# Patient Record
Sex: FEMALE | Race: OTHER
[De-identification: names, ages, dates, MRNs, and addresses within clinical notes are randomized per-mention and may not be internally consistent; named-entity substitution may affect disease eponyms.]

---

## 2019-10-21 ENCOUNTER — HOSPITAL ENCOUNTER (OUTPATIENT)
Dept: HOSPITAL 62 - ER | Age: 49
Setting detail: OBSERVATION
LOS: 3 days | Discharge: HOME | End: 2019-10-24
Attending: FAMILY MEDICINE | Admitting: FAMILY MEDICINE
Payer: COMMERCIAL

## 2019-10-21 DIAGNOSIS — Z79.899: ICD-10-CM

## 2019-10-21 DIAGNOSIS — Z91.81: ICD-10-CM

## 2019-10-21 DIAGNOSIS — Z73.3: ICD-10-CM

## 2019-10-21 DIAGNOSIS — R79.89: ICD-10-CM

## 2019-10-21 DIAGNOSIS — G89.29: ICD-10-CM

## 2019-10-21 DIAGNOSIS — Z82.49: ICD-10-CM

## 2019-10-21 DIAGNOSIS — E03.9: ICD-10-CM

## 2019-10-21 DIAGNOSIS — M54.9: ICD-10-CM

## 2019-10-21 DIAGNOSIS — E66.9: ICD-10-CM

## 2019-10-21 DIAGNOSIS — Z59.8: ICD-10-CM
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
## 2019-10-21 DIAGNOSIS — E11.42: ICD-10-CM

## 2019-10-21 DIAGNOSIS — R51: ICD-10-CM

## 2019-10-21 DIAGNOSIS — L97.509: ICD-10-CM

## 2019-10-21 DIAGNOSIS — R00.0: ICD-10-CM

## 2019-10-21 DIAGNOSIS — I12.9: ICD-10-CM

## 2019-10-21 DIAGNOSIS — R07.89: ICD-10-CM

## 2019-10-21 DIAGNOSIS — E11.621: ICD-10-CM

## 2019-10-21 DIAGNOSIS — M19.90: ICD-10-CM

## 2019-10-21 DIAGNOSIS — I95.1: Primary | ICD-10-CM

## 2019-10-21 DIAGNOSIS — N18.9: ICD-10-CM

## 2019-10-21 DIAGNOSIS — Z79.4: ICD-10-CM

## 2019-10-21 DIAGNOSIS — Z23: ICD-10-CM

## 2019-10-21 DIAGNOSIS — S92.502B: ICD-10-CM

## 2019-10-21 DIAGNOSIS — R29.6: ICD-10-CM

## 2019-10-21 DIAGNOSIS — Z79.891: ICD-10-CM

## 2019-10-21 DIAGNOSIS — Z79.82: ICD-10-CM

## 2019-10-21 DIAGNOSIS — E78.5: ICD-10-CM

## 2019-10-21 DIAGNOSIS — E11.22: ICD-10-CM

## 2019-10-21 DIAGNOSIS — D72.829: ICD-10-CM

## 2019-10-21 DIAGNOSIS — N17.9: ICD-10-CM

## 2019-10-21 DIAGNOSIS — L03.032: ICD-10-CM

## 2019-10-21 DIAGNOSIS — F32.9: ICD-10-CM

## 2019-10-21 DIAGNOSIS — E11.65: ICD-10-CM

## 2019-10-21 PROCEDURE — 82962 GLUCOSE BLOOD TEST: CPT

## 2019-10-21 PROCEDURE — 96361 HYDRATE IV INFUSION ADD-ON: CPT

## 2019-10-21 PROCEDURE — G0378 HOSPITAL OBSERVATION PER HR: HCPCS

## 2019-10-21 PROCEDURE — 85379 FIBRIN DEGRADATION QUANT: CPT

## 2019-10-21 PROCEDURE — 83036 HEMOGLOBIN GLYCOSYLATED A1C: CPT

## 2019-10-21 PROCEDURE — 93005 ELECTROCARDIOGRAM TRACING: CPT

## 2019-10-21 PROCEDURE — 82803 BLOOD GASES ANY COMBINATION: CPT

## 2019-10-21 PROCEDURE — 99285 EMERGENCY DEPT VISIT HI MDM: CPT

## 2019-10-21 PROCEDURE — A9567 TECHNETIUM TC-99M AEROSOL: HCPCS

## 2019-10-21 PROCEDURE — 96365 THER/PROPH/DIAG IV INF INIT: CPT

## 2019-10-21 PROCEDURE — 81001 URINALYSIS AUTO W/SCOPE: CPT

## 2019-10-21 PROCEDURE — 85610 PROTHROMBIN TIME: CPT

## 2019-10-21 PROCEDURE — 73630 X-RAY EXAM OF FOOT: CPT

## 2019-10-21 PROCEDURE — 78582 LUNG VENTILAT&PERFUS IMAGING: CPT

## 2019-10-21 PROCEDURE — A9540 TC99M MAA: HCPCS

## 2019-10-21 PROCEDURE — 93880 EXTRACRANIAL BILAT STUDY: CPT

## 2019-10-21 PROCEDURE — 96375 TX/PRO/DX INJ NEW DRUG ADDON: CPT

## 2019-10-21 PROCEDURE — 71045 X-RAY EXAM CHEST 1 VIEW: CPT

## 2019-10-21 PROCEDURE — 85025 COMPLETE CBC W/AUTO DIFF WBC: CPT

## 2019-10-21 PROCEDURE — 90686 IIV4 VACC NO PRSV 0.5 ML IM: CPT

## 2019-10-21 PROCEDURE — 84484 ASSAY OF TROPONIN QUANT: CPT

## 2019-10-21 PROCEDURE — 85027 COMPLETE CBC AUTOMATED: CPT

## 2019-10-21 PROCEDURE — 87040 BLOOD CULTURE FOR BACTERIA: CPT

## 2019-10-21 PROCEDURE — 82553 CREATINE MB FRACTION: CPT

## 2019-10-21 PROCEDURE — 93010 ELECTROCARDIOGRAM REPORT: CPT

## 2019-10-21 PROCEDURE — 82550 ASSAY OF CK (CPK): CPT

## 2019-10-21 PROCEDURE — 80053 COMPREHEN METABOLIC PANEL: CPT

## 2019-10-21 PROCEDURE — 36415 COLL VENOUS BLD VENIPUNCTURE: CPT

## 2019-10-21 PROCEDURE — 70450 CT HEAD/BRAIN W/O DYE: CPT

## 2019-10-21 PROCEDURE — 80048 BASIC METABOLIC PNL TOTAL CA: CPT

## 2019-10-21 PROCEDURE — 83605 ASSAY OF LACTIC ACID: CPT

## 2019-10-21 SDOH — ECONOMIC STABILITY - INCOME SECURITY: OTHER PROBLEMS RELATED TO HOUSING AND ECONOMIC CIRCUMSTANCES: Z59.8

## 2019-10-22 LAB
ADD MANUAL DIFF: NO
ADD MANUAL DIFF: NO
ALBUMIN SERPL-MCNC: 3.8 G/DL (ref 3.5–5)
ALP SERPL-CCNC: 115 U/L (ref 38–126)
ANION GAP SERPL CALC-SCNC: 14 MMOL/L (ref 5–19)
ANION GAP SERPL CALC-SCNC: 15 MMOL/L (ref 5–19)
AST SERPL-CCNC: 14 U/L (ref 14–36)
BASE EXCESS BLDV CALC-SCNC: -7.5 MMOL/L
BASOPHILS # BLD AUTO: 0.1 10^3/UL (ref 0–0.2)
BASOPHILS # BLD AUTO: 0.2 10^3/UL (ref 0–0.2)
BASOPHILS NFR BLD AUTO: 1.1 % (ref 0–2)
BASOPHILS NFR BLD AUTO: 1.3 % (ref 0–2)
BILIRUB DIRECT SERPL-MCNC: 0.4 MG/DL (ref 0–0.4)
BILIRUB SERPL-MCNC: 0.4 MG/DL (ref 0.2–1.3)
BUN SERPL-MCNC: 60 MG/DL (ref 7–20)
BUN SERPL-MCNC: 63 MG/DL (ref 7–20)
CALCIUM: 9.5 MG/DL (ref 8.4–10.2)
CALCIUM: 9.9 MG/DL (ref 8.4–10.2)
CHLORIDE SERPL-SCNC: 101 MMOL/L (ref 98–107)
CHLORIDE SERPL-SCNC: 101 MMOL/L (ref 98–107)
CK MB SERPL-MCNC: 2.16 NG/ML (ref ?–4.55)
CK SERPL-CCNC: 92 U/L (ref 30–135)
CO2 SERPL-SCNC: 19 MMOL/L (ref 22–30)
CO2 SERPL-SCNC: 20 MMOL/L (ref 22–30)
EOSINOPHIL # BLD AUTO: 1.6 10^3/UL (ref 0–0.6)
EOSINOPHIL # BLD AUTO: 1.7 10^3/UL (ref 0–0.6)
EOSINOPHIL NFR BLD AUTO: 12.5 % (ref 0–6)
EOSINOPHIL NFR BLD AUTO: 15.3 % (ref 0–6)
ERYTHROCYTE [DISTWIDTH] IN BLOOD BY AUTOMATED COUNT: 14.1 % (ref 11.5–14)
ERYTHROCYTE [DISTWIDTH] IN BLOOD BY AUTOMATED COUNT: 14.1 % (ref 11.5–14)
GLUCOSE SERPL-MCNC: 118 MG/DL (ref 75–110)
GLUCOSE SERPL-MCNC: 217 MG/DL (ref 75–110)
HCO3 BLDV-SCNC: 19.7 MMOL/L (ref 20–32)
HCT VFR BLD CALC: 36.8 % (ref 36–47)
HCT VFR BLD CALC: 39.9 % (ref 36–47)
HGB BLD-MCNC: 12.1 G/DL (ref 12–15.5)
HGB BLD-MCNC: 12.7 G/DL (ref 12–15.5)
INR PPP: 1.01
LYMPHOCYTES # BLD AUTO: 2.2 10^3/UL (ref 0.5–4.7)
LYMPHOCYTES # BLD AUTO: 2.4 10^3/UL (ref 0.5–4.7)
LYMPHOCYTES NFR BLD AUTO: 18.7 % (ref 13–45)
LYMPHOCYTES NFR BLD AUTO: 19.8 % (ref 13–45)
MCH RBC QN AUTO: 28.4 PG (ref 27–33.4)
MCH RBC QN AUTO: 28.9 PG (ref 27–33.4)
MCHC RBC AUTO-ENTMCNC: 31.9 G/DL (ref 32–36)
MCHC RBC AUTO-ENTMCNC: 32.8 G/DL (ref 32–36)
MCV RBC AUTO: 88 FL (ref 80–97)
MCV RBC AUTO: 89 FL (ref 80–97)
MONOCYTES # BLD AUTO: 0.6 10^3/UL (ref 0.1–1.4)
MONOCYTES # BLD AUTO: 0.6 10^3/UL (ref 0.1–1.4)
MONOCYTES NFR BLD AUTO: 4.4 % (ref 3–13)
MONOCYTES NFR BLD AUTO: 5.1 % (ref 3–13)
NEUTROPHILS # BLD AUTO: 6.5 10^3/UL (ref 1.7–8.2)
NEUTROPHILS # BLD AUTO: 8.1 10^3/UL (ref 1.7–8.2)
NEUTS SEG NFR BLD AUTO: 58.7 % (ref 42–78)
NEUTS SEG NFR BLD AUTO: 63.1 % (ref 42–78)
PCO2 BLDV: 47 MMHG (ref 35–63)
PH BLDV: 7.24 [PH] (ref 7.3–7.42)
PLATELET # BLD: 358 10^3/UL (ref 150–450)
PLATELET # BLD: 433 10^3/UL (ref 150–450)
POTASSIUM SERPL-SCNC: 4.6 MMOL/L (ref 3.6–5)
POTASSIUM SERPL-SCNC: 5.4 MMOL/L (ref 3.6–5)
PROT SERPL-MCNC: 7.4 G/DL (ref 6.3–8.2)
PROTHROMBIN TIME: 13.3 SEC (ref 11.4–15.4)
RBC # BLD AUTO: 4.19 10^6/UL (ref 3.72–5.28)
RBC # BLD AUTO: 4.48 10^6/UL (ref 3.72–5.28)
TOTAL CELLS COUNTED % (AUTO): 100 %
TOTAL CELLS COUNTED % (AUTO): 100 %
TROPONIN I SERPL-MCNC: < 0.012 NG/ML
WBC # BLD AUTO: 11.1 10^3/UL (ref 4–10.5)
WBC # BLD AUTO: 12.9 10^3/UL (ref 4–10.5)

## 2019-10-22 RX ADMIN — SODIUM CHLORIDE, SODIUM LACTATE, POTASSIUM CHLORIDE, AND CALCIUM CHLORIDE PRN MLS/HR: .6; .31; .03; .02 INJECTION, SOLUTION INTRAVENOUS at 01:20

## 2019-10-22 RX ADMIN — ASPIRIN SCH MG: 81 TABLET, COATED ORAL at 21:05

## 2019-10-22 RX ADMIN — HYDROCODONE BITARTRATE AND ACETAMINOPHEN PRN TAB: 10; 325 TABLET ORAL at 21:05

## 2019-10-22 RX ADMIN — BUPROPION HYDROCHLORIDE SCH MG: 75 TABLET, FILM COATED ORAL at 17:48

## 2019-10-22 RX ADMIN — INSULIN LISPRO SCH UNIT: 100 INJECTION, SOLUTION INTRAVENOUS; SUBCUTANEOUS at 15:04

## 2019-10-22 RX ADMIN — CYCLOBENZAPRINE HYDROCHLORIDE SCH MG: 10 TABLET, FILM COATED ORAL at 17:47

## 2019-10-22 RX ADMIN — SODIUM CHLORIDE, SODIUM LACTATE, POTASSIUM CHLORIDE, AND CALCIUM CHLORIDE PRN MLS/HR: .6; .31; .03; .02 INJECTION, SOLUTION INTRAVENOUS at 01:02

## 2019-10-22 RX ADMIN — Medication SCH: at 15:07

## 2019-10-22 RX ADMIN — HEPARIN SODIUM SCH UNIT: 5000 INJECTION, SOLUTION INTRAVENOUS; SUBCUTANEOUS at 21:13

## 2019-10-22 RX ADMIN — CLINDAMYCIN PHOSPHATE SCH MLS/HR: 6 INJECTION, SOLUTION INTRAVENOUS at 17:47

## 2019-10-22 RX ADMIN — PREGABALIN SCH MG: 100 CAPSULE ORAL at 15:03

## 2019-10-22 RX ADMIN — NYSTATIN SCH APPLIC: 100000 POWDER TOPICAL at 17:47

## 2019-10-22 RX ADMIN — INSULIN LISPRO SCH: 100 INJECTION, SOLUTION INTRAVENOUS; SUBCUTANEOUS at 21:22

## 2019-10-22 RX ADMIN — PREGABALIN SCH MG: 100 CAPSULE ORAL at 21:05

## 2019-10-22 NOTE — RADIOLOGY REPORT (SQ)
EXAM DESCRIPTION:  CAROTID DOPPLER



COMPLETED DATE/TIME:  10/22/2019 5:05 pm



REASON FOR STUDY:  syncope



COMPARISON:  CT brain 10/22/2019



TECHNIQUE:  Grayscale ultrasound, Doppler velocity and spectra, and color Doppler images acquired of 
the extra-cranial carotid and vertebral arteries. Images stored on PACS.



LIMITATIONS:  None.



FINDINGS:  RIGHT CAROTID

CCA Velocities: Within normal limits.  Right common carotid artery peak systolic velocity 1.1 m/sec

ICA Velocities

 Peak systolic 1.1 m/s.

 End diastolic 0.25 m/s.

Proximal ICA/CCA peak systolic ratio normal.

Spectra normal. No significant plaque.

LEFT CAROTID

CCA Velocities: Within normal limits.  Left common carotid artery peak systolic velocity 0.98 m/sec

ICA Velocities

 Peak systolic 0.87 m/s.

 End diastolic 0.34 m/s.

Proximal ICA/CCA peak systolic ratio normal.

Spectra normal. No significant plaque.

VERTEBRAL ARTERIES: Antegrade flow.  Normal waveforms.

SUBCLAVIAN ARTERIES: Not evaluated

OTHER: No other significant finding.



IMPRESSION:  No flow significant stenosis at the carotid bifurcations.



COMMENT:  Quality ID #[id number]:  Velocity criteria are extrapolated from the diameter data as defined by t
he Society of Radiologists in Ultrasound Consensus Conference. Radiology 2003: 229; 340-346.



TECHNICAL DOCUMENTATION:  JOB ID:  [id number]

 2011 [employer]- All Rights Reserved



Reading location - IP/workstation name: [email or web address]

## 2019-10-22 NOTE — RADIOLOGY REPORT (SQ)
EXAM DESCRIPTION: 



XR CHEST 1 VIEW



COMPLETED DATE/TME:  10/22/2019 02:11



CLINICAL HISTORY: 



49 years, Female, CHEST PAIN



COMPARISON:

None.



NUMBER OF VIEWS:

One



TECHNIQUE:

AP view of the chest



LIMITATIONS:

None.



FINDINGS:



The lungs are clear. The heart is normal in size. There is no

pneumothorax or pleural effusion. The bones are unremarkable.



IMPRESSION:



No acute cardiopulmonary abnormality

 



copyright 2011 [employer]- All Rights Reserved

## 2019-10-22 NOTE — RADIOLOGY REPORT (SQ)
EXAM DESCRIPTION:

CT HEAD WITHOUT IV CONTRAST



COMPLETED DATE/TME:  10/22/2019 00:43



CLINICAL HISTORY:

49 years Female, Fall with worsening HA/HI



COMPARISON:

None.



TECHNIQUE:  No contrast.  Coronal and sagittal reformat.  This

exam was performed according to our departmental

dose-optimization program, which includes automated exposure

control, adjustment of the mA and/or kV according to patient size

and/or use of iterative reconstruction technique.



FINDINGS:  No hemorrhage or infarct. No mass, mass effect, or

midline shift. Brain and extra-axial structures appear intact.



IMPRESSION: Normal CT of the head.

## 2019-10-22 NOTE — PDOC H&P
History of Present Illness


Admission Date/PCP: 


10/22/19 05:45


No local PCP


Patient complains of: Syncopal episodes


History of Present Illness: 


NERISSA SHAFFER is a 49 year old female who comes to the emergency room with a 

several week history of syncope and near syncope episodes.  She admits having 

numerous syncopal and near syncopal episodes over the last several weeks.  She 

relates the syncopal episodes to a lot of personal life stress that involves 

severe financial problems.  The syncopal episodes and near syncopal episodes 

have been accompanied by chest pain of a intermittent pressure nature usually in

the left anterolateral chest but occasionally being of a sharp pleuritic nature.

 Additional symptoms associated with her syncopal episodes include a left 

frontal temporal headache and an injured left fifth toe which is becoming red 

swollen tender and has been bleeding on the bottom aspect of the toe.  She 

describes the syncopal episodes as severe blackout spells or near blackout 

spells lasting for several minutes and resolving spontaneously.  She admits 

falls associated with her syncopal episodes that are resulted in numerous 

bruises and other similar injuries.  She has not identified any additional 

aggravating or ameliorating factors for her syncopal episodes.  She admits prior

similar episodes in the past when she has been under a lot of stress.  In the 

emergency room she was found to have mild hypotension improved with IV fluids, 

acute renal injury and a negative VQ scan.  She was subsequently admitted to 

hospital for further evaluation and treatment.





Past Medical History


Cardiac Medical History: Reports: Hyperlipidema, Hypertension, Other - 

Palpitations


   Denies: Atrial Fibrillation, Congestive Heart Failure, Coronary Artery 

Disease, Myocardial Infarction


Pulmonary Medical History: 


   Denies: Asthma, Chronic Obstructive Pulmonary Disease (COPD)


EENT Medical History: Reports: Nose - Allergic rhinitis


   Denies: Ears - Hearing aids


Neurological Medical History: Reports: Other - Diabetic peripheral neuropathy


   Denies: Hemorrhagic CVA, Ischemic CVA, Seizures


Endocrine Medical History: Reports: Diabetes Mellitus Type 2, Hypothyroidism


Renal/ Medical History: Reports: Chronic Kidney Disease


   Denies: Nephrolithiasis


Malignancy Medical History: Reports: None


GI Medical History: 


   Denies: Cirrhosis, Hepatitis


Musculoskeltal Medical History: Reports: Other - Chronic back pain


   Denies: Arthritis, Gout


Skin Medical History: 


   Denies: Eczema, Psoriasis


Psychiatric Medical History: 


   Denies: Alcohol Dependency, Substance Abuse


Traumatic Medical History: Reports: None


Hematology: 


   Denies: Anemia, Bleeding Tendencies


Infectious Medical History: Reports: None





Past Surgical History


Past Surgical History: Reports: Hysterectomy, Tubal Ligation





Social History


Information Source: Patient


Lives with: Family


Smoking Status: Never Smoker


Electronic Cigarette use?: No


Frequency of Alcohol Use: Rare


Hx Recreational Drug Use: No


Drugs: None


Hx Prescription Drug Abuse: No





- Advance Directive


Resuscitation Status: Full Code


Surrogate healthcare decision maker:: 


Manuela Handley





Family History


Family History: CAD, DM, Hyperlipidemia, Hypertension, Malignancy, Thyroid 

Disfunction


Parental Family History Reviewed: Yes


Children Family History Reviewed: No


Sibling(s) Family History Reviewed.: Yes





Medication/Allergy


Allergies/Adverse Reactions: 


                                        





No Known Allergies Allergy (Unverified 10/22/19 00:10)


   











Review of Systems


Constitutional: ABSENT: chills, fever(s)


Eyes: ABSENT: visual disturbances, other - Ocular pain


Ears: ABSENT: hearing changes, other - Ear pain


Nose, Mouth, and Throat: ABSENT: mouth pain, sore throat


Cardiovascular: PRESENT: as per HPI, chest pain, palpitations, other - Syncope 

and near syncope


Respiratory: ABSENT: cough, dyspnea


Gastrointestinal: ABSENT: abdominal pain, constipation, diarrhea, nausea, 

vomiting


Genitourinary: ABSENT: dysuria, hematuria


Musculoskeletal: PRESENT: back pain - Chronic.  ABSENT: joint swelling, muscle 

weakness


Integumentary: ABSENT: pruritus, rash


Neurological: PRESENT: as per HPI, syncope.  ABSENT: confusion, convulsions, 

focal weakness, memory loss


Psychiatric: PRESENT: as per HPI, other - Under a lot of psychological stress 

related to bankruptcy's.  ABSENT: anxiety, depression


Endocrine: ABSENT: cold intolerance, heat intolerance


Hematologic/Lymphatic: ABSENT: easy bleeding, easy bruising


Allergic/Immunologic: ABSENT: seasonal rhinorrhea





Physical Exam


Vital Signs: 


                                        











Temp Pulse Resp BP Pulse Ox


 


 97.6 F   115 H  18   115/74   98 


 


 10/22/19 07:00  10/21/19 23:15  10/22/19 07:01  10/22/19 07:00  10/22/19 07:01








                                 Intake & Output











 10/20/19 10/21/19 10/22/19





 23:59 23:59 23:59


 


Intake Total   2300


 


Balance   2300


 


Weight  115.4 kg 











General appearance: PRESENT: no acute distress, cooperative, obese


Head exam: PRESENT: atraumatic, normocephalic


Eye exam: PRESENT: conjunctiva pink.  ABSENT: conjunctival injection, scleral 

icterus


Ear exam: PRESENT: normal external ear exam.  ABSENT: bleeding, drainage


Mouth exam: PRESENT: dry mucosa, neck supple


Neck exam: ABSENT: thyromegaly, tracheal deviation


Respiratory exam: PRESENT: clear to auscultation silverio, symmetrical, unlabored


Cardiovascular exam: PRESENT: RRR.  ABSENT: clicks, gallop, rubs


Pulses: PRESENT: normal radial pulses, normal dorsalis pedis pul


Vascular exam: PRESENT: normal capillary refill.  ABSENT: pallor


GI/Abdominal exam: PRESENT: normal bowel sounds, soft


Rectal exam: PRESENT: deferred


Extremities exam: ABSENT: joint swelling, pedal edema


Musculoskeletal exam: ABSENT: deformity, dislocation


Neurological exam: PRESENT: alert, oriented to person, oriented to place, 

oriented to time, oriented to situation, CN II-XII grossly intact.  ABSENT: 

motor sensory deficit


Psychiatric exam: PRESENT: appropriate affect, normal mood


Skin exam: PRESENT: dry, intact, warm.  ABSENT: jaundice, rash, urticaria





Results


Laboratory Results: 


                                        





                                 10/22/19 00:30 





                                 10/22/19 00:30 





                                        











  10/22/19 10/22/19 10/22/19





  00:30 00:30 00:30


 


WBC  12.9 H  


 


RBC  4.48  


 


Hgb  12.7  


 


Hct  39.9  


 


MCV  89  


 


MCH  28.4  


 


MCHC  31.9 L  


 


RDW  14.1 H  


 


Plt Count  433  


 


Seg Neutrophils %  63.1  


 


VBG pH   


 


VBG pCO2   


 


VBG HCO3   


 


VBG Base Excess   


 


Sodium   135.2 L 


 


Potassium   5.4 H 


 


Chloride   101 


 


Carbon Dioxide   19 L 


 


Anion Gap   15 


 


BUN   63 H 


 


Creatinine   2.38 H 


 


Est GFR ( Amer)   26 L 


 


Glucose   217 H 


 


Lactic Acid    1.8


 


Calcium   9.9 


 


Total Bilirubin   0.4 


 


AST   14 


 


Alkaline Phosphatase   115 


 


Total Protein   7.4 


 


Albumin   3.8 














  10/22/19





  01:20


 


WBC 


 


RBC 


 


Hgb 


 


Hct 


 


MCV 


 


MCH 


 


MCHC 


 


RDW 


 


Plt Count 


 


Seg Neutrophils % 


 


VBG pH  7.24 L


 


VBG pCO2  47.0


 


VBG HCO3  19.7 L


 


VBG Base Excess  -7.5


 


Sodium 


 


Potassium 


 


Chloride 


 


Carbon Dioxide 


 


Anion Gap 


 


BUN 


 


Creatinine 


 


Est GFR (African Amer) 


 


Glucose 


 


Lactic Acid 


 


Calcium 


 


Total Bilirubin 


 


AST 


 


Alkaline Phosphatase 


 


Total Protein 


 


Albumin 








                                        











  10/22/19 10/22/19 10/22/19





  00:30 00:30 02:47


 


Creatine Kinase  92  


 


CK-MB (CK-2)   2.16 


 


Troponin I   < 0.012  < 0.012











Impressions: 


                                        





Head CT  10/22/19 00:43


IMPRESSION: Normal CT of the head.


 








Foot X-Ray  10/22/19 00:45


IMPRESSION:


 


Fracture of the proximal phalanx of the fifth toe.


 


 


copyright 2011 [employer]- All Rights Reserved


 








Chest X-Ray  10/22/19 02:11


IMPRESSION:


 


No acute cardiopulmonary abnormality


 


 


copyright 2011 [employer]- All Rights Reserved


 








Lung Scan-VQ NM  10/22/19 02:33


IMPRESSION: Very low probability for pulmonary embolus.


 














Assessment and Plan





- Diagnosis


(1) Syncope


Qualifiers: 


   Syncope type: unspecified   Qualified Code(s): R55 - Syncope and collapse   


Is this a current diagnosis for this admission?: Yes   


Plan: 


Patient syncope will be evaluated by engaging Dr. Garcia in consultation.  

Serial cardiac enzymes will be performed and the patient will be observed on 

telemetry monitor.  Her renal insufficiency and intravascular volume depletion 

will be addressed with IV fluids and at that point her syncopal status will be 

reassessed.








(2) Acute kidney injury superimposed on chronic kidney disease


Is this a current diagnosis for this admission?: Yes   


Plan: 


Patient will be treated with IV fluids and evaluated on an ongoing basis.  She 

was monitored with daily metabolic profiles.








(3) Fracture of fifth toe, left, open


Qualifiers: 


   Encounter type: initial encounter   Qualified Code(s): S92.502B - Displaced 

unspecified fracture of left lesser toe(s), initial encounter for open fracture 

 


Is this a current diagnosis for this admission?: Yes   


Plan: 


Wound will be dressed with antibiotic ointment and a Telfa pad between the 

fourth and fifth toes with sukumar taping of the fifth toe pin to the fourth toe. 

Orthopedic consultation may be obtained at the discretion of the daytime 

hospitalist.








(4) Cellulitis of fifth toe, left


Is this a current diagnosis for this admission?: Yes   


Plan: 


Patient will be treated with linezolid and Zosyn for antibiotic therapy.  Her 

progress and followed with a daily CBC








- Time


Time Spent with patient: 25-34 minutes


Medications reviewed and adjusted accordingly: Yes


Anticipated discharge: Home





- Inpatient Certification


Based on my medical assessment, after consideration of the patient's 

comorbidities, presenting symptoms, or acuity I expect that the services needed 

warrant INPATIENT care.: Yes


I certify that my determination is in accordance with my understanding of 

Medicare's requirements for reasonable and necessary INPATIENT services [42 CFR 

412.3e].: Yes


Medical Necessity: Need Close Monitoring Due to Risk of Patient Decompensation, 

Need For IV Fluids, Need For Continuous Telemetry Monitoring, Need for Pain 

Control, Need for IV Antibiotics, Risk of Complication if Not Cared For in 

Hospital

## 2019-10-22 NOTE — RADIOLOGY REPORT (SQ)
V/Q scan on  10/22/2019 at 1:34 AM 



CLINICAL INDICATION: Chest pain, elevated d-dimer



COMPARISON: Chest x-ray from 10/22/2019



FINDINGS: Following the oral inhalation of 32.6 mCi of Tc 99m-

DTPA aerosolized, multiple projections of the chest are obtained.

Then following the intravenous administration of 5.44 mCi of Tc

99m- MAA, the same multiple projections of the chest are

obtained. Good ventilation and perfusion is noted bilaterally.

There is no area of perfusion that is worse than ventilation.



IMPRESSION: Very low probability for pulmonary embolus.

## 2019-10-22 NOTE — RADIOLOGY REPORT (SQ)
EXAM DESCRIPTION: 



XR FOOT 3 OR MORE VIEWS



COMPLETED DATE/TME:  10/22/2019 00:45



CLINICAL HISTORY: 



49 years, Female, 5th toe injury



COMPARISON:

None.



NUMBER OF VIEWS:





TECHNIQUE:





LIMITATIONS:

None.



FINDINGS:



3 views of the left foot were obtained.



There is fracture of the distal aspect of the proximal phalanx of

the fifth toe, with mild lateral displacement.



There is a plantar calcaneal spur.



IMPRESSION:



Fracture of the proximal phalanx of the fifth toe.

 



copyright 2011 [employer]- All Rights Reserved

## 2019-10-22 NOTE — EKG REPORT
SEVERITY:- BORDERLINE ECG -

SINUS TACHYCARDIA

BORDERLINE T ABNORMALITIES, INFERIOR LEADS

:

Confirmed by: Verónica Garcia MD 22-Oct-2019 09:23:51

## 2019-10-23 LAB
ANION GAP SERPL CALC-SCNC: 12 MMOL/L (ref 5–19)
APPEARANCE UR: CLEAR
APTT PPP: (no result) S
BILIRUB UR QL STRIP: NEGATIVE
BUN SERPL-MCNC: 47 MG/DL (ref 7–20)
CALCIUM: 9.6 MG/DL (ref 8.4–10.2)
CHLORIDE SERPL-SCNC: 105 MMOL/L (ref 98–107)
CO2 SERPL-SCNC: 21 MMOL/L (ref 22–30)
ERYTHROCYTE [DISTWIDTH] IN BLOOD BY AUTOMATED COUNT: 14.6 % (ref 11.5–14)
GLUCOSE SERPL-MCNC: 88 MG/DL (ref 75–110)
GLUCOSE UR STRIP-MCNC: NEGATIVE MG/DL
HCT VFR BLD CALC: 36.1 % (ref 36–47)
HGB BLD-MCNC: 11.9 G/DL (ref 12–15.5)
KETONES UR STRIP-MCNC: NEGATIVE MG/DL
MCH RBC QN AUTO: 29.2 PG (ref 27–33.4)
MCHC RBC AUTO-ENTMCNC: 32.8 G/DL (ref 32–36)
MCV RBC AUTO: 89 FL (ref 80–97)
NITRITE UR QL STRIP: NEGATIVE
PH UR STRIP: 5 [PH] (ref 5–9)
PLATELET # BLD: 346 10^3/UL (ref 150–450)
POTASSIUM SERPL-SCNC: 4.7 MMOL/L (ref 3.6–5)
PROT UR STRIP-MCNC: NEGATIVE MG/DL
RBC # BLD AUTO: 4.06 10^6/UL (ref 3.72–5.28)
SP GR UR STRIP: 1.01
UROBILINOGEN UR-MCNC: NEGATIVE MG/DL (ref ?–2)
WBC # BLD AUTO: 9.9 10^3/UL (ref 4–10.5)

## 2019-10-23 RX ADMIN — HYDROCODONE BITARTRATE AND ACETAMINOPHEN PRN TAB: 10; 325 TABLET ORAL at 15:46

## 2019-10-23 RX ADMIN — PANTOPRAZOLE SODIUM SCH MG: 40 TABLET, DELAYED RELEASE ORAL at 08:36

## 2019-10-23 RX ADMIN — Medication SCH: at 00:22

## 2019-10-23 RX ADMIN — HYDROCODONE BITARTRATE AND ACETAMINOPHEN PRN TAB: 10; 325 TABLET ORAL at 23:08

## 2019-10-23 RX ADMIN — PREGABALIN SCH MG: 100 CAPSULE ORAL at 20:40

## 2019-10-23 RX ADMIN — CYCLOBENZAPRINE HYDROCHLORIDE SCH MG: 10 TABLET, FILM COATED ORAL at 09:21

## 2019-10-23 RX ADMIN — INSULIN LISPRO SCH: 100 INJECTION, SOLUTION INTRAVENOUS; SUBCUTANEOUS at 23:11

## 2019-10-23 RX ADMIN — SIMVASTATIN SCH MG: 10 TABLET, FILM COATED ORAL at 00:26

## 2019-10-23 RX ADMIN — NYSTATIN SCH: 100000 POWDER TOPICAL at 17:00

## 2019-10-23 RX ADMIN — HYDROCODONE BITARTRATE AND ACETAMINOPHEN PRN TAB: 10; 325 TABLET ORAL at 08:28

## 2019-10-23 RX ADMIN — SODIUM CHLORIDE PRN MLS/HR: 9 INJECTION, SOLUTION INTRAVENOUS at 17:02

## 2019-10-23 RX ADMIN — METOPROLOL TARTRATE SCH MG: 25 TABLET, FILM COATED ORAL at 16:56

## 2019-10-23 RX ADMIN — Medication SCH: at 23:35

## 2019-10-23 RX ADMIN — NYSTATIN SCH: 100000 POWDER TOPICAL at 10:00

## 2019-10-23 RX ADMIN — INSULIN LISPRO SCH: 100 INJECTION, SOLUTION INTRAVENOUS; SUBCUTANEOUS at 12:12

## 2019-10-23 RX ADMIN — CLINDAMYCIN PHOSPHATE SCH MLS/HR: 6 INJECTION, SOLUTION INTRAVENOUS at 17:00

## 2019-10-23 RX ADMIN — SIMVASTATIN SCH MG: 10 TABLET, FILM COATED ORAL at 23:08

## 2019-10-23 RX ADMIN — INSULIN LISPRO SCH UNIT: 100 INJECTION, SOLUTION INTRAVENOUS; SUBCUTANEOUS at 16:55

## 2019-10-23 RX ADMIN — NYSTATIN SCH: 100000 POWDER TOPICAL at 13:34

## 2019-10-23 RX ADMIN — HEPARIN SODIUM SCH UNIT: 5000 INJECTION, SOLUTION INTRAVENOUS; SUBCUTANEOUS at 23:10

## 2019-10-23 RX ADMIN — HEPARIN SODIUM SCH UNIT: 5000 INJECTION, SOLUTION INTRAVENOUS; SUBCUTANEOUS at 09:21

## 2019-10-23 RX ADMIN — LEVOTHYROXINE SODIUM SCH MG: 100 TABLET ORAL at 06:08

## 2019-10-23 RX ADMIN — CYCLOBENZAPRINE HYDROCHLORIDE SCH MG: 10 TABLET, FILM COATED ORAL at 17:00

## 2019-10-23 RX ADMIN — Medication SCH: at 13:04

## 2019-10-23 RX ADMIN — ASPIRIN SCH MG: 81 TABLET, COATED ORAL at 23:08

## 2019-10-23 RX ADMIN — Medication SCH: at 05:45

## 2019-10-23 RX ADMIN — PREGABALIN SCH MG: 100 CAPSULE ORAL at 08:36

## 2019-10-23 RX ADMIN — SODIUM CHLORIDE PRN MLS/HR: 9 INJECTION, SOLUTION INTRAVENOUS at 08:36

## 2019-10-23 RX ADMIN — INSULIN LISPRO SCH: 100 INJECTION, SOLUTION INTRAVENOUS; SUBCUTANEOUS at 08:25

## 2019-10-23 RX ADMIN — BUPROPION HYDROCHLORIDE SCH MG: 75 TABLET, FILM COATED ORAL at 17:00

## 2019-10-23 RX ADMIN — CLINDAMYCIN PHOSPHATE SCH MLS/HR: 6 INJECTION, SOLUTION INTRAVENOUS at 09:21

## 2019-10-23 RX ADMIN — NYSTATIN SCH APPLIC: 100000 POWDER TOPICAL at 09:59

## 2019-10-23 RX ADMIN — CLINDAMYCIN PHOSPHATE SCH MLS/HR: 6 INJECTION, SOLUTION INTRAVENOUS at 01:57

## 2019-10-23 RX ADMIN — BUPROPION HYDROCHLORIDE SCH MG: 75 TABLET, FILM COATED ORAL at 09:21

## 2019-10-23 NOTE — PDOC CONSULTATION
Consultation


Consult Date: 10/23/19


Provider Consulted: ENEIDA ROBLES


Consult reason:: 49-year-old white female with multiple diabetic complications 

and a new left small toe proximal phalanx fracture.





History of Present Illness


Admission Date/PCP: 


  10/22/19 05:45





  





History of Present Illness: 


NERISSA SHAFFER is a 49 year old female


49-year-old female sustained trauma to her left foot.  Because of peripheral 

neuropathy she was one unaware of the trauma.  She reports deformity of the toe 

at one point.  X-rays here in [place] demonstrate a proximal phalanx fracture.





The patient also has foot complications from diabetes of the right foot.  She is

scheduled for surgery by Dr. Geo Reyes in [place].  We will leave those issues 

to that physician.





Past Medical History


Cardiac Medical History: Reports: Hyperlipidema, Hypertension, Other - 

Palpitations


   Denies: Atrial Fibrillation, Congestive Heart Failure, Coronary Artery 

Disease, Myocardial Infarction


Pulmonary Medical History: Reports: Asthma


   Denies: Chronic Obstructive Pulmonary Disease (COPD)


EENT Medical History: Reports: Nose - Allergic rhinitis


   Denies: Ears - Hearing aids


Neurological Medical History: Reports: Other - Diabetic peripheral neuropathy


   Denies: Hemorrhagic CVA, Ischemic CVA, Seizures


Endocrine Medical History: Reports: Diabetes Mellitus Type 2, Hypothyroidism


Renal/ Medical History: Reports: Chronic Kidney Disease


   Denies: Nephrolithiasis


Malignancy Medical History: Reports: None


GI Medical History: 


   Denies: Cirrhosis, Hepatitis


Musculoskeltal Medical History: Reports: Arthritis, Other - Chronic back pain


   Denies: Gout


Skin Medical History: 


   Denies: Eczema, Psoriasis


Psychiatric Medical History: Reports: Depression


   Denies: Alcohol Dependency, Substance Abuse


Traumatic Medical History: Reports: None


Hematology: Reports: Anemia


   Denies: Bleeding Tendencies


Infectious Medical History: Reports: None





Past Surgical History


Past Surgical History: Reports: Hysterectomy, Tubal Ligation





Social History


Information Source: Patient, [facility] Records


Lives with: Family


Smoking Status: Never Smoker


Electronic Cigarette use?: No


Frequency of Alcohol Use: Rare


Hx Recreational Drug Use: No


Drugs: None


Hx Prescription Drug Abuse: No





- Advance Directive


Resuscitation Status: Full Code





Family History


Family History: CAD, DM, Hyperlipidemia, Hypertension, Malignancy, Thyroid 

Disfunction


Parental Family History Reviewed: No


Children Family History Reviewed: No


Sibling(s) Family History Reviewed.: No





Medication/Allergy


Home Medications: 








Albuterol Sulfate [Ventolin Hfa 8 gm Mdi (1 Mdi/ER Disp)] 2 puff IH Q6HP PRN 

10/22/19 


Aspirin [Adult Low Dose Aspirin EC] 81 mg PO QHS 10/22/19 


Bupropion HCl [Wellbutrin Xl 150 mg 24hr Tablet] 150 mg PO DAILY 10/22/19 


Cyclobenzaprine HCl [Flexeril 10 mg Tablet] 10 mg PO BID 10/22/19 


Furosemide [Lasix 20 mg Tablet] 20 mg PO DAILY 10/22/19 


Glimepiride [Amaryl 4 mg Tablet] 4 mg PO BID 10/22/19 


Hydrochlorothiazide [Hydrodiuril 25 mg Tablet] 25 mg PO DAILY 10/22/19 


Hydrocodone/Acetaminophen [Norco  mg Tablet] 1 tab PO Q6HP PRN 10/22/19 


Insulin Aspart [Novolog] 0 unit SQ .SLD SCALE 10/22/19 


Insulin Glargine,Hum.rec.anlog [Toujeo Solostar] 120 unit SQ DAILY 10/22/19 


Levothyroxine Sodium 200 mcg PO Q6AM 10/22/19 


Lisinopril [Prinivil 40 mg Tablet] 40 mg PO DAILY 10/22/19 


Metformin HCl [Metformin HCl ER] 750 mg PO BID 10/22/19 


Naproxen [Naprosyn] 500 mg PO BID 10/22/19 


Nystatin [Mycostatin Topical Powder 15 gm] 1 applic TP TID 10/22/19 


Omeprazole Magnesium [Prilosec Otc] 40 mg PO DAILY 10/22/19 


Potassium Chloride 20 meq PO DAILY 10/22/19 


Pregabalin [Lyrica 100 Mg Capsule] 100 mg PO BID 10/22/19 


Simvastatin [Zocor 10 mg Tablet] 10 mg PO QHS 10/22/19 


Sitagliptin Phosphate [Januvia] 100 mg PO DAILY 10/22/19 








Allergies/Adverse Reactions: 


                                        





No Known Allergies Allergy (Unverified 10/22/19 00:10)


   











Review of Systems


All systems: as per PMH





Physical Exam


Vital Signs: 


                                        











Temp Pulse Resp BP Pulse Ox


 


 36.8 C   112 H  17   112/77   96 


 


 10/22/19 19:42  10/23/19 02:00  10/22/19 19:42  10/22/19 19:42  10/22/19 19:42








                                 Intake & Output











 10/21/19 10/22/19 10/23/19





 06:59 06:59 06:59


 


Intake Total  2000 640


 


Balance  2000 640


 


Weight  115.4 kg 118.3 kg











Physical Exam: 





Obese middle-aged white female sitting on the edge of a hospital bed.  Patient 

is alert, oriented, and appropriate.


General appearance: PRESENT: no acute distress, obese


Head exam: PRESENT: normocephalic


Respiratory exam: PRESENT: unlabored


Cardiovascular exam: PRESENT: RRR


Pulses: PRESENT: +1 pedal pulses bilateral


Vascular exam: PRESENT: normal capillary refill


GI/Abdominal exam: PRESENT: soft


Rectal exam: PRESENT: deferred


Extremities exam: PRESENT: other - Examination of left foot reveals swelling 

ecchymosis and tenderness about the fourth and fifth toes.  There is brisk 

capillary refill in the fifth toe.  Right foot has bandages over the plantar and

 medial aspect of the first MTP joint.


Psychiatric exam: PRESENT: appropriate affect, normal mood.  ABSENT: homicidal 

ideation, suicidal ideation


Skin exam: PRESENT: dry, intact, warm.  ABSENT: cyanosis, rash





Results


Laboratory Results: 


                                        





                                 10/22/19 11:37 





                                 10/22/19 11:37 





                                        











  10/22/19 10/22/19





  11:37 11:37


 


WBC  11.1 H 


 


RBC  4.19 


 


Hgb  12.1 


 


Hct  36.8 


 


MCV  88 


 


MCH  28.9 


 


MCHC  32.8 


 


RDW  14.1 H 


 


Plt Count  358 


 


Seg Neutrophils %  58.7 


 


Sodium   134.7 L


 


Potassium   4.6


 


Chloride   101


 


Carbon Dioxide   20 L


 


Anion Gap   14


 


BUN   60 H


 


Creatinine   1.82 H


 


Est GFR (African Amer)   36 L


 


Glucose   118 H


 


Calcium   9.5








                                        











  10/22/19 10/22/19 10/22/19





  00:30 00:30 02:47


 


Creatine Kinase  92  


 


CK-MB (CK-2)   2.16 


 


Troponin I   < 0.012  < 0.012











Impressions: 


                                        





Carotid Doppler Study  10/22/19 00:00


IMPRESSION:  No flow significant stenosis at the carotid bifurcations.


 








Head CT  10/22/19 00:43


IMPRESSION: Normal CT of the head.


 








Foot X-Ray  10/22/19 00:45


IMPRESSION:


 


Fracture of the proximal phalanx of the fifth toe.


 


 


copyright 2011 [employer]- All Rights Reserved


 








Chest X-Ray  10/22/19 02:11


IMPRESSION:


 


No acute cardiopulmonary abnormality


 


 


copyright 2011 [employer]- All Rights Reserved


 








Lung Scan-VQ NM  10/22/19 02:33


IMPRESSION: Very low probability for pulmonary embolus.


 











Status: Imported from PACS





Assessment & Plan





- Diagnosis


(1) Fracture of fifth toe, left, open


Qualifiers: 


   Encounter type: initial encounter   Qualified Code(s): S92.502B - Displaced 

unspecified fracture of left lesser toe(s), initial encounter for open fracture 

  


Is this a current diagnosis for this admission?: Yes   


Plan: 


Patient can sukumar tape fourth and fifth toes together.








- Time


Time Spent: 50 to 70 Minutes


Anticipated discharge: Other


Within: Other

## 2019-10-23 NOTE — PDOC PROGRESS REPORT
Subjective


Progress Note for:: 10/23/19


Subjective:: 


Is a 49-year-old female with hypertension, depression, poorly controlled IDDM 

who was admitted for syncopal episodes.  Patient was also noted to be in acute 

renal failure was also noted to have a fracture on the left fifth toe.





No acute event ovenright. Orthostatic vital signs show that patient does have 

orthostasis. She is on a lot of of multiple antihypertensives and also is on 

Flexeril and Norco at home.  She did report that she had bouts of diarrhea 3 to 

4 weeks ago and has not been having good oral intake in the past week or so. 


Reason For Visit: 


SYNCOPE,RENAL FAILURE








Physical Exam


Vital Signs: 


                                        











Temp Pulse Resp BP Pulse Ox


 


 97.9 F   113 H  16   113/74   96 


 


 10/23/19 12:00  10/23/19 14:00  10/23/19 12:00  10/23/19 12:00  10/23/19 12:00








                                 Intake & Output











 10/22/19 10/23/19 10/24/19





 06:59 06:59 06:59


 


Intake Total 2000 640 1288


 


Output Total   1800


 


Balance 2000 640 -512


 


Weight 254 lb 6.615 oz 260 lb 2.327 oz 











General appearance: PRESENT: no acute distress, well-developed, well-nourished


Head exam: PRESENT: atraumatic, normocephalic


Eye exam: PRESENT: conjunctiva pink, EOMI, PERRLA.  ABSENT: scleral icterus


Ear exam: PRESENT: normal external ear exam


Mouth exam: PRESENT: moist, tongue midline


Neck exam: ABSENT: carotid bruit, JVD, lymphadenopathy, thyromegaly


Respiratory exam: PRESENT: clear to auscultation silverio.  ABSENT: rales, rhonchi, 

wheezes


Cardiovascular exam: PRESENT: RRR.  ABSENT: diastolic murmur, rubs, systolic 

murmur


Pulses: PRESENT: normal dorsalis pedis pul


GI/Abdominal exam: PRESENT: normal bowel sounds, soft.  ABSENT: distended, 

guarding, mass, organolmegaly, rebound, tenderness


Rectal exam: PRESENT: deferred


Neurological exam: PRESENT: alert, awake, oriented to person, oriented to place,

oriented to time, oriented to situation, CN II-XII grossly intact.  ABSENT: 

motor sensory deficit





Results


Laboratory Results: 


                                        





                                 10/23/19 07:05 





                                 10/23/19 07:05 





                                        











  10/23/19 10/23/19 10/23/19





  07:05 07:05 09:01


 


WBC  9.9  


 


RBC  4.06  


 


Hgb  11.9 L  


 


Hct  36.1  


 


MCV  89  


 


MCH  29.2  


 


MCHC  32.8  


 


RDW  14.6 H  


 


Plt Count  346  


 


Sodium   138.4 


 


Potassium   4.7 


 


Chloride   105 


 


Carbon Dioxide   21 L 


 


Anion Gap   12 


 


BUN   47 H 


 


Creatinine   1.46 H 


 


Est GFR ( Amer)   46 L 


 


Glucose   88 


 


Calcium   9.6 


 


Urine Color    STRAW


 


Urine Appearance    CLEAR


 


Urine pH    5.0


 


Ur Specific Gravity    1.009


 


Urine Protein    NEGATIVE


 


Urine Glucose (UA)    NEGATIVE


 


Urine Ketones    NEGATIVE


 


Urine Blood    NEGATIVE


 


Urine Nitrite    NEGATIVE


 


Ur Leukocyte Esterase    TRACE H


 


Urine WBC (Auto)    1


 


Urine RBC (Auto)    1








                                        











  10/22/19 10/22/19 10/22/19





  00:30 00:30 02:47


 


Creatine Kinase  92  


 


CK-MB (CK-2)   2.16 


 


Troponin I   < 0.012  < 0.012











Impressions: 


                                        





Carotid Doppler Study  10/22/19 00:00


IMPRESSION:  No flow significant stenosis at the carotid bifurcations.


 








Head CT  10/22/19 00:43


IMPRESSION: Normal CT of the head.


 








Foot X-Ray  10/22/19 00:45


IMPRESSION:


 


Fracture of the proximal phalanx of the fifth toe.


 


 


copyright 2011 [employer]- All Rights Reserved


 








Chest X-Ray  10/22/19 02:11


IMPRESSION:


 


No acute cardiopulmonary abnormality


 


 


copyright 2011 [employer]- All Rights Reserved


 








Lung Scan-VQ NM  10/22/19 02:33


IMPRESSION: Very low probability for pulmonary embolus.


 














Assessment and Plan





- Diagnosis


(1) Syncope


Qualifiers: 


   Syncope type: unspecified   Qualified Code(s): R55 - Syncope and collapse   


Is this a current diagnosis for this admission?: Yes   


Plan: 


Secondary to orthostasis.  Carotid Doppler negative.  Patient's home 

antihypertensives have been held off.  Will start metoprolol instead if her 

blood pressures will start to recover.








(2) Acute kidney injury superimposed on chronic kidney disease


Is this a current diagnosis for this admission?: Yes   


Plan: 


Likely a combination of prerenal from dehydration and possible ATN as she did 

came in slightly hypotensive.  She reported having bouts of diarrhea 3 to 4 

weeks ago and since then has had poor oral intake.








(3) Chronic foot ulcer


Is this a current diagnosis for this admission?: Yes   


Plan: 


We will set up an appointment with the wound clinic.








(4) Fracture of fifth toe, left, open


Qualifiers: 


   Encounter type: initial encounter   Qualified Code(s): S92.502B - Displaced 

unspecified fracture of left lesser toe(s), initial encounter for open fracture 

 


Is this a current diagnosis for this admission?: Yes   


Plan: 


Orthopedics consulted.  Nonoperative management.








- Time


Time Spent with patient: 25-34 minutes

## 2019-10-24 VITALS — SYSTOLIC BLOOD PRESSURE: 70 MMHG | DIASTOLIC BLOOD PRESSURE: 40 MMHG

## 2019-10-24 LAB
ANION GAP SERPL CALC-SCNC: 10 MMOL/L (ref 5–19)
BUN SERPL-MCNC: 29 MG/DL (ref 7–20)
CALCIUM: 8.9 MG/DL (ref 8.4–10.2)
CHLORIDE SERPL-SCNC: 108 MMOL/L (ref 98–107)
CO2 SERPL-SCNC: 21 MMOL/L (ref 22–30)
GLUCOSE SERPL-MCNC: 226 MG/DL (ref 75–110)
POTASSIUM SERPL-SCNC: 5.1 MMOL/L (ref 3.6–5)

## 2019-10-24 RX ADMIN — PREGABALIN SCH MG: 100 CAPSULE ORAL at 07:53

## 2019-10-24 RX ADMIN — CLINDAMYCIN PHOSPHATE SCH MLS/HR: 6 INJECTION, SOLUTION INTRAVENOUS at 03:37

## 2019-10-24 RX ADMIN — CLINDAMYCIN PHOSPHATE SCH MLS/HR: 6 INJECTION, SOLUTION INTRAVENOUS at 09:35

## 2019-10-24 RX ADMIN — HYDROCODONE BITARTRATE AND ACETAMINOPHEN PRN TAB: 10; 325 TABLET ORAL at 07:53

## 2019-10-24 RX ADMIN — CYCLOBENZAPRINE HYDROCHLORIDE SCH MG: 10 TABLET, FILM COATED ORAL at 09:35

## 2019-10-24 RX ADMIN — INSULIN LISPRO SCH: 100 INJECTION, SOLUTION INTRAVENOUS; SUBCUTANEOUS at 11:35

## 2019-10-24 RX ADMIN — PANTOPRAZOLE SODIUM SCH MG: 40 TABLET, DELAYED RELEASE ORAL at 07:53

## 2019-10-24 RX ADMIN — LEVOTHYROXINE SODIUM SCH MG: 100 TABLET ORAL at 07:28

## 2019-10-24 RX ADMIN — Medication SCH: at 09:31

## 2019-10-24 RX ADMIN — BUPROPION HYDROCHLORIDE SCH MG: 75 TABLET, FILM COATED ORAL at 09:35

## 2019-10-24 RX ADMIN — INSULIN LISPRO SCH UNIT: 100 INJECTION, SOLUTION INTRAVENOUS; SUBCUTANEOUS at 07:54

## 2019-10-24 RX ADMIN — METOPROLOL TARTRATE SCH MG: 25 TABLET, FILM COATED ORAL at 07:28

## 2019-10-24 RX ADMIN — SODIUM CHLORIDE PRN MLS/HR: 9 INJECTION, SOLUTION INTRAVENOUS at 02:12

## 2019-10-24 RX ADMIN — NYSTATIN SCH: 100000 POWDER TOPICAL at 09:36

## 2019-10-24 RX ADMIN — HEPARIN SODIUM SCH UNIT: 5000 INJECTION, SOLUTION INTRAVENOUS; SUBCUTANEOUS at 09:35

## 2019-10-25 NOTE — PDOC DISCHARGE SUMMARY
Impression





- Admit/DC Date/PCP


Admission Date/Primary Care Provider: 


  10/22/19 05:45





  





Discharge Date: 10/25/19





- Discharge Diagnosis


(1) Syncope


Is this a current diagnosis for this admission?: Yes   





(2) Acute kidney injury superimposed on chronic kidney disease


Is this a current diagnosis for this admission?: Yes   





(3) Chronic foot ulcer


Is this a current diagnosis for this admission?: Yes   





(4) Fracture of fifth toe, left, open


Is this a current diagnosis for this admission?: Yes   





- Additional Information


Resuscitation Status: Full Code


Discharge Diet: As Tolerated


Discharge Activity: Activity As Tolerated


Referrals: 


WOUND CARE [Outside] (OFFICE WILL CONTACT PATIENT WITH APPOINTMENT)


REESE SHEPPARD DO [ACTIVE STAFF] - 10/28/19 1:20 pm


Prescriptions: 


Ciprofloxacin HCl [Cipro 500 mg Tablet] 500 mg PO BID 5 Days #10 tablet


Docusate Sodium 100 mg PO DAILYP PRN #10 tablet


 PRN Reason: 


Metoprolol Tartrate [Lopressor 25 mg Tablet] 12.5 mg PO Q12A #30 tablet


Home Medications: 








Albuterol Sulfate [Ventolin Hfa 8 gm Mdi (1 Mdi/ER Disp)] 2 puff IH Q6HP PRN 

10/22/19 


Aspirin [Adult Low Dose Aspirin EC] 81 mg PO QHS 10/22/19 


Bupropion HCl [Wellbutrin Xl 150 mg 24hr Tablet] 150 mg PO DAILY 10/22/19 


Cyclobenzaprine HCl [Flexeril 10 mg Tablet] 10 mg PO BID 10/22/19 


Furosemide [Lasix 20 mg Tablet] 20 mg PO DAILY 10/22/19 


Glimepiride [Amaryl 4 mg Tablet] 4 mg PO BID 10/22/19 


Hydrocodone/Acetaminophen [Norco  mg Tablet] 1 tab PO Q6HP PRN 10/22/19 


Insulin Aspart [Novolog] 0 unit SQ .SLD SCALE 10/22/19 


Insulin Glargine,Hum.rec.anlog [Toujeo Solostar] 120 unit SQ DAILY 10/22/19 


Levothyroxine Sodium 200 mcg PO Q6AM 10/22/19 


Metformin HCl [Metformin HCl ER] 750 mg PO BID 10/22/19 


Nystatin [Mycostatin Topical Powder 15 gm] 1 applic TP TID 10/22/19 


Omeprazole Magnesium [Prilosec Otc] 40 mg PO DAILY 10/22/19 


Pregabalin [Lyrica 100 mg Capsule] 100 mg PO BID 10/22/19 


Simvastatin [Zocor 10 mg Tablet] 10 mg PO QHS 10/22/19 


Sitagliptin Phosphate [Januvia] 100 mg PO DAILY 10/22/19 


Ciprofloxacin HCl [Cipro 500 mg Tablet] 500 mg PO BID 5 Days #10 tablet 10/24/19




Docusate Sodium 100 mg PO DAILYP PRN #10 tablet 10/24/19 


Metoprolol Tartrate [Lopressor 25 mg Tablet] 12.5 mg PO Q12A #30 tablet 10/24/19













History of Present Illiness


History of Present Illness: 


Admitting hospitalist's H&P:





NERISSA SHAFFER is a 49 year old female who comes to the emergency room with a 

several week history of syncope and near syncope episodes.  She admits having 

numerous syncopal and near syncopal episodes over the last several weeks.  She 

relates the syncopal episodes to a lot of personal life stress that involves 

severe financial problems.  The syncopal episodes and near syncopal episodes 

have been accompanied by chest pain of a intermittent pressure nature usually in

the left anterolateral chest but occasionally being of a sharp pleuritic nature.

 Additional symptoms associated with her syncopal episodes include a left 

frontal temporal headache and an injured left fifth toe which is becoming red 

swollen tender and has been bleeding on the bottom aspect of the toe.  She 

describes the syncopal episodes as severe blackout spells or near blackout 

spells lasting for several minutes and resolving spontaneously.  She admits 

falls associated with her syncopal episodes that are resulted in numerous 

bruises and other similar injuries.  She has not identified any additional 

aggravating or ameliorating factors for her syncopal episodes.  She admits prior

similar episodes in the past when she has been under a lot of stress.  In the 

emergency room she was found to have mild hypotension improved with IV fluids, 

acute renal injury and a negative VQ scan.  She was subsequently admitted to 

hospital for further evaluation and treatment.








Hospital Course


Hospital Course: 


This a 49-year-old female with hypertension, depression, poorly controlled IDDM 

who was admitted for syncopal episodes.  Patient was also noted to be in acute 

renal failure was also noted to have a fracture on the left fifth toe.





Her syncope work-up was remarkable for orthostatic hypotension.  Patient did 

report that she was having several days of diarrhea 3 to 4 weeks ago and since 

then has had poor oral intake.  On top of this, she is taking multiple 

antihypertensives and was also on Flexeril and Norco at home.  Patient's 

antihypertensives were held and she was started on IV fluids.  





Her acute renal failure resolved and her creatinine normalized with IV fluids.  

She was also assessed by orthopedics for her toe fracture and this was managed 

nonoperatively.  She will be set up with the wound clinic to follow-up on her 

chronic foot ulcers.  Her antihypertensive regimen was modified.  Lisinopril and

hydrochlorothiazide were discontinued.  Her blood pressures did recover.  She 

will be switched instead to Lopressor 12.5 mg every 12.  She was also counseled 

about discontinuing naproxen at home.  Her potassium was also elevated and her 

home potassium supplements were discontinued.  She will continue to take her 

Lasix as needed for her leg swelling.








Physical Exam


Vital Signs: 


                                        











Temp Pulse Resp BP Pulse Ox


 


 97.8 F   93   16   70/40 L  99 


 


 10/24/19 11:39  10/24/19 11:39  10/24/19 11:39  10/24/19 11:39  10/24/19 11:39








                                 Intake & Output











 10/24/19 10/25/19 10/26/19





 06:59 06:59 06:59


 


Intake Total 3013 450 


 


Output Total 2700  


 


Balance 313 450 


 


Weight 262 lb 5.601 oz  











General appearance: PRESENT: no acute distress, well-developed, well-nourished


Head exam: PRESENT: atraumatic, normocephalic


Eye exam: PRESENT: conjunctiva pink, EOMI, PERRLA.  ABSENT: scleral icterus


Ear exam: PRESENT: normal external ear exam


Mouth exam: PRESENT: moist, tongue midline


Neck exam: ABSENT: carotid bruit, JVD, lymphadenopathy, thyromegaly


Respiratory exam: PRESENT: clear to auscultation silverio.  ABSENT: rales, rhonchi, 

wheezes


Cardiovascular exam: PRESENT: RRR.  ABSENT: diastolic murmur, rubs, systolic 

murmur


Pulses: PRESENT: normal dorsalis pedis pul


GI/Abdominal exam: PRESENT: normal bowel sounds, soft.  ABSENT: distended, 

guarding, mass, organolmegaly, rebound, tenderness


Rectal exam: PRESENT: deferred


Extremities exam: ABSENT: pedal edema


Neurological exam: PRESENT: alert, awake, oriented to person, oriented to place,

oriented to time, oriented to situation, CN II-XII grossly intact.  ABSENT: 

motor sensory deficit





Results


Laboratory Results: 


                                        











WBC  9.9 10^3/uL (4.0-10.5)   10/23/19  07:05    


 


RBC  4.06 10^6/uL (3.72-5.28)   10/23/19  07:05    


 


Hgb  11.9 g/dL (12.0-15.5)  L  10/23/19  07:05    


 


Hct  36.1 % (36.0-47.0)   10/23/19  07:05    


 


MCV  89 fl (80-97)   10/23/19  07:05    


 


MCH  29.2 pg (27.0-33.4)   10/23/19  07:05    


 


MCHC  32.8 g/dL (32.0-36.0)   10/23/19  07:05    


 


RDW  14.6 % (11.5-14.0)  H  10/23/19  07:05    


 


Plt Count  346 10^3/uL (150-450)   10/23/19  07:05    


 


Lymph % (Auto)  19.8 % (13-45)   10/22/19  11:37    


 


Mono % (Auto)  5.1 % (3-13)   10/22/19  11:37    


 


Eos % (Auto)  15.3 % (0-6)  H  10/22/19  11:37    


 


Baso % (Auto)  1.1 % (0-2)   10/22/19  11:37    


 


Absolute Neuts (auto)  6.5 10^3/uL (1.7-8.2)   10/22/19  11:37    


 


Absolute Lymphs (auto)  2.2 10^3/uL (0.5-4.7)   10/22/19  11:37    


 


Absolute Monos (auto)  0.6 10^3/uL (0.1-1.4)   10/22/19  11:37    


 


Absolute Eos (auto)  1.7 10^3/uL (0.0-0.6)  H  10/22/19  11:37    


 


Absolute Basos (auto)  0.1 10^3/uL (0.0-0.2)   10/22/19  11:37    


 


Seg Neutrophils %  58.7 % (42-78)   10/22/19  11:37    


 


PT  13.3 SEC (11.4-15.4)   10/22/19  00:30    


 


INR  1.01   10/22/19  00:30    


 


D-Dimer  1.95 ug/mL (0.00-0.50)  H  10/22/19  00:30    


 


VBG pH  7.24  (7.30-7.42)  L  10/22/19  01:20    


 


VBG pCO2  47.0 mmHg (35-63)   10/22/19  01:20    


 


VBG HCO3  19.7 mmol/L (20-32)  L  10/22/19  01:20    


 


VBG Base Excess  -7.5 mmol/L  10/22/19  01:20    


 


Sodium  138.7 mmol/L (137-145)   10/24/19  05:30    


 


Potassium  5.1 mmol/L (3.6-5.0)  H  10/24/19  05:30    


 


Chloride  108 mmol/L ()  H  10/24/19  05:30    


 


Carbon Dioxide  21 mmol/L (22-30)  L  10/24/19  05:30    


 


Anion Gap  10  (5-19)   10/24/19  05:30    


 


BUN  29 mg/dL (7-20)  H  10/24/19  05:30    


 


Creatinine  1.21 mg/dL (0.52-1.25)   10/24/19  05:30    


 


Est GFR ( Amer)  57  (>60)  L  10/24/19  05:30    


 


Est GFR (MDRD) Non-Af  47  (>60)  L  10/24/19  05:30    


 


Glucose  226 mg/dL ()  H  10/24/19  05:30    


 


POC Glucose  200 mg/dL ()  H  10/24/19  06:26    


 


Hemoglobin A1c %  > 14.0 % (4.7-6.0)  H  10/22/19  11:37    


 


Lactic Acid  1.8 mmol/L (0.7-2.1)   10/22/19  00:30    


 


Calcium  8.9 mg/dL (8.4-10.2)   10/24/19  05:30    


 


Total Bilirubin  0.4 mg/dL (0.2-1.3)   10/22/19  00:30    


 


Direct Bilirubin  0.4 mg/dL (0.0-0.4)   10/22/19  00:30    


 


Neonat Total Bilirubin  Not Reportable   10/22/19  00:30    


 


Neonat Direct Bilirubin  Not Reportable   10/22/19  00:30    


 


Neonat Indirect Bili  Not Reportable   10/22/19  00:30    


 


AST  14 U/L (14-36)   10/22/19  00:30    


 


ALT  13 U/L (<35)   10/22/19  00:30    


 


Alkaline Phosphatase  115 U/L ()   10/22/19  00:30    


 


Creatine Kinase  92 U/L ()   10/22/19  00:30    


 


CK-MB (CK-2)  2.16 ng/mL (<4.55)   10/22/19  00:30    


 


Troponin I  < 0.012 ng/mL  10/22/19  02:47    


 


Total Protein  7.4 g/dL (6.3-8.2)   10/22/19  00:30    


 


Albumin  3.8 g/dL (3.5-5.0)   10/22/19  00:30    


 


Urine Color  STRAW   10/23/19  09:01    


 


Urine Appearance  CLEAR   10/23/19  09:01    


 


Urine pH  5.0  (5.0-9.0)   10/23/19  09:01    


 


Ur Specific Gravity  1.009   10/23/19  09:01    


 


Urine Protein  NEGATIVE mg/dL (NEGATIVE)   10/23/19  09:01    


 


Urine Glucose (UA)  NEGATIVE mg/dL (NEGATIVE)   10/23/19  09:01    


 


Urine Ketones  NEGATIVE mg/dL (NEGATIVE)   10/23/19  09:01    


 


Urine Blood  NEGATIVE  (NEGATIVE)   10/23/19  09:01    


 


Urine Nitrite  NEGATIVE  (NEGATIVE)   10/23/19  09:01    


 


Urine Bilirubin  NEGATIVE  (NEGATIVE)   10/23/19  09:01    


 


Urine Urobilinogen  NEGATIVE mg/dL (<2.0)   10/23/19  09:01    


 


Ur Leukocyte Esterase  TRACE  (NEGATIVE)  H  10/23/19  09:01    


 


Urine WBC (Auto)  1 /HPF  10/23/19  09:01    


 


Urine RBC (Auto)  1 /HPF  10/23/19  09:01    


 


Squamous Epi Cells Auto  1 /HPF  10/23/19  09:01    


 


Urine Ascorbic Acid  NEGATIVE  (NEGATIVE)   10/23/19  09:01    








                                        











  10/22/19 10/22/19





  00:30 02:47


 


CK-MB (CK-2)  2.16 


 


Troponin I  < 0.012  < 0.012











Impressions: 


                                        





Carotid Doppler Study  10/22/19 00:00


IMPRESSION:  No flow significant stenosis at the carotid bifurcations.


 








Head CT  10/22/19 00:43


IMPRESSION: Normal CT of the head.


 








Foot X-Ray  10/22/19 00:45


IMPRESSION:


 


Fracture of the proximal phalanx of the fifth toe.


 


 


copyright 2011 [employer]- All Rights Reserved


 








Chest X-Ray  10/22/19 02:11


IMPRESSION:


 


No acute cardiopulmonary abnormality


 


 


copyright 2011 [employer]- All Rights Reserved


 








Lung Scan-VQ NM  10/22/19 02:33


IMPRESSION: Very low probability for pulmonary embolus.


 














Stroke


Is this a Stroke Patient?: No





Acute Heart Failure





- **


Is this a Heart Failure Patient?: No

## 2020-01-30 ENCOUNTER — HOSPITAL (OUTPATIENT)
Dept: OTHER | Age: 50
End: 2020-01-30

## 2020-01-30 ENCOUNTER — DIAGNOSTIC TRANS (OUTPATIENT)
Dept: OTHER | Age: 50
End: 2020-01-30

## 2020-01-30 LAB
ANALYZER ANC (IANC): ABNORMAL
ANION GAP SERPL CALC-SCNC: 15 MMOL/L (ref 10–20)
ANION GAP SERPL CALC-SCNC: 16 MMOL/L (ref 10–20)
BASE DEFICIT BLDA-SCNC: 7 MMOL/L (ref 0–2)
BASE EXCESS BLDA CALC-SCNC: ABNORMAL MMOL/L
BASOPHILS # BLD: 0.1 K/MCL (ref 0–0.3)
BASOPHILS NFR BLD: 1 %
BDY SITE: ABNORMAL
BODY TEMPERATURE: 37 DEGREES
BUN SERPL-MCNC: 31 MG/DL (ref 6–20)
BUN SERPL-MCNC: 33 MG/DL (ref 6–20)
BUN/CREAT SERPL: 25 (ref 7–25)
BUN/CREAT SERPL: 27 (ref 7–25)
CA-I BLDA-SCNC: 14 % (ref 15–23)
CALCIUM SERPL-MCNC: 8.2 MG/DL (ref 8.4–10.2)
CALCIUM SERPL-MCNC: 9 MG/DL (ref 8.4–10.2)
CHLORIDE SERPL-SCNC: 93 MMOL/L (ref 98–107)
CHLORIDE SERPL-SCNC: 99 MMOL/L (ref 98–107)
CO2 SERPL-SCNC: 20 MMOL/L (ref 21–32)
CO2 SERPL-SCNC: 22 MMOL/L (ref 21–32)
COHGB MFR BLD: 1 %
CONDITION-RC: ABNORMAL
CONDITION: ABNORMAL
CREAT SERPL-MCNC: 1.16 MG/DL (ref 0.51–0.95)
CREAT SERPL-MCNC: 1.31 MG/DL (ref 0.51–0.95)
D DIMER PPP FEU-MCNC: 2.4 MG/L (FEU)
D DIMER PPP FEU-MCNC: ABNORMAL
DIFFERENTIAL METHOD BLD: ABNORMAL
EOSINOPHIL # BLD: 0.1 K/MCL (ref 0.1–0.5)
EOSINOPHIL NFR BLD: 0 %
ERYTHROCYTE [DISTWIDTH] IN BLOOD: 13.2 % (ref 11–15)
GLUCOSE SERPL-MCNC: 454 MG/DL (ref 65–99)
GLUCOSE SERPL-MCNC: 469 MG/DL (ref 65–99)
GLUCOSE SERPL-MCNC: ABNORMAL MG/DL
HCO3 BLDA-SCNC: 17 MMOL/L (ref 22–28)
HCT VFR BLD CALC: 33 % (ref 36–46.5)
HCT VFR BLD CALC: 36.5 % (ref 36–46.5)
HGB BLD-MCNC: 10.9 G/DL (ref 12–15.5)
HGB BLD-MCNC: 11.8 G/DL (ref 12–15.5)
HOROWITZ INDEX BLD+IHG-RTO: 21 %
IMM GRANULOCYTES # BLD AUTO: 0.1 K/MCL (ref 0–0.2)
IMM GRANULOCYTES NFR BLD: 1 %
INFLUENZA A VIRUS (XFLUA): NOT DETECTED
INFLUENZA B VIRUS (XFLUB): NORMAL
INFLUENZA B VIRUS (XFLUB): NOT DETECTED
LACTATE BLDA-MCNC: 1 MMOL/L
LYMPHOCYTES # BLD: 0.6 K/MCL (ref 1–4.8)
LYMPHOCYTES NFR BLD: 4 %
MCH RBC QN AUTO: 27 PG (ref 26–34)
MCHC RBC AUTO-ENTMCNC: 32.3 G/DL (ref 32–36.5)
MCV RBC AUTO: 83.5 FL (ref 78–100)
METHGB MFR BLD: 0.9 %
MONOCYTES # BLD: 0.6 K/MCL (ref 0.3–0.9)
MONOCYTES NFR BLD: 4 %
NEUTROPHILS # BLD: 14.5 K/MCL (ref 1.8–7.7)
NEUTROPHILS NFR BLD: 90 %
NEUTS SEG NFR BLD: ABNORMAL %
NRBC (NRBCRE): 0 /100 WBC
OXYHGB MFR BLD: 92 % (ref 94–98)
PAO2 / FIO2 RATIO (RPFR): 305 (ref 300–500)
PCO2 BLDA: 29 MM HG (ref 32–45)
PH BLDA: 7.38 UNITS (ref 7.35–7.45)
PLATELET # BLD: 424 K/MCL (ref 140–450)
PO2 BLDA: 64 MM HG (ref 83–108)
POTASSIUM SERPL-SCNC: 3.9 MMOL/L (ref 3.4–5.1)
POTASSIUM SERPL-SCNC: 4.8 MMOL/L (ref 3.4–5.1)
POTASSIUM SERPL-SCNC: ABNORMAL MMOL/L
RBC # BLD: 4.37 MIL/MCL (ref 4–5.2)
SAO2 % BLDA: 94 % (ref 95–99)
SODIUM SERPL-SCNC: 126 MMOL/L (ref 135–145)
SODIUM SERPL-SCNC: 130 MMOL/L (ref 135–145)
SPECIMEN SOURCE (FLUSC): NORMAL
TROPONIN I SERPL HS-MCNC: <0.02 NG/ML
WBC # BLD: 16 K/MCL (ref 4.2–11)

## 2020-01-30 PROCEDURE — 99223 1ST HOSP IP/OBS HIGH 75: CPT | Performed by: INTERNAL MEDICINE

## 2020-01-31 LAB
2009 H1N1 SUBTYPE (RF1N1): NOT DETECTED
ADENOVIRUS (RADENO): NOT DETECTED
ALBUMIN SERPL-MCNC: 2.1 G/DL (ref 3.6–5.1)
ALBUMIN/GLOB SERPL: 0.4 {RATIO} (ref 1–2.4)
ALP SERPL-CCNC: 183 UNITS/L (ref 45–117)
ALT SERPL-CCNC: 16 UNITS/L
ANALYZER ANC (IANC): ABNORMAL
ANION GAP SERPL CALC-SCNC: 12 MMOL/L (ref 10–20)
AST SERPL-CCNC: 21 UNITS/L
BASOPHILS # BLD: 0.1 K/MCL (ref 0–0.3)
BASOPHILS NFR BLD: 1 %
BILIRUB SERPL-MCNC: 0.2 MG/DL (ref 0.2–1)
BOCAVIRUS (RBOCA): NOT DETECTED
BUN SERPL-MCNC: 29 MG/DL (ref 6–20)
BUN/CREAT SERPL: 28 (ref 7–25)
C. PNEUMONIAE (RCHLP): NOT DETECTED
CALCIUM SERPL-MCNC: 8.6 MG/DL (ref 8.4–10.2)
CHLORIDE SERPL-SCNC: 101 MMOL/L (ref 98–107)
CO2 SERPL-SCNC: 23 MMOL/L (ref 21–32)
CORONAVIRUS 229E (RC229E): NOT DETECTED
CORONAVIRUS HKU1 (RCHKU1): NOT DETECTED
CORONAVIRUS NL63 (RCNL63): NOT DETECTED
CORONAVIRUS OC43 (RCO43): NOT DETECTED
CREAT SERPL-MCNC: 1.05 MG/DL (ref 0.51–0.95)
DIFFERENTIAL METHOD BLD: ABNORMAL
EOSINOPHIL # BLD: 0.2 K/MCL (ref 0.1–0.5)
EOSINOPHIL NFR BLD: 1 %
ERYTHROCYTE [DISTWIDTH] IN BLOOD: 13.2 % (ref 11–15)
GLOBULIN SER-MCNC: 5.4 G/DL (ref 2–4)
GLUCOSE BLDC GLUCOMTR-MCNC: 106 MG/DL (ref 70–99)
GLUCOSE BLDC GLUCOMTR-MCNC: 120 MG/DL (ref 70–99)
GLUCOSE BLDC GLUCOMTR-MCNC: 277 MG/DL (ref 70–99)
GLUCOSE BLDC GLUCOMTR-MCNC: 310 MG/DL (ref 70–99)
GLUCOSE BLDC GLUCOMTR-MCNC: 344 MG/DL (ref 70–99)
GLUCOSE BLDC GLUCOMTR-MCNC: 429 MG/DL (ref 70–99)
GLUCOSE BLDC GLUCOMTR-MCNC: 431 MG/DL (ref 70–99)
GLUCOSE BLDC GLUCOMTR-MCNC: ABNORMAL MG/DL
GLUCOSE SERPL-MCNC: 311 MG/DL (ref 65–99)
HCT VFR BLD CALC: 31.3 % (ref 36–46.5)
HGB BLD-MCNC: 10.1 G/DL (ref 12–15.5)
IMM GRANULOCYTES # BLD AUTO: 0.2 K/MCL (ref 0–0.2)
IMM GRANULOCYTES NFR BLD: 1 %
INFLUENZA A SUBTYPE H1 (RFLH1): NOT DETECTED
INFLUENZA A SUBTYPE H3 (RFLH3): NOT DETECTED
INFLUENZA A UNSUBTYPABLE (RIAU): NORMAL
INFLUENZA B VIRUS (RFLUB): NOT DETECTED
LEGIONELLA AB TITR SER IF: NORMAL {TITER}
LYMPHOCYTES # BLD: 1 K/MCL (ref 1–4.8)
LYMPHOCYTES NFR BLD: 7 %
M. PNEUMONIAE (RMYPP): NORMAL
M. PNEUMONIAE (RMYPP): NOT DETECTED
MCH RBC QN AUTO: 26.6 PG (ref 26–34)
MCHC RBC AUTO-ENTMCNC: 32.3 G/DL (ref 32–36.5)
MCV RBC AUTO: 82.6 FL (ref 78–100)
METAPNEUMOVIRUS (RMETA): NOT DETECTED
MONOCYTES # BLD: 1 K/MCL (ref 0.3–0.9)
MONOCYTES NFR BLD: 7 %
NEUTROPHILS # BLD: 12.6 K/MCL (ref 1.8–7.7)
NEUTROPHILS NFR BLD: 83 %
NEUTS SEG NFR BLD: ABNORMAL %
NRBC (NRBCRE): 0 /100 WBC
PARAINFLUENZA, TYPE 1 (RPAR1): NOT DETECTED
PARAINFLUENZA, TYPE 2 (RPAR2): NOT DETECTED
PARAINFLUENZA, TYPE 3 (RPAR3): NOT DETECTED
PARAINFLUENZA, TYPE 4 (RPAR4): NOT DETECTED
PLATELET # BLD: 368 K/MCL (ref 140–450)
POTASSIUM SERPL-SCNC: 3.8 MMOL/L (ref 3.4–5.1)
PROT SERPL-MCNC: 7.5 G/DL (ref 6.4–8.2)
RBC # BLD: 3.79 MIL/MCL (ref 4–5.2)
RHINOVIRUS/ENTEROVIRUS (RRHINO): NOT DETECTED
RSV, SUBTYPE A (RRSVA): NOT DETECTED
RSV, SUBTYPE B (RRSVB): NOT DETECTED
SODIUM SERPL-SCNC: 132 MMOL/L (ref 135–145)
SPECIMEN SOURCE: NORMAL
STREP PNEUMONIAE ANTIGEN DETECTION: NORMAL
WBC # BLD: 15 K/MCL (ref 4.2–11)

## 2020-01-31 PROCEDURE — 99233 SBSQ HOSP IP/OBS HIGH 50: CPT | Performed by: INTERNAL MEDICINE

## 2020-02-01 LAB
CREAT SERPL-MCNC: 1.71 MG/DL (ref 0.51–0.95)
GLUCOSE BLDC GLUCOMTR-MCNC: 151 MG/DL (ref 70–99)
GLUCOSE BLDC GLUCOMTR-MCNC: 159 MG/DL (ref 70–99)
GLUCOSE BLDC GLUCOMTR-MCNC: 167 MG/DL (ref 70–99)
GLUCOSE BLDC GLUCOMTR-MCNC: 75 MG/DL (ref 70–99)
GLUCOSE BLDC GLUCOMTR-MCNC: ABNORMAL MG/DL
GLUCOSE BLDC GLUCOMTR-MCNC: ABNORMAL MG/DL
MAGNESIUM SERPL-MCNC: 2.3 MG/DL (ref 1.7–2.4)
POTASSIUM SERPL-SCNC: 4.2 MMOL/L (ref 3.4–5.1)

## 2020-02-01 PROCEDURE — 99233 SBSQ HOSP IP/OBS HIGH 50: CPT | Performed by: INTERNAL MEDICINE

## 2020-02-02 LAB
ALBUMIN SERPL-MCNC: 1.8 G/DL (ref 3.6–5.1)
ALBUMIN/GLOB SERPL: 0.4 {RATIO} (ref 1–2.4)
ALP SERPL-CCNC: 206 UNITS/L (ref 45–117)
ALT SERPL-CCNC: 21 UNITS/L
AMORPH SED URNS QL MICRO: ABNORMAL
ANALYZER ANC (IANC): ABNORMAL
ANION GAP SERPL CALC-SCNC: 12 MMOL/L (ref 10–20)
ANION GAP SERPL CALC-SCNC: 12 MMOL/L (ref 10–20)
APPEARANCE UR: ABNORMAL
AST SERPL-CCNC: 25 UNITS/L
BACTERIA #/AREA URNS HPF: ABNORMAL /HPF
BASOPHILS # BLD: 0.1 K/MCL (ref 0–0.3)
BASOPHILS NFR BLD: 1 %
BILIRUB SERPL-MCNC: 0.1 MG/DL (ref 0.2–1)
BILIRUB UR QL: NEGATIVE
BUN SERPL-MCNC: 31 MG/DL (ref 6–20)
BUN SERPL-MCNC: 32 MG/DL (ref 6–20)
BUN/CREAT SERPL: 16 (ref 7–25)
BUN/CREAT SERPL: 19 (ref 7–25)
CALCIUM SERPL-MCNC: 8.4 MG/DL (ref 8.4–10.2)
CALCIUM SERPL-MCNC: 8.9 MG/DL (ref 8.4–10.2)
CAOX CRY URNS QL MICRO: ABNORMAL
CHLORIDE SERPL-SCNC: 102 MMOL/L (ref 98–107)
CHLORIDE SERPL-SCNC: 104 MMOL/L (ref 98–107)
CO2 SERPL-SCNC: 19 MMOL/L (ref 21–32)
CO2 SERPL-SCNC: 22 MMOL/L (ref 21–32)
COLOR UR: YELLOW
CREAT SERPL-MCNC: 1.65 MG/DL (ref 0.51–0.95)
CREAT SERPL-MCNC: 1.91 MG/DL (ref 0.51–0.95)
CREAT UR-MCNC: 238 MG/DL
CREAT UR-MCNC: 239 MG/DL
CREAT UR-MCNC: NORMAL MG/DL
DIFFERENTIAL METHOD BLD: ABNORMAL
EOSINOPHIL # BLD: 0.5 K/MCL (ref 0.1–0.5)
EOSINOPHIL NFR BLD: 4 %
EPITH CASTS #/AREA URNS LPF: ABNORMAL /[LPF]
ERYTHROCYTE [DISTWIDTH] IN BLOOD: 13.7 % (ref 11–15)
FATTY CASTS #/AREA URNS LPF: ABNORMAL /[LPF]
GLOBULIN SER-MCNC: 4.9 G/DL (ref 2–4)
GLUCOSE BLDC GLUCOMTR-MCNC: 187 MG/DL (ref 70–99)
GLUCOSE BLDC GLUCOMTR-MCNC: 239 MG/DL (ref 70–99)
GLUCOSE BLDC GLUCOMTR-MCNC: 66 MG/DL (ref 70–99)
GLUCOSE BLDC GLUCOMTR-MCNC: 72 MG/DL (ref 70–99)
GLUCOSE BLDC GLUCOMTR-MCNC: 98 MG/DL (ref 70–99)
GLUCOSE BLDC GLUCOMTR-MCNC: ABNORMAL MG/DL
GLUCOSE BLDC GLUCOMTR-MCNC: NORMAL MG/DL
GLUCOSE BLDC GLUCOMTR-MCNC: NORMAL MG/DL
GLUCOSE SERPL-MCNC: 103 MG/DL (ref 65–99)
GLUCOSE SERPL-MCNC: 249 MG/DL (ref 65–99)
GLUCOSE UR-MCNC: NEGATIVE MG/DL
GRAN CASTS #/AREA URNS LPF: ABNORMAL /[LPF]
HBA1C MFR BLD: 10.0           24 %
HBA1C MFR BLD: 16.1 % (ref 4.5–5.6)
HBA1C MFR BLD: 6.0            126 %
HBA1C MFR BLD: 6.5            14 %
HBA1C MFR BLD: 7.0            154 %
HBA1C MFR BLD: 7.5            169 %
HBA1C MFR BLD: 8.0            183 %
HBA1C MFR BLD: 8.5            197 %
HBA1C MFR BLD: 9.0            212 %
HBA1C MFR BLD: 9.5            226 %
HBA1C MFR BLD: ABNORMAL %
HCT VFR BLD CALC: 29 % (ref 36–46.5)
HGB BLD-MCNC: 9.2 G/DL (ref 12–15.5)
HGB UR QL: NEGATIVE
HYALINE CASTS #/AREA URNS LPF: ABNORMAL /LPF (ref 0–5)
IMM GRANULOCYTES # BLD AUTO: 0.3 K/MCL (ref 0–0.2)
IMM GRANULOCYTES NFR BLD: 2 %
KETONES UR-MCNC: NEGATIVE MG/DL
LEUKOCYTE ESTERASE UR QL STRIP: ABNORMAL
LEUKOCYTE ESTERASE UR QL STRIP: ABNORMAL
LYMPHOCYTES # BLD: 1.5 K/MCL (ref 1–4.8)
LYMPHOCYTES NFR BLD: 14 %
MCH RBC QN AUTO: 26.8 PG (ref 26–34)
MCHC RBC AUTO-ENTMCNC: 31.7 G/DL (ref 32–36.5)
MCV RBC AUTO: 84.5 FL (ref 78–100)
MICROALBUMIN UR-MCNC: 15.7 MG/DL
MICROALBUMIN/CREAT UR: 65.7 MG/G
MICROALBUMIN/CREAT UR: ABNORMAL MG/G{CREAT}
MICROALBUMIN/CREAT UR: ABNORMAL MG/G{CREAT}
MIXED CELL CASTS #/AREA URNS LPF: ABNORMAL /[LPF]
MONOCYTES # BLD: 0.6 K/MCL (ref 0.3–0.9)
MONOCYTES NFR BLD: 6 %
MUCOUS THREADS URNS QL MICRO: PRESENT
NEUTROPHILS # BLD: 7.8 K/MCL (ref 1.8–7.7)
NEUTROPHILS NFR BLD: 73 %
NEUTS SEG NFR BLD: ABNORMAL %
NITRITE UR QL: NEGATIVE
NRBC (NRBCRE): 0 /100 WBC
OSMOLALITY UR: 371 MOSM/KG (ref 50–1200)
PH UR: 5 UNITS (ref 5–7)
PLATELET # BLD: 392 K/MCL (ref 140–450)
POTASSIUM SERPL-SCNC: 4 MMOL/L (ref 3.4–5.1)
POTASSIUM SERPL-SCNC: 4.2 MMOL/L (ref 3.4–5.1)
POTASSIUM UR-SCNC: 40.3 MMOL/L
POTASSIUM UR-SCNC: NORMAL MMOL/L
PROT SERPL-MCNC: 6.7 G/DL (ref 6.4–8.2)
PROT UR QL: 30 MG/DL
PROT UR-MCNC: 94 MG/DL
RBC # BLD: 3.43 MIL/MCL (ref 4–5.2)
RBC #/AREA URNS HPF: ABNORMAL /HPF (ref 0–2)
RBC CASTS #/AREA URNS LPF: ABNORMAL /[LPF]
RENAL EPI CELLS #/AREA URNS HPF: ABNORMAL /[HPF]
SODIUM SERPL-SCNC: 131 MMOL/L (ref 135–145)
SODIUM SERPL-SCNC: 132 MMOL/L (ref 135–145)
SODIUM UR-SCNC: 11 MMOL/L
SODIUM UR-SCNC: NORMAL MMOL/L
SP GR UR: 1.03 (ref 1–1.03)
SPECIMEN SOURCE: ABNORMAL
SPERM URNS QL MICRO: ABNORMAL
SQUAMOUS #/AREA URNS HPF: ABNORMAL /HPF (ref 0–5)
T VAGINALIS URNS QL MICRO: ABNORMAL
TRI-PHOS CRY URNS QL MICRO: ABNORMAL
URATE CRY URNS QL MICRO: ABNORMAL
URINE REFLEX: ABNORMAL
URNS CMNT MICRO: ABNORMAL
UROBILINOGEN UR QL: 0.2 MG/DL (ref 0–1)
WAXY CASTS #/AREA URNS LPF: ABNORMAL /[LPF]
WBC # BLD: 10.7 K/MCL (ref 4.2–11)
WBC #/AREA URNS HPF: ABNORMAL /HPF (ref 0–5)
WBC CASTS #/AREA URNS LPF: ABNORMAL /[LPF]
YEAST HYPHAE URNS QL MICRO: ABNORMAL
YEAST URNS QL MICRO: ABNORMAL

## 2020-02-02 PROCEDURE — 99233 SBSQ HOSP IP/OBS HIGH 50: CPT | Performed by: INTERNAL MEDICINE

## 2020-02-03 LAB
ALBUMIN SERPL-MCNC: 1.9 G/DL (ref 3.6–5.1)
ANION GAP SERPL CALC-SCNC: 8 MMOL/L (ref 10–20)
BACTERIA UR CULT: NORMAL
BUN SERPL-MCNC: 25 MG/DL (ref 6–20)
BUN/CREAT SERPL: 22 (ref 7–25)
CALCIUM SERPL-MCNC: 8.8 MG/DL (ref 8.4–10.2)
CHLORIDE SERPL-SCNC: 107 MMOL/L (ref 98–107)
CO2 SERPL-SCNC: 24 MMOL/L (ref 21–32)
CREAT SERPL-MCNC: 1.12 MG/DL (ref 0.51–0.95)
GLUCOSE BLDC GLUCOMTR-MCNC: 102 MG/DL (ref 70–99)
GLUCOSE BLDC GLUCOMTR-MCNC: 104 MG/DL (ref 70–99)
GLUCOSE BLDC GLUCOMTR-MCNC: 123 MG/DL (ref 70–99)
GLUCOSE BLDC GLUCOMTR-MCNC: 161 MG/DL (ref 70–99)
GLUCOSE BLDC GLUCOMTR-MCNC: 47 MG/DL (ref 70–99)
GLUCOSE BLDC GLUCOMTR-MCNC: 66 MG/DL (ref 70–99)
GLUCOSE BLDC GLUCOMTR-MCNC: 69 MG/DL (ref 70–99)
GLUCOSE BLDC GLUCOMTR-MCNC: 75 MG/DL (ref 70–99)
GLUCOSE BLDC GLUCOMTR-MCNC: 88 MG/DL (ref 70–99)
GLUCOSE BLDC GLUCOMTR-MCNC: 93 MG/DL (ref 70–99)
GLUCOSE BLDC GLUCOMTR-MCNC: ABNORMAL MG/DL
GLUCOSE BLDC GLUCOMTR-MCNC: NORMAL MG/DL
GLUCOSE BLDC GLUCOMTR-MCNC: NORMAL MG/DL
GLUCOSE SERPL-MCNC: 85 MG/DL (ref 65–99)
MAGNESIUM SERPL-MCNC: 2.1 MG/DL (ref 1.7–2.4)
PHOSPHATE SERPL-MCNC: 3.8 MG/DL (ref 2.4–4.7)
POTASSIUM SERPL-SCNC: 3.9 MMOL/L (ref 3.4–5.1)
SODIUM SERPL-SCNC: 135 MMOL/L (ref 135–145)

## 2020-02-03 PROCEDURE — 99233 SBSQ HOSP IP/OBS HIGH 50: CPT | Performed by: INTERNAL MEDICINE

## 2020-02-04 LAB
ALBUMIN SERPL-MCNC: 2.2 G/DL (ref 3.6–5.1)
ANION GAP SERPL CALC-SCNC: 11 MMOL/L (ref 10–20)
BUN SERPL-MCNC: 16 MG/DL (ref 6–20)
BUN/CREAT SERPL: 17 (ref 7–25)
CALCIUM SERPL-MCNC: 8.9 MG/DL (ref 8.4–10.2)
CHLORIDE SERPL-SCNC: 106 MMOL/L (ref 98–107)
CO2 SERPL-SCNC: 22 MMOL/L (ref 21–32)
CREAT SERPL-MCNC: 0.93 MG/DL (ref 0.51–0.95)
GLUCOSE BLDC GLUCOMTR-MCNC: 107 MG/DL (ref 70–99)
GLUCOSE BLDC GLUCOMTR-MCNC: 126 MG/DL (ref 70–99)
GLUCOSE BLDC GLUCOMTR-MCNC: 64 MG/DL (ref 70–99)
GLUCOSE BLDC GLUCOMTR-MCNC: 72 MG/DL (ref 70–99)
GLUCOSE BLDC GLUCOMTR-MCNC: 93 MG/DL (ref 70–99)
GLUCOSE BLDC GLUCOMTR-MCNC: NORMAL MG/DL
GLUCOSE SERPL-MCNC: 112 MG/DL (ref 65–99)
PHOSPHATE SERPL-MCNC: 3.7 MG/DL (ref 2.4–4.7)
POTASSIUM SERPL-SCNC: 4.1 MMOL/L (ref 3.4–5.1)
SODIUM SERPL-SCNC: 135 MMOL/L (ref 135–145)

## 2020-02-04 PROCEDURE — 99233 SBSQ HOSP IP/OBS HIGH 50: CPT | Performed by: INTERNAL MEDICINE

## 2020-02-05 LAB
ALBUMIN SERPL-MCNC: 2.4 G/DL (ref 3.6–5.1)
ANION GAP SERPL CALC-SCNC: 11 MMOL/L (ref 10–20)
BUN SERPL-MCNC: 16 MG/DL (ref 6–20)
BUN/CREAT SERPL: 16 (ref 7–25)
CALCIUM SERPL-MCNC: 9.5 MG/DL (ref 8.4–10.2)
CHLORIDE SERPL-SCNC: 103 MMOL/L (ref 98–107)
CO2 SERPL-SCNC: 27 MMOL/L (ref 21–32)
CREAT SERPL-MCNC: 1 MG/DL (ref 0.51–0.95)
GLUCOSE BLDC GLUCOMTR-MCNC: 111 MG/DL (ref 70–99)
GLUCOSE BLDC GLUCOMTR-MCNC: 129 MG/DL (ref 70–99)
GLUCOSE BLDC GLUCOMTR-MCNC: 156 MG/DL (ref 70–99)
GLUCOSE BLDC GLUCOMTR-MCNC: 52 MG/DL (ref 70–99)
GLUCOSE BLDC GLUCOMTR-MCNC: 97 MG/DL (ref 70–99)
GLUCOSE BLDC GLUCOMTR-MCNC: ABNORMAL MG/DL
GLUCOSE BLDC GLUCOMTR-MCNC: ABNORMAL MG/DL
GLUCOSE SERPL-MCNC: 59 MG/DL (ref 65–99)
PHOSPHATE SERPL-MCNC: 4.8 MG/DL (ref 2.4–4.7)
POTASSIUM SERPL-SCNC: 4 MMOL/L (ref 3.4–5.1)
SODIUM SERPL-SCNC: 137 MMOL/L (ref 135–145)

## 2020-02-05 PROCEDURE — 99233 SBSQ HOSP IP/OBS HIGH 50: CPT | Performed by: INTERNAL MEDICINE

## 2020-02-06 LAB
ALBUMIN SERPL-MCNC: 2.3 G/DL (ref 3.6–5.1)
ALBUMIN/GLOB SERPL: 0.4 {RATIO} (ref 1–2.4)
ALP SERPL-CCNC: 163 UNITS/L (ref 45–117)
ALT SERPL-CCNC: 16 UNITS/L
ANION GAP SERPL CALC-SCNC: 12 MMOL/L (ref 10–20)
AST SERPL-CCNC: 17 UNITS/L
BILIRUB SERPL-MCNC: 0.2 MG/DL (ref 0.2–1)
BUN SERPL-MCNC: 16 MG/DL (ref 6–20)
BUN/CREAT SERPL: 16 (ref 7–25)
CALCIUM SERPL-MCNC: 9 MG/DL (ref 8.4–10.2)
CHLORIDE SERPL-SCNC: 100 MMOL/L (ref 98–107)
CO2 SERPL-SCNC: 27 MMOL/L (ref 21–32)
CREAT SERPL-MCNC: 1 MG/DL (ref 0.51–0.95)
GLOBULIN SER-MCNC: 5.4 G/DL (ref 2–4)
GLUCOSE BLDC GLUCOMTR-MCNC: 105 MG/DL (ref 70–99)
GLUCOSE BLDC GLUCOMTR-MCNC: 156 MG/DL (ref 70–99)
GLUCOSE BLDC GLUCOMTR-MCNC: 178 MG/DL (ref 70–99)
GLUCOSE BLDC GLUCOMTR-MCNC: 206 MG/DL (ref 70–99)
GLUCOSE BLDC GLUCOMTR-MCNC: 64 MG/DL (ref 70–99)
GLUCOSE BLDC GLUCOMTR-MCNC: 78 MG/DL (ref 70–99)
GLUCOSE BLDC GLUCOMTR-MCNC: ABNORMAL MG/DL
GLUCOSE SERPL-MCNC: 69 MG/DL (ref 65–99)
MAGNESIUM SERPL-MCNC: 1.8 MG/DL (ref 1.7–2.4)
POTASSIUM SERPL-SCNC: 4.1 MMOL/L (ref 3.4–5.1)
PROT SERPL-MCNC: 7.7 G/DL (ref 6.4–8.2)
SODIUM SERPL-SCNC: 135 MMOL/L (ref 135–145)

## 2020-02-06 PROCEDURE — 99233 SBSQ HOSP IP/OBS HIGH 50: CPT | Performed by: INTERNAL MEDICINE

## 2020-02-07 LAB
CREAT SERPL-MCNC: 0.94 MG/DL (ref 0.51–0.95)
GLUCOSE BLDC GLUCOMTR-MCNC: 119 MG/DL (ref 70–99)
GLUCOSE BLDC GLUCOMTR-MCNC: 166 MG/DL (ref 70–99)
GLUCOSE BLDC GLUCOMTR-MCNC: ABNORMAL MG/DL
GLUCOSE BLDC GLUCOMTR-MCNC: ABNORMAL MG/DL
MAGNESIUM SERPL-MCNC: 1.9 MG/DL (ref 1.7–2.4)
POTASSIUM SERPL-SCNC: 4.5 MMOL/L (ref 3.4–5.1)

## 2020-02-07 PROCEDURE — 99233 SBSQ HOSP IP/OBS HIGH 50: CPT | Performed by: INTERNAL MEDICINE

## 2020-10-18 ENCOUNTER — APPOINTMENT (OUTPATIENT)
Dept: GENERAL RADIOLOGY | Age: 50
DRG: 177 | End: 2020-10-18
Attending: EMERGENCY MEDICINE
Payer: COMMERCIAL

## 2020-10-18 ENCOUNTER — APPOINTMENT (OUTPATIENT)
Dept: GENERAL RADIOLOGY | Facility: HOSPITAL | Age: 50
DRG: 177 | End: 2020-10-18
Attending: NURSE PRACTITIONER
Payer: COMMERCIAL

## 2020-10-18 ENCOUNTER — HOSPITAL ENCOUNTER (INPATIENT)
Facility: HOSPITAL | Age: 50
LOS: 5 days | Discharge: HOME | DRG: 177 | End: 2020-10-23
Attending: EMERGENCY MEDICINE | Admitting: INTERNAL MEDICINE
Payer: COMMERCIAL

## 2020-10-18 DIAGNOSIS — U07.1 PNEUMONIA DUE TO COVID-19 VIRUS: Primary | ICD-10-CM

## 2020-10-18 DIAGNOSIS — R73.9 HYPERGLYCEMIA: ICD-10-CM

## 2020-10-18 DIAGNOSIS — E87.2 ACIDOSIS: ICD-10-CM

## 2020-10-18 DIAGNOSIS — J12.82 PNEUMONIA DUE TO COVID-19 VIRUS: Primary | ICD-10-CM

## 2020-10-18 DIAGNOSIS — R00.0 TACHYCARDIA: ICD-10-CM

## 2020-10-18 PROBLEM — D72.829 LEUKOCYTOSIS: Status: ACTIVE | Noted: 2020-10-18

## 2020-10-18 PROBLEM — E87.1 HYPONATREMIA: Status: ACTIVE | Noted: 2020-10-18

## 2020-10-18 PROBLEM — E87.20 METABOLIC ACIDOSIS: Status: ACTIVE | Noted: 2020-10-18

## 2020-10-18 PROCEDURE — 71045 X-RAY EXAM CHEST 1 VIEW: CPT | Performed by: NURSE PRACTITIONER

## 2020-10-18 PROCEDURE — 71045 X-RAY EXAM CHEST 1 VIEW: CPT | Performed by: EMERGENCY MEDICINE

## 2020-10-18 RX ORDER — INSULIN ASPART 100 [IU]/ML
30 INJECTION, SOLUTION INTRAVENOUS; SUBCUTANEOUS 3 TIMES DAILY
COMMUNITY
Start: 2020-09-08

## 2020-10-18 RX ORDER — MORPHINE SULFATE 2 MG/ML
1 INJECTION, SOLUTION INTRAMUSCULAR; INTRAVENOUS EVERY 2 HOUR PRN
Status: DISCONTINUED | OUTPATIENT
Start: 2020-10-18 | End: 2020-10-22

## 2020-10-18 RX ORDER — MORPHINE SULFATE 2 MG/ML
2 INJECTION, SOLUTION INTRAMUSCULAR; INTRAVENOUS EVERY 2 HOUR PRN
Status: DISCONTINUED | OUTPATIENT
Start: 2020-10-18 | End: 2020-10-22

## 2020-10-18 RX ORDER — PREGABALIN 100 MG/1
100 CAPSULE ORAL 3 TIMES DAILY
COMMUNITY
Start: 2020-10-03

## 2020-10-18 RX ORDER — ENOXAPARIN SODIUM 100 MG/ML
1 INJECTION SUBCUTANEOUS ONCE
Status: COMPLETED | OUTPATIENT
Start: 2020-10-18 | End: 2020-10-18

## 2020-10-18 RX ORDER — LEVOTHYROXINE SODIUM 0.2 MG/1
200 TABLET ORAL EVERY MORNING
COMMUNITY
Start: 2020-08-14

## 2020-10-18 RX ORDER — SITAGLIPTIN 100 MG/1
100 TABLET, FILM COATED ORAL DAILY
COMMUNITY
Start: 2020-08-14

## 2020-10-18 RX ORDER — SIMVASTATIN 10 MG
10 TABLET ORAL NIGHTLY
COMMUNITY
Start: 2020-08-14

## 2020-10-18 RX ORDER — ENOXAPARIN SODIUM 100 MG/ML
0.5 INJECTION SUBCUTANEOUS EVERY 12 HOURS SCHEDULED
Status: DISCONTINUED | OUTPATIENT
Start: 2020-10-19 | End: 2020-10-20

## 2020-10-18 RX ORDER — DEXTROSE MONOHYDRATE 25 G/50ML
50 INJECTION, SOLUTION INTRAVENOUS
Status: DISCONTINUED | OUTPATIENT
Start: 2020-10-18 | End: 2020-10-23

## 2020-10-18 RX ORDER — CYCLOBENZAPRINE HCL 10 MG
10 TABLET ORAL 3 TIMES DAILY PRN
COMMUNITY
Start: 2020-10-01

## 2020-10-18 RX ORDER — FUROSEMIDE 20 MG/1
20 TABLET ORAL DAILY
COMMUNITY
Start: 2020-08-29

## 2020-10-18 RX ORDER — HYDROCHLOROTHIAZIDE 25 MG/1
TABLET ORAL
COMMUNITY
Start: 2020-08-14

## 2020-10-18 RX ORDER — INSULIN GLARGINE 300 U/ML
120 INJECTION, SOLUTION SUBCUTANEOUS DAILY
COMMUNITY
Start: 2020-09-08

## 2020-10-18 RX ORDER — CEPHALEXIN 500 MG/1
500 CAPSULE ORAL 2 TIMES DAILY
Status: ON HOLD | COMMUNITY
Start: 2020-07-29 | End: 2020-10-23

## 2020-10-18 RX ORDER — BUPROPION HYDROCHLORIDE 150 MG/1
150 TABLET ORAL DAILY
COMMUNITY
Start: 2020-09-27

## 2020-10-18 RX ORDER — MORPHINE SULFATE 2 MG/ML
0.5 INJECTION, SOLUTION INTRAMUSCULAR; INTRAVENOUS EVERY 2 HOUR PRN
Status: DISCONTINUED | OUTPATIENT
Start: 2020-10-18 | End: 2020-10-22

## 2020-10-18 RX ORDER — CLINDAMYCIN HYDROCHLORIDE 300 MG/1
300 CAPSULE ORAL 3 TIMES DAILY
Status: ON HOLD | COMMUNITY
Start: 2020-06-12 | End: 2020-10-23

## 2020-10-18 RX ORDER — CARVEDILOL 25 MG/1
TABLET ORAL
COMMUNITY
Start: 2020-08-15

## 2020-10-18 RX ORDER — SODIUM CHLORIDE 9 MG/ML
INJECTION, SOLUTION INTRAVENOUS CONTINUOUS
Status: DISCONTINUED | OUTPATIENT
Start: 2020-10-18 | End: 2020-10-21

## 2020-10-19 ENCOUNTER — APPOINTMENT (OUTPATIENT)
Dept: GENERAL RADIOLOGY | Facility: HOSPITAL | Age: 50
DRG: 177 | End: 2020-10-19
Attending: INTERNAL MEDICINE
Payer: COMMERCIAL

## 2020-10-19 ENCOUNTER — APPOINTMENT (OUTPATIENT)
Dept: CV DIAGNOSTICS | Facility: HOSPITAL | Age: 50
DRG: 177 | End: 2020-10-19
Attending: NURSE PRACTITIONER
Payer: COMMERCIAL

## 2020-10-19 PROCEDURE — XW033E5 INTRODUCTION OF REMDESIVIR ANTI-INFECTIVE INTO PERIPHERAL VEIN, PERCUTANEOUS APPROACH, NEW TECHNOLOGY GROUP 5: ICD-10-PCS | Performed by: HOSPITALIST

## 2020-10-19 PROCEDURE — 99291 CRITICAL CARE FIRST HOUR: CPT | Performed by: NURSE PRACTITIONER

## 2020-10-19 PROCEDURE — 74018 RADEX ABDOMEN 1 VIEW: CPT | Performed by: INTERNAL MEDICINE

## 2020-10-19 PROCEDURE — 93306 TTE W/DOPPLER COMPLETE: CPT | Performed by: NURSE PRACTITIONER

## 2020-10-19 PROCEDURE — 99223 1ST HOSP IP/OBS HIGH 75: CPT | Performed by: HOSPITALIST

## 2020-10-19 RX ORDER — POLYETHYLENE GLYCOL 3350 17 G/17G
17 POWDER, FOR SOLUTION ORAL DAILY PRN
Status: DISCONTINUED | OUTPATIENT
Start: 2020-10-19 | End: 2020-10-23

## 2020-10-19 RX ORDER — POTASSIUM CHLORIDE 20 MEQ/1
40 TABLET, EXTENDED RELEASE ORAL ONCE
Status: COMPLETED | OUTPATIENT
Start: 2020-10-19 | End: 2020-10-19

## 2020-10-19 RX ORDER — HYDROCODONE BITARTRATE AND ACETAMINOPHEN 10; 325 MG/1; MG/1
1 TABLET ORAL EVERY 4 HOURS PRN
Status: DISCONTINUED | OUTPATIENT
Start: 2020-10-19 | End: 2020-10-23 | Stop reason: DRUGHIGH

## 2020-10-19 RX ORDER — CYCLOBENZAPRINE HCL 10 MG
10 TABLET ORAL 3 TIMES DAILY PRN
Status: DISCONTINUED | OUTPATIENT
Start: 2020-10-19 | End: 2020-10-23

## 2020-10-19 RX ORDER — BISACODYL 10 MG
10 SUPPOSITORY, RECTAL RECTAL
Status: DISCONTINUED | OUTPATIENT
Start: 2020-10-19 | End: 2020-10-23

## 2020-10-19 RX ORDER — DEXTROSE MONOHYDRATE 25 G/50ML
50 INJECTION, SOLUTION INTRAVENOUS
Status: DISCONTINUED | OUTPATIENT
Start: 2020-10-19 | End: 2020-10-19

## 2020-10-19 RX ORDER — DEXTROSE AND SODIUM CHLORIDE 5; .45 G/100ML; G/100ML
INJECTION, SOLUTION INTRAVENOUS AS NEEDED
Status: DISCONTINUED | OUTPATIENT
Start: 2020-10-19 | End: 2020-10-19

## 2020-10-19 RX ORDER — METOCLOPRAMIDE 5 MG/1
10 TABLET ORAL EVERY 6 HOURS PRN
Status: DISCONTINUED | OUTPATIENT
Start: 2020-10-19 | End: 2020-10-23

## 2020-10-19 RX ORDER — METOCLOPRAMIDE HYDROCHLORIDE 5 MG/ML
10 INJECTION INTRAMUSCULAR; INTRAVENOUS EVERY 6 HOURS PRN
Status: DISCONTINUED | OUTPATIENT
Start: 2020-10-19 | End: 2020-10-23

## 2020-10-19 RX ORDER — SODIUM CHLORIDE 9 MG/ML
INJECTION, SOLUTION INTRAVENOUS CONTINUOUS
Status: DISCONTINUED | OUTPATIENT
Start: 2020-10-19 | End: 2020-10-21

## 2020-10-19 RX ORDER — HYDROCODONE BITARTRATE AND ACETAMINOPHEN 10; 325 MG/1; MG/1
1 TABLET ORAL
COMMUNITY

## 2020-10-19 RX ORDER — LEVOTHYROXINE SODIUM 0.2 MG/1
200 TABLET ORAL EVERY MORNING
Status: DISCONTINUED | OUTPATIENT
Start: 2020-10-19 | End: 2020-10-23

## 2020-10-19 RX ORDER — CARVEDILOL 12.5 MG/1
25 TABLET ORAL 2 TIMES DAILY WITH MEALS
Status: DISCONTINUED | OUTPATIENT
Start: 2020-10-19 | End: 2020-10-23

## 2020-10-19 RX ORDER — DOCUSATE SODIUM 100 MG/1
100 CAPSULE, LIQUID FILLED ORAL 2 TIMES DAILY
Status: DISCONTINUED | OUTPATIENT
Start: 2020-10-19 | End: 2020-10-23

## 2020-10-19 RX ORDER — BUPROPION HYDROCHLORIDE 150 MG/1
150 TABLET ORAL DAILY
Status: DISCONTINUED | OUTPATIENT
Start: 2020-10-19 | End: 2020-10-23

## 2020-10-19 RX ORDER — PREGABALIN 100 MG/1
100 CAPSULE ORAL 3 TIMES DAILY
Status: DISCONTINUED | OUTPATIENT
Start: 2020-10-19 | End: 2020-10-23

## 2020-10-19 NOTE — PAYOR COMM NOTE
--------------  ADMISSION REVIEW     Payor: [facility]  Subscriber #:  [id number]  Authorization Number: [id number]    Admit date: 10/18/20  Admit time: 2311       Admitting Physician: Alicia Valdez DO  Attending Physician:  Edyta Gracia MD  Primary Ca rhythm. No Edema  Pulmonary:  Pulmonary effort is normal.  Normal breath sounds. No wheezing, rhonchi or rales.    Abdominal: Soft nontender nondistended, normal bowel sounds, no guarding no rebound tenderness  Skin: Warm and dry  Neurological: Awake alert following components:    WBC 14.9 (*)     Neutrophil Absolute Prelim 12.87 (*)     Neutrophil Absolute 12.87 (*)     All other components within normal limits   TROPONIN I - Normal   CK CREATINE KINASE (NOT CREATININE) - Normal   LACTIC ACID, PLASMA - Norm the patient.  This critical care time included obtaining a history; examining the patient; pulse oximetry; ordering and review of studies; arranging urgent treatment with development of a management plan; evaluation of patient's response to treatment; frequ MRN [id number]   [facility] 4SW-A Attending Sybil Paulino MD   Hosp Day # 1 PCP Jan Otero MD     Chief Complaint: SOB and weakness    History of Present Illness: Jimmy Nino is a 48year old female with medical history of DM type II SOLOSTAR 300 UNIT/ML Subcutaneous Solution Pen-injector, Inject 120 Units into the skin daily. , Disp: , Rfl:     •  Insulin Aspart FlexPen 100 UNIT/ML Subcutaneous Solution Pen-injector, Inject 30 Units into the skin 3 (three) times daily. , Disp: , Rfl: No edema or cyanosis. Integument: No rashes or lesions. Psychiatric: Appropriate mood and affect.       Diagnostic Data:      Labs:  Recent Labs   Lab 10/18/20  1922   WBC 14.9*   HGB 14.6   MCV 88.2   .0       Recent Labs   Lab 10/18/20  1922 10/ Subcutaneous (Right Lower Abdomen) Sisi Abdi RN      Insulin Regular Human (NOVOLIN R) 100 Units in sodium chloride 0.9% 100 mL infusion     Date Action Dose Route User    10/18/2020 2038 New Bag 9 Units/hr Intravenous (Right Antecubital) Donna Selby hypothyroidism, htn, hld, and chronic pain who presented to the hospital with days of fevers, sputum production, trouble urinating and cough with recent travel to [place]. She was found to have COVID19, blood sugar of 542 and a Anion gap of 20.   Sh  10/18/2020     K 5.1 10/18/2020     CL 88 10/18/2020     CO2 15.0 10/18/2020      10/18/2020     CA 9.4 10/18/2020     ALB 3.0 10/18/2020     ALKPHO 166 10/18/2020     BILT 0.7 10/18/2020     TP 9.4 10/18/2020     AST 32 10/18/2020     ALT 22

## 2020-10-19 NOTE — CONSULTS
[facility]      Endocrinology Consultation    Elizabeth Brennan Patient Status:  Inpatient    1970 MRN [id number]   [facility] 4SW-A Attending Bertin Jenkins MD   Hosp Day # 1 PCP Jolene Donato MD     Reason for Consultation:  DM Rfl:     •  pregabalin 100 MG Oral Cap, Take 100 mg by mouth 3 (three) times daily. , Disp: , Rfl:     •  metFORMIN HCl 850 MG Oral Tab, Take by mouth 2 (two) times daily. , Disp: , Rfl:     •  Levothyroxine Sodium 200 MCG Oral Tab, Take 200 mcg by mouth sylvia Daily  •  carvedilol (COREG) tab 25 mg, 25 mg, Oral, BID with meals  •  Levothyroxine Sodium (SYNTHROID) tab 200 mcg, 200 mcg, Oral, QAM  •  pregabalin (LYRICA) cap 100 mg, 100 mg, Oral, TID  •  Metoclopramide HCl (REGLAN) tab 10 mg, 10 mg, Oral, Q6H PRN * 1 mg, Intravenous, Q2H PRN **OR** morphINE sulfate (PF) 2 MG/ML injection 2 mg, 2 mg, Intravenous, Q2H PRN    Physical Exam:  /64   Pulse 94   Temp 98.8 °F (37.1 °C) (Temporal)   Resp 14   Ht 67\"   Wt 226 lb 6.6 oz (102.7 kg)   SpO2 93%   BMI 35.46 continue to follow closely while the patient is in the hospital. Please feel free to contact me with any questions or concerns.      Sree Tate MD  Endocrinology, Diabetes, and Metabolism  [facility]

## 2020-10-19 NOTE — PROGRESS NOTES
ICU  Critical Care APRN Progress Note    NAME: Jean-Paul Valentino - ROOM: [address] [place] [address] - MRN: [id number] - Age: 48year old - :1970    History Of Present Illness:  Jena-Paul Valentino is a 48year old female with PMHx significant for dm2, hypothyroidism, htn, hld Pulses: 2+ and symmetric all extremities  Skin: Skin color, texture, turgor normal for ethnicity, no rashes or lesions, warm and dry  Neurologic: CNII-XII intact, normal strength    Data this admission:  No results found.     Labs:  Lab Results   Component

## 2020-10-19 NOTE — H&P
[facility]  History and Physical     Apollo Espinosa Patient Status:  Inpatient    1970 MRN [id number]   [facility] 4SW-A Attending Leanne Vizcaino MD   Hosp Day # 1 PCP Stephany Fritz MD     Chief Complaint: SOB and weakness medications on file prior to encounter. •  JANUVIA 100 MG Oral Tab, Take 100 mg by mouth daily. , Disp: , Rfl:     •  simvastatin 10 MG Oral Tab, Take 10 mg by mouth nightly., Disp: , Rfl:     •  pregabalin 100 MG Oral Cap, Take 100 mg by mouth 3 (three lymphadenopathy. No JVD. No carotid bruits. Respiratory: Clear to auscultation bilaterally. No wheezes. No rhonchi. Cardiovascular: S1, S2. Regular rate and rhythm. No murmurs, rubs or gallops. Equal pulses. Chest and Back: No tenderness or deformity.

## 2020-10-19 NOTE — ED PROVIDER NOTES
Patient Seen in: THE [facility] Emergency Department In [place]      History   Patient presents with:  Difficulty Breathing  Dizziness    Stated Complaint: Difficulty breathing, fever, phlegm, dizzy.     HPI    26-year-old woman history of diabetes hypertensio regular rhythm. No Edema  Pulmonary:  Pulmonary effort is normal.  Normal breath sounds. No wheezing, rhonchi or rales.    Abdominal: Soft nontender nondistended, normal bowel sounds, no guarding no rebound tenderness  Skin: Warm and dry  Neurological: Ebony following components:    WBC 14.9 (*)     Neutrophil Absolute Prelim 12.87 (*)     Neutrophil Absolute 12.87 (*)     All other components within normal limits   TROPONIN I - Normal   CK CREATINE KINASE (NOT CREATININE) - Normal   LACTIC ACID, PLASMA - Norm the patient.  This critical care time included obtaining a history; examining the patient; pulse oximetry; ordering and review of studies; arranging urgent treatment with development of a management plan; evaluation of patient's response to treatment; frequ

## 2020-10-19 NOTE — DIETARY NOTE
[facility]    NUTRITION INITIAL ASSESSMENT    Pt does not meet malnutrition criteria. NUTRITION DIAGNOSIS/PROBLEM:    Altered nutrition-related lab values related to endocrine dysfunction and suspected excessive CHO intake as evidenced by A1c: >16. calories/day (15-17 calories per kg)  Protein: 123-135 grams protein/day (2.0-2.2 grams protein per kg)  Fluid: ~1 ml/kcal or per MD discretion    MONITOR AND EVALUATE/NUTRITION GOALS:    1. PO intake to meet at least 75% patient nutrition prescription   2

## 2020-10-19 NOTE — CONSULTS
[address] [place] [address] Patient Status:  Inpatient    1970 MRN [id number]   [facility] 4SW-A Attending Branden Justin MD   Hosp Day # 1 PCP [facility] docusate sodium (COLACE) cap 100 mg, 100 mg, Oral, BID  •  insulin detemir (LEVEMIR) 100 UNIT/ML flextouch 80 Units, 80 Units, Subcutaneous, Daily  •  Insulin Aspart Pen (NOVOLOG) 100 UNIT/ML flexpen 1-75 Units, 1-75 Units, Subcutaneous, TID CC  •  Insulin skin daily. , Disp: , Rfl:     •  Insulin Aspart FlexPen 100 UNIT/ML Subcutaneous Solution Pen-injector, Inject 30 Units into the skin 3 (three) times daily. , Disp: , Rfl:     •  hydrochlorothiazide 25 MG Oral Tab, TK 1 T PO ONCE D PRF LEG SWELLING, Disp: , 1.67* 1.63*   GFRAA 32* 41* 42*   GFRNAA 27* 35* 36*   CA 9.4 9.0 8.5   ALB 3.0*  --   --    * 127* 130*   K 5.1 3.8 3.8   CL 88* 94* 100   CO2 15.0* 20.0* 20.0*   ALKPHO 166*  --   --    AST 32  --   --    ALT 22  --   --    BILT 0.7  --   --    TP creat is 0.9  Agreeable to start under EUA guidance. No role for steroids with only minimal 02 needs and no documented hypoxia    2. DM/ on insulin drip    3.  UNIQUE/in the setting of dehydration, hyperglycemina, improving on IVF        Polina Tidwell,

## 2020-10-19 NOTE — PLAN OF CARE
Pt received this AM, A&OX4, fatigued. O2 maintained on RA-2L. BP stable. NSR/ST on monitor. Low grade temp this AM.  K and phos replaced. Insulin gtt stopped this AM at 0830, transitioned to SQ insulins, accu check QID.  1800 ADA diet, poor appetite.

## 2020-10-19 NOTE — PLAN OF CARE
Pt received from [facility], received pt at approximately 2320. Freddy DONIS aware of pt arrival. Per EMS 2L NC was placed during transport due to desaturations. Pt alert and oriented x4. Maintaining adequate O2 saturations on 2L NC.  Nonproductive cough

## 2020-10-19 NOTE — CONSULTS
[address] Patient Status:  Inpatient    1970 MRN [id number]   [facility] 4SW-A Attending Verito Corona MD   Hosp Day # 1 PCP Gabriela Mata MD     Date of Admission: 10/18/2020  Admission Diagnosis: Hyperglyc carvedilol (COREG) tab 25 mg, 25 mg, Oral, BID with meals  •  Levothyroxine Sodium (SYNTHROID) tab 200 mcg, 200 mcg, Oral, QAM  •  pregabalin (LYRICA) cap 100 mg, 100 mg, Oral, TID  •  Metoclopramide HCl (REGLAN) tab 10 mg, 10 mg, Oral, Q6H PRN **OR** Meto orthopnea, or syncope  Respiratory: + SOB, dyspnea on exertion, cough  GI: denies nausea, vomiting, diarrhea, constipation, melena, abdominal pain  : denies hematuria, dysuria, hesitancy, or incontinence  Musculoskeletal: denies arthralgias, myalgias, mu Component Value Date     10/19/2020    K 3.8 10/19/2020     10/19/2020    CO2 20.0 10/19/2020    BUN 35 10/19/2020    CREATSERUM 1.63 10/19/2020     10/19/2020    CA 8.5 10/19/2020    ALKPHO 166 10/18/2020    ALT 22 10/18/2020    AST 3 AM

## 2020-10-20 PROCEDURE — 99233 SBSQ HOSP IP/OBS HIGH 50: CPT | Performed by: HOSPITALIST

## 2020-10-20 PROCEDURE — 3E0533Z INTRODUCTION OF ANTI-INFLAMMATORY INTO PERIPHERAL ARTERY, PERCUTANEOUS APPROACH: ICD-10-PCS | Performed by: HOSPITALIST

## 2020-10-20 RX ORDER — POTASSIUM CHLORIDE 20 MEQ/1
40 TABLET, EXTENDED RELEASE ORAL EVERY 4 HOURS
Status: COMPLETED | OUTPATIENT
Start: 2020-10-20 | End: 2020-10-20

## 2020-10-20 RX ORDER — ENOXAPARIN SODIUM 100 MG/ML
40 INJECTION SUBCUTANEOUS DAILY
Status: DISCONTINUED | OUTPATIENT
Start: 2020-10-20 | End: 2020-10-23

## 2020-10-20 RX ORDER — DEXAMETHASONE SODIUM PHOSPHATE 4 MG/ML
6 VIAL (ML) INJECTION DAILY
Status: DISCONTINUED | OUTPATIENT
Start: 2020-10-20 | End: 2020-10-23

## 2020-10-20 NOTE — PLAN OF CARE
Patient received alert and oriented on 2L/NC. C/o generalized pain throughout the day, managed with norco. Patient continues to c/o constipation, PRN miralax, colace and bisacodyl suppository given. Large BM noted this evening, patient states relief.  Basim Soni

## 2020-10-20 NOTE — PAYOR COMM NOTE
--------------  CONTINUED STAY REVIEW    Payor: [facility] PP  Subscriber #:  [id number]  Authorization Number: [id number]    Admit date: 10/18/20  Admit time: 2311    Admitting Physician: Misa Gardner DO  Attending Physician:  Kori Hylton MD  Primary C CREATSERUM 2.06*   < > 1.63* 1.59* 1.09*   GFRAA 32*   < > 42* 43* 68   GFRNAA 27*   < > 36* 38* 59*   CA 9.4   < > 8.5 8.8 8.0*   ALB 3.0*  --   --   --  2.0*   *   < > 130* 133* 135*   K 5.1   < > 3.8 3.4* 3.4*   CL 88*   < > 100 102 108   CO2 15 Mushtaq Jarrell RN      buPROPion HCl ER (XL) (WELLBUTRIN XL) 150 MG 24 hr tab 150 mg     Date Action Dose Route User    10/20/2020 0826 Given 150 mg Oral Mushtaq Jarrell RN      carvedilol (COREG) tab 25 mg     Date Action Dose Route User    10/20/2020 10/19/2020 1349 Given 14 Units Subcutaneous (Left Upper Arm) Karen Ojeda RN      insulin detemir (LEVEMIR) 100 UNIT/ML flextouch 80 Units     Date Action Dose Route User    10/20/2020 0832 Given 80 Units Subcutaneous (Right Upper Arm) Vannessa Toussaint 10/19/20 2200 — 90 22 — 91 % — — — EL [FreeTextEntry1] : see HPI

## 2020-10-20 NOTE — PLAN OF CARE
OFFICE VISIT    Ttaa Parks  [id number]    Chief complaint:  Dysfunctional voiding    Tata Parks is a 65 year old female returning for further evaluation and management of neurogenic bladder.  Patient has longstanding history of incomplete emptying and neurogenic bladder.  She has undergone urodynamics which show minimal detrusor function.  She is not able to empty the bladder without a catheter.  She is not able to perform clean intermittent catheterization.  She has had continued issues with recurrent urinary tract infections.  She currently also has a decubitus ulcer.    Past medical history reviewed with patient and no significant changes since prior visit.        Summary of your Discharge Medications          Accurate as of August 26, 2020 12:52 PM. Always use your most recent med list.            Take these Medications      Details   acetaminophen 325 MG tablet  Commonly known as: Tylenol   Take 2 tablets by mouth every 4 hours as needed for Pain.     cholecalciferol 1000 UNITS tablet  Commonly known as: VITAMIN D3   Take 1 tablet by mouth daily.     ciprofloxacin 500 MG tablet  Commonly known as: Cipro   Take 1 tablet by mouth 2 times daily for 14 days.     DISPENSE   Take 5 mLs by mouth daily. Indications: Formula # 1 OC-Classic with Fresh [place] Jelly     Fish Oil 600 MG Cap   Take 600 mg by mouth daily.     hydroCORTisone 2.5 % rectal cream  Commonly known as: ANUSOL-HC   Place rectally 2 times daily as needed for Hemorrhoids.     Iron 325 (65 Fe) MG Tab   Take 1 tablet by mouth once daily with breakfast     ketoconazole 2 % cream  Commonly known as: NIZORAL   Apply topically daily.     lidocaine 5 % ointment  Commonly known as: XYLOCAINE   Apply topically three times daily as needed.     liver oil-zinc oxide 40 % ointment  Commonly known as: DESITIN   Apply daily to the Affected areas     magnesium oxide 400 MG tablet  Commonly known as: MAG-OX   Take 1 tablet by mouth daily.     oxybutynin 5 MG  Received pt A&Ox4. NSR on monitor. O2 maintained on RA-2L. Norco given x2. Feels like she is unable to void, urinary retention protocol followed. Straight cath x1. Pt c/o abd discomfort d/t constipation. Miralax given. See flowsheets. See MAR.  Awaiting bed 24 hr tablet  Commonly known as: DITROPAN-XL   Take 1 tablet by mouth daily.     triamcinolone 0.1 % cream  Commonly known as: ARISTOCORT   Apply twice daily to affected areas on the body (upper and lower extremities). DO NOT APPLY TO FACE. 10 grams per use.     Vitamin B12-Folic Acid 500-400 MCG Tab   Take 1 tablet by mouth daily.            ALLERGIES:   Allergen Reactions   • Bee Sting SHORTNESS OF BREATH   • Influenza Vaccine Live HIVES and SHORTNESS OF BREATH     Sob, swelling, rash    • Hydrocortisone Other (See Comments) and SWELLING     Flushed skin, burning  Pt can't remember, thinks hydrocortisone or cortisol  Pt can't remember, thinks hydrocortisone or cortisol  Flushed skin, burning     • Sulindac Other (See Comments)     CNS symptoms  CNS symptoms     • Beef Extract   (Food Or Med) Other (See Comments)     All internal metal   • Cortisone Acetate SWELLING   • Metal Topical   (Environmental) SWELLING     Pt states internal metal (braces) caused severe swelling. Topical metal like silverware is fine, only internal metal is a problem.       ROS:  Constitutional:  No fevers or chills  Genitourinary:  No hematuria    EXAM:    Vital Signs:   Visit Vitals  /64   Pulse 83   Ht 5' 2\" (1.575 m)   Wt 56.7 kg   SpO2 99%   BMI 22.86 kg/m²       Constitutional: The patient is well developed, well nourished, in no acute distress, appears stated age.  Neck: Symmetric without swelling or tenderness.   Respiratory: Respiratory effort normal without accessory muscle use  Cardiovascular: Regular rate, no edema      Labs:    Sodium (mmol/L)   Date Value   08/24/2020 142   08/07/2020 140   05/04/2020 138   01/09/2020 140   01/08/2020 139   01/07/2020 140     Potassium (mmol/L)   Date Value   08/24/2020 4.2   08/07/2020 4.0   05/04/2020 4.4   01/09/2020 4.3   01/08/2020 4.1   01/08/2020 3.7     Chloride (mmol/L)   Date Value   08/24/2020 107   08/07/2020 105   05/04/2020 102   01/09/2020 110 (H)   01/08/2020 104    01/07/2020 103     Carbon Dioxide (mmol/L)   Date Value   08/24/2020 26   08/07/2020 26   05/04/2020 25   01/09/2020 22   01/08/2020 24   01/07/2020 26     BUN (mg/dL)   Date Value   08/24/2020 25 (H)   08/07/2020 23 (H)   05/04/2020 33 (H)   01/09/2020 26 (H)   01/08/2020 34 (H)   01/07/2020 36 (H)     Creatinine (mg/dL)   Date Value   08/24/2020 1.26 (H)   08/07/2020 1.21 (H)   05/04/2020 1.35 (H)   01/09/2020 1.12 (H)   01/08/2020 1.47 (H)   01/07/2020 1.46 (H)     Glucose (mg/dL)   Date Value   08/24/2020 125 (H)   08/07/2020 85   05/04/2020 122 (H)   01/09/2020 108 (H)   01/08/2020 101 (H)   01/07/2020 102 (H)         Imaging:  CT scan Lasix renal scan reviewed.  She does have persistent hydronephrosis with a catheter in place.  Lasix renal scan is equivocal for obstruction.      Assessment and Plan:  65-year-old female with neurogenic bladder requiring Alberts catheterization.  We discussed options including conversion to a suprapubic catheter.  She remains hesitant to undergo this procedure.  We will continue Alberts catheter.  We will begin Gent irrigations given continued issues with catheter obstruction and debris.  Home care to be set up for sacral decubitus ulcer care and for [place] irrigations.  She will return to clinic in 1 month for catheter change.  She will return to clinic in 2 months for visit    Irving Mo MD  [facility]

## 2020-10-20 NOTE — PROGRESS NOTES
[facility]  Progress Note     Sulema Rush Patient Status:  Inpatient    1970 MRN [id number]   [facility] 4SW-A Attending Matteo Mendoza MD   Hosp Day # 2 PCP Nichol Alvarado MD     Chief Complaint: DKA, COVID   S: Feeling in the last 168 hours. Recent Labs   Lab 10/18/20  1922 10/19/20  0534   TROP <0.045 <0.045   CK 60  --         Imaging: Imaging data reviewed in Epic.   Medications:   • Potassium Chloride ER  40 mEq Oral Q4H   • enoxaparin  40 mg Subcutaneous Daily   • b

## 2020-10-20 NOTE — PHYSICAL THERAPY NOTE
PHYSICAL THERAPY EVALUATION - INPATIENT     Room Number: 455/455-A  Evaluation Date: 10/20/2020  Type of Evaluation: Initial  Physician Order: PT Eval and Treat    Presenting Problem: [address], DKA  Reason for Therapy: Mobility Dysfunction and Dischar decreased awareness of need for safety    RANGE OF MOTION AND STRENGTH ASSESSMENT  Upper extremity ROM and strength- see OT eval    Lower extremity ROM is within functional limits     Lower extremity strength is within functional limits except for the foll by pain and report of constipation. Pt agreeable to attempt to use commode. Pt supine-sit slowly with supervision- requires increased time with positional change. BP stable. Pt demonstrates fair static sitting balance at EOB.  Pt impulsive with sit-stand an limitations in independent bed mobility, transfers, and gait. The patient is below baseline and would benefit from skilled inpatient PT to address the above deficits to assist patient in returning to prior to level of function.  Recommend Pt D/C home with H

## 2020-10-20 NOTE — PROGRESS NOTES
[facility]                INFECTIOUS DISEASE PROGRESS NOTE    Darell Patterson Patient Status:  Inpatient    1970 MRN [id number]   [facility] 4SW-A Attending Verito Corona MD   Hosp Day # 2 PCP Gabriela Mata MD     Antibiot < > 8.5 8.8 8.0*   ALB 3.0*  --   --   --  2.0*   *   < > 130* 133* 135*   K 5.1   < > 3.8 3.4* 3.4*   CL 88*   < > 100 102 108   CO2 15.0*   < > 20.0* 24.0 22.0   ALKPHO 166*  --   --   --  127*   AST 32  --   --   --  57*   ALT 22  --   --   --  20

## 2020-10-20 NOTE — PLAN OF CARE
I tried calling her on her cell [phone] again. No answer. She was talking with her son earlier. I will perform the psych assessment tomorrow.     Dr. Isabel Billy

## 2020-10-20 NOTE — PROGRESS NOTES
[facility]  Progress Note     Soila Corbett Patient Status:  Inpatient    1970 MRN [id number]   [facility] 4SW-A Attending Meghana Robles MD   Hosp Day # 3 PCP Lizeth Ye MD     Chief Complaint: COVID    S: Patient Mike Madrigal --  0.2  --  0.2   TP 9.4*  --   --  6.6  --  6.6    < > = values in this interval not displayed. Estimated Creatinine Clearance: 66.1 mL/min (based on SCr of 0.99 mg/dL). No results for input(s): PTP, INR in the last 168 hours.     [facility] on room air   5. Serial labs  6. Incentive spirometry, reviewed with patient   7. Increase activity  8. Prone as able  9. Isolation   10. CCM and ID on consult  3. Uncontrolled diabetes mellitus, A1c > 16.4  1. Stop IVF   2. Insulin per Endo  4. UTI  1.  Ce

## 2020-10-20 NOTE — PROGRESS NOTES
[facility]  Endocrinology Progress Note    Rosy Gonzalez Patient Status:  Inpatient    1970 MRN [id number]   [facility] 4SW-A Attending Camden Sheffield MD   Hosp Day # 2 PCP Tuan Gamez MD     Subjective:  Glucoses well contr 226* 180* 148* 175* 211* 171* 141* 109* 126*      Ref. Range 10/19/2020 05:34   HEMOGLOBIN A1c Latest Ref Range: <5.7 % >16.4 (H)   ESTIMATED AVERAGE GLUCOSE Latest Ref Range: 68 - 126 mg/dL >424 (H)        Ref.  Range 10/19/2020 08:27   T4,Free (Direct) La

## 2020-10-20 NOTE — PROGRESS NOTES
[address] Patient Status:  Inpatient    1970 MRN [id number]   [facility] 4SW-A Attending Kori Hylton MD   Hosp Day # 2 PCP Olga Lidia Deleon MD     SUBJECTIVE: Pt complains of constipation and inability to [facility] mg, Intravenous, Q24H  •  bisacodyl (DULCOLAX) rectal suppository 10 mg, 10 mg, Rectal, Daily PRN  •  PEG 3350 (MIRALAX) powder packet 17 g, 17 g, Oral, Daily PRN  •  HYDROcodone-acetaminophen (NORCO)  MG per tab 1 tablet, 1 tablet, Oral, Q4H PRN  • RBC 3.90 10/20/2020    HGB 11.3 10/20/2020    HCT 34.3 10/20/2020    MCV 87.9 10/20/2020    MCH 29.0 10/20/2020    MCHC 32.9 10/20/2020    RDW 12.9 10/20/2020    .0 10/20/2020     Lab Results   Component Value Date     10/20/2020    K 3.4 10/2 7. Constipation:  -KUB with moderate stool  -bowel regimen  8. Urinary retention:  -follow up on urine cx  9. FEN:  -diabetic diet  10. Proph:  -lovenox  11.  Dispo:  -stable for transfer to PMU  -will follow    Omkar Hughes MD  10/20/2020  7:16 A

## 2020-10-21 PROCEDURE — 99233 SBSQ HOSP IP/OBS HIGH 50: CPT | Performed by: HOSPITALIST

## 2020-10-21 RX ORDER — PRAVASTATIN SODIUM 20 MG
20 TABLET ORAL NIGHTLY
Status: DISCONTINUED | OUTPATIENT
Start: 2020-10-21 | End: 2020-10-23

## 2020-10-21 RX ORDER — FUROSEMIDE 20 MG/1
20 TABLET ORAL DAILY
Status: DISCONTINUED | OUTPATIENT
Start: 2020-10-21 | End: 2020-10-22

## 2020-10-21 NOTE — PROGRESS NOTES
[facility]                INFECTIOUS DISEASE TELEMEDICINE PROGRESS NOTE    Kobe Perera Patient Status:  Inpatient    1970 MRN [id number]   [facility] 4SW-A Attending Holly Randhawa MD   Hosp Day # 3 PCP Ute Pink MD 3.0*  --   --  2.0*  --  2.0*   *   < > 133* 135*  --  133*   K 5.1   < > 3.4* 3.4* 4.4 3.9   CL 88*   < > 102 108  --  107   CO2 15.0*   < > 24.0 22.0  --  20.0*   ALKPHO 166*  --   --  127*  --  125*   AST 32  --   --  57*  --  26   ALT 22  --   -- reviewed:  Patient Active Problem List:     Hyponatremia     Leukocytosis     Metabolic acidosis     Pneumonia due to COVID-19 virus     Hyperglycemia     Diabetic ketoacidosis without coma associated with type 2 diabetes mellitus ([facility]     UNIQUE (acute kidn

## 2020-10-21 NOTE — PLAN OF CARE
Problem: Impaired Activities of Daily Living  Goal: Achieve highest/safest level of independence in self care  Description: Interventions:  - Assess ability and encourage patient to participate in ADLs to maximize function  - Promote sitting position [facility]

## 2020-10-21 NOTE — OCCUPATIONAL THERAPY NOTE
OCCUPATIONAL THERAPY EVALUATION - INPATIENT     Room Number: 455/455-A  Evaluation Date: 10/20/2020  Type of Evaluation: Initial  Presenting Problem: hyperglycemia, COVID-19 PNA    Physician Order: IP Consult to Occupational Therapy  Reason for Therapy: AD bathroom, I will feel better. \"     Patient self-stated goal is to go to the bathroom      OBJECTIVE  Precautions: Bed/chair alarm(R LE gabino per patient report)  Fall Risk: High fall risk    WEIGHT BEARING RESTRICTION  Weight Bearing Restriction: None Pt performed supine>sit EOB with CG assistance. Pt performed sit>stand with RW and CG assistance with min verbal/visual/tactile cues for safety with RW use and hand placement. Pt performed functional mobility with CG assistance and RW x approx 2ft>bedside. MODERATE  3 - 5 performance deficits   Client Assessment/Performance Deficits MODERATE - Comorbidities and min to mod modifications of tasks    Clinical Decision Making MODERATE - Analysis of occupational profile, detailed assessments, several treatment op

## 2020-10-21 NOTE — PROGRESS NOTES
[address] Patient Status:  Inpatient    1970 MRN [id number]   [facility] 4SW-A Attending Verito Corona MD   Hosp Day # 3 PCP Gabriela Mata MD     SUBJECTIVE: Pt had large BM yesterday, still with difficulty HYDROcodone-acetaminophen (NORCO)  MG per tab 1 tablet, 1 tablet, Oral, Q4H PRN  •  cyclobenzaprine (FLEXERIL) tab 10 mg, 10 mg, Oral, TID PRN  •  insulin detemir (LEVEMIR) 100 UNIT/ML flextouch 40 Units, 40 Units, Subcutaneous, Nightly  •  0.9% NaCl 208.0 10/21/2020     Lab Results   Component Value Date     10/21/2020    K 3.9 10/21/2020     10/21/2020    CO2 20.0 10/21/2020    BUN 20 10/21/2020    CREATSERUM 0.99 10/21/2020     10/21/2020    CA 8.5 10/21/2020    ALKPHO 125 10/21/2 Shahrzad Albrecht MD

## 2020-10-21 NOTE — PLAN OF CARE
Assumed care of pt at 0130. Told in report pt had not voided and needed to have bladder scanned. Bladder scan done at 0230 and > 520 was noted. Pt straight cath for 500 cc of indra urine out.  Discussed with APN that pt has had the need to be straight cath

## 2020-10-21 NOTE — PLAN OF CARE
Patient received alert and oriented x4, up in chair with significant improvement in mood this AM. Very apologetic for behavior yesterday. Continues to be on room air. Awaiting transfer to [facility] when bed becomes available.  Appetite improved today eating nearl

## 2020-10-21 NOTE — PROGRESS NOTES
[facility]  Endocrinology Progress Note    [place] Patient Status:  Inpatient    1970 MRN [id number]   [facility] 4SW-A Attending Sloan Christianson MD   Hosp Day # 3 PCP Tono Aleman MD     Subjective:  Glucoses increasing 373* 325* 288* 265* 220* 226* 180* 148* 175* 211* 171* 141* 109* 126*      Ref. Range 10/19/2020 05:34   HEMOGLOBIN A1c Latest Ref Range: <5.7 % >16.4 (H)   ESTIMATED AVERAGE GLUCOSE Latest Ref Range: 68 - 126 mg/dL >424 (H)        Ref.  Range 10/19/2020 08

## 2020-10-21 NOTE — PROGRESS NOTES
Assumed care of pt. At 1. Pt. A & O x4, VSS, in room air, [place] - generalized weakness. Denies of any SOB/Chest pain. C/o moderate pain and muscle spasms. Pt. Continues to have frequent mood fluctuations, pleasant most times, very tearful this evening.  Q6

## 2020-10-21 NOTE — PAYOR COMM NOTE
--------------  CONTINUED STAY REVIEW    Payor: [facility]  Subscriber #:  [id number]  Authorization Number: [id number]    Admit date: 10/18/20  Admit time: 2311    Admitting Physician: Vazquez Ivy DO  Attending Physician:  Philip Ramirez MD  Primary C 10/20/2020 1605 Given 1 tablet Oral Anahi Victoria RN      Insulin Aspart Pen (NOVOLOG) 100 UNIT/ML flexpen 1-75 Units     Date Action Dose Route User    10/21/2020 0846 Given 34 Units Subcutaneous (Right Upper Arm) Anahi Victoria RN      Insulin Aspa 10/20/2020 2000 Rate/Dose Verify (none) Intravenous Santana Avila RN            Plan: 10/21  Sandy Lakhani MD   Physician   Pulmonology   Progress Notes   Signed   Date of Service:  10/21/2020  7:11 AM               Signed             Paco Berumen •  Insulin Aspart Pen (NOVOLOG) 100 UNIT/ML flexpen 1-50 Units, 1-50 Units, Subcutaneous, TID CC and HS  •  remdesivir 100 mg in sodium chloride 0.9% 250 mL IVPB, 100 mg, Intravenous, Q24H  •  bisacodyl (DULCOLAX) rectal suppository 10 mg, 10 mg, Rectal, D                         Extremity: No clubbing or cyanosis. trace edema                          Skin: No rashes or lesions.              Lab Results   Component Value Date     WBC 5.7 10/21/2020     RBC 4.01 10/21/2020     HGB 11.6 10/21/2020     HCT 35. 2 -insulin gtt transitioned to subQ  -endo following  4. UNIQUE: likely prerenal in setting of DKA  -improved with IVF  5. Hypothyroidism:   -synthroid  6. Elevated D-dimer: suspect 2/2 covid  -echo without evidence of RV strain, hyperdynamic EF   7.  Constipati 1922 10/19/20  0534   PCT 0.25* 0.29*         Cardiac       Recent Labs   Lab 10/18/20  1922 10/19/20  0534   TROP <0.045 <0.045   PBNP 240*  --          Creatinine Kinase      Recent Labs   Lab 10/18/20  1922   CK 60         Inflammatory Markers         R     Ceftriaxone <=1 Sensitive         Ciprofloxacin <=0.25 Sensitive         Gentamicin <=1 Sensitive         Meropenem <=0.25 Sensitive         Levofloxacin <=0.12 Sensitive         Nitrofurantoin 128 Resistant         Piperacillin + Tazobactam >=128 Resi Detailed Report     Chart Review: Note Routing History    No routing history on file.     PLEASE FAX DAYS CERTIFIED AND NEXT REVIEW DATE

## 2020-10-22 PROCEDURE — 99233 SBSQ HOSP IP/OBS HIGH 50: CPT | Performed by: HOSPITALIST

## 2020-10-22 RX ORDER — SPIRONOLACTONE 25 MG/1
25 TABLET ORAL DAILY
Status: DISCONTINUED | OUTPATIENT
Start: 2020-10-23 | End: 2020-10-23

## 2020-10-22 RX ORDER — HYDROCODONE BITARTRATE AND ACETAMINOPHEN 10; 325 MG/1; MG/1
1 TABLET ORAL EVERY 6 HOURS PRN
Status: DISCONTINUED | OUTPATIENT
Start: 2020-10-22 | End: 2020-10-23

## 2020-10-22 RX ORDER — FUROSEMIDE 20 MG/1
20 TABLET ORAL
Status: DISCONTINUED | OUTPATIENT
Start: 2020-10-22 | End: 2020-10-23

## 2020-10-22 RX ORDER — HYDROCHLOROTHIAZIDE 25 MG/1
25 TABLET ORAL DAILY
Status: DISCONTINUED | OUTPATIENT
Start: 2020-10-23 | End: 2020-10-23

## 2020-10-22 RX ORDER — FUROSEMIDE 10 MG/ML
40 INJECTION INTRAMUSCULAR; INTRAVENOUS ONCE
Status: COMPLETED | OUTPATIENT
Start: 2020-10-22 | End: 2020-10-22

## 2020-10-22 RX ORDER — ALBUTEROL SULFATE 90 UG/1
2 AEROSOL, METERED RESPIRATORY (INHALATION) EVERY 4 HOURS PRN
Status: DISCONTINUED | OUTPATIENT
Start: 2020-10-22 | End: 2020-10-23
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
## 2020-10-22 NOTE — PLAN OF CARE
Assumed care of pt at [facility]. Pt remains on ra, lungs diminished. Sat 95%. Pt voiding freely now no retention. Pt c/o lower ext edema and requesting extra dose of lasix. Pt was extremely upset that she could not have a second dose.  Discussed rational as to w

## 2020-10-22 NOTE — PAYOR COMM NOTE
--------------  CONTINUED STAY REVIEW    Payor: [facility] PP  Subscriber #:  [id number]  Authorization Number: [id number]    Admit date: 10/18/20  Admit time: 2311    Admitting Physician: Mar Huston DO  Attending Physician:  Yamile Shah MD  Primary C 10/21/2020 2032 Given 1 tablet Oral Ed Levy RN    10/21/2020 1134 Given 1 tablet Oral Eunice Hamilton RN      Insulin Aspart Pen (NOVOLOG) 100 UNIT/ML flexpen 1-75 Units     Date Action Dose Route User    10/21/2020 1904 Given 24 Units Subcutane Hosp Day # 4 PCP Mayra Adhikari MD      SUBJECTIVE:  Pt now able to urinate spontaneously. She complains of generalized fatigue today.   Pt markedly hypertensive this am.       OBJECTIVE:  BP (!) 173/94   Pulse 69   Temp 97.4 °F (36.3 °C) (Temporal)   Re •  bisacodyl (DULCOLAX) rectal suppository 10 mg, 10 mg, Rectal, Daily PRN  •  PEG 3350 (MIRALAX) powder packet 17 g, 17 g, Oral, Daily PRN  •  HYDROcodone-acetaminophen (NORCO)  MG per tab 1 tablet, 1 tablet, Oral, Q4H PRN  •  cyclobenzaprine (FLEXE   RDW 12.9 10/22/2020     .0 10/22/2020            Lab Results   Component Value Date      10/22/2020     K 4.0 10/22/2020      10/22/2020     CO2 22.0 10/22/2020     BUN 33 10/22/2020     CREATSERUM 1.01 10/22/2020     GLU 83 10/22/2020 -KUB with moderate stool  -bowel regimen  9. Klebsiella UTI:  -ceftriaxone  10. HTN:  -resume home lasix   11. FEN:  -diabetic diet  12. Proph:  -lovenox  13.  Dispo:  -PMU status   -will follow     Queta Pinedo MD                  Electronically si

## 2020-10-22 NOTE — PROGRESS NOTES
[address] Patient Status:  Inpatient    1970 MRN [id number]   [facility] 4SW-A Attending Bucky Thompson MD   Hosp Day # 4 PCP Deidra Noland MD     SUBJECTIVE:  Pt now able to urinate spontaneously.   She compl CC and HS  •  remdesivir 100 mg in sodium chloride 0.9% 250 mL IVPB, 100 mg, Intravenous, Q24H  •  bisacodyl (DULCOLAX) rectal suppository 10 mg, 10 mg, Rectal, Daily PRN  •  PEG 3350 (MIRALAX) powder packet 17 g, 17 g, Oral, Daily PRN  •  HYDROcodone-acet MCV 86.3 10/22/2020    MCH 28.8 10/22/2020    MCHC 33.3 10/22/2020    RDW 12.9 10/22/2020    .0 10/22/2020     Lab Results   Component Value Date     10/22/2020    K 4.0 10/22/2020     10/22/2020    CO2 22.0 10/22/2020    BUN 33 10/22/20 moderate stool  -bowel regimen  9. Klebsiella UTI:  -ceftriaxone  10. HTN:  -resume home lasix   11. FEN:  -diabetic diet  12. Proph:  -lovenox  13.  Dispo:  -PMU status   -will follow    Seble Hewitt MD

## 2020-10-22 NOTE — PROGRESS NOTES
[facility]  Progress Note     Geeta Porter Patient Status:  Inpatient    1970 MRN [id number]   [facility] 4SW-A Attending Chrystal Oppenheim, MD   Hosp Day # 4 PCP Antoni Stephenson MD     Chief Complaint: COVID    S: Patient tra COVID19 Detected (A) 10/18/2020       Pro-Calcitonin  Recent Labs   Lab 10/18/20  1922 10/19/20  0534   PCT 0.25* 0.29*       Cardiac  Recent Labs   Lab 10/18/20  1922 10/19/20  0534   TROP <0.045 <0.045   PBNP 240*  --        Creatinine Kinase  Recent Lab discaharge  5. Essential hypertension  1. Coreg  2. HCTZ restarted  3. Resume Spironolactone  4. Change Lasix to PRN  5. Monitor hemodynamics   6. Dyslipidemia  1. Statin   7. Hypothyroidism   1. Synthroid  8. Constipation, improving   1. Bowel care   9.  H

## 2020-10-22 NOTE — PLAN OF CARE
Received patient this am awake and alert. RA-sats 95%. Lungs sound dim. Up in chair. Tolerating breakfast. C/o neuropathy pain. Medicated w/Norco and Flexeril. Ok to transfer to [facility]. Report given to [facility] RN.  Patient transported via wheelchair w/meds and bel

## 2020-10-22 NOTE — PROGRESS NOTES
[facility]  Endocrinology Progress Note    Cristhianchina Castro Patient Status:  Inpatient    1970 MRN [id number]   [facility] 4SW-A Attending Janice Franklin MD   Hosp Day # 4 PCP Rashawn Arriaza MD     Subjective:  Spoke with pt via p 10/19/20  0501 10/19/20  0728 10/19/20  0841 10/19/20  1026 10/19/20  1348 10/19/20  1710 10/19/20  2136 10/20/20  0754 10/20/20  1219   PGLU 546* 495* 469* 408* 373* 325* 288* 265* 220* 226* 180* 148* 175* 211* 171* 141* 109* 126*      Ref.  Range 10/19/20

## 2020-10-23 VITALS
BODY MASS INDEX: 38.96 KG/M2 | WEIGHT: 248.25 LBS | OXYGEN SATURATION: 96 % | SYSTOLIC BLOOD PRESSURE: 162 MMHG | DIASTOLIC BLOOD PRESSURE: 98 MMHG | RESPIRATION RATE: 19 BRPM | TEMPERATURE: 98 F | HEART RATE: 79 BPM | HEIGHT: 67 IN

## 2020-10-23 PROCEDURE — 99239 HOSP IP/OBS DSCHRG MGMT >30: CPT | Performed by: HOSPITALIST

## 2020-10-23 RX ORDER — FUROSEMIDE 10 MG/ML
20 INJECTION INTRAMUSCULAR; INTRAVENOUS ONCE
Status: COMPLETED | OUTPATIENT
Start: 2020-10-23 | End: 2020-10-23

## 2020-10-23 NOTE — PROGRESS NOTES
[facility]                INFECTIOUS DISEASE PROGRESS NOTE    Spencer Reed Patient Status:  Inpatient    1970 MRN [id number]   [facility] 4SW-A Attending Angy García MD   Hosp Day # 5 PCP Elijah Diaz MD     Antibiot ALT 18 18 22   BILT 0.2 0.1 0.2   TP 6.6 7.1 6.4       No results found for: [facility] Encounter on 10/18/20   1.  URINE CULTURE, ROUTINE     Status: Abnormal    Collection Time: 10/19/20  2:51 AM    Specimen: Urine, clean catch   Re beginning around 10/10 with fatigue, chills on 10/10-10/11, cough, fever on 10/14  --on 02 2L, no documented hypoxia in hospital, per report 02 sat low 90s in ambulance  CXR infiltrates  -may benefit from Remdesivir with symptoms under 10 days, and risk fa

## 2020-10-23 NOTE — DISCHARGE SUMMARY
[facility]  DISCHARGE SUMMARY     Apollo Espinosa Patient Status:  Inpatient    1970 MRN [id number]   [facility] 5NW-A Attending Yamile Shah MD   Hosp Day # 5 PCP Stephany Fritz MD     Date of Admission: 10/18/2020  Date of shortness of breath. She states that she has had some chills and fever but she did not take her temperature at home.   She states because she is not been eating well and she has been sleeping most of the day she did not take any of her diabetes medications (chronic back pain). Refills: 0     Insulin Aspart FlexPen 100 UNIT/ML Sopn      Inject 30 Units into the skin 3 (three) times daily. Refills: 0     Januvia 100 MG Tabs  Generic drug: SITagliptin Phosphate      Take 100 mg by mouth daily.    Refills: 0

## 2020-10-23 NOTE — PROGRESS NOTES
Multidisciplinary Discharge Rounds held 10/23/2020. Treatment team members present today include , , Charge Nurse, Nurse, RT, PT and Pharmacy caring for [employer].      Other care providers present:    Mobility Goal: up to ch

## 2020-10-23 NOTE — PHYSICAL THERAPY NOTE
PHYSICAL THERAPY TREATMENT NOTE - INPATIENT    Room Number: 963/175-Y     Session: 1   Number of Visits to Meet Established Goals: 5    Presenting Problem: COVID19, DKA    History related to current admission: Pt is a 48 y.o. female admitted 10/18/20 with None   -   Moving from lying on back to sitting on the side of the bed?: A Little   How much help from another person does the patient currently need. ..   -   Moving to and from a bed to a chair (including a wheelchair)?: None   -   Need to walk in hospita from home at d/c.    DISCHARGE RECOMMENDATIONS  PT Discharge Recommendations: Home with home health PT     PLAN  PT Treatment Plan: Bed mobility; Endurance; Energy conservation;Patient education;Gait training;Strengthening;Transfer training;Balance training

## 2020-10-23 NOTE — PROGRESS NOTES
[facility]  Progress Note     Lina Thompson Patient Status:  Inpatient    1970 MRN [id number]   [facility] 4SW-A Attending Sandor Chi MD   Hosp Day # 5 PCP Naima Agosto MD     Chief Complaint: COVID    S: Patient sta 10/18/20  1922 10/19/20  0534   TROP <0.045 <0.045   PBNP 240*  --        Creatinine Kinase  Recent Labs   Lab 10/18/20  1922   CK 60       Inflammatory Markers  Recent Labs   Lab 10/19/20  0400 10/19/20  0534 10/20/20  0424 10/21/20  0504   CRP  --  17.40 7. Dyslipidemia  1. Statin   8. Hypothyroidism   1. Synthroid  9. Constipation, resolved  10. Hyponatremia  11. Anemia, chronic   12. Diabetic ketoacidosis, AG 20 on admission, resolved  13. Acute kidney injury, resolved  14. Tachycardia, improved  15.  L

## 2020-10-23 NOTE — PROGRESS NOTES
NURSING DISCHARGE NOTE    Discharged Home via Wheelchair. Accompanied by Support staff  Belongings Taken by patient/family. Patient discharged home via wheelchair to [facility]. Discharge instructions reviewed. Medication updates reviewed.  Patient

## 2020-10-23 NOTE — PLAN OF CARE
Received patient A/O x 4, rambles and lacks eye contact at times. VSS. Maintaining 02 saturations >96% on RA. Assessed pt upon ambulation w/ pulse oximeter on, O2 sats and gait remained stable.  Complaints of pain in her back, relieved by norco and rest. No Assess for changes in respiratory status  - Assess for changes in mentation and behavior  - Position to facilitate oxygenation and minimize respiratory effort  - Oxygen supplementation based on oxygen saturation or ABGs  - Provide Smoking Cessation handout

## 2020-10-23 NOTE — PROGRESS NOTES
[facility]  Progress Note    Roberta Call Patient Status:  Inpatient    1970 MRN [id number]   [facility] 5NW-A Attending Mckayla Romano MD   Hosp Day # 5 PCP James Munson MD     Assessment/Plan:  Patient Active Problem List: 10/20/20  1652 10/20/20  1652 10/21/20  0504 10/21/20  0504 10/22/20  0503 10/22/20  0503 10/22/20  2057   NA  --   --  133*  --  133*  --   --    K 4.4  --  3.9  --  4.0  --   --    CL  --   --  107  --  106  --   --    CO2  --   --  20.0*  --  22.0

## 2020-10-23 NOTE — DIETARY NOTE
[facility]    NUTRITION INITIAL ASSESSMENT    Pt does not meet malnutrition criteria. NUTRITION DIAGNOSIS/PROBLEM:    Altered nutrition-related lab values related to endocrine dysfunction and suspected excessive CHO intake as evidenced by A1c: >16. kg)  Protein: 123-135 grams protein/day (2.0-2.2 grams protein per kg)  Fluid: ~1 ml/kcal or per MD discretion    MONITOR AND EVALUATE/NUTRITION GOALS:    1. PO intake to meet at least 75% patient nutrition prescription   2. DM diet ed prior to DC  3.  POC

## 2020-10-23 NOTE — PROGRESS NOTES
Pulmonary Progress Note        NAME: Nati Ellis - ROOM: 53/531-A - MRN: [id number] - Age: 48year old - : 1970        Last 24hrs: No events overnight, sitting up at bedside, no complaints though she does mention that she got SOB after talking fo reviewed as noted below        ASSESSMENT/PLAN:  1.  Acute hypoxemic respiratory failure: 2/2 covid PNA  -PCT mildly elevated - has Klebsiella UTI - on ceftriaxone  -weaned to RA  2. COVID19 PNA  -decadron 6mg daily given hypoxia  -on remdesivir  -ID follow

## 2020-10-23 NOTE — PLAN OF CARE
Problem: Diabetes/Glucose Control  Goal: Glucose maintained within prescribed range  Description: INTERVENTIONS:  - Monitor Blood Glucose as ordered  - Assess for signs and symptoms of hyperglycemia and hypoglycemia  - Administer ordered medications to m for Discharge     Problem: GASTROINTESTINAL - ADULT  Goal: Maintains or returns to baseline bowel function  Description: INTERVENTIONS:  - Assess bowel function  - Maintain adequate hydration with IV or PO as ordered and tolerated  - Evaluate effectiveness highest/safest level of mobility/gait  Description: Interventions:  - Assess patient's functional ability and stability  - Promote increasing activity/tolerance for mobility and gait  - Educate and engage patient/family in tolerated activity level and prec

## 2020-10-23 NOTE — PAYOR COMM NOTE
--------------  CONTINUED STAY REVIEW    Payor: [facility] PPO  Subscriber #:  [id number]  Authorization Number: [id number]    Admit date: 10/18/20  Admit time: 2311    Admitting Physician: Berta Mason DO  Attending Physician:  Luciana Epley, MD  Primary C 10/22/2020 1233 Given 18 Units Subcutaneous (Left Lower Abdomen) Shaniqua Michelle RN      insulin detemir (LEVEMIR) 100 UNIT/ML flextouch 80 Units     Date Action Dose Route User    10/22/2020 1001 Given 60 Units Subcutaneous (Right Upper Arm) Myles - received Levemir 60 units x 1 yesterday due to glucose in the 80's. Daytime hyperglycemia noted afterward.   - Continue levemir 80 units in AM and  50 units QHS   - Novolog 1:5 > 140 QID prn  - Novolog 1:2 grams of carbs   - Will follow today with 80 uni Jose Noyola MD, [facility]   Endocrinology, Diabetes, and Metabolism  [facility]                  Electronically signed by Mary Kelly MD at 10/23/2020  9:28 AM     ED to Hosp-Admission (Current) on 10/18/2020        Detailed Report     Marivel Ibrahim

## 2021-02-12 ENCOUNTER — HOSPITAL ENCOUNTER (EMERGENCY)
Age: 51
Discharge: HOME | End: 2021-02-12
Attending: EMERGENCY MEDICINE

## 2021-02-12 ENCOUNTER — APPOINTMENT (OUTPATIENT)
Dept: GENERAL RADIOLOGY | Age: 51
End: 2021-02-12
Attending: EMERGENCY MEDICINE

## 2021-02-12 VITALS
BODY MASS INDEX: 37.32 KG/M2 | SYSTOLIC BLOOD PRESSURE: 98 MMHG | HEIGHT: 68 IN | TEMPERATURE: 98.3 F | OXYGEN SATURATION: 99 % | HEART RATE: 105 BPM | WEIGHT: 246.25 LBS | RESPIRATION RATE: 16 BRPM | DIASTOLIC BLOOD PRESSURE: 72 MMHG

## 2021-02-12 DIAGNOSIS — E11.65 UNCONTROLLED TYPE 2 DIABETES MELLITUS WITH HYPERGLYCEMIA (CMD): ICD-10-CM

## 2021-02-12 DIAGNOSIS — B37.31 YEAST VAGINITIS: ICD-10-CM

## 2021-02-12 DIAGNOSIS — K59.00 CONSTIPATION, ACUTE: ICD-10-CM

## 2021-02-12 DIAGNOSIS — B37.31 CANDIDIASIS OF VULVA AND VAGINA: ICD-10-CM

## 2021-02-12 DIAGNOSIS — K56.41 FECAL IMPACTION (CMD): Primary | ICD-10-CM

## 2021-02-12 DIAGNOSIS — E66.9 OBESITY (BMI 30-39.9): ICD-10-CM

## 2021-02-12 DIAGNOSIS — Z91.148 NONCOMPLIANCE WITH MEDICATION REGIMEN: ICD-10-CM

## 2021-02-12 DIAGNOSIS — R33.8 ACUTE URINARY RETENTION: ICD-10-CM

## 2021-02-12 PROBLEM — E87.1 HYPONATREMIA: Status: ACTIVE | Noted: 2020-10-18

## 2021-02-12 PROBLEM — U07.1 PNEUMONIA DUE TO COVID-19 VIRUS: Status: ACTIVE | Noted: 2020-10-18

## 2021-02-12 PROBLEM — N17.9 AKI (ACUTE KIDNEY INJURY) (CMD): Status: ACTIVE | Noted: 2021-02-12

## 2021-02-12 PROBLEM — D72.829 LEUKOCYTOSIS: Status: ACTIVE | Noted: 2020-10-18

## 2021-02-12 PROBLEM — R73.9 HYPERGLYCEMIA: Status: ACTIVE | Noted: 2020-10-18

## 2021-02-12 PROBLEM — J12.82 PNEUMONIA DUE TO COVID-19 VIRUS: Status: ACTIVE | Noted: 2020-10-18

## 2021-02-12 PROBLEM — R09.02 HYPOXIA: Status: ACTIVE | Noted: 2021-02-12

## 2021-02-12 PROBLEM — E66.01 MORBID OBESITY (CMD): Status: ACTIVE | Noted: 2021-02-12

## 2021-02-12 PROBLEM — E87.20 METABOLIC ACIDOSIS: Status: ACTIVE | Noted: 2020-10-18

## 2021-02-12 LAB
ALBUMIN SERPL-MCNC: 3.4 G/DL (ref 3.6–5.1)
ALBUMIN/GLOB SERPL: 0.8 {RATIO} (ref 1–2.4)
ALP SERPL-CCNC: 93 UNITS/L (ref 45–117)
ALT SERPL-CCNC: 13 UNITS/L
ANION GAP SERPL CALC-SCNC: 18 MMOL/L (ref 10–20)
APPEARANCE UR: CLEAR
AST SERPL-CCNC: 11 UNITS/L
BACTERIA #/AREA URNS HPF: ABNORMAL /HPF
BASOPHILS # BLD: 0.1 K/MCL (ref 0–0.3)
BASOPHILS NFR BLD: 1 %
BILIRUB SERPL-MCNC: 0.6 MG/DL (ref 0.2–1)
BILIRUB UR QL STRIP: NEGATIVE
BUN SERPL-MCNC: 23 MG/DL (ref 6–20)
BUN/CREAT SERPL: 23 (ref 7–25)
CALCIUM SERPL-MCNC: 9.3 MG/DL (ref 8.4–10.2)
CHLORIDE SERPL-SCNC: 94 MMOL/L (ref 98–107)
CO2 SERPL-SCNC: 22 MMOL/L (ref 21–32)
COLOR UR: YELLOW
CREAT SERPL-MCNC: 1 MG/DL (ref 0.51–0.95)
DEPRECATED RDW RBC: 42.5 FL (ref 39–50)
EOSINOPHIL # BLD: 0.1 K/MCL (ref 0–0.5)
EOSINOPHIL NFR BLD: 1 %
ERYTHROCYTE [DISTWIDTH] IN BLOOD: 13.7 % (ref 11–15)
FASTING DURATION TIME PATIENT: ABNORMAL H
GFR SERPLBLD BASED ON 1.73 SQ M-ARVRAT: 66 ML/MIN/1.73M2
GLOBULIN SER-MCNC: 4.5 G/DL (ref 2–4)
GLUCOSE BLDC GLUCOMTR-MCNC: 457 MG/DL (ref 70–99)
GLUCOSE BLDC GLUCOMTR-MCNC: 539 MG/DL (ref 70–99)
GLUCOSE SERPL-MCNC: 540 MG/DL (ref 65–99)
GLUCOSE UR STRIP-MCNC: >=500 MG/DL
HCT VFR BLD CALC: 42.5 % (ref 36–46.5)
HGB BLD-MCNC: 14 G/DL (ref 12–15.5)
HGB UR QL STRIP: NEGATIVE
HYALINE CASTS #/AREA URNS LPF: ABNORMAL /LPF
IMM GRANULOCYTES # BLD AUTO: 0.1 K/MCL (ref 0–0.2)
IMM GRANULOCYTES # BLD: 1 %
KETONES UR STRIP-MCNC: 20 MG/DL
LEUKOCYTE ESTERASE UR QL STRIP: NEGATIVE
LYMPHOCYTES # BLD: 1.1 K/MCL (ref 1–4.8)
LYMPHOCYTES NFR BLD: 11 %
MCH RBC QN AUTO: 28.3 PG (ref 26–34)
MCHC RBC AUTO-ENTMCNC: 32.9 G/DL (ref 32–36.5)
MCV RBC AUTO: 85.9 FL (ref 78–100)
MONOCYTES # BLD: 0.4 K/MCL (ref 0.3–0.9)
MONOCYTES NFR BLD: 4 %
NEUTROPHILS # BLD: 8.7 K/MCL (ref 1.8–7.7)
NEUTROPHILS NFR BLD: 82 %
NITRITE UR QL STRIP: NEGATIVE
NRBC BLD MANUAL-RTO: 0 /100 WBC
PH UR STRIP: 5 [PH] (ref 5–7)
PLATELET # BLD AUTO: 360 K/MCL (ref 140–450)
POTASSIUM SERPL-SCNC: 4.6 MMOL/L (ref 3.4–5.1)
PROT SERPL-MCNC: 7.9 G/DL (ref 6.4–8.2)
PROT UR STRIP-MCNC: 100 MG/DL
RBC # BLD: 4.95 MIL/MCL (ref 4–5.2)
RBC #/AREA URNS HPF: ABNORMAL /HPF
SODIUM SERPL-SCNC: 129 MMOL/L (ref 135–145)
SP GR UR STRIP: 1.03 (ref 1–1.03)
SQUAMOUS #/AREA URNS HPF: ABNORMAL /HPF
UROBILINOGEN UR STRIP-MCNC: 0.2 MG/DL
WBC # BLD: 10.5 K/MCL (ref 4.2–11)
WBC #/AREA URNS HPF: ABNORMAL /HPF

## 2021-02-12 PROCEDURE — 81001 URINALYSIS AUTO W/SCOPE: CPT | Performed by: EMERGENCY MEDICINE

## 2021-02-12 PROCEDURE — 80053 COMPREHEN METABOLIC PANEL: CPT | Performed by: EMERGENCY MEDICINE

## 2021-02-12 PROCEDURE — 82962 GLUCOSE BLOOD TEST: CPT

## 2021-02-12 PROCEDURE — P9612 CATHETERIZE FOR URINE SPEC: HCPCS | Performed by: EMERGENCY MEDICINE

## 2021-02-12 PROCEDURE — 85025 COMPLETE CBC W/AUTO DIFF WBC: CPT | Performed by: EMERGENCY MEDICINE

## 2021-02-12 PROCEDURE — 74018 RADEX ABDOMEN 1 VIEW: CPT

## 2021-02-12 PROCEDURE — 10002807 HB RX 258: Performed by: EMERGENCY MEDICINE

## 2021-02-12 PROCEDURE — 96361 HYDRATE IV INFUSION ADD-ON: CPT

## 2021-02-12 PROCEDURE — 10002803 HB RX 637: Performed by: EMERGENCY MEDICINE

## 2021-02-12 PROCEDURE — 99283 EMERGENCY DEPT VISIT LOW MDM: CPT

## 2021-02-12 PROCEDURE — 10002800 HB RX 250 W HCPCS: Performed by: EMERGENCY MEDICINE

## 2021-02-12 PROCEDURE — 96374 THER/PROPH/DIAG INJ IV PUSH: CPT

## 2021-02-12 RX ORDER — SENNA AND DOCUSATE SODIUM 50; 8.6 MG/1; MG/1
2 TABLET, FILM COATED ORAL DAILY
Qty: 20 TABLET | Refills: 0 | Status: SHIPPED | OUTPATIENT
Start: 2021-02-12 | End: 2021-02-22

## 2021-02-12 RX ORDER — LANCETS 33 GAUGE
EACH MISCELLANEOUS
COMMUNITY
Start: 2021-01-31

## 2021-02-12 RX ORDER — POTASSIUM CHLORIDE 1500 MG/1
TABLET, EXTENDED RELEASE ORAL DAILY
COMMUNITY
Start: 2021-01-31

## 2021-02-12 RX ORDER — NYSTATIN 100000 [USP'U]/G
POWDER TOPICAL
COMMUNITY
Start: 2020-12-31

## 2021-02-12 RX ORDER — BLOOD SUGAR DIAGNOSTIC
STRIP MISCELLANEOUS
COMMUNITY
Start: 2021-01-31

## 2021-02-12 RX ORDER — MINERAL OIL 100 G/100G
133 OIL RECTAL ONCE
Status: COMPLETED | OUTPATIENT
Start: 2021-02-12 | End: 2021-02-12

## 2021-02-12 RX ORDER — INSULIN GLARGINE 100 [IU]/ML
45 INJECTION, SOLUTION SUBCUTANEOUS ONCE
Status: COMPLETED | OUTPATIENT
Start: 2021-02-12 | End: 2021-02-12

## 2021-02-12 RX ORDER — CALCIUM CARB/VITAMIN D3/VIT K1 500-100-40
TABLET,CHEWABLE ORAL
COMMUNITY
Start: 2020-11-21

## 2021-02-12 RX ORDER — OMEPRAZOLE 40 MG/1
40 CAPSULE, DELAYED RELEASE ORAL DAILY
COMMUNITY
Start: 2021-01-31

## 2021-02-12 RX ORDER — FUROSEMIDE 20 MG/1
20 TABLET ORAL DAILY
COMMUNITY
Start: 2020-12-19

## 2021-02-12 RX ORDER — SITAGLIPTIN 100 MG/1
100 TABLET, FILM COATED ORAL DAILY
COMMUNITY
Start: 2021-01-31

## 2021-02-12 RX ORDER — SIMVASTATIN 10 MG
10 TABLET ORAL AT BEDTIME
COMMUNITY
Start: 2021-01-31

## 2021-02-12 RX ORDER — CLOTRIMAZOLE 1 %
1 CREAM WITH APPLICATOR VAGINAL AT BEDTIME
Qty: 45 G | Refills: 0 | Status: SHIPPED | OUTPATIENT
Start: 2021-02-12 | End: 2021-02-22

## 2021-02-12 RX ORDER — ALBUTEROL SULFATE 90 UG/1
AEROSOL, METERED RESPIRATORY (INHALATION)
COMMUNITY
Start: 2021-01-15

## 2021-02-12 RX ORDER — PEN NEEDLE, DIABETIC 31 GX5/16"
NEEDLE, DISPOSABLE MISCELLANEOUS
COMMUNITY
Start: 2020-11-21

## 2021-02-12 RX ORDER — INSULIN LISPRO 100 [IU]/ML
18 INJECTION, SOLUTION INTRAVENOUS; SUBCUTANEOUS ONCE
Status: COMPLETED | OUTPATIENT
Start: 2021-02-12 | End: 2021-02-12

## 2021-02-12 RX ORDER — HYDROCODONE BITARTRATE AND ACETAMINOPHEN 10; 325 MG/1; MG/1
TABLET ORAL
COMMUNITY
Start: 2021-01-15

## 2021-02-12 RX ORDER — LORAZEPAM 2 MG/ML
2 INJECTION INTRAMUSCULAR ONCE
Status: COMPLETED | OUTPATIENT
Start: 2021-02-12 | End: 2021-02-12

## 2021-02-12 RX ORDER — BUPROPION HYDROCHLORIDE 150 MG/1
150 TABLET ORAL DAILY
COMMUNITY
Start: 2021-01-15

## 2021-02-12 RX ORDER — PREGABALIN 100 MG/1
100 CAPSULE ORAL 3 TIMES DAILY
COMMUNITY
Start: 2021-01-31

## 2021-02-12 RX ORDER — CYCLOBENZAPRINE HCL 10 MG
TABLET ORAL
COMMUNITY
Start: 2021-01-15

## 2021-02-12 RX ORDER — LEVOTHYROXINE SODIUM 0.2 MG/1
200 TABLET ORAL EVERY MORNING
COMMUNITY
Start: 2021-01-31

## 2021-02-12 RX ORDER — FLUCONAZOLE 100 MG/1
200 TABLET ORAL ONCE
Status: COMPLETED | OUTPATIENT
Start: 2021-02-12 | End: 2021-02-12

## 2021-02-12 RX ORDER — LIDOCAINE 50 MG/G
PATCH TOPICAL
COMMUNITY
Start: 2020-12-31

## 2021-02-12 RX ORDER — SPIRONOLACTONE 25 MG/1
25 TABLET ORAL DAILY
COMMUNITY
Start: 2020-12-19

## 2021-02-12 RX ORDER — NYSTATIN 100000 U/G
CREAM TOPICAL
COMMUNITY
Start: 2020-12-19

## 2021-02-12 RX ORDER — POLYETHYLENE GLYCOL 3350 17 G/17G
17 POWDER, FOR SOLUTION ORAL 3 TIMES DAILY
Qty: 1 BOTTLE | Refills: 0 | Status: SHIPPED | OUTPATIENT
Start: 2021-02-12 | End: 2021-02-19

## 2021-02-12 RX ORDER — HYDROCHLOROTHIAZIDE 25 MG/1
TABLET ORAL
COMMUNITY
Start: 2021-01-31

## 2021-02-12 RX ADMIN — INSULIN GLARGINE 45 UNITS: 100 INJECTION, SOLUTION SUBCUTANEOUS at 18:29

## 2021-02-12 RX ADMIN — LORAZEPAM 2 MG: 2 INJECTION INTRAMUSCULAR; INTRAVENOUS at 17:45

## 2021-02-12 RX ADMIN — MINERAL OIL 133 ML: 100 ENEMA RECTAL at 18:52

## 2021-02-12 RX ADMIN — INSULIN LISPRO 18 UNITS: 100 INJECTION, SOLUTION INTRAVENOUS; SUBCUTANEOUS at 18:29

## 2021-02-12 RX ADMIN — SODIUM CHLORIDE 1000 ML: 9 INJECTION, SOLUTION INTRAVENOUS at 17:20

## 2021-02-12 RX ADMIN — FLUCONAZOLE 200 MG: 100 TABLET ORAL at 18:58

## 2021-02-12 ASSESSMENT — ENCOUNTER SYMPTOMS
FEVER: 0
CONSTIPATION: 1
ENDOCRINE COMMENTS: HYPERGLYCEMIA
ABDOMINAL PAIN: 1
EYES NEGATIVE: 1
HEMATOLOGIC/LYMPHATIC NEGATIVE: 1
RESPIRATORY NEGATIVE: 1
WOUND: 1
ABDOMINAL DISTENTION: 1
ALLERGIC/IMMUNOLOGIC NEGATIVE: 1
NERVOUS/ANXIOUS: 1
APPETITE CHANGE: 1
FATIGUE: 1

## 2021-02-12 ASSESSMENT — PAIN SCALES - GENERAL: PAINLEVEL_OUTOF10: 8

## 2021-02-13 LAB
RAINBOW EXTRA TUBES HOLD SPECIMEN: NORMAL

## 2022-08-04 ENCOUNTER — APPOINTMENT (OUTPATIENT)
Dept: GENERAL RADIOLOGY | Facility: HOSPITAL | Age: 52
End: 2022-08-04
Attending: EMERGENCY MEDICINE
Payer: COMMERCIAL

## 2022-08-04 ENCOUNTER — HOSPITAL ENCOUNTER (INPATIENT)
Facility: HOSPITAL | Age: 52
LOS: 12 days | Discharge: HOME HEALTH | End: 2022-08-16
Attending: EMERGENCY MEDICINE | Admitting: HOSPITALIST
Payer: COMMERCIAL

## 2022-08-04 DIAGNOSIS — R77.8 ELEVATED TROPONIN: ICD-10-CM

## 2022-08-04 DIAGNOSIS — J98.01 ACUTE BRONCHOSPASM: ICD-10-CM

## 2022-08-04 DIAGNOSIS — R09.02 HYPOXIA: ICD-10-CM

## 2022-08-04 DIAGNOSIS — R60.0 BILATERAL LEG EDEMA: ICD-10-CM

## 2022-08-04 DIAGNOSIS — I50.9 ACUTE ON CHRONIC CONGESTIVE HEART FAILURE, UNSPECIFIED HEART FAILURE TYPE (HCC): Primary | ICD-10-CM

## 2022-08-04 DIAGNOSIS — N17.9 ACUTE RENAL FAILURE, UNSPECIFIED ACUTE RENAL FAILURE TYPE (HCC): ICD-10-CM

## 2022-08-04 PROBLEM — R79.89 ELEVATED TROPONIN: Status: ACTIVE | Noted: 2022-08-04

## 2022-08-04 LAB
ALBUMIN SERPL-MCNC: 3.1 G/DL (ref 3.4–5)
ALBUMIN/GLOB SERPL: 0.7 {RATIO} (ref 1–2)
ALP LIVER SERPL-CCNC: 107 U/L
ALT SERPL-CCNC: 18 U/L
ANION GAP SERPL CALC-SCNC: 7 MMOL/L (ref 0–18)
AST SERPL-CCNC: 16 U/L (ref 15–37)
ATRIAL RATE: 67 BPM
BASOPHILS # BLD AUTO: 0.03 X10(3) UL (ref 0–0.2)
BASOPHILS NFR BLD AUTO: 0.6 %
BILIRUB SERPL-MCNC: 0.4 MG/DL (ref 0.1–2)
BILIRUB UR QL STRIP.AUTO: NEGATIVE
BUN BLD-MCNC: 56 MG/DL (ref 7–18)
CALCIUM BLD-MCNC: 8.8 MG/DL (ref 8.5–10.1)
CHLORIDE SERPL-SCNC: 107 MMOL/L (ref 98–112)
CHOLEST SERPL-MCNC: 166 MG/DL (ref ?–200)
CLARITY UR REFRACT.AUTO: CLEAR
CO2 SERPL-SCNC: 23 MMOL/L (ref 21–32)
COLOR UR AUTO: YELLOW
CREAT BLD-MCNC: 2.76 MG/DL
EOSINOPHIL # BLD AUTO: 0.06 X10(3) UL (ref 0–0.7)
EOSINOPHIL NFR BLD AUTO: 1.1 %
ERYTHROCYTE [DISTWIDTH] IN BLOOD BY AUTOMATED COUNT: 14.2 %
EST. AVERAGE GLUCOSE BLD GHB EST-MCNC: 148 MG/DL (ref 68–126)
GFR SERPLBLD BASED ON 1.73 SQ M-ARVRAT: 20 ML/MIN/1.73M2 (ref 60–?)
GLOBULIN PLAS-MCNC: 4.3 G/DL (ref 2.8–4.4)
GLUCOSE BLD-MCNC: 203 MG/DL (ref 70–99)
GLUCOSE BLD-MCNC: 99 MG/DL (ref 70–99)
GLUCOSE UR STRIP.AUTO-MCNC: NEGATIVE MG/DL
HBA1C MFR BLD: 6.8 % (ref ?–5.7)
HCT VFR BLD AUTO: 28.8 %
HDLC SERPL-MCNC: 49 MG/DL (ref 40–59)
HGB BLD-MCNC: 9.2 G/DL
HYALINE CASTS #/AREA URNS AUTO: PRESENT /LPF
IMM GRANULOCYTES # BLD AUTO: 0.05 X10(3) UL (ref 0–1)
IMM GRANULOCYTES NFR BLD: 0.9 %
KETONES UR STRIP.AUTO-MCNC: NEGATIVE MG/DL
LACTATE SERPL-SCNC: 0.5 MMOL/L (ref 0.4–2)
LDLC SERPL CALC-MCNC: 89 MG/DL (ref ?–100)
LEUKOCYTE ESTERASE UR QL STRIP.AUTO: NEGATIVE
LYMPHOCYTES # BLD AUTO: 0.61 X10(3) UL (ref 1–4)
LYMPHOCYTES NFR BLD AUTO: 11.4 %
MCH RBC QN AUTO: 26.7 PG (ref 26–34)
MCHC RBC AUTO-ENTMCNC: 31.9 G/DL (ref 31–37)
MCV RBC AUTO: 83.7 FL
MONOCYTES # BLD AUTO: 0.49 X10(3) UL (ref 0.1–1)
MONOCYTES NFR BLD AUTO: 9.2 %
NEUTROPHILS # BLD AUTO: 4.11 X10 (3) UL (ref 1.5–7.7)
NEUTROPHILS # BLD AUTO: 4.11 X10(3) UL (ref 1.5–7.7)
NEUTROPHILS NFR BLD AUTO: 76.8 %
NITRITE UR QL STRIP.AUTO: NEGATIVE
NONHDLC SERPL-MCNC: 117 MG/DL (ref ?–130)
NT-PROBNP SERPL-MCNC: 4304 PG/ML (ref ?–125)
OSMOLALITY SERPL CALC.SUM OF ELEC: 300 MOSM/KG (ref 275–295)
P AXIS: 25 DEGREES
P-R INTERVAL: 190 MS
PH UR STRIP.AUTO: 6 [PH] (ref 5–8)
PLATELET # BLD AUTO: 270 10(3)UL (ref 150–450)
POTASSIUM SERPL-SCNC: 4.4 MMOL/L (ref 3.5–5.1)
PROT SERPL-MCNC: 7.4 G/DL (ref 6.4–8.2)
PROT UR STRIP.AUTO-MCNC: >=300 MG/DL
Q-T INTERVAL: 442 MS
QRS DURATION: 86 MS
QTC CALCULATION (BEZET): 467 MS
R AXIS: 34 DEGREES
RBC # BLD AUTO: 3.44 X10(6)UL
RBC UR QL AUTO: NEGATIVE
SARS-COV-2 RNA RESP QL NAA+PROBE: NOT DETECTED
SODIUM SERPL-SCNC: 137 MMOL/L (ref 136–145)
SP GR UR STRIP.AUTO: 1.02 (ref 1–1.03)
T AXIS: 47 DEGREES
TRIGL SERPL-MCNC: 160 MG/DL (ref 30–149)
TROPONIN I HIGH SENSITIVITY: 123 NG/L
UROBILINOGEN UR STRIP.AUTO-MCNC: 0.2 MG/DL
VENTRICULAR RATE: 67 BPM
VLDLC SERPL CALC-MCNC: 26 MG/DL (ref 0–30)
WBC # BLD AUTO: 5.4 X10(3) UL (ref 4–11)

## 2022-08-04 PROCEDURE — 99223 1ST HOSP IP/OBS HIGH 75: CPT | Performed by: HOSPITALIST

## 2022-08-04 PROCEDURE — 3044F HG A1C LEVEL LT 7.0%: CPT | Performed by: INTERNAL MEDICINE

## 2022-08-04 PROCEDURE — 3E0333Z INTRODUCTION OF ANTI-INFLAMMATORY INTO PERIPHERAL VEIN, PERCUTANEOUS APPROACH: ICD-10-PCS | Performed by: EMERGENCY MEDICINE

## 2022-08-04 PROCEDURE — 71045 X-RAY EXAM CHEST 1 VIEW: CPT | Performed by: EMERGENCY MEDICINE

## 2022-08-04 RX ORDER — CARVEDILOL 12.5 MG/1
37.5 TABLET ORAL 2 TIMES DAILY WITH MEALS
Status: DISCONTINUED | OUTPATIENT
Start: 2022-08-04 | End: 2022-08-11

## 2022-08-04 RX ORDER — FUROSEMIDE 10 MG/ML
40 INJECTION INTRAMUSCULAR; INTRAVENOUS ONCE
Status: COMPLETED | OUTPATIENT
Start: 2022-08-04 | End: 2022-08-04

## 2022-08-04 RX ORDER — HYDRALAZINE HYDROCHLORIDE 10 MG/1
10 TABLET, FILM COATED ORAL SEE ADMIN INSTRUCTIONS
COMMUNITY
End: 2022-08-16

## 2022-08-04 RX ORDER — HYDROXYZINE HYDROCHLORIDE 25 MG/1
25 TABLET, FILM COATED ORAL 3 TIMES DAILY PRN
COMMUNITY

## 2022-08-04 RX ORDER — NITROGLYCERIN 20 MG/100ML
INJECTION INTRAVENOUS CONTINUOUS
Status: DISCONTINUED | OUTPATIENT
Start: 2022-08-04 | End: 2022-08-09

## 2022-08-04 RX ORDER — NICOTINE POLACRILEX 4 MG
15 LOZENGE BUCCAL
Status: DISCONTINUED | OUTPATIENT
Start: 2022-08-04 | End: 2022-08-16

## 2022-08-04 RX ORDER — FUROSEMIDE 10 MG/ML
40 INJECTION INTRAMUSCULAR; INTRAVENOUS
Status: DISCONTINUED | OUTPATIENT
Start: 2022-08-04 | End: 2022-08-05

## 2022-08-04 RX ORDER — ONDANSETRON 2 MG/ML
4 INJECTION INTRAMUSCULAR; INTRAVENOUS EVERY 6 HOURS PRN
Status: DISCONTINUED | OUTPATIENT
Start: 2022-08-04 | End: 2022-08-16

## 2022-08-04 RX ORDER — DEXTROSE MONOHYDRATE 25 G/50ML
50 INJECTION, SOLUTION INTRAVENOUS
Status: DISCONTINUED | OUTPATIENT
Start: 2022-08-04 | End: 2022-08-16

## 2022-08-04 RX ORDER — HYDRALAZINE HYDROCHLORIDE 20 MG/ML
10 INJECTION INTRAMUSCULAR; INTRAVENOUS EVERY 6 HOURS PRN
Status: DISCONTINUED | OUTPATIENT
Start: 2022-08-04 | End: 2022-08-16

## 2022-08-04 RX ORDER — PRAVASTATIN SODIUM 20 MG
20 TABLET ORAL NIGHTLY
Status: DISCONTINUED | OUTPATIENT
Start: 2022-08-04 | End: 2022-08-16

## 2022-08-04 RX ORDER — CLOPIDOGREL BISULFATE 75 MG/1
75 TABLET ORAL DAILY
COMMUNITY

## 2022-08-04 RX ORDER — PREGABALIN 50 MG/1
50 CAPSULE ORAL 3 TIMES DAILY
Status: DISCONTINUED | OUTPATIENT
Start: 2022-08-04 | End: 2022-08-05

## 2022-08-04 RX ORDER — OMEPRAZOLE 40 MG/1
40 CAPSULE, DELAYED RELEASE ORAL DAILY
COMMUNITY

## 2022-08-04 RX ORDER — HEPARIN SODIUM 5000 [USP'U]/ML
5000 INJECTION, SOLUTION INTRAVENOUS; SUBCUTANEOUS EVERY 12 HOURS SCHEDULED
Status: DISCONTINUED | OUTPATIENT
Start: 2022-08-04 | End: 2022-08-16

## 2022-08-04 RX ORDER — ALBUTEROL SULFATE 90 UG/1
2 AEROSOL, METERED RESPIRATORY (INHALATION)
COMMUNITY

## 2022-08-04 RX ORDER — BUMETANIDE 1 MG/1
2 TABLET ORAL 2 TIMES DAILY
COMMUNITY
End: 2022-08-16

## 2022-08-04 RX ORDER — ERGOCALCIFEROL (VITAMIN D2) 1250 MCG
50000 CAPSULE ORAL WEEKLY
COMMUNITY

## 2022-08-04 RX ORDER — NIFEDIPINE 30 MG/1
30 TABLET, FILM COATED, EXTENDED RELEASE ORAL DAILY
COMMUNITY
End: 2022-08-16

## 2022-08-04 RX ORDER — PREGABALIN 50 MG/1
50 CAPSULE ORAL 3 TIMES DAILY
COMMUNITY
End: 2022-08-16

## 2022-08-04 RX ORDER — BUPROPION HYDROCHLORIDE 150 MG/1
150 TABLET ORAL DAILY
Status: DISCONTINUED | OUTPATIENT
Start: 2022-08-05 | End: 2022-08-16

## 2022-08-04 RX ORDER — LEVOTHYROXINE SODIUM 0.2 MG/1
200 TABLET ORAL EVERY MORNING
Status: DISCONTINUED | OUTPATIENT
Start: 2022-08-05 | End: 2022-08-04

## 2022-08-04 RX ORDER — NITROGLYCERIN 0.4 MG/1
0.4 TABLET SUBLINGUAL ONCE
Status: COMPLETED | OUTPATIENT
Start: 2022-08-04 | End: 2022-08-04

## 2022-08-04 RX ORDER — NIFEDIPINE 60 MG/1
60 TABLET, EXTENDED RELEASE ORAL DAILY
Status: DISCONTINUED | OUTPATIENT
Start: 2022-08-04 | End: 2022-08-08

## 2022-08-04 RX ORDER — HYDROCODONE BITARTRATE AND ACETAMINOPHEN 10; 325 MG/1; MG/1
1 TABLET ORAL EVERY 4 HOURS PRN
Status: DISCONTINUED | OUTPATIENT
Start: 2022-08-04 | End: 2022-08-16

## 2022-08-04 RX ORDER — SIMVASTATIN 20 MG
20 TABLET ORAL NIGHTLY
COMMUNITY
End: 2022-08-16

## 2022-08-04 RX ORDER — HYDRALAZINE HYDROCHLORIDE 25 MG/1
25 TABLET, FILM COATED ORAL SEE ADMIN INSTRUCTIONS
COMMUNITY
End: 2022-08-16

## 2022-08-04 RX ORDER — LOSARTAN POTASSIUM 100 MG/1
100 TABLET ORAL DAILY
COMMUNITY
End: 2022-08-16

## 2022-08-04 RX ORDER — CLOPIDOGREL BISULFATE 75 MG/1
75 TABLET ORAL DAILY
Status: DISCONTINUED | OUTPATIENT
Start: 2022-08-04 | End: 2022-08-16

## 2022-08-04 RX ORDER — ALBUTEROL SULFATE 90 UG/1
8 AEROSOL, METERED RESPIRATORY (INHALATION) 4 TIMES DAILY
Status: DISCONTINUED | OUTPATIENT
Start: 2022-08-04 | End: 2022-08-04

## 2022-08-04 RX ORDER — ASPIRIN 325 MG
325 TABLET, DELAYED RELEASE (ENTERIC COATED) ORAL DAILY
Status: DISCONTINUED | OUTPATIENT
Start: 2022-08-04 | End: 2022-08-16

## 2022-08-04 RX ORDER — ACETAMINOPHEN 500 MG
500 TABLET ORAL EVERY 4 HOURS PRN
Status: DISCONTINUED | OUTPATIENT
Start: 2022-08-04 | End: 2022-08-16

## 2022-08-04 RX ORDER — MELATONIN
3 NIGHTLY PRN
Status: DISCONTINUED | OUTPATIENT
Start: 2022-08-04 | End: 2022-08-16

## 2022-08-04 RX ORDER — NICOTINE POLACRILEX 4 MG
30 LOZENGE BUCCAL
Status: DISCONTINUED | OUTPATIENT
Start: 2022-08-04 | End: 2022-08-16

## 2022-08-04 RX ORDER — PROCHLORPERAZINE EDISYLATE 5 MG/ML
5 INJECTION INTRAMUSCULAR; INTRAVENOUS EVERY 8 HOURS PRN
Status: DISCONTINUED | OUTPATIENT
Start: 2022-08-04 | End: 2022-08-16

## 2022-08-04 RX ORDER — INSULIN GLARGINE 300 U/ML
120 INJECTION, SOLUTION SUBCUTANEOUS DAILY
COMMUNITY

## 2022-08-04 RX ORDER — METHYLPREDNISOLONE SODIUM SUCCINATE 125 MG/2ML
125 INJECTION, POWDER, LYOPHILIZED, FOR SOLUTION INTRAMUSCULAR; INTRAVENOUS ONCE
Status: COMPLETED | OUTPATIENT
Start: 2022-08-04 | End: 2022-08-04

## 2022-08-04 RX ORDER — LEVOTHYROXINE SODIUM 0.07 MG/1
75 TABLET ORAL
COMMUNITY

## 2022-08-04 NOTE — ED INITIAL ASSESSMENT (HPI)
Patient arrived to the ED from home with c/o cough and BILLY. Patient states shes been sick for the last week, prescribed a Z-marie and feels shes getting worse and its harder to breathe. Patient states she has a productive cough. Crackles and wheezing heard bilaterally. AAOx4 and rates pain 6/10.

## 2022-08-04 NOTE — ED QUICK NOTES
Patient sitting at the edge of the bed for position of comfort.  at bedside. Patient informed not to get up without calling for assistance. Verbalized understanding.

## 2022-08-05 ENCOUNTER — APPOINTMENT (OUTPATIENT)
Dept: ULTRASOUND IMAGING | Facility: HOSPITAL | Age: 52
End: 2022-08-05
Attending: INTERNAL MEDICINE
Payer: COMMERCIAL

## 2022-08-05 ENCOUNTER — APPOINTMENT (OUTPATIENT)
Dept: CV DIAGNOSTICS | Facility: HOSPITAL | Age: 52
End: 2022-08-05
Attending: HOSPITALIST
Payer: COMMERCIAL

## 2022-08-05 LAB
ANION GAP SERPL CALC-SCNC: 10 MMOL/L (ref 0–18)
BILIRUB UR QL STRIP.AUTO: NEGATIVE
BUN BLD-MCNC: 66 MG/DL (ref 7–18)
CALCIUM BLD-MCNC: 8.5 MG/DL (ref 8.5–10.1)
CHLORIDE SERPL-SCNC: 104 MMOL/L (ref 98–112)
CO2 SERPL-SCNC: 21 MMOL/L (ref 21–32)
COLOR UR AUTO: YELLOW
CREAT BLD-MCNC: 2.99 MG/DL
CREAT UR-SCNC: 109 MG/DL
CREAT UR-SCNC: 109 MG/DL
DEPRECATED HBV CORE AB SER IA-ACNC: 42.3 NG/ML
GFR SERPLBLD BASED ON 1.73 SQ M-ARVRAT: 18 ML/MIN/1.73M2 (ref 60–?)
GLUCOSE BLD-MCNC: 118 MG/DL (ref 70–99)
GLUCOSE BLD-MCNC: 203 MG/DL (ref 70–99)
GLUCOSE BLD-MCNC: 298 MG/DL (ref 70–99)
GLUCOSE BLD-MCNC: 330 MG/DL (ref 70–99)
GLUCOSE BLD-MCNC: 360 MG/DL (ref 70–99)
GLUCOSE UR STRIP.AUTO-MCNC: NEGATIVE MG/DL
HYALINE CASTS #/AREA URNS AUTO: PRESENT /LPF
HYALINE CASTS #/AREA URNS AUTO: PRESENT /LPF
IRON SATN MFR SERPL: 15 %
IRON SERPL-MCNC: 40 UG/DL
KETONES UR STRIP.AUTO-MCNC: NEGATIVE MG/DL
MAGNESIUM SERPL-MCNC: 2.3 MG/DL (ref 1.6–2.6)
MICROALBUMIN UR-MCNC: 192 MG/DL
MICROALBUMIN/CREAT 24H UR-RTO: 1761.5 UG/MG (ref ?–30)
NITRITE UR QL STRIP.AUTO: NEGATIVE
OSMOLALITY SERPL CALC.SUM OF ELEC: 312 MOSM/KG (ref 275–295)
PH UR STRIP.AUTO: 5.5 [PH] (ref 5–8)
POTASSIUM SERPL-SCNC: 4.7 MMOL/L (ref 3.5–5.1)
PROT UR STRIP.AUTO-MCNC: >=300 MG/DL
SODIUM SERPL-SCNC: 135 MMOL/L (ref 136–145)
SODIUM SERPL-SCNC: 39 MMOL/L
SP GR UR STRIP.AUTO: 1.02 (ref 1–1.03)
TIBC SERPL-MCNC: 273 UG/DL (ref 240–450)
TRANSFERRIN SERPL-MCNC: 183 MG/DL (ref 200–360)
TROPONIN I HIGH SENSITIVITY: 101 NG/L
TROPONIN I HIGH SENSITIVITY: 110 NG/L
TSI SER-ACNC: 1.81 MIU/ML (ref 0.36–3.74)
URATE SERPL-MCNC: 10.9 MG/DL
UROBILINOGEN UR STRIP.AUTO-MCNC: 0.2 MG/DL

## 2022-08-05 PROCEDURE — 99232 SBSQ HOSP IP/OBS MODERATE 35: CPT | Performed by: HOSPITALIST

## 2022-08-05 PROCEDURE — 3060F POS MICROALBUMINURIA REV: CPT | Performed by: INTERNAL MEDICINE

## 2022-08-05 PROCEDURE — 93306 TTE W/DOPPLER COMPLETE: CPT | Performed by: HOSPITALIST

## 2022-08-05 PROCEDURE — 76770 US EXAM ABDO BACK WALL COMP: CPT | Performed by: INTERNAL MEDICINE

## 2022-08-05 PROCEDURE — 99255 IP/OBS CONSLTJ NEW/EST HI 80: CPT | Performed by: INTERNAL MEDICINE

## 2022-08-05 RX ORDER — GABAPENTIN 300 MG/1
300 CAPSULE ORAL 2 TIMES DAILY
Status: DISCONTINUED | OUTPATIENT
Start: 2022-08-05 | End: 2022-08-16

## 2022-08-05 RX ORDER — HYDROXYZINE HYDROCHLORIDE 25 MG/1
25 TABLET, FILM COATED ORAL 3 TIMES DAILY PRN
Status: DISCONTINUED | OUTPATIENT
Start: 2022-08-05 | End: 2022-08-16

## 2022-08-05 RX ORDER — FUROSEMIDE 10 MG/ML
80 INJECTION INTRAMUSCULAR; INTRAVENOUS
Status: DISCONTINUED | OUTPATIENT
Start: 2022-08-05 | End: 2022-08-08

## 2022-08-05 NOTE — PLAN OF CARE
Received patient as admit from ED into 2625 at change of shift. Oriented to room, call light and safety. AO x4. NSR on tele monitor. O2 sat adequate on 3L nasal cannula. Will attempt to wean as tolerated. Patient arrived on nitroglycerin gtt. Day shift RN spoke with elenita DONIS, who instructed to keep patient on drip overnight. Patient with right sided weakness, due to past CVA, but able to ambulate with walker and assist. Plan of care updated. Bed locked, in lowest position, with bed alarm on. Call light and personal items in reach. Will continue to monitor.     Problem: Diabetes/Glucose Control  Goal: Glucose maintained within prescribed range  Description: INTERVENTIONS:  - Monitor Blood Glucose as ordered  - Assess for signs and symptoms of hyperglycemia and hypoglycemia  - Administer ordered medications to maintain glucose within target range  - Assess barriers to adequate nutritional intake and initiate nutrition consult as needed  - Instruct patient on self management of diabetes  Outcome: Progressing     Problem: Patient/Family Goals  Goal: Patient/Family Long Term Goal  Description: Patient's Long Term Goal: stay out of the hospital    Interventions:  - take meds as prescribed  - attend follow up appointments  - follow recommended diet  - See additional Care Plan goals for specific interventions  Outcome: Progressing  Goal: Patient/Family Short Term Goal  Description: Patient's Short Term Goal:     Interventions:   -   - See additional Care Plan goals for specific interventions  Outcome: Progressing     Problem: RESPIRATORY - ADULT  Goal: Achieves optimal ventilation and oxygenation  Description: INTERVENTIONS:  - Assess for changes in respiratory status  - Assess for changes in mentation and behavior  - Position to facilitate oxygenation and minimize respiratory effort  - Oxygen supplementation based on oxygen saturation or ABGs  - Provide Smoking Cessation handout, if applicable  - Encourage broncho-pulmonary hygiene including cough, deep breathe, Incentive Spirometry  - Assess the need for suctioning and perform as needed  - Assess and instruct to report SOB or any respiratory difficulty  - Respiratory Therapy support as indicated  - Manage/alleviate anxiety  - Monitor for signs/symptoms of CO2 retention  Outcome: Progressing

## 2022-08-05 NOTE — PLAN OF CARE
RN SHIFT NOTE    Assumed care of pt at 0700. Pt denies pain at this time. Patient is alert and oriented x 4 (Reminders and reorientation completed). Patient is NSR on tele, S1 and S2 present an pt denies cardiac symptoms. +3 BLE pitting edema noted. Lung sound diminished with expiratory wheezes. Abdomen is soft and round. Last BM on 08/02. Skin is intact. Will titrate off nitroglycerin gtt as tolerated per cardiology. Nitroglycerin is currently running at 60 mcg/min. Tolerating medication and care needs have been met at this time.      POC:  Echo, US of Kidneys/Bladder, PT to see, Monitor BP         Problem: Diabetes/Glucose Control  Goal: Glucose maintained within prescribed range  Description: INTERVENTIONS:  - Monitor Blood Glucose as ordered  - Assess for signs and symptoms of hyperglycemia and hypoglycemia  - Administer ordered medications to maintain glucose within target range  - Assess barriers to adequate nutritional intake and initiate nutrition consult as needed  - Instruct patient on self management of diabetes  Outcome: Progressing     Problem: Patient/Family Goals  Goal: Patient/Family Long Term Goal  Description: Patient's Long Term Goal: stay out of the hospital    Interventions:  - take meds as prescribed  - attend follow up appointments  - follow recommended diet  - See additional Care Plan goals for specific interventions  Outcome: Progressing  Goal: Patient/Family Short Term Goal  Description: Patient's Short Term Goal: To discharge home    Interventions:   - take medication as prescribed  -  ambulate in brown  - monitor blood pressure   - See additional Care Plan goals for specific interventions  Outcome: Progressing     Problem: RESPIRATORY - ADULT  Goal: Achieves optimal ventilation and oxygenation  Description: INTERVENTIONS:  - Assess for changes in respiratory status  - Assess for changes in mentation and behavior  - Position to facilitate oxygenation and minimize respiratory effort  - Oxygen supplementation based on oxygen saturation or ABGs  - Provide Smoking Cessation handout, if applicable  - Encourage broncho-pulmonary hygiene including cough, deep breathe, Incentive Spirometry  - Assess the need for suctioning and perform as needed  - Assess and instruct to report SOB or any respiratory difficulty  - Respiratory Therapy support as indicated  - Manage/alleviate anxiety  - Monitor for signs/symptoms of CO2 retention  Outcome: Progressing

## 2022-08-06 LAB
ADENOVIRUS PCR:: NOT DETECTED
ANION GAP SERPL CALC-SCNC: 9 MMOL/L (ref 0–18)
B PARAPERT DNA SPEC QL NAA+PROBE: NOT DETECTED
B PERT DNA SPEC QL NAA+PROBE: NOT DETECTED
BUN BLD-MCNC: 77 MG/DL (ref 7–18)
C PNEUM DNA SPEC QL NAA+PROBE: NOT DETECTED
CALCIUM BLD-MCNC: 8.5 MG/DL (ref 8.5–10.1)
CHLORIDE SERPL-SCNC: 106 MMOL/L (ref 98–112)
CO2 SERPL-SCNC: 21 MMOL/L (ref 21–32)
CORONAVIRUS 229E PCR:: NOT DETECTED
CORONAVIRUS HKU1 PCR:: NOT DETECTED
CORONAVIRUS NL63 PCR:: NOT DETECTED
CORONAVIRUS OC43 PCR:: NOT DETECTED
CREAT BLD-MCNC: 3.25 MG/DL
ERYTHROCYTE [DISTWIDTH] IN BLOOD BY AUTOMATED COUNT: 14.2 %
FLUAV RNA SPEC QL NAA+PROBE: NOT DETECTED
FLUBV RNA SPEC QL NAA+PROBE: NOT DETECTED
GFR SERPLBLD BASED ON 1.73 SQ M-ARVRAT: 16 ML/MIN/1.73M2 (ref 60–?)
GLUCOSE BLD-MCNC: 135 MG/DL (ref 70–99)
GLUCOSE BLD-MCNC: 143 MG/DL (ref 70–99)
GLUCOSE BLD-MCNC: 154 MG/DL (ref 70–99)
GLUCOSE BLD-MCNC: 96 MG/DL (ref 70–99)
GLUCOSE BLD-MCNC: 99 MG/DL (ref 70–99)
HCT VFR BLD AUTO: 27.4 %
HGB BLD-MCNC: 8.5 G/DL
MCH RBC QN AUTO: 26.5 PG (ref 26–34)
MCHC RBC AUTO-ENTMCNC: 31 G/DL (ref 31–37)
MCV RBC AUTO: 85.4 FL
METAPNEUMOVIRUS PCR:: NOT DETECTED
MYCOPLASMA PNEUMONIA PCR:: NOT DETECTED
OSMOLALITY SERPL CALC.SUM OF ELEC: 305 MOSM/KG (ref 275–295)
PARAINFLUENZA 1 PCR:: NOT DETECTED
PARAINFLUENZA 2 PCR:: NOT DETECTED
PARAINFLUENZA 3 PCR:: NOT DETECTED
PARAINFLUENZA 4 PCR:: NOT DETECTED
PLATELET # BLD AUTO: 293 10(3)UL (ref 150–450)
POTASSIUM SERPL-SCNC: 4.3 MMOL/L (ref 3.5–5.1)
RBC # BLD AUTO: 3.21 X10(6)UL
RHINOVIRUS/ENTERO PCR:: NOT DETECTED
RSV RNA SPEC QL NAA+PROBE: NOT DETECTED
SARS-COV-2 RNA NPH QL NAA+NON-PROBE: NOT DETECTED
SODIUM SERPL-SCNC: 136 MMOL/L (ref 136–145)
WBC # BLD AUTO: 6.6 X10(3) UL (ref 4–11)

## 2022-08-06 PROCEDURE — 99232 SBSQ HOSP IP/OBS MODERATE 35: CPT | Performed by: HOSPITALIST

## 2022-08-06 PROCEDURE — 99233 SBSQ HOSP IP/OBS HIGH 50: CPT | Performed by: INTERNAL MEDICINE

## 2022-08-06 RX ORDER — GUAIFENESIN 600 MG
600 TABLET, EXTENDED RELEASE 12 HR ORAL 2 TIMES DAILY
Status: DISCONTINUED | OUTPATIENT
Start: 2022-08-06 | End: 2022-08-16

## 2022-08-06 RX ORDER — BENZONATATE 100 MG/1
100 CAPSULE ORAL 3 TIMES DAILY PRN
Status: DISCONTINUED | OUTPATIENT
Start: 2022-08-06 | End: 2022-08-16

## 2022-08-06 NOTE — PLAN OF CARE
Pt AOx4. O2 sats maintained to 100% on 1 L NC. NSR on tele. Pt denies pain. Pt updated on plan of care.      Problem: Diabetes/Glucose Control  Goal: Glucose maintained within prescribed range  Description: INTERVENTIONS:  - Monitor Blood Glucose as ordered  - Assess for signs and symptoms of hyperglycemia and hypoglycemia  - Administer ordered medications to maintain glucose within target range  - Assess barriers to adequate nutritional intake and initiate nutrition consult as needed  - Instruct patient on self management of diabetes  Outcome: Progressing     Problem: Patient/Family Goals  Goal: Patient/Family Long Term Goal  Description: Patient's Long Term Goal: stay out of the hospital    Interventions:  - take meds as prescribed  - attend follow up appointments  - follow recommended diet  - See additional Care Plan goals for specific interventions  Outcome: Progressing  Problem: Patient/Family Goals  Goal: Patient/Family Short Term Goal  Description: Patient's Short Term Goal: to have decreased swelling and improvement of breathing    Interventions:   - IV lasix per nephrology  - monitor labs   - monitor VSS  - See additional Care Plan goals for specific interventions  8/6/2022 1122 by Jammie Jose RN  Outcome: Progressing          Problem: RESPIRATORY - ADULT  Goal: Achieves optimal ventilation and oxygenation  Description: INTERVENTIONS:  - Assess for changes in respiratory status  - Assess for changes in mentation and behavior  - Position to facilitate oxygenation and minimize respiratory effort  - Oxygen supplementation based on oxygen saturation or ABGs  - Provide Smoking Cessation handout, if applicable  - Encourage broncho-pulmonary hygiene including cough, deep breathe, Incentive Spirometry  - Assess the need for suctioning and perform as needed  - Assess and instruct to report SOB or any respiratory difficulty  - Respiratory Therapy support as indicated  - Manage/alleviate anxiety  - Monitor for signs/symptoms of CO2 retention  Outcome: Progressing

## 2022-08-06 NOTE — PLAN OF CARE
Assumed care of patient 1930 sitting up in bed, with family at bedside. AO x4. NSR on tele monitor. O2 sat adequate on 2L nasal cannula. Attempted to wean off O2, but patient prefers it for comfort. Denies chest pain. Plan of care updated. Bed locked, in lowest position, with bed alarm on. Call light and personal items in reach. Will continue to monitor.     Problem: Diabetes/Glucose Control  Goal: Glucose maintained within prescribed range  Description: INTERVENTIONS:  - Monitor Blood Glucose as ordered  - Assess for signs and symptoms of hyperglycemia and hypoglycemia  - Administer ordered medications to maintain glucose within target range  - Assess barriers to adequate nutritional intake and initiate nutrition consult as needed  - Instruct patient on self management of diabetes  Outcome: Progressing     Problem: Patient/Family Goals  Goal: Patient/Family Long Term Goal  Description: Patient's Long Term Goal: stay out of the hospital    Interventions:  - take meds as prescribed  - attend follow up appointments  - follow recommended diet  - See additional Care Plan goals for specific interventions  Outcome: Progressing     Problem: RESPIRATORY - ADULT  Goal: Achieves optimal ventilation and oxygenation  Description: INTERVENTIONS:  - Assess for changes in respiratory status  - Assess for changes in mentation and behavior  - Position to facilitate oxygenation and minimize respiratory effort  - Oxygen supplementation based on oxygen saturation or ABGs  - Provide Smoking Cessation handout, if applicable  - Encourage broncho-pulmonary hygiene including cough, deep breathe, Incentive Spirometry  - Assess the need for suctioning and perform as needed  - Assess and instruct to report SOB or any respiratory difficulty  - Respiratory Therapy support as indicated  - Manage/alleviate anxiety  - Monitor for signs/symptoms of CO2 retention  Outcome: Progressing

## 2022-08-07 LAB
ANION GAP SERPL CALC-SCNC: 9 MMOL/L (ref 0–18)
BUN BLD-MCNC: 83 MG/DL (ref 7–18)
CALCIUM BLD-MCNC: 8.4 MG/DL (ref 8.5–10.1)
CHLORIDE SERPL-SCNC: 105 MMOL/L (ref 98–112)
CO2 SERPL-SCNC: 21 MMOL/L (ref 21–32)
CREAT BLD-MCNC: 3.19 MG/DL
ERYTHROCYTE [DISTWIDTH] IN BLOOD BY AUTOMATED COUNT: 14.5 %
GFR SERPLBLD BASED ON 1.73 SQ M-ARVRAT: 17 ML/MIN/1.73M2 (ref 60–?)
GLUCOSE BLD-MCNC: 156 MG/DL (ref 70–99)
GLUCOSE BLD-MCNC: 177 MG/DL (ref 70–99)
GLUCOSE BLD-MCNC: 178 MG/DL (ref 70–99)
GLUCOSE BLD-MCNC: 192 MG/DL (ref 70–99)
GLUCOSE BLD-MCNC: 222 MG/DL (ref 70–99)
HCT VFR BLD AUTO: 30.2 %
HGB BLD-MCNC: 9.4 G/DL
MCH RBC QN AUTO: 26.7 PG (ref 26–34)
MCHC RBC AUTO-ENTMCNC: 31.1 G/DL (ref 31–37)
MCV RBC AUTO: 85.8 FL
OSMOLALITY SERPL CALC.SUM OF ELEC: 309 MOSM/KG (ref 275–295)
PLATELET # BLD AUTO: 307 10(3)UL (ref 150–450)
POTASSIUM SERPL-SCNC: 4.6 MMOL/L (ref 3.5–5.1)
RBC # BLD AUTO: 3.52 X10(6)UL
SODIUM SERPL-SCNC: 135 MMOL/L (ref 136–145)
WBC # BLD AUTO: 8.6 X10(3) UL (ref 4–11)

## 2022-08-07 PROCEDURE — 99232 SBSQ HOSP IP/OBS MODERATE 35: CPT | Performed by: HOSPITALIST

## 2022-08-07 PROCEDURE — 99233 SBSQ HOSP IP/OBS HIGH 50: CPT | Performed by: INTERNAL MEDICINE

## 2022-08-07 RX ORDER — PANTOPRAZOLE SODIUM 40 MG/1
40 TABLET, DELAYED RELEASE ORAL
Status: DISCONTINUED | OUTPATIENT
Start: 2022-08-08 | End: 2022-08-16

## 2022-08-07 RX ORDER — POLYETHYLENE GLYCOL 3350 17 G/17G
17 POWDER, FOR SOLUTION ORAL DAILY PRN
Status: DISCONTINUED | OUTPATIENT
Start: 2022-08-07 | End: 2022-08-09

## 2022-08-07 RX ORDER — BISACODYL 10 MG
10 SUPPOSITORY, RECTAL RECTAL
Status: DISCONTINUED | OUTPATIENT
Start: 2022-08-07 | End: 2022-08-16

## 2022-08-07 RX ORDER — DOCUSATE SODIUM 100 MG/1
100 CAPSULE, LIQUID FILLED ORAL 2 TIMES DAILY
Status: DISCONTINUED | OUTPATIENT
Start: 2022-08-07 | End: 2022-08-16

## 2022-08-07 RX ORDER — OXYMETAZOLINE HYDROCHLORIDE 0.05 G/100ML
1 SPRAY NASAL EVERY 12 HOURS PRN
Status: DISCONTINUED | OUTPATIENT
Start: 2022-08-07 | End: 2022-08-16

## 2022-08-07 NOTE — PLAN OF CARE
Pt AOx4. R sided weakness from old CVA. O2 sats maintained on 1 L NC. Tessalon pearls given with relief. NSR on tele. Pain managed with PRN norco. Pt reports no BM in 1 week, Hospitalist notified, bowel regimen ordered. Pt updated on plan of care.      Problem: Diabetes/Glucose Control  Goal: Glucose maintained within prescribed range  Description: INTERVENTIONS:  - Monitor Blood Glucose as ordered  - Assess for signs and symptoms of hyperglycemia and hypoglycemia  - Administer ordered medications to maintain glucose within target range  - Assess barriers to adequate nutritional intake and initiate nutrition consult as needed  - Instruct patient on self management of diabetes  Outcome: Progressing     Problem: Patient/Family Goals  Goal: Patient/Family Long Term Goal  Description: Patient's Long Term Goal: stay out of the hospital    Interventions:  - take meds as prescribed  - attend follow up appointments  - follow recommended diet  - See additional Care Plan goals for specific interventions  Outcome: Progressing  Goal: Patient/Family Short Term Goal  Description: Patient's Short Term Goal: to have decreased swelling and improvement of breathing    Interventions:   - IV lasix per nephrology  - monitor labs   - monitor VSS  - See additional Care Plan goals for specific interventions  Outcome: Progressing     Problem: RESPIRATORY - ADULT  Goal: Achieves optimal ventilation and oxygenation  Description: INTERVENTIONS:  - Assess for changes in respiratory status  - Assess for changes in mentation and behavior  - Position to facilitate oxygenation and minimize respiratory effort  - Oxygen supplementation based on oxygen saturation or ABGs  - Provide Smoking Cessation handout, if applicable  - Encourage broncho-pulmonary hygiene including cough, deep breathe, Incentive Spirometry  - Assess the need for suctioning and perform as needed  - Assess and instruct to report SOB or any respiratory difficulty  - Respiratory Therapy support as indicated  - Manage/alleviate anxiety  - Monitor for signs/symptoms of CO2 retention  Outcome: Progressing

## 2022-08-07 NOTE — PLAN OF CARE
Patient is Aox4. NSR on tele. Pt on 2L nasal cannula, dyspnea present with exertion. Productive cough present, per pt does not know appearance, prn tessalon given. Pt has severe chronic back pain, prn norco x1 given. Occasional episodes of incontinence, briefed. Pitting edema to BLE. R side weakness from prior CVA. QID. Up standby with walker. Call light within reach.     Plan: Diuresing, daily wt, I&Os, 1500mL fluid restriction  Home with HHC/int supervision  IV Venofer   **Patient needs bowel regimen, has not had BM in several days    Problem: Diabetes/Glucose Control  Goal: Glucose maintained within prescribed range  Description: INTERVENTIONS:  - Monitor Blood Glucose as ordered  - Assess for signs and symptoms of hyperglycemia and hypoglycemia  - Administer ordered medications to maintain glucose within target range  - Assess barriers to adequate nutritional intake and initiate nutrition consult as needed  - Instruct patient on self management of diabetes  8/6/2022 2150 by Joseph Delgado RN  Outcome: Progressing  8/6/2022 2150 by Joseph Delgado RN  Outcome: Progressing  8/6/2022 2150 by Joseph Delgado RN  Outcome: Progressing     Problem: Patient/Family Goals  Goal: Patient/Family Long Term Goal  Description: Patient's Long Term Goal: stay out of the hospital    Interventions:  - take meds as prescribed  - attend follow up appointments  - follow recommended diet  - See additional Care Plan goals for specific interventions  8/6/2022 2150 by Joseph Delgado RN  Outcome: Progressing  8/6/2022 2150 by Joseph Delgado RN  Outcome: Progressing  8/6/2022 2150 by Joseph Delgado RN  Outcome: Progressing  Goal: Patient/Family Short Term Goal  Description: Patient's Short Term Goal: to have decreased swelling and improvement of breathing    Interventions:   - IV lasix per nephrology  - monitor labs   - monitor VSS  - See additional Care Plan goals for specific interventions  8/6/2022 2150 by Joseph Delgado RN  Outcome: Progressing  8/6/2022 2150 by Kana Gusman RN  Outcome: Progressing  8/6/2022 2150 by Kana Gusman RN  Outcome: Progressing     Problem: RESPIRATORY - ADULT  Goal: Achieves optimal ventilation and oxygenation  Description: INTERVENTIONS:  - Assess for changes in respiratory status  - Assess for changes in mentation and behavior  - Position to facilitate oxygenation and minimize respiratory effort  - Oxygen supplementation based on oxygen saturation or ABGs  - Provide Smoking Cessation handout, if applicable  - Encourage broncho-pulmonary hygiene including cough, deep breathe, Incentive Spirometry  - Assess the need for suctioning and perform as needed  - Assess and instruct to report SOB or any respiratory difficulty  - Respiratory Therapy support as indicated  - Manage/alleviate anxiety  - Monitor for signs/symptoms of CO2 retention  8/6/2022 2150 by Kana Gusman RN  Outcome: Progressing  8/6/2022 2150 by Kana Gusman RN  Outcome: Progressing  8/6/2022 2150 by Kana Gusman RN  Outcome: Progressing

## 2022-08-08 LAB
ANION GAP SERPL CALC-SCNC: 8 MMOL/L (ref 0–18)
ATRIAL RATE: 69 BPM
BASOPHILS # BLD AUTO: 0.05 X10(3) UL (ref 0–0.2)
BASOPHILS NFR BLD AUTO: 0.6 %
BUN BLD-MCNC: 93 MG/DL (ref 7–18)
CALCIUM BLD-MCNC: 8.4 MG/DL (ref 8.5–10.1)
CHLORIDE SERPL-SCNC: 103 MMOL/L (ref 98–112)
CO2 SERPL-SCNC: 22 MMOL/L (ref 21–32)
CREAT BLD-MCNC: 3.17 MG/DL
EOSINOPHIL # BLD AUTO: 0.43 X10(3) UL (ref 0–0.7)
EOSINOPHIL NFR BLD AUTO: 5.5 %
ERYTHROCYTE [DISTWIDTH] IN BLOOD BY AUTOMATED COUNT: 14.4 %
GFR SERPLBLD BASED ON 1.73 SQ M-ARVRAT: 17 ML/MIN/1.73M2 (ref 60–?)
GLUCOSE BLD-MCNC: 118 MG/DL (ref 70–99)
GLUCOSE BLD-MCNC: 119 MG/DL (ref 70–99)
GLUCOSE BLD-MCNC: 151 MG/DL (ref 70–99)
GLUCOSE BLD-MCNC: 175 MG/DL (ref 70–99)
GLUCOSE BLD-MCNC: 177 MG/DL (ref 70–99)
HCT VFR BLD AUTO: 27.9 %
HGB BLD-MCNC: 8.7 G/DL
IMM GRANULOCYTES # BLD AUTO: 0.06 X10(3) UL (ref 0–1)
IMM GRANULOCYTES NFR BLD: 0.8 %
LYMPHOCYTES # BLD AUTO: 1.3 X10(3) UL (ref 1–4)
LYMPHOCYTES NFR BLD AUTO: 16.6 %
MCH RBC QN AUTO: 27.2 PG (ref 26–34)
MCHC RBC AUTO-ENTMCNC: 31.2 G/DL (ref 31–37)
MCV RBC AUTO: 87.2 FL
MONOCYTES # BLD AUTO: 0.58 X10(3) UL (ref 0.1–1)
MONOCYTES NFR BLD AUTO: 7.4 %
NEUTROPHILS # BLD AUTO: 5.4 X10 (3) UL (ref 1.5–7.7)
NEUTROPHILS # BLD AUTO: 5.4 X10(3) UL (ref 1.5–7.7)
NEUTROPHILS NFR BLD AUTO: 69.1 %
OSMOLALITY SERPL CALC.SUM OF ELEC: 306 MOSM/KG (ref 275–295)
P AXIS: 33 DEGREES
P-R INTERVAL: 204 MS
PLATELET # BLD AUTO: 287 10(3)UL (ref 150–450)
POTASSIUM SERPL-SCNC: 5 MMOL/L (ref 3.5–5.1)
Q-T INTERVAL: 418 MS
QRS DURATION: 94 MS
QTC CALCULATION (BEZET): 447 MS
R AXIS: 50 DEGREES
RBC # BLD AUTO: 3.2 X10(6)UL
SODIUM SERPL-SCNC: 133 MMOL/L (ref 136–145)
T AXIS: 60 DEGREES
VENTRICULAR RATE: 69 BPM
WBC # BLD AUTO: 7.8 X10(3) UL (ref 4–11)

## 2022-08-08 PROCEDURE — 99233 SBSQ HOSP IP/OBS HIGH 50: CPT | Performed by: HOSPITALIST

## 2022-08-08 PROCEDURE — 99233 SBSQ HOSP IP/OBS HIGH 50: CPT | Performed by: INTERNAL MEDICINE

## 2022-08-08 RX ORDER — BUMETANIDE 0.25 MG/ML
4 INJECTION, SOLUTION INTRAMUSCULAR; INTRAVENOUS ONCE
Status: COMPLETED | OUTPATIENT
Start: 2022-08-08 | End: 2022-08-08

## 2022-08-08 RX ORDER — NIFEDIPINE 30 MG/1
30 TABLET, EXTENDED RELEASE ORAL DAILY
Status: DISCONTINUED | OUTPATIENT
Start: 2022-08-09 | End: 2022-08-09

## 2022-08-08 NOTE — CM/SW NOTE
08/08/22 1400   CM/SW Referral Data   Referral Source Social Work (self-referral)   Reason for Referral Discharge planning   Informant Patient;EMR   Patient Info   Patient's Current Mental Status at Time of Assessment Alert;Oriented   Patient's Home Environment Condo/Apt with elevator   Patient lives with Spouse/Significant other   Discharge Needs   Anticipated D/C needs Home health care     Prior Level of [place] per PT evaulation: since her stroke, she requires supervision/assist with all mobility, tries to do activities on her own but mostly needs assist, amb household distances with rollator, hx of fall within the year due to weakness requiring assist to recover. Patient is a 45 y/o female who admitted with acute renal failure and acute on chronic congestive heart failure, with a history of stroke. PT recommending HH. Met with patient, who was alert and oriented, to discuss discharge planning. Patient lives with her  Verena Dodge) in an apartment in [place] during the evenings and weekends. She stays with her daughter (Steph Grossman) during the daytime at her home in [facility] while Fidencio Andrea is working. She normally uses a RW to ambulate, when she is feeling weaker she uses a wheelchair. Discussed PT recommendation of HH, patient is agreeable and prefers to use [facility] who she has had a good experience with in the past. She will receive [facility] at her daughter's home. SW acknowledge order for PHQ4. Patient mentioned feeling down, discouraged and anxious at times since her stroke. She feels this way because she now has to rely on her family for assistance with her care when she was previously independent. She takes medication for anxiety which has helped. She has trouble sleeping at times. Discussed LISSETTE and informed her of resources on AVS.    Sent referral to [address] [facility] in [facility]. Spoke with [employer]  ([phone]) who confirmed they worked with patient in March.  They are reviewing referral. They have daughter's address on file. SW will continue to follow.      HUSSAIN Araujo  Discharge Planner  [phone]

## 2022-08-08 NOTE — PLAN OF CARE
Patient is Aox4. NSR on tele. Pt on 1L nasal cannula for comfort. RIVERA. Productive cough present, prn tessalon given. R side weakness from CVA. Pt c/o  severe chronic back pain. Pt started on bowel regimen, no BM in several days. Continent of B&B. Up standby with walker. Call light within reach.     Plan: IV Lasix BID, daily wt, I&Os, 1500mL FR  IV Venofer daily  Pt recs HHC/int supervision     Problem: Diabetes/Glucose Control  Goal: Glucose maintained within prescribed range  Description: INTERVENTIONS:  - Monitor Blood Glucose as ordered  - Assess for signs and symptoms of hyperglycemia and hypoglycemia  - Administer ordered medications to maintain glucose within target range  - Assess barriers to adequate nutritional intake and initiate nutrition consult as needed  - Instruct patient on self management of diabetes  8/7/2022 2357 by Tomi Gan RN  Outcome: Progressing  8/7/2022 2357 by Tomi Gan RN  Outcome: Progressing     Problem: Patient/Family Goals  Goal: Patient/Family Long Term Goal  Description: Patient's Long Term Goal: stay out of the hospital    Interventions:  - take meds as prescribed  - attend follow up appointments  - follow recommended diet  - See additional Care Plan goals for specific interventions  8/7/2022 2357 by Tomi Gan RN  Outcome: Progressing  8/7/2022 2357 by Tomi Gan RN  Outcome: Progressing  Goal: Patient/Family Short Term Goal  Description: Patient's Short Term Goal: to have decreased swelling and improvement of breathing    Interventions:   - IV lasix per nephrology  - monitor labs   - monitor VSS  - See additional Care Plan goals for specific interventions  8/7/2022 2357 by Tomi Gan RN  Outcome: Progressing  8/7/2022 2357 by Tomi Gan RN  Outcome: Progressing     Problem: RESPIRATORY - ADULT  Goal: Achieves optimal ventilation and oxygenation  Description: INTERVENTIONS:  - Assess for changes in respiratory status  - Assess for changes in mentation and behavior  - Position to facilitate oxygenation and minimize respiratory effort  - Oxygen supplementation based on oxygen saturation or ABGs  - Provide Smoking Cessation handout, if applicable  - Encourage broncho-pulmonary hygiene including cough, deep breathe, Incentive Spirometry  - Assess the need for suctioning and perform as needed  - Assess and instruct to report SOB or any respiratory difficulty  - Respiratory Therapy support as indicated  - Manage/alleviate anxiety  - Monitor for signs/symptoms of CO2 retention  8/7/2022 2357 by Maycol Loomis RN  Outcome: Progressing  8/7/2022 2357 by Maycol Loomis, RN  Outcome: Progressing

## 2022-08-08 NOTE — PLAN OF CARE
RN SHIFT NOTE    Assuemed care of pt at 0700. Pt complain of 9/10 nerve pain at this time. Was treated with norco. Patient is alert and oriented x 4 (Reminders and reorientation compelted). Patient is NSR on tele, S1 and S2 present. Lung sounds clear and diminished. Abdomen is soft and round. Last BM on 08/07 (per patient). Tolerating medications and care needs have been met a this time.      POC: start bumex gtt, strict I & O's          Problem: Diabetes/Glucose Control  Goal: Glucose maintained within prescribed range  Description: INTERVENTIONS:  - Monitor Blood Glucose as ordered  - Assess for signs and symptoms of hyperglycemia and hypoglycemia  - Administer ordered medications to maintain glucose within target range  - Assess barriers to adequate nutritional intake and initiate nutrition consult as needed  - Instruct patient on self management of diabetes  Outcome: Progressing     Problem: Patient/Family Goals  Goal: Patient/Family Long Term Goal  Description: Patient's Long Term Goal: stay out of the hospital    Interventions:  - take meds as prescribed  - attend follow up appointments  - follow recommended diet  - See additional Care Plan goals for specific interventions  Outcome: Progressing  Goal: Patient/Family Short Term Goal  Description: Patient's Short Term Goal: to have decreased swelling and improvement of breathing    Interventions:   - IV lasix per nephrology  - monitor labs   - monitor VSS  - See additional Care Plan goals for specific interventions  Outcome: Progressing     Problem: RESPIRATORY - ADULT  Goal: Achieves optimal ventilation and oxygenation  Description: INTERVENTIONS:  - Assess for changes in respiratory status  - Assess for changes in mentation and behavior  - Position to facilitate oxygenation and minimize respiratory effort  - Oxygen supplementation based on oxygen saturation or ABGs  - Provide Smoking Cessation handout, if applicable  - Encourage broncho-pulmonary hygiene including cough, deep breathe, Incentive Spirometry  - Assess the need for suctioning and perform as needed  - Assess and instruct to report SOB or any respiratory difficulty  - Respiratory Therapy support as indicated  - Manage/alleviate anxiety  - Monitor for signs/symptoms of CO2 retention  Outcome: Progressing

## 2022-08-08 NOTE — PROGRESS NOTES
22   Clinical Encounter Type   Visited With Patient and family together   Routine Visit Introduction   Continue Visiting No   Family Spiritual Encounters   Family Coping Accepting; Sadness   Family Participation in Care Consistently   Family Support During Treatment Consistently   Taxonomy   Intended Effects Establish rapport and connectedness   Methods Demonstrate acceptance; Offer spiritual/Mandaen support; Offer emotional support   Interventions Acknowledge current situation;Provide compassionate touch;Provide Grief Processing Session; Share words of hope and inspiration  (Pt's beloved dog  and she wasn't able to be there.)

## 2022-08-09 LAB
ALBUMIN SERPL-MCNC: 2.9 G/DL (ref 3.4–5)
ALBUMIN/GLOB SERPL: 0.8 {RATIO} (ref 1–2)
ALP LIVER SERPL-CCNC: 81 U/L
ALT SERPL-CCNC: 14 U/L
ANION GAP SERPL CALC-SCNC: 6 MMOL/L (ref 0–18)
AST SERPL-CCNC: 10 U/L (ref 15–37)
BASOPHILS # BLD AUTO: 0.04 X10(3) UL (ref 0–0.2)
BASOPHILS NFR BLD AUTO: 0.5 %
BILIRUB SERPL-MCNC: 0.2 MG/DL (ref 0.1–2)
BUN BLD-MCNC: 96 MG/DL (ref 7–18)
CALCIUM BLD-MCNC: 8.3 MG/DL (ref 8.5–10.1)
CHLORIDE SERPL-SCNC: 100 MMOL/L (ref 98–112)
CO2 SERPL-SCNC: 25 MMOL/L (ref 21–32)
CREAT BLD-MCNC: 3.38 MG/DL
EOSINOPHIL # BLD AUTO: 0.45 X10(3) UL (ref 0–0.7)
EOSINOPHIL NFR BLD AUTO: 5.6 %
ERYTHROCYTE [DISTWIDTH] IN BLOOD BY AUTOMATED COUNT: 14.3 %
GFR SERPLBLD BASED ON 1.73 SQ M-ARVRAT: 16 ML/MIN/1.73M2 (ref 60–?)
GLOBULIN PLAS-MCNC: 3.8 G/DL (ref 2.8–4.4)
GLUCOSE BLD-MCNC: 156 MG/DL (ref 70–99)
GLUCOSE BLD-MCNC: 176 MG/DL (ref 70–99)
GLUCOSE BLD-MCNC: 181 MG/DL (ref 70–99)
GLUCOSE BLD-MCNC: 210 MG/DL (ref 70–99)
GLUCOSE BLD-MCNC: 252 MG/DL (ref 70–99)
HCT VFR BLD AUTO: 27.6 %
HGB BLD-MCNC: 8.9 G/DL
IMM GRANULOCYTES # BLD AUTO: 0.08 X10(3) UL (ref 0–1)
IMM GRANULOCYTES NFR BLD: 1 %
LYMPHOCYTES # BLD AUTO: 1.03 X10(3) UL (ref 1–4)
LYMPHOCYTES NFR BLD AUTO: 12.7 %
MCH RBC QN AUTO: 27.2 PG (ref 26–34)
MCHC RBC AUTO-ENTMCNC: 32.2 G/DL (ref 31–37)
MCV RBC AUTO: 84.4 FL
MONOCYTES # BLD AUTO: 0.48 X10(3) UL (ref 0.1–1)
MONOCYTES NFR BLD AUTO: 5.9 %
NEUTROPHILS # BLD AUTO: 6.01 X10 (3) UL (ref 1.5–7.7)
NEUTROPHILS # BLD AUTO: 6.01 X10(3) UL (ref 1.5–7.7)
NEUTROPHILS NFR BLD AUTO: 74.3 %
OSMOLALITY SERPL CALC.SUM OF ELEC: 306 MOSM/KG (ref 275–295)
PLATELET # BLD AUTO: 291 10(3)UL (ref 150–450)
POTASSIUM SERPL-SCNC: 4.6 MMOL/L (ref 3.5–5.1)
PROT SERPL-MCNC: 6.7 G/DL (ref 6.4–8.2)
RBC # BLD AUTO: 3.27 X10(6)UL
SODIUM SERPL-SCNC: 131 MMOL/L (ref 136–145)
URATE SERPL-MCNC: 10.1 MG/DL
WBC # BLD AUTO: 8.1 X10(3) UL (ref 4–11)

## 2022-08-09 PROCEDURE — 99233 SBSQ HOSP IP/OBS HIGH 50: CPT | Performed by: INTERNAL MEDICINE

## 2022-08-09 RX ORDER — POLYETHYLENE GLYCOL 3350 17 G/17G
17 POWDER, FOR SOLUTION ORAL DAILY
Status: DISCONTINUED | OUTPATIENT
Start: 2022-08-09 | End: 2022-08-10

## 2022-08-09 RX ORDER — CEFAZOLIN SODIUM/WATER 2 G/20 ML
2 SYRINGE (ML) INTRAVENOUS EVERY 12 HOURS
Status: DISCONTINUED | OUTPATIENT
Start: 2022-08-09 | End: 2022-08-10

## 2022-08-09 RX ORDER — SENNOSIDES 8.6 MG
8.6 TABLET ORAL 2 TIMES DAILY
Status: DISCONTINUED | OUTPATIENT
Start: 2022-08-09 | End: 2022-08-16

## 2022-08-09 NOTE — PLAN OF CARE
Patient is Aox4. NSR on tele. Pt on 1L nasal cannula for comfort. RIVERA. Pt has a non-productive cough, prn tessalon given. Pt c/o severe back pain, prn medication given with relief. Pitting edema to BLE. Continent of B&B. Up standby with walker. Call light within reach.     Plan: IV Bumex infusing, daily wt, strict I&Os  HHC/int supervision at discharge  IV Venofer daily     Problem: Diabetes/Glucose Control  Goal: Glucose maintained within prescribed range  Description: INTERVENTIONS:  - Monitor Blood Glucose as ordered  - Assess for signs and symptoms of hyperglycemia and hypoglycemia  - Administer ordered medications to maintain glucose within target range  - Assess barriers to adequate nutritional intake and initiate nutrition consult as needed  - Instruct patient on self management of diabetes  8/8/2022 2215 by Tomi Gan RN  Outcome: Progressing  8/8/2022 2215 by Tomi Gan RN  Outcome: Progressing     Problem: Patient/Family Goals  Goal: Patient/Family Long Term Goal  Description: Patient's Long Term Goal: stay out of the hospital    Interventions:  - take meds as prescribed  - attend follow up appointments  - follow recommended diet  - See additional Care Plan goals for specific interventions  8/8/2022 2215 by Tomi Gan RN  Outcome: Progressing  8/8/2022 2215 by Tomi Gan RN  Outcome: Progressing  Goal: Patient/Family Short Term Goal  Description: Patient's Short Term Goal: to have decreased swelling and improvement of breathing    Interventions:   - IV lasix per nephrology  - monitor labs   - monitor VSS  - See additional Care Plan goals for specific interventions  8/8/2022 2215 by Tomi Gan RN  Outcome: Progressing  8/8/2022 2215 by Tomi Gan RN  Outcome: Progressing     Problem: RESPIRATORY - ADULT  Goal: Achieves optimal ventilation and oxygenation  Description: INTERVENTIONS:  - Assess for changes in respiratory status  - Assess for changes in mentation and behavior  - Position to facilitate oxygenation and minimize respiratory effort  - Oxygen supplementation based on oxygen saturation or ABGs  - Provide Smoking Cessation handout, if applicable  - Encourage broncho-pulmonary hygiene including cough, deep breathe, Incentive Spirometry  - Assess the need for suctioning and perform as needed  - Assess and instruct to report SOB or any respiratory difficulty  - Respiratory Therapy support as indicated  - Manage/alleviate anxiety  - Monitor for signs/symptoms of CO2 retention  8/8/2022 2215 by Buster Ferrera RN  Outcome: Progressing  8/8/2022 2215 by Buster Ferrera RN  Outcome: Progressing

## 2022-08-09 NOTE — PLAN OF CARE
RN SHIFT NOTE    Assumed care of pt at 0700. Patient is alert and oriented x 4 (Reminders and reorientation completed). Patient is NSR on tele, S1 and S2 present and pt denies cardiac symptoms. Lung sounds clear and diminished. +2 BLE pitting edema noted. Abdomen is round and distended. Last BM on 8/7 at night per patient. Miralax and Sennakot given this morning. Bumex gtt increased to 4ml/ hr in AM. Tolerating medication and care needs have been met at this time.        POC: Strict I & O's, Bumex gtt, wean o2        Problem: Diabetes/Glucose Control  Goal: Glucose maintained within prescribed range  Description: INTERVENTIONS:  - Monitor Blood Glucose as ordered  - Assess for signs and symptoms of hyperglycemia and hypoglycemia  - Administer ordered medications to maintain glucose within target range  - Assess barriers to adequate nutritional intake and initiate nutrition consult as needed  - Instruct patient on self management of diabetes  Outcome: Progressing     Problem: Patient/Family Goals  Goal: Patient/Family Long Term Goal  Description: Patient's Long Term Goal: stay out of the hospital    Interventions:  - take meds as prescribed  - attend follow up appointments  - follow recommended diet  - See additional Care Plan goals for specific interventions  Outcome: Progressing  Goal: Patient/Family Short Term Goal  Description: Patient's Short Term Goal: to have decreased swelling and improvement of breathing    Interventions:   - IV lasix per nephrology  - monitor labs   - monitor VSS  - See additional Care Plan goals for specific interventions  Outcome: Progressing     Problem: RESPIRATORY - ADULT  Goal: Achieves optimal ventilation and oxygenation  Description: INTERVENTIONS:  - Assess for changes in respiratory status  - Assess for changes in mentation and behavior  - Position to facilitate oxygenation and minimize respiratory effort  - Oxygen supplementation based on oxygen saturation or ABGs  - Provide Smoking Cessation handout, if applicable  - Encourage broncho-pulmonary hygiene including cough, deep breathe, Incentive Spirometry  - Assess the need for suctioning and perform as needed  - Assess and instruct to report SOB or any respiratory difficulty  - Respiratory Therapy support as indicated  - Manage/alleviate anxiety  - Monitor for signs/symptoms of CO2 retention  Outcome: Progressing

## 2022-08-10 ENCOUNTER — APPOINTMENT (OUTPATIENT)
Dept: INTERVENTIONAL RADIOLOGY/VASCULAR | Facility: HOSPITAL | Age: 52
End: 2022-08-10
Attending: INTERNAL MEDICINE
Payer: COMMERCIAL

## 2022-08-10 LAB
ANION GAP SERPL CALC-SCNC: 9 MMOL/L (ref 0–18)
BUN BLD-MCNC: 105 MG/DL (ref 7–18)
CALCIUM BLD-MCNC: 8.8 MG/DL (ref 8.5–10.1)
CHLORIDE SERPL-SCNC: 97 MMOL/L (ref 98–112)
CO2 SERPL-SCNC: 25 MMOL/L (ref 21–32)
CREAT BLD-MCNC: 3.51 MG/DL
ERYTHROCYTE [DISTWIDTH] IN BLOOD BY AUTOMATED COUNT: 14.6 %
GFR SERPLBLD BASED ON 1.73 SQ M-ARVRAT: 15 ML/MIN/1.73M2 (ref 60–?)
GLUCOSE BLD-MCNC: 149 MG/DL (ref 70–99)
GLUCOSE BLD-MCNC: 154 MG/DL (ref 70–99)
GLUCOSE BLD-MCNC: 182 MG/DL (ref 70–99)
GLUCOSE BLD-MCNC: 203 MG/DL (ref 70–99)
GLUCOSE BLD-MCNC: 289 MG/DL (ref 70–99)
HBV CORE AB SERPL QL IA: NONREACTIVE
HBV SURFACE AB SER QL: NONREACTIVE
HBV SURFACE AB SERPL IA-ACNC: <3.1 MIU/ML
HBV SURFACE AG SER-ACNC: <0.1 [IU]/L
HBV SURFACE AG SERPL QL IA: NONREACTIVE
HCT VFR BLD AUTO: 28.5 %
HGB BLD-MCNC: 9.2 G/DL
INR BLD: 1.04 (ref 0.85–1.16)
MCH RBC QN AUTO: 26.7 PG (ref 26–34)
MCHC RBC AUTO-ENTMCNC: 32.3 G/DL (ref 31–37)
MCV RBC AUTO: 82.8 FL
OSMOLALITY SERPL CALC.SUM OF ELEC: 310 MOSM/KG (ref 275–295)
PLATELET # BLD AUTO: 306 10(3)UL (ref 150–450)
POTASSIUM SERPL-SCNC: 4 MMOL/L (ref 3.5–5.1)
PROTHROMBIN TIME: 13.6 SECONDS (ref 11.6–14.8)
RBC # BLD AUTO: 3.44 X10(6)UL
SODIUM SERPL-SCNC: 131 MMOL/L (ref 136–145)
URATE SERPL-MCNC: 6.1 MG/DL
WBC # BLD AUTO: 9.2 X10(3) UL (ref 4–11)

## 2022-08-10 PROCEDURE — 99232 SBSQ HOSP IP/OBS MODERATE 35: CPT | Performed by: HOSPITALIST

## 2022-08-10 PROCEDURE — B518ZZA FLUOROSCOPY OF SUPERIOR VENA CAVA, GUIDANCE: ICD-10-PCS | Performed by: RADIOLOGY

## 2022-08-10 PROCEDURE — 02HV33Z INSERTION OF INFUSION DEVICE INTO SUPERIOR VENA CAVA, PERCUTANEOUS APPROACH: ICD-10-PCS | Performed by: RADIOLOGY

## 2022-08-10 PROCEDURE — 5A1D70Z PERFORMANCE OF URINARY FILTRATION, INTERMITTENT, LESS THAN 6 HOURS PER DAY: ICD-10-PCS | Performed by: INTERNAL MEDICINE

## 2022-08-10 PROCEDURE — 99233 SBSQ HOSP IP/OBS HIGH 50: CPT | Performed by: INTERNAL MEDICINE

## 2022-08-10 RX ORDER — HEPARIN SODIUM 1000 [USP'U]/ML
2000 INJECTION, SOLUTION INTRAVENOUS; SUBCUTANEOUS
Status: DISCONTINUED | OUTPATIENT
Start: 2022-08-10 | End: 2022-08-16

## 2022-08-10 RX ORDER — POLYETHYLENE GLYCOL 3350 17 G/17G
17 POWDER, FOR SOLUTION ORAL DAILY PRN
Status: DISCONTINUED | OUTPATIENT
Start: 2022-08-10 | End: 2022-08-11

## 2022-08-10 RX ORDER — LIDOCAINE HYDROCHLORIDE 10 MG/ML
INJECTION, SOLUTION INFILTRATION; PERINEURAL
Status: COMPLETED
Start: 2022-08-10 | End: 2022-08-10

## 2022-08-10 RX ORDER — MIDAZOLAM HYDROCHLORIDE 1 MG/ML
INJECTION INTRAMUSCULAR; INTRAVENOUS
Status: COMPLETED
Start: 2022-08-10 | End: 2022-08-10

## 2022-08-10 RX ORDER — HEPARIN SODIUM 5000 [USP'U]/ML
INJECTION, SOLUTION INTRAVENOUS; SUBCUTANEOUS
Status: COMPLETED
Start: 2022-08-10 | End: 2022-08-10

## 2022-08-10 RX ORDER — CEFAZOLIN SODIUM/WATER 2 G/20 ML
2 SYRINGE (ML) INTRAVENOUS EVERY 24 HOURS
Status: DISCONTINUED | OUTPATIENT
Start: 2022-08-10 | End: 2022-08-16

## 2022-08-10 NOTE — CONSULTS
[facility] Pharmacy Note:  Renal Adjustment for cefazolin (ANCEF)    Anuradha Aguilar is a 46year old patient who has been prescribed cefazolin (ANCEF) 2 g every 12 hrs. The estimated creatinine clearance is 18.2 mL/min (A) (based on SCr of 3.51 mg/dL (H)). Pt is on HD. The dose has been adjusted to cefazolin (ANCEF) 2 g every 24 hrs per hospital renal dose adjustment protocol for treatment of UTI. Pharmacy will follow and adjust dose as warranted for additional renal function changes.     Thank you,    Jalen Nunez, PharmD  8/10/2022  8:13 AM

## 2022-08-10 NOTE — PLAN OF CARE
RN SHIFT NOTE    Assumed care of pt at 0700. Pt denies pain at this time. Patient is alert and oriented x 4 (Reminders and reorientation completed). Patient is NSR on tele, S1 and S2 present and pt denies cardiac symptoms. +2 BLE edema noted. Lung sounds crackles at bases. Abdomen is soft and round. Last BM on 8/9. Bumex gtt running at 4ml/hr. Tolerating medications and care needs have been met at this time.        POC: Permcath placement, Dialysis            Problem: Diabetes/Glucose Control  Goal: Glucose maintained within prescribed range  Description: INTERVENTIONS:  - Monitor Blood Glucose as ordered  - Assess for signs and symptoms of hyperglycemia and hypoglycemia  - Administer ordered medications to maintain glucose within target range  - Assess barriers to adequate nutritional intake and initiate nutrition consult as needed  - Instruct patient on self management of diabetes  8/10/2022 0816 by Tati Ding RN  Outcome: Progressing  8/10/2022 0813 by Tati Ding RN  Outcome: Progressing     Problem: Patient/Family Goals  Goal: Patient/Family Long Term Goal  Description: Patient's Long Term Goal: stay out of the hospital    Interventions:  - take meds as prescribed  - attend follow up appointments  - follow recommended diet  - See additional Care Plan goals for specific interventions  8/10/2022 0816 by Tati Ding RN  Outcome: Progressing  8/10/2022 0813 by Tati Ding RN  Outcome: Progressing  Goal: Patient/Family Short Term Goal  Description: Patient's Short Term Goal: to have decreased swelling and improvement of breathing    Interventions:   - IV lasix per nephrology  - monitor labs   - monitor VSS  - See additional Care Plan goals for specific interventions  8/10/2022 0816 by Tati Ding RN  Outcome: Progressing  8/10/2022 0813 by Tati Ding RN  Outcome: Progressing     Problem: RESPIRATORY - ADULT  Goal: Achieves optimal ventilation and oxygenation  Description: INTERVENTIONS:  - Assess for changes in respiratory status  - Assess for changes in mentation and behavior  - Position to facilitate oxygenation and minimize respiratory effort  - Oxygen supplementation based on oxygen saturation or ABGs  - Provide Smoking Cessation handout, if applicable  - Encourage broncho-pulmonary hygiene including cough, deep breathe, Incentive Spirometry  - Assess the need for suctioning and perform as needed  - Assess and instruct to report SOB or any respiratory difficulty  - Respiratory Therapy support as indicated  - Manage/alleviate anxiety  - Monitor for signs/symptoms of CO2 retention  8/10/2022 0816 by Akosua Hyatt RN  Outcome: Progressing  8/10/2022 0813 by Akosua Hyatt RN  Outcome: Progressing

## 2022-08-10 NOTE — PLAN OF CARE
Patient is Aox4. NSR on tele. Pt on 1L nasal cannula, RIVERA. Non-productive cough present. Pt c/p severe back pain, prn norco given with relief. Occasional stress incontinence, briefed. Continent of bowel. Last BM 8/9. Up x1 with walker. Call light within reach.     Plan: Bumex gtt, Diuril q12, IV Ancef q12, IV Venofer daily, daily wt, I&Os, 1500mL FR    Problem: Diabetes/Glucose Control  Goal: Glucose maintained within prescribed range  Description: INTERVENTIONS:  - Monitor Blood Glucose as ordered  - Assess for signs and symptoms of hyperglycemia and hypoglycemia  - Administer ordered medications to maintain glucose within target range  - Assess barriers to adequate nutritional intake and initiate nutrition consult as needed  - Instruct patient on self management of diabetes  8/9/2022 2307 by Jackie Blum RN  Outcome: Progressing  8/9/2022 2306 by Jackie Blum RN  Outcome: Progressing     Problem: Patient/Family Goals  Goal: Patient/Family Long Term Goal  Description: Patient's Long Term Goal: stay out of the hospital    Interventions:  - take meds as prescribed  - attend follow up appointments  - follow recommended diet  - See additional Care Plan goals for specific interventions  8/9/2022 2307 by Jackie Blum RN  Outcome: Progressing  8/9/2022 2306 by Jackie Blum RN  Outcome: Progressing  Goal: Patient/Family Short Term Goal  Description: Patient's Short Term Goal: to have decreased swelling and improvement of breathing    Interventions:   - IV lasix per nephrology  - monitor labs   - monitor VSS  - See additional Care Plan goals for specific interventions  8/9/2022 2307 by Jackie Blum RN  Outcome: Progressing  8/9/2022 2306 by Jackie Blum RN  Outcome: Progressing     Problem: RESPIRATORY - ADULT  Goal: Achieves optimal ventilation and oxygenation  Description: INTERVENTIONS:  - Assess for changes in respiratory status  - Assess for changes in mentation and behavior  - Position to facilitate oxygenation and minimize respiratory effort  - Oxygen supplementation based on oxygen saturation or ABGs  - Provide Smoking Cessation handout, if applicable  - Encourage broncho-pulmonary hygiene including cough, deep breathe, Incentive Spirometry  - Assess the need for suctioning and perform as needed  - Assess and instruct to report SOB or any respiratory difficulty  - Respiratory Therapy support as indicated  - Manage/alleviate anxiety  - Monitor for signs/symptoms of CO2 retention  8/9/2022 2307 by Christopher Degroot RN  Outcome: Progressing  8/9/2022 2306 by Christopher Degroot, RN  Outcome: Progressing

## 2022-08-10 NOTE — PLAN OF CARE
[facility] and [facility] ordered for tonight and tomorrow at 1000.

## 2022-08-11 LAB
ANION GAP SERPL CALC-SCNC: 7 MMOL/L (ref 0–18)
BUN BLD-MCNC: 76 MG/DL (ref 7–18)
CALCIUM BLD-MCNC: 8.8 MG/DL (ref 8.5–10.1)
CHLORIDE SERPL-SCNC: 99 MMOL/L (ref 98–112)
CO2 SERPL-SCNC: 27 MMOL/L (ref 21–32)
CREAT BLD-MCNC: 2.89 MG/DL
GFR SERPLBLD BASED ON 1.73 SQ M-ARVRAT: 19 ML/MIN/1.73M2 (ref 60–?)
GLUCOSE BLD-MCNC: 117 MG/DL (ref 70–99)
GLUCOSE BLD-MCNC: 131 MG/DL (ref 70–99)
GLUCOSE BLD-MCNC: 181 MG/DL (ref 70–99)
GLUCOSE BLD-MCNC: 245 MG/DL (ref 70–99)
GLUCOSE BLD-MCNC: 264 MG/DL (ref 70–99)
OSMOLALITY SERPL CALC.SUM OF ELEC: 300 MOSM/KG (ref 275–295)
POTASSIUM SERPL-SCNC: 3.7 MMOL/L (ref 3.5–5.1)
SODIUM SERPL-SCNC: 133 MMOL/L (ref 136–145)

## 2022-08-11 PROCEDURE — 99233 SBSQ HOSP IP/OBS HIGH 50: CPT | Performed by: INTERNAL MEDICINE

## 2022-08-11 PROCEDURE — 99233 SBSQ HOSP IP/OBS HIGH 50: CPT | Performed by: HOSPITALIST

## 2022-08-11 RX ORDER — POLYETHYLENE GLYCOL 3350 17 G/17G
17 POWDER, FOR SOLUTION ORAL DAILY
Status: DISCONTINUED | OUTPATIENT
Start: 2022-08-11 | End: 2022-08-16

## 2022-08-11 RX ORDER — CARVEDILOL 12.5 MG/1
25 TABLET ORAL 2 TIMES DAILY WITH MEALS
Status: DISCONTINUED | OUTPATIENT
Start: 2022-08-11 | End: 2022-08-15

## 2022-08-11 NOTE — PHYSICAL THERAPY NOTE
IP PT attempted, pt in bed sleeping s/p HD. Pt awakens to voice and requests to rest at this time 2/2 fatigue. Will re-attempt as schedule permits. RN present and aware.

## 2022-08-11 NOTE — PLAN OF CARE
Patient alert and oriented x4. NSR on telemetry. On 2L nasal cannula with continuous pulse oximetry. Norco and warm packs administered for back pain. Pitting edema noted to bilateral lower extremities. Plan is for dialysis tomorrow, patient updated. Call light within reach, bed alarm in place.        Problem: Diabetes/Glucose Control  Goal: Glucose maintained within prescribed range  Description: INTERVENTIONS:  - Monitor Blood Glucose as ordered  - Assess for signs and symptoms of hyperglycemia and hypoglycemia  - Administer ordered medications to maintain glucose within target range  - Assess barriers to adequate nutritional intake and initiate nutrition consult as needed  - Instruct patient on self management of diabetes  Outcome: Progressing     Problem: Patient/Family Goals  Goal: Patient/Family Long Term Goal  Description: Patient's Long Term Goal: stay out of the hospital    Interventions:  - take meds as prescribed  - attend follow up appointments  - follow recommended diet  - See additional Care Plan goals for specific interventions  Outcome: Progressing  Goal: Patient/Family Short Term Goal  Description: Patient's Short Term Goal: to have decreased swelling and improvement of breathing    Interventions:   - IV lasix per nephrology  - monitor labs   - monitor VSS  - See additional Care Plan goals for specific interventions  Outcome: Progressing     Problem: RESPIRATORY - ADULT  Goal: Achieves optimal ventilation and oxygenation  Description: INTERVENTIONS:  - Assess for changes in respiratory status  - Assess for changes in mentation and behavior  - Position to facilitate oxygenation and minimize respiratory effort  - Oxygen supplementation based on oxygen saturation or ABGs  - Provide Smoking Cessation handout, if applicable  - Encourage broncho-pulmonary hygiene including cough, deep breathe, Incentive Spirometry  - Assess the need for suctioning and perform as needed  - Assess and instruct to report SOB or any respiratory difficulty  - Respiratory Therapy support as indicated  - Manage/alleviate anxiety  - Monitor for signs/symptoms of CO2 retention  Outcome: Progressing

## 2022-08-12 LAB
GLUCOSE BLD-MCNC: 163 MG/DL (ref 70–99)
GLUCOSE BLD-MCNC: 166 MG/DL (ref 70–99)
GLUCOSE BLD-MCNC: 183 MG/DL (ref 70–99)
GLUCOSE BLD-MCNC: 206 MG/DL (ref 70–99)
GLUCOSE BLD-MCNC: 239 MG/DL (ref 70–99)
MAGNESIUM SERPL-MCNC: 2.2 MG/DL (ref 1.6–2.6)

## 2022-08-12 PROCEDURE — 99233 SBSQ HOSP IP/OBS HIGH 50: CPT | Performed by: INTERNAL MEDICINE

## 2022-08-12 NOTE — PLAN OF CARE
Pt a& O x4   On 2LNC intermittently. Up in chair x 2. Warm packs to low back. HD this am  With 3L out. Good appetite. Did not work with phys. Therapy today, too tired. Hot packs given for low back pain. HD again tomorrow. Family at bedside.

## 2022-08-12 NOTE — PLAN OF CARE
Shift Note:  Assumed care of patient. Patient alert and oriented x4. Patient on room air/ 1L O2 NC for comfort, denies difficulty breathing, lung sounds diminished with xrackles in diego lower lung lobes. Denies any cardiac symptoms, NSR on tele. Reports chronic back pain - receives Narco for pain management. Continent of bowel, last BM 8/11 -- receives laxatives and stool softeners, pt is also anuric at the time, receiving temp hemodialysis. Ambulates with stand by assist and a walker, call light within reach, tolerating care well.      POC:  - Daily weight // 1500cc restriction   - Accu checks, carb coverage   - HD today and tomorrow   - Pain management         Problem: Diabetes/Glucose Control  Goal: Glucose maintained within prescribed range  Description: INTERVENTIONS:  - Monitor Blood Glucose as ordered  - Assess for signs and symptoms of hyperglycemia and hypoglycemia  - Administer ordered medications to maintain glucose within target range  - Assess barriers to adequate nutritional intake and initiate nutrition consult as needed  - Instruct patient on self management of diabetes  Outcome: Progressing     Problem: Patient/Family Goals  Goal: Patient/Family Long Term Goal  Description: Patient's Long Term Goal: stay out of the hospital    Interventions:  - take meds as prescribed  - attend follow up appointments  - follow recommended diet  - See additional Care Plan goals for specific interventions  Outcome: Progressing  Goal: Patient/Family Short Term Goal  Description: Patient's Short Term Goal: to have decreased swelling and improvement of breathing    Interventions:   - IV lasix per nephrology  - monitor labs   - monitor VSS  - See additional Care Plan goals for specific interventions  Outcome: Progressing     Problem: RESPIRATORY - ADULT  Goal: Achieves optimal ventilation and oxygenation  Description: INTERVENTIONS:  - Assess for changes in respiratory status  - Assess for changes in mentation and behavior  - Position to facilitate oxygenation and minimize respiratory effort  - Oxygen supplementation based on oxygen saturation or ABGs  - Provide Smoking Cessation handout, if applicable  - Encourage broncho-pulmonary hygiene including cough, deep breathe, Incentive Spirometry  - Assess the need for suctioning and perform as needed  - Assess and instruct to report SOB or any respiratory difficulty  - Respiratory Therapy support as indicated  - Manage/alleviate anxiety  - Monitor for signs/symptoms of CO2 retention  Outcome: Progressing

## 2022-08-12 NOTE — PROGRESS NOTES
Assumed care of patient [id number]. Patient alert and oriented x4. NSR on telemetry. On 1L nasal cannula intermittently for comfort. BLE pitting edema noted. Norco administered for back pain. Plan is for dialysis tomorrow. Call light within reach.

## 2022-08-12 NOTE — PROGRESS NOTES
A&OX4. On 1L for comfort right IG HD will get dialysis today. Has pain but did not request any meds. Non productive cough. BLE edema.

## 2022-08-12 NOTE — CM/SW NOTE
SW acknowledged order from MD regarding BALAJI. PT eval on 8/6 recommended HH/intermittent supervision. Spoke with PT who is planning to see patient again and provide SW and RN update if recommendation changes. PT has been unable to work with patient the last two days due to patient eating lunch and resting after HD. PT plans to work with patient over weekend if unable to see today. SW will continue to follow.      Racheal Paris, HUSSAIN  Discharge Planner  [phone]

## 2022-08-12 NOTE — PHYSICAL THERAPY NOTE
PHYSICAL THERAPY TREATMENT NOTE - INPATIENT    Room Number: 2625/2625-A     Session: 1     Number of Visits to Meet Established Goals: 3    Presenting Problem: SOB/cough  Co-Morbidities : stroke, DM2, HTN  ASSESSMENT     Pt continues to progress toward functional goals and is motivated to participate in skilled therapy. Pt is CGA to supervision for t/f and gait training and   presents with decreased endurance below PLOF and will continue to benefit from ongoing IP PT to maximize functional independence. Pt encouraged to amb c nsg staff 3x/day to maintain current level of function. Pt verbalizes agreement. The AM-PAC '6-Clicks' Inpatient Basic Mobility Short Form was completed and this patient is demonstrating a 21% degree of impairment in mobility. Research supports that patients with this level of impairment may benefit from home c HHPT. DISCHARGE RECOMMENDATIONS  PT Discharge Recommendations: Home with home health PT; Intermittent Supervision     PLAN  PT Treatment Plan: Patient education;Gait training;Stair training  Rehab Potential : Good  Frequency (Obs): 3x/week    CURRENT GOALS        Goal #1 Patient is able to negotiate 3 stairs with CGA      Goal #2 Patient is able to ambulate 50 feet with assist device: walker - rolling at assistance level: supervision      Goal #4     Goal #5     Goal #6     Goal Comments: Goals established on 2022 all goals ongoing      SUBJECTIVE  \"They usually help me on the stairs\"     OBJECTIVE       WEIGHT BEARING RESTRICTION                   PAIN ASSESSMENT   Ratin          BALANCE                                                                                                                       Static Sitting: Good  Dynamic Sitting: Good           Static Standing: Fair -  Dynamic Standing: Fair -    ACTIVITY TOLERANCE                         O2 WALK         AM-[facility] '6-Clicks' INPATIENT SHORT FORM - BASIC MOBILITY  How much difficulty does the patient currently have. .. Patient Difficulty: Turning over in bed (including adjusting bedclothes, sheets and blankets)?: None   Patient Difficulty: Sitting down on and standing up from a chair with arms (e.g., wheelchair, bedside commode, etc.): None   Patient Difficulty: Moving from lying on back to sitting on the side of the bed?: None   How much help from another person does the patient currently need. .. Help from Another: Moving to and from a bed to a chair (including a wheelchair)?: None   Help from Another: Need to walk in hospital room?: A Little   Help from Another: Climbing 3-5 steps with a railing?: A Little       AM-PAC Score:  Raw Score: 22   Approx Degree of Impairment: 20.91%   Standardized Score (AM-PAC Scale): 53.28   CMS Modifier (G-Code):     FUNCTIONAL ABILITY STATUS  Gait Assessment   Functional Mobility/Gait Assessment  Gait Assistance: Contact guard assist  Distance (ft): 40  Assistive Device: Rolling walker  Pattern: Shuffle    Skilled Therapy Provided  Pt presents in semi sup. Son present. Educated pt on role of PT and goals for session. Pt gait trained in [facility] and [facility] , no LOB noted, pt with good insight into deficits and self limits distance 2/2 fatigue. (pt is s/p HD)  Pt performed seated and standing therex for BLE. Anticipate stair/stoop training next session. Bed Mobility:  Rolling right: nt   Rolling left: nt    Supine<>Sit: supervision HOB elevated    Sit<>Supine: nt      Transfer Mobility:  Sit<>Stand: CGA   Stand<>Sit: supervision    Gait: CGA     Therapist's Comments: BP monitored and WNL. Pt on 2-3L O2 NC       THERAPEUTIC EXERCISES  Lower Extremity Alternating marching  Ankle pumps  Knee extension  LAQ  6xsit x stand      Upper Extremity n/a     Position Sitting & Standing     Repetitions   5-15   Sets   1     Patient End of Session: Up in chair; With [facility] staff;Needs met;Call light within reach;RN aware of session/findings; All patient questions and concerns addressed; Family present

## 2022-08-13 LAB
ANION GAP SERPL CALC-SCNC: 5 MMOL/L (ref 0–18)
BASOPHILS # BLD AUTO: 0.09 X10(3) UL (ref 0–0.2)
BASOPHILS NFR BLD AUTO: 0.8 %
BUN BLD-MCNC: 44 MG/DL (ref 7–18)
CALCIUM BLD-MCNC: 8.8 MG/DL (ref 8.5–10.1)
CHLORIDE SERPL-SCNC: 102 MMOL/L (ref 98–112)
CO2 SERPL-SCNC: 26 MMOL/L (ref 21–32)
CREAT BLD-MCNC: 2.56 MG/DL
EOSINOPHIL # BLD AUTO: 0.54 X10(3) UL (ref 0–0.7)
EOSINOPHIL NFR BLD AUTO: 4.5 %
ERYTHROCYTE [DISTWIDTH] IN BLOOD BY AUTOMATED COUNT: 15 %
GFR SERPLBLD BASED ON 1.73 SQ M-ARVRAT: 22 ML/MIN/1.73M2 (ref 60–?)
GLUCOSE BLD-MCNC: 152 MG/DL (ref 70–99)
GLUCOSE BLD-MCNC: 154 MG/DL (ref 70–99)
GLUCOSE BLD-MCNC: 169 MG/DL (ref 70–99)
GLUCOSE BLD-MCNC: 183 MG/DL (ref 70–99)
GLUCOSE BLD-MCNC: 238 MG/DL (ref 70–99)
HCT VFR BLD AUTO: 30.8 %
HGB BLD-MCNC: 9.4 G/DL
IMM GRANULOCYTES # BLD AUTO: 0.1 X10(3) UL (ref 0–1)
IMM GRANULOCYTES NFR BLD: 0.8 %
LYMPHOCYTES # BLD AUTO: 1.85 X10(3) UL (ref 1–4)
LYMPHOCYTES NFR BLD AUTO: 15.5 %
MAGNESIUM SERPL-MCNC: 2.4 MG/DL (ref 1.6–2.6)
MCH RBC QN AUTO: 26.9 PG (ref 26–34)
MCHC RBC AUTO-ENTMCNC: 30.5 G/DL (ref 31–37)
MCV RBC AUTO: 88.3 FL
MONOCYTES # BLD AUTO: 1.33 X10(3) UL (ref 0.1–1)
MONOCYTES NFR BLD AUTO: 11.1 %
NEUTROPHILS # BLD AUTO: 8.05 X10 (3) UL (ref 1.5–7.7)
NEUTROPHILS # BLD AUTO: 8.05 X10(3) UL (ref 1.5–7.7)
NEUTROPHILS NFR BLD AUTO: 67.3 %
OSMOLALITY SERPL CALC.SUM OF ELEC: 290 MOSM/KG (ref 275–295)
PLATELET # BLD AUTO: 310 10(3)UL (ref 150–450)
POTASSIUM SERPL-SCNC: 4 MMOL/L (ref 3.5–5.1)
RBC # BLD AUTO: 3.49 X10(6)UL
SODIUM SERPL-SCNC: 133 MMOL/L (ref 136–145)
WBC # BLD AUTO: 12 X10(3) UL (ref 4–11)

## 2022-08-13 PROCEDURE — 99231 SBSQ HOSP IP/OBS SF/LOW 25: CPT | Performed by: HOSPITALIST

## 2022-08-13 PROCEDURE — 99233 SBSQ HOSP IP/OBS HIGH 50: CPT | Performed by: INTERNAL MEDICINE

## 2022-08-13 NOTE — PLAN OF CARE
Shift Note:  Assumed care of patient. Patient alert and oriented x4. Patient on room air/ 1L O2 NC for comfort, denies difficulty breathing, lung sounds diminished with crackles in diego lower lung lobes. Denies any cardiac symptoms, NSR on tele. Reports chronic back pain - receives Narco for pain management. Continent of bowel, last BM 8/11 -- receives laxatives and stool softeners, pt is also anuric at the time, receiving temp hemodialysis. Ambulates with stand by assist and a walker, call light within reach, tolerating care well.       POC:  - Daily weight // 1500cc restriction   - Accu checks, carb coverage   - No HD today   - Pain management       Problem: Diabetes/Glucose Control  Goal: Glucose maintained within prescribed range  Description: INTERVENTIONS:  - Monitor Blood Glucose as ordered  - Assess for signs and symptoms of hyperglycemia and hypoglycemia  - Administer ordered medications to maintain glucose within target range  - Assess barriers to adequate nutritional intake and initiate nutrition consult as needed  - Instruct patient on self management of diabetes  Outcome: Progressing     Problem: Patient/Family Goals  Goal: Patient/Family Long Term Goal  Description: Patient's Long Term Goal: stay out of the hospital    Interventions:  - take meds as prescribed  - attend follow up appointments  - follow recommended diet  - See additional Care Plan goals for specific interventions  Outcome: Progressing  Goal: Patient/Family Short Term Goal  Description: Patient's Short Term Goal: to have decreased swelling and improvement of breathing    Interventions:   - IV lasix per nephrology  - monitor labs   - monitor VSS  - See additional Care Plan goals for specific interventions  Outcome: Progressing     Problem: RESPIRATORY - ADULT  Goal: Achieves optimal ventilation and oxygenation  Description: INTERVENTIONS:  - Assess for changes in respiratory status  - Assess for changes in mentation and behavior  - Position to facilitate oxygenation and minimize respiratory effort  - Oxygen supplementation based on oxygen saturation or ABGs  - Provide Smoking Cessation handout, if applicable  - Encourage broncho-pulmonary hygiene including cough, deep breathe, Incentive Spirometry  - Assess the need for suctioning and perform as needed  - Assess and instruct to report SOB or any respiratory difficulty  - Respiratory Therapy support as indicated  - Manage/alleviate anxiety  - Monitor for signs/symptoms of CO2 retention  Outcome: Progressing

## 2022-08-14 LAB
ANION GAP SERPL CALC-SCNC: 7 MMOL/L (ref 0–18)
BASOPHILS # BLD AUTO: 0.11 X10(3) UL (ref 0–0.2)
BASOPHILS NFR BLD AUTO: 0.8 %
BUN BLD-MCNC: 56 MG/DL (ref 7–18)
CALCIUM BLD-MCNC: 9 MG/DL (ref 8.5–10.1)
CHLORIDE SERPL-SCNC: 99 MMOL/L (ref 98–112)
CO2 SERPL-SCNC: 25 MMOL/L (ref 21–32)
CREAT BLD-MCNC: 2.98 MG/DL
EOSINOPHIL # BLD AUTO: 0.51 X10(3) UL (ref 0–0.7)
EOSINOPHIL NFR BLD AUTO: 3.8 %
ERYTHROCYTE [DISTWIDTH] IN BLOOD BY AUTOMATED COUNT: 14.7 %
GFR SERPLBLD BASED ON 1.73 SQ M-ARVRAT: 18 ML/MIN/1.73M2 (ref 60–?)
GLUCOSE BLD-MCNC: 205 MG/DL (ref 70–99)
GLUCOSE BLD-MCNC: 207 MG/DL (ref 70–99)
GLUCOSE BLD-MCNC: 207 MG/DL (ref 70–99)
GLUCOSE BLD-MCNC: 214 MG/DL (ref 70–99)
GLUCOSE BLD-MCNC: 223 MG/DL (ref 70–99)
HCT VFR BLD AUTO: 30.6 %
HGB BLD-MCNC: 9.3 G/DL
IMM GRANULOCYTES # BLD AUTO: 0.1 X10(3) UL (ref 0–1)
IMM GRANULOCYTES NFR BLD: 0.8 %
LYMPHOCYTES # BLD AUTO: 1.31 X10(3) UL (ref 1–4)
LYMPHOCYTES NFR BLD AUTO: 9.8 %
MCH RBC QN AUTO: 26.8 PG (ref 26–34)
MCHC RBC AUTO-ENTMCNC: 30.4 G/DL (ref 31–37)
MCV RBC AUTO: 88.2 FL
MONOCYTES # BLD AUTO: 1.05 X10(3) UL (ref 0.1–1)
MONOCYTES NFR BLD AUTO: 7.9 %
NEUTROPHILS # BLD AUTO: 10.24 X10 (3) UL (ref 1.5–7.7)
NEUTROPHILS # BLD AUTO: 10.24 X10(3) UL (ref 1.5–7.7)
NEUTROPHILS NFR BLD AUTO: 76.9 %
OSMOLALITY SERPL CALC.SUM OF ELEC: 294 MOSM/KG (ref 275–295)
PLATELET # BLD AUTO: 321 10(3)UL (ref 150–450)
POTASSIUM SERPL-SCNC: 4.4 MMOL/L (ref 3.5–5.1)
RBC # BLD AUTO: 3.47 X10(6)UL
SODIUM SERPL-SCNC: 131 MMOL/L (ref 136–145)
WBC # BLD AUTO: 13.3 X10(3) UL (ref 4–11)

## 2022-08-14 PROCEDURE — 99232 SBSQ HOSP IP/OBS MODERATE 35: CPT | Performed by: HOSPITALIST

## 2022-08-14 PROCEDURE — 99233 SBSQ HOSP IP/OBS HIGH 50: CPT | Performed by: INTERNAL MEDICINE

## 2022-08-14 RX ORDER — BUMETANIDE 1 MG/1
1 TABLET ORAL
Status: DISCONTINUED | OUTPATIENT
Start: 2022-08-14 | End: 2022-08-15

## 2022-08-14 NOTE — PLAN OF CARE
Assumed care 1900  Patient alert, oriented x4  Family at bedside  NSR on tele  Fluid restriction maintained  Plan for AM labs  urinating

## 2022-08-15 LAB
ALBUMIN SERPL ELPH-MCNC: 3.47 G/DL (ref 3.75–5.21)
ALBUMIN/GLOB SERPL: 1.04 {RATIO} (ref 1–2)
ALPHA1 GLOB SERPL ELPH-MCNC: 0.44 G/DL (ref 0.19–0.46)
ALPHA2 GLOB SERPL ELPH-MCNC: 1.16 G/DL (ref 0.48–1.05)
ANION GAP SERPL CALC-SCNC: 6 MMOL/L (ref 0–18)
ATRIAL RATE: 75 BPM
B-GLOBULIN SERPL ELPH-MCNC: 0.81 G/DL (ref 0.68–1.23)
BUN BLD-MCNC: 64 MG/DL (ref 7–18)
CALCIUM BLD-MCNC: 8.9 MG/DL (ref 8.5–10.1)
CHLORIDE SERPL-SCNC: 99 MMOL/L (ref 98–112)
CO2 SERPL-SCNC: 23 MMOL/L (ref 21–32)
CREAT BLD-MCNC: 3.02 MG/DL
GAMMA GLOB SERPL ELPH-MCNC: 0.92 G/DL (ref 0.62–1.7)
GFR SERPLBLD BASED ON 1.73 SQ M-ARVRAT: 18 ML/MIN/1.73M2 (ref 60–?)
GLUCOSE BLD-MCNC: 176 MG/DL (ref 70–99)
GLUCOSE BLD-MCNC: 213 MG/DL (ref 70–99)
GLUCOSE BLD-MCNC: 253 MG/DL (ref 70–99)
GLUCOSE BLD-MCNC: 261 MG/DL (ref 70–99)
GLUCOSE BLD-MCNC: 279 MG/DL (ref 70–99)
KAPPA LC FREE SER-MCNC: 8.04 MG/DL (ref 0.33–1.94)
KAPPA LC FREE/LAMBDA FREE SER NEPH: 2.1 {RATIO} (ref 0.26–1.65)
LAMBDA LC FREE SERPL-MCNC: 3.82 MG/DL (ref 0.57–2.63)
OSMOLALITY SERPL CALC.SUM OF ELEC: 293 MOSM/KG (ref 275–295)
P AXIS: 27 DEGREES
P-R INTERVAL: 206 MS
POTASSIUM SERPL-SCNC: 4.4 MMOL/L (ref 3.5–5.1)
PROT SERPL-MCNC: 6.8 G/DL (ref 6.4–8.2)
Q-T INTERVAL: 398 MS
QRS DURATION: 92 MS
QTC CALCULATION (BEZET): 444 MS
R AXIS: 33 DEGREES
SODIUM SERPL-SCNC: 128 MMOL/L (ref 136–145)
T AXIS: 50 DEGREES
URATE SERPL-MCNC: 4.5 MG/DL
VENTRICULAR RATE: 75 BPM

## 2022-08-15 PROCEDURE — 99233 SBSQ HOSP IP/OBS HIGH 50: CPT | Performed by: INTERNAL MEDICINE

## 2022-08-15 RX ORDER — BUMETANIDE 2 MG/1
2 TABLET ORAL
Status: DISCONTINUED | OUTPATIENT
Start: 2022-08-15 | End: 2022-08-16

## 2022-08-15 RX ORDER — CARVEDILOL 12.5 MG/1
37.5 TABLET ORAL 2 TIMES DAILY WITH MEALS
Status: DISCONTINUED | OUTPATIENT
Start: 2022-08-15 | End: 2022-08-16

## 2022-08-15 RX ORDER — HYDRALAZINE HYDROCHLORIDE 25 MG/1
25 TABLET, FILM COATED ORAL 2 TIMES DAILY
Status: DISCONTINUED | OUTPATIENT
Start: 2022-08-15 | End: 2022-08-16

## 2022-08-15 RX ORDER — TOLVAPTAN 15 MG/1
15 TABLET ORAL ONCE
Status: COMPLETED | OUTPATIENT
Start: 2022-08-15 | End: 2022-08-15

## 2022-08-15 NOTE — PROGRESS NOTES
Assumed care 0700  O2 sat 96% room air. Tele NSR with hr-70s  Both legs still swollen.  made rounds with orders. Daily wt. Accu check QID. Up in chair with all meals. Will continue POC.   Problem: Diabetes/Glucose Control  Goal: Glucose maintained within prescribed range  Description: INTERVENTIONS:  - Monitor Blood Glucose as ordered  - Assess for signs and symptoms of hyperglycemia and hypoglycemia  - Administer ordered medications to maintain glucose within target range  - Assess barriers to adequate nutritional intake and initiate nutrition consult as needed  - Instruct patient on self management of diabetes  Outcome: Progressing     Problem: Patient/Family Goals  Goal: Patient/Family Long Term Goal  Description: Patient's Long Term Goal: stay out of the hospital    Interventions:  - take meds as prescribed  - attend follow up appointments  - follow recommended diet  - See additional Care Plan goals for specific interventions  Outcome: Progressing  Goal: Patient/Family Short Term Goal  Description: Patient's Short Term Goal: to have decreased swelling and improvement of breathing    Interventions:   - IV lasix per nephrology  - monitor labs   - monitor VSS  - See additional Care Plan goals for specific interventions  Outcome: Progressing     Problem: RESPIRATORY - ADULT  Goal: Achieves optimal ventilation and oxygenation  Description: INTERVENTIONS:  - Assess for changes in respiratory status  - Assess for changes in mentation and behavior  - Position to facilitate oxygenation and minimize respiratory effort  - Oxygen supplementation based on oxygen saturation or ABGs  - Provide Smoking Cessation handout, if applicable  - Encourage broncho-pulmonary hygiene including cough, deep breathe, Incentive Spirometry  - Assess the need for suctioning and perform as needed  - Assess and instruct to report SOB or any respiratory difficulty  - Respiratory Therapy support as indicated  - Manage/alleviate anxiety  - Monitor for signs/symptoms of CO2 retention  Outcome: Progressing

## 2022-08-15 NOTE — PHYSICAL THERAPY NOTE
PHYSICAL THERAPY TREATMENT NOTE - INPATIENT    Room Number: 2625/2625-A     Session: 2     Number of Visits to Meet Established Goals: 3    Presenting Problem: SOB/cough  Co-Morbidities : stroke, DM2, HTN  ASSESSMENT     Pt with worsening kidney function per RN. Patient is alert and oriented. Pt presents with impaired dynamic balance in standing, dec strength of diego le's and R residual weakness history, dec endurance and dec coordination. The AM-PAC 6-Clicks Inpatient Basic Mobility Short Form was completed and this patient is demonstrating a 21% degree of impairment in mobility. Research supports that patients with this level of impairment may benefit from home c HHPT. DISCHARGE RECOMMENDATIONS  PT Discharge Recommendations: Home with home health PT; Intermittent Supervision     PLAN  PT Treatment Plan: Patient education;Gait training;Stair training  Rehab Potential : Good  Frequency (Obs): 3x/week    CURRENT GOALS        Goal #1 Patient is able to negotiate 3 stairs with CGA      Goal #2 Patient is able to ambulate 50 feet with assist device: walker - rolling at assistance level: supervision      Goal #4  New Goal:4 steps with one rail and min assist   Goal #5     Goal #6     Goal Comments: Goals established on 2022      8/15/2022 all goals ongoing      SUBJECTIVE  \" yesterday I was confused. \"    OBJECTIVE       WEIGHT BEARING RESTRICTION                   PAIN ASSESSMENT   Ratin          BALANCE                                                                                                                       Static Sitting: Good  Dynamic Sitting: Good           Static Standing: Fair -  Dynamic Standing: Fair -    ACTIVITY TOLERANCE                         O2 WALK         -[facility] 6-Clicks INPATIENT SHORT FORM - BASIC MOBILITY  How much difficulty does the patient currently have. ..   Patient Difficulty: Turning over in bed (including adjusting bedclothes, sheets and blankets)?: None   Patient Difficulty: Sitting down on and standing up from a chair with arms (e.g., wheelchair, bedside commode, etc.): A Little   Patient Difficulty: Moving from lying on back to sitting on the side of the bed?: None   How much help from another person does the patient currently need. .. Help from Another: Moving to and from a bed to a chair (including a wheelchair)?: None   Help from Another: Need to walk in hospital room?: A Little   Help from Another: Climbing 3-5 steps with a railing?: A Lot       AM-PAC Score:  Raw Score: 20   Approx Degree of Impairment: 35.83%   Standardized Score (AM-PAC Scale): 47.67   CMS Modifier (G-Code): CJ    FUNCTIONAL ABILITY STATUS  Gait Assessment   Functional Mobility/Gait Assessment  Gait Assistance: Contact guard assist  Distance (ft): 35  Assistive Device: Rolling walker  Pattern:  (step through with dec stride on R, dec speed, dec step lengt)    Skilled Therapy Provided  Pt performed seated and standing therex for BLE. Standing balance training with ue reach, cues for core engagement. Bed Mobility:  NT  Transfer Mobility:  Sit<>Stand: CGA   Stand<>Sit: supervision    Gait: CGA with RW and cga. Chair needed in the hallway due to sudden fatigue after amb 35'. After rest, pt able to amb back to room. Cues for relaxation of diego shoulders to prevent neck pain. THERAPEUTIC EXERCISES  Lower Extremity Sit to stand  LAQ  Marching  Heel raises  Standing activity. Upper Extremity n/a     Position Sitting & Standing     Repetitions   10   Sets   1     Patient End of Session: Up in chair;Needs met;Call light within reach;RN aware of session/findings; All patient questions and concerns addressed; Alarm set

## 2022-08-15 NOTE — PLAN OF CARE
Assumed care of patient at [phone]. Alert and oriented x4, No C/O chest pain or SOB, SPO2 on RA/2L 95%-98%, Heart rate SR 70s. Breath sounds diminished. Bed is locked and in low position. Personal belonging within reach. Updated on plan of care and verbalized understanding. Will continue to monitor. 0028: Patient C/O back pain, PRN Norco given with good results. Patient resting comfortably.      Problem: Diabetes/Glucose Control  Goal: Glucose maintained within prescribed range  Description: INTERVENTIONS:  - Monitor Blood Glucose as ordered  - Assess for signs and symptoms of hyperglycemia and hypoglycemia  - Administer ordered medications to maintain glucose within target range  - Assess barriers to adequate nutritional intake and initiate nutrition consult as needed  - Instruct patient on self management of diabetes  Outcome: Progressing     Problem: Patient/Family Goals  Goal: Patient/Family Long Term Goal  Description: Patient's Long Term Goal: stay out of the hospital    Interventions:  - take meds as prescribed  - attend follow up appointments  - follow recommended diet  - See additional Care Plan goals for specific interventions  Outcome: Progressing  Goal: Patient/Family Short Term Goal  Description: Patient's Short Term Goal: to have decreased swelling and improvement of breathing    Interventions:   - IV lasix per nephrology  - monitor labs   - monitor VSS  - See additional Care Plan goals for specific interventions  Outcome: Progressing     Problem: RESPIRATORY - ADULT  Goal: Achieves optimal ventilation and oxygenation  Description: INTERVENTIONS:  - Assess for changes in respiratory status  - Assess for changes in mentation and behavior  - Position to facilitate oxygenation and minimize respiratory effort  - Oxygen supplementation based on oxygen saturation or ABGs  - Provide Smoking Cessation handout, if applicable  - Encourage broncho-pulmonary hygiene including cough, deep breathe, Incentive Spirometry  - Assess the need for suctioning and perform as needed  - Assess and instruct to report SOB or any respiratory difficulty  - Respiratory Therapy support as indicated  - Manage/alleviate anxiety  - Monitor for signs/symptoms of CO2 retention  Outcome: Progressing

## 2022-08-15 NOTE — PLAN OF CARE
Assumed care of patient 1130 in bed asleep. AO x4. NSR on tele monitor. O2 sat adequate on 2L nasal cannula. Denies chest pain and shortness of breath. Plan of care updated. Bed locked, in lowest position. Call light and personal items in reach. Will continue to monitor.

## 2022-08-15 NOTE — CM/SW NOTE
Care Progression Note:  Length of stay: 11  GMLOS:   Avoidable Delays:   Code Status: full    Acute Medical Issue/Factors:   Acute on chronic congestive heart failure, unspecified heart failure type [facility])       Discharge Barriers:bumex increased to twice daily, remove HD catheter if weight / labs stable ? potential for permacath   Expected discharge date: TBD  Expected next site of care: ongoing work with therapies--[employer] [address] reserved, ? BALAJI if recommended    / available for discharge planning.

## 2022-08-16 VITALS
WEIGHT: 285.5 LBS | HEIGHT: 67 IN | OXYGEN SATURATION: 96 % | BODY MASS INDEX: 44.81 KG/M2 | HEART RATE: 73 BPM | TEMPERATURE: 98 F | SYSTOLIC BLOOD PRESSURE: 125 MMHG | RESPIRATION RATE: 15 BRPM | DIASTOLIC BLOOD PRESSURE: 65 MMHG

## 2022-08-16 LAB
ANION GAP SERPL CALC-SCNC: 5 MMOL/L (ref 0–18)
BUN BLD-MCNC: 71 MG/DL (ref 7–18)
CALCIUM BLD-MCNC: 8.9 MG/DL (ref 8.5–10.1)
CHLORIDE SERPL-SCNC: 98 MMOL/L (ref 98–112)
CO2 SERPL-SCNC: 24 MMOL/L (ref 21–32)
CREAT BLD-MCNC: 3.18 MG/DL
GFR SERPLBLD BASED ON 1.73 SQ M-ARVRAT: 17 ML/MIN/1.73M2 (ref 60–?)
GLUCOSE BLD-MCNC: 150 MG/DL (ref 70–99)
GLUCOSE BLD-MCNC: 194 MG/DL (ref 70–99)
GLUCOSE BLD-MCNC: 199 MG/DL (ref 70–99)
GLUCOSE BLD-MCNC: 225 MG/DL (ref 70–99)
GLUCOSE BLD-MCNC: 229 MG/DL (ref 70–99)
OSMOLALITY SERPL CALC.SUM OF ELEC: 290 MOSM/KG (ref 275–295)
POTASSIUM SERPL-SCNC: 4.5 MMOL/L (ref 3.5–5.1)
SODIUM SERPL-SCNC: 127 MMOL/L (ref 136–145)

## 2022-08-16 PROCEDURE — 99233 SBSQ HOSP IP/OBS HIGH 50: CPT | Performed by: INTERNAL MEDICINE

## 2022-08-16 RX ORDER — HYDROCHLOROTHIAZIDE 25 MG/1
25 TABLET ORAL 2 TIMES DAILY
Qty: 60 TABLET | Refills: 0 | Status: SHIPPED | OUTPATIENT
Start: 2022-08-16

## 2022-08-16 RX ORDER — CARVEDILOL 12.5 MG/1
37.5 TABLET ORAL 2 TIMES DAILY WITH MEALS
Qty: 180 TABLET | Refills: 3 | Status: SHIPPED | OUTPATIENT
Start: 2022-08-16

## 2022-08-16 RX ORDER — TOLVAPTAN 15 MG/1
30 TABLET ORAL ONCE
Status: COMPLETED | OUTPATIENT
Start: 2022-08-16 | End: 2022-08-16

## 2022-08-16 RX ORDER — BUMETANIDE 2 MG/1
2 TABLET ORAL
Qty: 60 TABLET | Refills: 1 | Status: SHIPPED | OUTPATIENT
Start: 2022-08-16

## 2022-08-16 RX ORDER — PRAVASTATIN SODIUM 20 MG
20 TABLET ORAL NIGHTLY
Qty: 30 TABLET | Refills: 3 | Status: SHIPPED | OUTPATIENT
Start: 2022-08-16

## 2022-08-16 RX ORDER — GABAPENTIN 300 MG/1
300 CAPSULE ORAL 2 TIMES DAILY
Qty: 60 CAPSULE | Refills: 0 | Status: SHIPPED | OUTPATIENT
Start: 2022-08-16

## 2022-08-16 RX ORDER — HYDRALAZINE HYDROCHLORIDE 25 MG/1
25 TABLET, FILM COATED ORAL 2 TIMES DAILY
Qty: 60 TABLET | Refills: 3 | Status: SHIPPED | OUTPATIENT
Start: 2022-08-16

## 2022-08-16 NOTE — PLAN OF CARE
Patient alert and oriented x 4. Family at the bedside. Up with standby with a walker. Patient ambulating the hallway without difficulty. Pt says she has some shortness of breath, but it has improved since admission. On RA. NSR on tele. Continent of bowel and bladder. No complaints of pain, shortness of breath at rest or chest pain/discomfort. Fall precautions in place. Call light within reach.       POC: po Bumex, daily weight    Problem: Diabetes/Glucose Control  Goal: Glucose maintained within prescribed range  Description: INTERVENTIONS:  - Monitor Blood Glucose as ordered  - Assess for signs and symptoms of hyperglycemia and hypoglycemia  - Administer ordered medications to maintain glucose within target range  - Assess barriers to adequate nutritional intake and initiate nutrition consult as needed  - Instruct patient on self management of diabetes  Outcome: Progressing     Problem: Patient/Family Goals  Goal: Patient/Family Long Term Goal  Description: Patient's Long Term Goal: stay out of the hospital    Interventions:  - take meds as prescribed  - attend follow up appointments  - follow recommended diet  - See additional Care Plan goals for specific interventions  Outcome: Progressing  Goal: Patient/Family Short Term Goal  Description: Patient's Short Term Goal: to have decreased swelling and improvement of breathing    Interventions:   - IV lasix per nephrology  - monitor labs   - monitor VSS  - See additional Care Plan goals for specific interventions  Outcome: Progressing     Problem: RESPIRATORY - ADULT  Goal: Achieves optimal ventilation and oxygenation  Description: INTERVENTIONS:  - Assess for changes in respiratory status  - Assess for changes in mentation and behavior  - Position to facilitate oxygenation and minimize respiratory effort  - Oxygen supplementation based on oxygen saturation or ABGs  - Provide Smoking Cessation handout, if applicable  - Encourage broncho-pulmonary hygiene including cough, deep breathe, Incentive Spirometry  - Assess the need for suctioning and perform as needed  - Assess and instruct to report SOB or any respiratory difficulty  - Respiratory Therapy support as indicated  - Manage/alleviate anxiety  - Monitor for signs/symptoms of CO2 retention  Outcome: Progressing

## 2022-08-16 NOTE — PROGRESS NOTES
Pt discharged home. Tele removed, IV removed. F/U instructions provided and discussed. Pt and family verbalized understanding. Rx is given. Discussed adverse reactions and side effects of all new medications and provided handouts. Pt wheeled down by staff with belongings. Pt left denying any complaints of pain, malaise, or cardiac symptoms. All needs met by staff.

## 2022-08-16 NOTE — CM/SW NOTE
9:15am  PT and OT worked with patient again 8/15 and discharge recommendations remain home with home health PT/OT; Intermittent Supervision. ADONIS sent updates to [address] in [address]. ADONIS will continue to follow. Addendum 11am  SW attempted to meet with patient but she was sleeping. 12pm  Met with patient, who was alert and oriented, to confirm discharge plan. Discussed PT/OT recommendation still being HH, she is agreeable and she already spoke with [facility]. Patient confirmed she will be returning home with her  and staying with her daughter when her  is at work. Updated RN. ADONIS will remain available.      Edgar Magallon, HUSSAIN  Discharge Planner  [phone]

## 2022-08-17 NOTE — PAYOR COMM NOTE
Discharge Notification    Patient Name: Jake Stokes: [employer] P[employer]  Subscriber #: [id number]  Authorization Number: [id number]  Admit Date/Time: 8/4/2022 1:35 PM  Discharge Date/Time: 8/16/2022 7:25 PM

## 2022-12-06 ENCOUNTER — OFFICE VISIT (OUTPATIENT)
Dept: NEPHROLOGY | Facility: CLINIC | Age: 52
End: 2022-12-06
Payer: COMMERCIAL

## 2022-12-06 VITALS — DIASTOLIC BLOOD PRESSURE: 68 MMHG | WEIGHT: 270.38 LBS | SYSTOLIC BLOOD PRESSURE: 122 MMHG | BODY MASS INDEX: 42 KG/M2

## 2022-12-06 DIAGNOSIS — N18.30 STAGE 3 CHRONIC KIDNEY DISEASE, UNSPECIFIED WHETHER STAGE 3A OR 3B CKD (HCC): Primary | ICD-10-CM

## 2022-12-06 DIAGNOSIS — E11.21 DIABETIC NEPHROPATHY ASSOCIATED WITH TYPE 2 DIABETES MELLITUS (HCC): ICD-10-CM

## 2022-12-06 DIAGNOSIS — I50.32 CHRONIC HEART FAILURE WITH PRESERVED EJECTION FRACTION (HCC): ICD-10-CM

## 2022-12-06 DIAGNOSIS — I10 PRIMARY HYPERTENSION: ICD-10-CM

## 2022-12-06 PROCEDURE — 3074F SYST BP LT 130 MM HG: CPT | Performed by: INTERNAL MEDICINE

## 2022-12-06 PROCEDURE — 3078F DIAST BP <80 MM HG: CPT | Performed by: INTERNAL MEDICINE

## 2022-12-06 PROCEDURE — 99214 OFFICE O/P EST MOD 30 MIN: CPT | Performed by: INTERNAL MEDICINE

## 2022-12-20 ENCOUNTER — MED REC SCAN ONLY (OUTPATIENT)
Dept: NEPHROLOGY | Facility: CLINIC | Age: 52
End: 2022-12-20

## 2023-01-26 ENCOUNTER — APPOINTMENT (OUTPATIENT)
Dept: GENERAL RADIOLOGY | Age: 53
End: 2023-01-26
Payer: COMMERCIAL

## 2023-01-26 ENCOUNTER — HOSPITAL ENCOUNTER (EMERGENCY)
Age: 53
Discharge: HOME | End: 2023-01-26
Attending: EMERGENCY MEDICINE
Payer: COMMERCIAL

## 2023-01-26 ENCOUNTER — APPOINTMENT (OUTPATIENT)
Dept: GENERAL RADIOLOGY | Age: 53
End: 2023-01-26
Attending: EMERGENCY MEDICINE
Payer: COMMERCIAL

## 2023-01-26 VITALS
BODY MASS INDEX: 39.1 KG/M2 | TEMPERATURE: 96 F | OXYGEN SATURATION: 96 % | DIASTOLIC BLOOD PRESSURE: 92 MMHG | SYSTOLIC BLOOD PRESSURE: 120 MMHG | HEART RATE: 72 BPM | HEIGHT: 68 IN | RESPIRATION RATE: 18 BRPM | WEIGHT: 258 LBS

## 2023-01-26 DIAGNOSIS — U07.1 COVID-19: Primary | ICD-10-CM

## 2023-01-26 LAB
POCT INFLUENZA A: NEGATIVE
POCT INFLUENZA B: NEGATIVE
SARS-COV-2 RNA RESP QL NAA+PROBE: DETECTED

## 2023-01-26 PROCEDURE — 99283 EMERGENCY DEPT VISIT LOW MDM: CPT

## 2023-01-26 PROCEDURE — 71046 X-RAY EXAM CHEST 2 VIEWS: CPT | Performed by: EMERGENCY MEDICINE

## 2023-01-26 PROCEDURE — 87502 INFLUENZA DNA AMP PROBE: CPT | Performed by: EMERGENCY MEDICINE

## 2023-01-26 NOTE — DISCHARGE INSTRUCTIONS
Quarantine for 5 days starting today  Return if worse  Continue regular medications and watch her blood sugars closely  Recheck with primary care 1 week, sooner if not improving

## 2023-01-26 NOTE — ED INITIAL ASSESSMENT (HPI)
Pt has had a cold/cough for the past 3 weeks ago pt was given a zpak a couple weeks ago did not get any better.

## 2023-03-01 ENCOUNTER — APPOINTMENT (OUTPATIENT)
Dept: NUCLEAR MEDICINE | Facility: HOSPITAL | Age: 53
End: 2023-03-01
Attending: EMERGENCY MEDICINE
Payer: COMMERCIAL

## 2023-03-01 ENCOUNTER — APPOINTMENT (OUTPATIENT)
Dept: GENERAL RADIOLOGY | Facility: HOSPITAL | Age: 53
End: 2023-03-01
Payer: COMMERCIAL

## 2023-03-01 ENCOUNTER — HOSPITAL ENCOUNTER (INPATIENT)
Facility: HOSPITAL | Age: 53
LOS: 5 days | Discharge: HOME HEALTH | End: 2023-03-07
Attending: EMERGENCY MEDICINE | Admitting: HOSPITALIST
Payer: COMMERCIAL

## 2023-03-01 DIAGNOSIS — J18.9 COMMUNITY ACQUIRED PNEUMONIA, UNSPECIFIED LATERALITY: ICD-10-CM

## 2023-03-01 DIAGNOSIS — I50.9 ACUTE CONGESTIVE HEART FAILURE, UNSPECIFIED HEART FAILURE TYPE (HCC): ICD-10-CM

## 2023-03-01 DIAGNOSIS — N17.9 AKI (ACUTE KIDNEY INJURY) (HCC): Primary | ICD-10-CM

## 2023-03-01 DIAGNOSIS — R77.8 ELEVATED TROPONIN: ICD-10-CM

## 2023-03-01 LAB
ALBUMIN SERPL-MCNC: 2.7 G/DL (ref 3.4–5)
ALBUMIN/GLOB SERPL: 0.5 {RATIO} (ref 1–2)
ALP LIVER SERPL-CCNC: 138 U/L
ALT SERPL-CCNC: 12 U/L
ANION GAP SERPL CALC-SCNC: 10 MMOL/L (ref 0–18)
AST SERPL-CCNC: 9 U/L (ref 15–37)
BASOPHILS # BLD AUTO: 0.08 X10(3) UL (ref 0–0.2)
BASOPHILS NFR BLD AUTO: 0.4 %
BILIRUB SERPL-MCNC: 0.4 MG/DL (ref 0.1–2)
BUN BLD-MCNC: 95 MG/DL (ref 7–18)
CALCIUM BLD-MCNC: 9.1 MG/DL (ref 8.5–10.1)
CHLORIDE SERPL-SCNC: 94 MMOL/L (ref 98–112)
CHOLEST SERPL-MCNC: 159 MG/DL (ref ?–200)
CO2 SERPL-SCNC: 28 MMOL/L (ref 21–32)
CREAT BLD-MCNC: 5.32 MG/DL
D DIMER PPP FEU-MCNC: 2.44 UG/ML FEU (ref ?–0.52)
EOSINOPHIL # BLD AUTO: 0.35 X10(3) UL (ref 0–0.7)
EOSINOPHIL NFR BLD AUTO: 1.6 %
ERYTHROCYTE [DISTWIDTH] IN BLOOD BY AUTOMATED COUNT: 13.9 %
FLUAV + FLUBV RNA SPEC NAA+PROBE: NEGATIVE
FLUAV + FLUBV RNA SPEC NAA+PROBE: NEGATIVE
GFR SERPLBLD BASED ON 1.73 SQ M-ARVRAT: 9 ML/MIN/1.73M2 (ref 60–?)
GLOBULIN PLAS-MCNC: 5.1 G/DL (ref 2.8–4.4)
GLUCOSE BLD-MCNC: 172 MG/DL (ref 70–99)
HCT VFR BLD AUTO: 28 %
HDLC SERPL-MCNC: 41 MG/DL (ref 40–59)
HGB BLD-MCNC: 9.1 G/DL
IMM GRANULOCYTES # BLD AUTO: 0.18 X10(3) UL (ref 0–1)
IMM GRANULOCYTES NFR BLD: 0.8 %
LDLC SERPL CALC-MCNC: 87 MG/DL (ref ?–100)
LYMPHOCYTES # BLD AUTO: 1.11 X10(3) UL (ref 1–4)
LYMPHOCYTES NFR BLD AUTO: 4.9 %
MCH RBC QN AUTO: 29.4 PG (ref 26–34)
MCHC RBC AUTO-ENTMCNC: 32.5 G/DL (ref 31–37)
MCV RBC AUTO: 90.6 FL
MONOCYTES # BLD AUTO: 0.98 X10(3) UL (ref 0.1–1)
MONOCYTES NFR BLD AUTO: 4.4 %
NEUTROPHILS # BLD AUTO: 19.74 X10 (3) UL (ref 1.5–7.7)
NEUTROPHILS # BLD AUTO: 19.74 X10(3) UL (ref 1.5–7.7)
NEUTROPHILS NFR BLD AUTO: 87.9 %
NONHDLC SERPL-MCNC: 118 MG/DL (ref ?–130)
NT-PROBNP SERPL-MCNC: 9918 PG/ML (ref ?–125)
OSMOLALITY SERPL CALC.SUM OF ELEC: 307 MOSM/KG (ref 275–295)
PLATELET # BLD AUTO: 374 10(3)UL (ref 150–450)
POTASSIUM SERPL-SCNC: 3.5 MMOL/L (ref 3.5–5.1)
PROT SERPL-MCNC: 7.8 G/DL (ref 6.4–8.2)
RBC # BLD AUTO: 3.09 X10(6)UL
RSV RNA SPEC NAA+PROBE: NEGATIVE
SARS-COV-2 RNA RESP QL NAA+PROBE: NOT DETECTED
SODIUM SERPL-SCNC: 132 MMOL/L (ref 136–145)
TRIGL SERPL-MCNC: 181 MG/DL (ref 30–149)
TROPONIN I HIGH SENSITIVITY: 62 NG/L
VLDLC SERPL CALC-MCNC: 29 MG/DL (ref 0–30)
WBC # BLD AUTO: 22.4 X10(3) UL (ref 4–11)

## 2023-03-01 PROCEDURE — 71046 X-RAY EXAM CHEST 2 VIEWS: CPT

## 2023-03-01 PROCEDURE — 78582 LUNG VENTILAT&PERFUS IMAGING: CPT | Performed by: EMERGENCY MEDICINE

## 2023-03-01 PROCEDURE — 99223 1ST HOSP IP/OBS HIGH 75: CPT | Performed by: INTERNAL MEDICINE

## 2023-03-01 RX ORDER — IPRATROPIUM BROMIDE AND ALBUTEROL SULFATE 2.5; .5 MG/3ML; MG/3ML
3 SOLUTION RESPIRATORY (INHALATION) ONCE
Status: COMPLETED | OUTPATIENT
Start: 2023-03-01 | End: 2023-03-01

## 2023-03-01 RX ORDER — GABAPENTIN 400 MG/1
400 CAPSULE ORAL 2 TIMES DAILY
COMMUNITY

## 2023-03-01 RX ORDER — AZITHROMYCIN 250 MG/1
500 TABLET, FILM COATED ORAL ONCE
Status: COMPLETED | OUTPATIENT
Start: 2023-03-01 | End: 2023-03-01

## 2023-03-01 RX ORDER — BUMETANIDE 2 MG/1
2 TABLET ORAL 2 TIMES DAILY
COMMUNITY

## 2023-03-01 RX ORDER — DEXTROSE 5 % IN WATER
PIGGYBACK WITH THREADED PORT (ML) INTRAVENOUS
Status: COMPLETED
Start: 2023-03-01 | End: 2023-03-01

## 2023-03-01 RX ORDER — ONDANSETRON 4 MG/1
TABLET, ORALLY DISINTEGRATING ORAL
COMMUNITY
Start: 2022-10-08

## 2023-03-01 RX ORDER — FUROSEMIDE 10 MG/ML
40 INJECTION INTRAMUSCULAR; INTRAVENOUS ONCE
Status: COMPLETED | OUTPATIENT
Start: 2023-03-01 | End: 2023-03-01

## 2023-03-02 ENCOUNTER — APPOINTMENT (OUTPATIENT)
Dept: CV DIAGNOSTICS | Facility: HOSPITAL | Age: 53
End: 2023-03-02
Attending: INTERNAL MEDICINE
Payer: COMMERCIAL

## 2023-03-02 PROBLEM — N17.9 AKI (ACUTE KIDNEY INJURY) (HCC): Status: ACTIVE | Noted: 2023-03-02

## 2023-03-02 PROBLEM — J18.9 COMMUNITY ACQUIRED PNEUMONIA, UNSPECIFIED LATERALITY: Status: ACTIVE | Noted: 2023-03-02

## 2023-03-02 PROBLEM — I50.9 ACUTE CONGESTIVE HEART FAILURE, UNSPECIFIED HEART FAILURE TYPE (HCC): Status: ACTIVE | Noted: 2023-03-02

## 2023-03-02 PROBLEM — N17.9 AKI (ACUTE KIDNEY INJURY): Status: ACTIVE | Noted: 2023-03-02

## 2023-03-02 LAB
ADENOVIRUS PCR:: NOT DETECTED
ANION GAP SERPL CALC-SCNC: 10 MMOL/L (ref 0–18)
ATRIAL RATE: 73 BPM
B PARAPERT DNA SPEC QL NAA+PROBE: NOT DETECTED
B PERT DNA SPEC QL NAA+PROBE: NOT DETECTED
BASOPHILS # BLD AUTO: 0.08 X10(3) UL (ref 0–0.2)
BASOPHILS NFR BLD AUTO: 0.4 %
BILIRUB UR QL STRIP.AUTO: NEGATIVE
BUN BLD-MCNC: 97 MG/DL (ref 7–18)
C PNEUM DNA SPEC QL NAA+PROBE: NOT DETECTED
C3 SERPL-MCNC: 194 MG/DL (ref 90–180)
C4 SERPL-MCNC: 54.8 MG/DL (ref 10–40)
CALCIUM BLD-MCNC: 8.6 MG/DL (ref 8.5–10.1)
CHLORIDE SERPL-SCNC: 93 MMOL/L (ref 98–112)
CO2 SERPL-SCNC: 27 MMOL/L (ref 21–32)
COLOR UR AUTO: YELLOW
CORONAVIRUS 229E PCR:: NOT DETECTED
CORONAVIRUS HKU1 PCR:: NOT DETECTED
CORONAVIRUS NL63 PCR:: NOT DETECTED
CORONAVIRUS OC43 PCR:: NOT DETECTED
CREAT BLD-MCNC: 5.51 MG/DL
CREAT UR-SCNC: 116 MG/DL
DEPRECATED HBV CORE AB SER IA-ACNC: 238.4 NG/ML
EOSINOPHIL # BLD AUTO: 0.86 X10(3) UL (ref 0–0.7)
EOSINOPHIL NFR BLD AUTO: 4.2 %
ERYTHROCYTE [DISTWIDTH] IN BLOOD BY AUTOMATED COUNT: 13.9 %
EST. AVERAGE GLUCOSE BLD GHB EST-MCNC: 183 MG/DL (ref 68–126)
FLUAV RNA SPEC QL NAA+PROBE: NOT DETECTED
FLUBV RNA SPEC QL NAA+PROBE: NOT DETECTED
GFR SERPLBLD BASED ON 1.73 SQ M-ARVRAT: 9 ML/MIN/1.73M2 (ref 60–?)
GLUCOSE BLD-MCNC: 157 MG/DL (ref 70–99)
GLUCOSE BLD-MCNC: 177 MG/DL (ref 70–99)
GLUCOSE BLD-MCNC: 180 MG/DL (ref 70–99)
GLUCOSE BLD-MCNC: 192 MG/DL (ref 70–99)
GLUCOSE BLD-MCNC: 209 MG/DL (ref 70–99)
GLUCOSE BLD-MCNC: 298 MG/DL (ref 70–99)
GLUCOSE UR STRIP.AUTO-MCNC: 50 MG/DL
HBA1C MFR BLD: 8 % (ref ?–5.7)
HCT VFR BLD AUTO: 26.1 %
HGB BLD-MCNC: 8.5 G/DL
HYALINE CASTS #/AREA URNS AUTO: PRESENT /LPF
IMM GRANULOCYTES # BLD AUTO: 0.16 X10(3) UL (ref 0–1)
IMM GRANULOCYTES NFR BLD: 0.8 %
IRON SATN MFR SERPL: 11 %
IRON SERPL-MCNC: 22 UG/DL
KETONES UR STRIP.AUTO-MCNC: NEGATIVE MG/DL
LYMPHOCYTES # BLD AUTO: 1.19 X10(3) UL (ref 1–4)
LYMPHOCYTES NFR BLD AUTO: 5.7 %
MAGNESIUM SERPL-MCNC: 2.5 MG/DL (ref 1.6–2.6)
MCH RBC QN AUTO: 29.4 PG (ref 26–34)
MCHC RBC AUTO-ENTMCNC: 32.6 G/DL (ref 31–37)
MCV RBC AUTO: 90.3 FL
METAPNEUMOVIRUS PCR:: NOT DETECTED
MONOCYTES # BLD AUTO: 0.81 X10(3) UL (ref 0.1–1)
MONOCYTES NFR BLD AUTO: 3.9 %
MYCOPLASMA PNEUMONIA PCR:: NOT DETECTED
NEUTROPHILS # BLD AUTO: 17.62 X10 (3) UL (ref 1.5–7.7)
NEUTROPHILS # BLD AUTO: 17.62 X10(3) UL (ref 1.5–7.7)
NEUTROPHILS NFR BLD AUTO: 85 %
NITRITE UR QL STRIP.AUTO: NEGATIVE
OSMOLALITY SERPL CALC.SUM OF ELEC: 306 MOSM/KG (ref 275–295)
P AXIS: 15 DEGREES
P-R INTERVAL: 194 MS
PARAINFLUENZA 1 PCR:: NOT DETECTED
PARAINFLUENZA 2 PCR:: NOT DETECTED
PARAINFLUENZA 3 PCR:: NOT DETECTED
PARAINFLUENZA 4 PCR:: NOT DETECTED
PH UR STRIP.AUTO: 5 [PH] (ref 5–8)
PLATELET # BLD AUTO: 350 10(3)UL (ref 150–450)
POTASSIUM SERPL-SCNC: 3.4 MMOL/L (ref 3.5–5.1)
PROT UR STRIP.AUTO-MCNC: >=500 MG/DL
Q-T INTERVAL: 450 MS
QRS DURATION: 88 MS
QTC CALCULATION (BEZET): 495 MS
R AXIS: 21 DEGREES
RBC # BLD AUTO: 2.89 X10(6)UL
RBC UR QL AUTO: NEGATIVE
RHINOVIRUS/ENTERO PCR:: NOT DETECTED
RSV RNA SPEC QL NAA+PROBE: NOT DETECTED
SARS-COV-2 RNA NPH QL NAA+NON-PROBE: NOT DETECTED
SODIUM SERPL-SCNC: 13 MMOL/L
SODIUM SERPL-SCNC: 130 MMOL/L (ref 136–145)
SP GR UR STRIP.AUTO: 1.02 (ref 1–1.03)
T AXIS: 22 DEGREES
TIBC SERPL-MCNC: 195 UG/DL (ref 240–450)
TRANSFERRIN SERPL-MCNC: 131 MG/DL (ref 200–360)
UROBILINOGEN UR STRIP.AUTO-MCNC: <2 MG/DL
VENTRICULAR RATE: 73 BPM
WBC # BLD AUTO: 20.7 X10(3) UL (ref 4–11)
WBC #/AREA URNS AUTO: >50 /HPF

## 2023-03-02 PROCEDURE — 99232 SBSQ HOSP IP/OBS MODERATE 35: CPT | Performed by: INTERNAL MEDICINE

## 2023-03-02 PROCEDURE — 99223 1ST HOSP IP/OBS HIGH 75: CPT | Performed by: INTERNAL MEDICINE

## 2023-03-02 PROCEDURE — 93306 TTE W/DOPPLER COMPLETE: CPT | Performed by: INTERNAL MEDICINE

## 2023-03-02 RX ORDER — PANTOPRAZOLE SODIUM 40 MG/1
40 TABLET, DELAYED RELEASE ORAL
Status: DISCONTINUED | OUTPATIENT
Start: 2023-03-02 | End: 2023-03-07

## 2023-03-02 RX ORDER — DOCUSATE SODIUM 100 MG/1
100 CAPSULE, LIQUID FILLED ORAL 2 TIMES DAILY
Status: DISCONTINUED | OUTPATIENT
Start: 2023-03-02 | End: 2023-03-07

## 2023-03-02 RX ORDER — CARVEDILOL 12.5 MG/1
37.5 TABLET ORAL 2 TIMES DAILY WITH MEALS
Status: DISCONTINUED | OUTPATIENT
Start: 2023-03-02 | End: 2023-03-02

## 2023-03-02 RX ORDER — HYDROCODONE BITARTRATE AND ACETAMINOPHEN 10; 325 MG/1; MG/1
1 TABLET ORAL EVERY 6 HOURS PRN
Status: DISCONTINUED | OUTPATIENT
Start: 2023-03-02 | End: 2023-03-07

## 2023-03-02 RX ORDER — SODIUM CHLORIDE 9 MG/ML
INJECTION, SOLUTION INTRAVENOUS CONTINUOUS
Status: DISCONTINUED | OUTPATIENT
Start: 2023-03-02 | End: 2023-03-05

## 2023-03-02 RX ORDER — BUPROPION HYDROCHLORIDE 150 MG/1
150 TABLET ORAL DAILY
Status: DISCONTINUED | OUTPATIENT
Start: 2023-03-02 | End: 2023-03-07

## 2023-03-02 RX ORDER — MELATONIN
3 NIGHTLY PRN
Status: DISCONTINUED | OUTPATIENT
Start: 2023-03-02 | End: 2023-03-07

## 2023-03-02 RX ORDER — CARVEDILOL 12.5 MG/1
25 TABLET ORAL 2 TIMES DAILY WITH MEALS
Status: DISCONTINUED | OUTPATIENT
Start: 2023-03-02 | End: 2023-03-06

## 2023-03-02 RX ORDER — ACETAMINOPHEN 500 MG
500 TABLET ORAL EVERY 4 HOURS PRN
Status: DISCONTINUED | OUTPATIENT
Start: 2023-03-02 | End: 2023-03-07

## 2023-03-02 RX ORDER — GABAPENTIN 400 MG/1
400 CAPSULE ORAL 2 TIMES DAILY
Status: DISCONTINUED | OUTPATIENT
Start: 2023-03-02 | End: 2023-03-07

## 2023-03-02 RX ORDER — HYDROXYZINE HYDROCHLORIDE 25 MG/1
25 TABLET, FILM COATED ORAL 3 TIMES DAILY PRN
Status: DISCONTINUED | OUTPATIENT
Start: 2023-03-02 | End: 2023-03-07

## 2023-03-02 RX ORDER — POLYETHYLENE GLYCOL 3350 17 G/17G
17 POWDER, FOR SOLUTION ORAL DAILY PRN
Status: DISCONTINUED | OUTPATIENT
Start: 2023-03-02 | End: 2023-03-07

## 2023-03-02 RX ORDER — NICOTINE POLACRILEX 4 MG
30 LOZENGE BUCCAL
Status: DISCONTINUED | OUTPATIENT
Start: 2023-03-02 | End: 2023-03-07

## 2023-03-02 RX ORDER — NICOTINE POLACRILEX 4 MG
15 LOZENGE BUCCAL
Status: DISCONTINUED | OUTPATIENT
Start: 2023-03-02 | End: 2023-03-07

## 2023-03-02 RX ORDER — PRAVASTATIN SODIUM 20 MG
20 TABLET ORAL NIGHTLY
Status: DISCONTINUED | OUTPATIENT
Start: 2023-03-02 | End: 2023-03-07

## 2023-03-02 RX ORDER — HYDRALAZINE HYDROCHLORIDE 25 MG/1
25 TABLET, FILM COATED ORAL 2 TIMES DAILY
Status: DISCONTINUED | OUTPATIENT
Start: 2023-03-02 | End: 2023-03-04

## 2023-03-02 RX ORDER — CLOPIDOGREL BISULFATE 75 MG/1
75 TABLET ORAL DAILY
Status: DISCONTINUED | OUTPATIENT
Start: 2023-03-02 | End: 2023-03-07

## 2023-03-02 RX ORDER — ONDANSETRON 2 MG/ML
4 INJECTION INTRAMUSCULAR; INTRAVENOUS EVERY 6 HOURS PRN
Status: DISCONTINUED | OUTPATIENT
Start: 2023-03-02 | End: 2023-03-07

## 2023-03-02 RX ORDER — FLUTICASONE PROPIONATE 50 MCG
1 SPRAY, SUSPENSION (ML) NASAL DAILY
Status: DISCONTINUED | OUTPATIENT
Start: 2023-03-02 | End: 2023-03-07

## 2023-03-02 RX ORDER — ALBUTEROL SULFATE 90 UG/1
2 AEROSOL, METERED RESPIRATORY (INHALATION) EVERY 6 HOURS PRN
Status: DISCONTINUED | OUTPATIENT
Start: 2023-03-02 | End: 2023-03-07

## 2023-03-02 RX ORDER — ASPIRIN 325 MG
325 TABLET, DELAYED RELEASE (ENTERIC COATED) ORAL DAILY
Status: DISCONTINUED | OUTPATIENT
Start: 2023-03-02 | End: 2023-03-07

## 2023-03-02 RX ORDER — AZITHROMYCIN 250 MG/1
500 TABLET, FILM COATED ORAL
Status: COMPLETED | OUTPATIENT
Start: 2023-03-02 | End: 2023-03-03

## 2023-03-02 RX ORDER — POTASSIUM CHLORIDE 20 MEQ/1
40 TABLET, EXTENDED RELEASE ORAL ONCE
Status: COMPLETED | OUTPATIENT
Start: 2023-03-02 | End: 2023-03-02

## 2023-03-02 RX ORDER — DEXTROSE MONOHYDRATE 25 G/50ML
50 INJECTION, SOLUTION INTRAVENOUS
Status: DISCONTINUED | OUTPATIENT
Start: 2023-03-02 | End: 2023-03-07

## 2023-03-02 RX ORDER — LEVOTHYROXINE SODIUM 0.07 MG/1
75 TABLET ORAL
Status: DISCONTINUED | OUTPATIENT
Start: 2023-03-02 | End: 2023-03-07

## 2023-03-02 RX ORDER — LEVOTHYROXINE SODIUM 0.1 MG/1
200 TABLET ORAL
Status: DISCONTINUED | OUTPATIENT
Start: 2023-03-02 | End: 2023-03-07

## 2023-03-02 RX ORDER — HYDROCHLOROTHIAZIDE 25 MG/1
25 TABLET ORAL 2 TIMES DAILY
Status: DISCONTINUED | OUTPATIENT
Start: 2023-03-02 | End: 2023-03-02

## 2023-03-02 RX ORDER — HEPARIN SODIUM 5000 [USP'U]/ML
5000 INJECTION, SOLUTION INTRAVENOUS; SUBCUTANEOUS EVERY 12 HOURS SCHEDULED
Status: DISCONTINUED | OUTPATIENT
Start: 2023-03-02 | End: 2023-03-07

## 2023-03-02 NOTE — ED QUICK NOTES
Orders for admission, patient is aware of plan and ready to go upstairs. Any questions, please call ED RN Sydnee at extension [phone]. Patient Covid vaccination status: Unvaccinated     COVID Test Ordered in ED: SARS-CoV-2/Flu A and B/RSV by PCR (GeneXpert)    COVID Suspicion at Admission: N/A    Running Infusions:  None    Mental Status/LOC at time of transport:  Aox4    Other pertinent information:   CIWA score: N/A   NIH score:  N/A

## 2023-03-02 NOTE — PROGRESS NOTES
03/02/23 0130 03/02/23 0135 03/02/23 0143   Vital Signs   Pulse 85 81 80   Heart Rate Source Monitor Monitor Monitor   Resp  --   --  16   Respiratory Quality  --   --  Normal   /67 113/71 115/58   MAP (mmHg)  --   --  69   BP Location Right arm Right arm Right arm   BP Method Automatic Automatic Automatic   Patient Position Lying Sitting Standing

## 2023-03-02 NOTE — BH RN ADMISSION NOTE
NURSING ADMISSION NOTE      Patient admitted via transport with . Oriented to room. Safety precautions initiated. Bed in low position. Call light in reach. Pt admitted from ER for acute kidney injury. Admission assessment complete. Admit orders received and initiated. ADMISSION NAVIGATOR COMPLETED.

## 2023-03-02 NOTE — PLAN OF CARE
Pt A&Ox 4. On 4L NC per patient request; pt arrived to unit on 2L sating 96%. NSR on tele; no c/o cardiac symptoms. Pt incontinent of B&B at times. Pt denies chest pain but c/o chronic back discomfort; PRN pain medication given as ordered, seems to be resting comfortably at this time. Pt up with max two assist and a walker. Plan of care reviewed with pt and  whom remains at the bedside; all questions answered at this time. Bed in lowest position; call light within reach. High fall risk precautions are in place per unit protocol. Problem: Diabetes/Glucose Control  Goal: Glucose maintained within prescribed range  Description: INTERVENTIONS:  - Monitor Blood Glucose as ordered  - Assess for signs and symptoms of hyperglycemia and hypoglycemia  - Administer ordered medications to maintain glucose within target range  - Assess barriers to adequate nutritional intake and initiate nutrition consult as needed  - Instruct patient on self management of diabetes  Outcome: Progressing        Problem: CARDIOVASCULAR - ADULT  Goal: Maintains optimal cardiac output and hemodynamic stability  Description: INTERVENTIONS:  - Monitor vital signs, rhythm, and trends  - Monitor for bleeding, hypotension and signs of decreased cardiac output  - Evaluate effectiveness of vasoactive medications to optimize hemodynamic stability  - Monitor arterial and/or venous puncture sites for bleeding and/or hematoma  - Assess quality of pulses, skin color and temperature  - Assess for signs of decreased coronary artery perfusion - ex.  Angina  - Evaluate fluid balance, assess for edema, trend weights  Outcome: Progressing  Goal: Absence of cardiac arrhythmias or at baseline  Description: INTERVENTIONS:  - Continuous cardiac monitoring, monitor vital signs, obtain 12 lead EKG if indicated  - Evaluate effectiveness of antiarrhythmic and heart rate control medications as ordered  - Initiate emergency measures for life threatening arrhythmias  - Monitor electrolytes and administer replacement therapy as ordered  Outcome: Progressing     Problem: RESPIRATORY - ADULT  Goal: Achieves optimal ventilation and oxygenation  Description: INTERVENTIONS:  - Assess for changes in respiratory status  - Assess for changes in mentation and behavior  - Position to facilitate oxygenation and minimize respiratory effort  - Oxygen supplementation based on oxygen saturation or ABGs  - Provide Smoking Cessation handout, if applicable  - Encourage broncho-pulmonary hygiene including cough, deep breathe, Incentive Spirometry  - Assess the need for suctioning and perform as needed  - Assess and instruct to report SOB or any respiratory difficulty  - Respiratory Therapy support as indicated  - Manage/alleviate anxiety  - Monitor for signs/symptoms of CO2 retention  Outcome: Progressing     Problem: METABOLIC/FLUID AND ELECTROLYTES - ADULT  Goal: Glucose maintained within prescribed range  Description: INTERVENTIONS:  - Monitor Blood Glucose as ordered  - Assess for signs and symptoms of hyperglycemia and hypoglycemia  - Administer ordered medications to maintain glucose within target range  - Assess barriers to adequate nutritional intake and initiate nutrition consult as needed  - Instruct patient on self management of diabetes  Outcome: Progressing  Goal: Electrolytes maintained within normal limits  Description: INTERVENTIONS:  - Monitor labs and rhythm and assess patient for signs and symptoms of electrolyte imbalances  - Administer electrolyte replacement as ordered  - Monitor response to electrolyte replacements, including rhythm and repeat lab results as appropriate  - Fluid restriction as ordered  - Instruct patient on fluid and nutrition restrictions as appropriate  Outcome: Progressing     Problem: SKIN/TISSUE INTEGRITY - ADULT  Goal: Skin integrity remains intact  Description: INTERVENTIONS  - Assess and document risk factors for pressure ulcer development  - Assess and document skin integrity  - Monitor for areas of redness and/or skin breakdown  - Initiate interventions, skin care algorithm/standards of care as needed  Outcome: Progressing     Problem: PAIN - ADULT  Goal: Verbalizes/displays adequate comfort level or patient's stated pain goal  Description: INTERVENTIONS:  - Encourage pt to monitor pain and request assistance  - Assess pain using appropriate pain scale  - Administer analgesics based on type and severity of pain and evaluate response  - Implement non-pharmacological measures as appropriate and evaluate response  - Consider cultural and social influences on pain and pain management  - Manage/alleviate anxiety  - Utilize distraction and/or relaxation techniques  - Monitor for opioid side effects  - Notify MD/LIP if interventions unsuccessful or patient reports new pain  - Anticipate increased pain with activity and pre-medicate as appropriate  Outcome: Progressing     Problem: RISK FOR INFECTION - ADULT  Goal: Absence of fever/infection during anticipated neutropenic period  Description: INTERVENTIONS  - Monitor WBC  - Administer growth factors as ordered  - Implement neutropenic guidelines  Outcome: Progressing     Problem: SAFETY ADULT - FALL  Goal: Free from fall injury  Description: INTERVENTIONS:  - Assess pt frequently for physical needs  - Identify cognitive and physical deficits and behaviors that affect risk of falls.   - [place] fall precautions as indicated by assessment.  - Educate pt/family on patient safety including physical limitations  - Instruct pt to call for assistance with activity based on assessment  - Modify environment to reduce risk of injury  - Provide assistive devices as appropriate  - Consider OT/PT consult to assist with strengthening/mobility  - Encourage toileting schedule  Outcome: Progressing     Problem: DISCHARGE PLANNING  Goal: Discharge to home or other facility with appropriate resources  Description: INTERVENTIONS:  - Identify barriers to discharge w/pt and caregiver  - Include patient/family/discharge partner in discharge planning  - Arrange for needed discharge resources and transportation as appropriate  - Identify discharge learning needs (meds, wound care, etc)  - Arrange for interpreters to assist at discharge as needed  - Consider post-discharge preferences of patient/family/discharge partner  - Complete POLST form as appropriate  - Assess patient's ability to be responsible for managing their own health  - Refer to Case Management Department for coordinating discharge planning if the patient needs post-hospital services based on physician/LIP order or complex needs related to functional status, cognitive ability or social support system  Outcome: Progressing     Problem: Altered Communication/Language Barrier  Goal: Patient/Family is able to understand and participate in their care  Description: Interventions:  - Assess communication ability and preferred communication style  - Implement communication aides and strategies  - Use visual cues when possible  - Listen attentively, be patient, do not interrupt  - Minimize distractions  - Allow time for understanding and response  - Establish method for patient to ask for assistance (call light)  - Provide an  as needed  - Communicate barriers and strategies to overcome with those who interact with patient  Outcome: Progressing

## 2023-03-02 NOTE — PLAN OF CARE
Pt alert able to make needs known. C/o generalized pain medication given as ordered. Cont on oxygen at 3L , pt does not use oxygen at home. Antibiotics for PNA ordered. IV fluids started. Plan of care discussed with pt , verbalized understanding . Call light within reach . Problem: Diabetes/Glucose Control  Goal: Glucose maintained within prescribed range  Description: INTERVENTIONS:  - Monitor Blood Glucose as ordered  - Assess for signs and symptoms of hyperglycemia and hypoglycemia  - Administer ordered medications to maintain glucose within target range  - Assess barriers to adequate nutritional intake and initiate nutrition consult as needed  - Instruct patient on self management of diabetes  Outcome: Progressing     Problem: CARDIOVASCULAR - ADULT  Goal: Maintains optimal cardiac output and hemodynamic stability  Description: INTERVENTIONS:  - Monitor vital signs, rhythm, and trends  - Monitor for bleeding, hypotension and signs of decreased cardiac output  - Evaluate effectiveness of vasoactive medications to optimize hemodynamic stability  - Monitor arterial and/or venous puncture sites for bleeding and/or hematoma  - Assess quality of pulses, skin color and temperature  - Assess for signs of decreased coronary artery perfusion - ex.  Angina  - Evaluate fluid balance, assess for edema, trend weights  Outcome: Progressing  Goal: Absence of cardiac arrhythmias or at baseline  Description: INTERVENTIONS:  - Continuous cardiac monitoring, monitor vital signs, obtain 12 lead EKG if indicated  - Evaluate effectiveness of antiarrhythmic and heart rate control medications as ordered  - Initiate emergency measures for life threatening arrhythmias  - Monitor electrolytes and administer replacement therapy as ordered  Outcome: Progressing     Problem: RESPIRATORY - ADULT  Goal: Achieves optimal ventilation and oxygenation  Description: INTERVENTIONS:  - Assess for changes in respiratory status  - Assess for changes in mentation and behavior  - Position to facilitate oxygenation and minimize respiratory effort  - Oxygen supplementation based on oxygen saturation or ABGs  - Provide Smoking Cessation handout, if applicable  - Encourage broncho-pulmonary hygiene including cough, deep breathe, Incentive Spirometry  - Assess the need for suctioning and perform as needed  - Assess and instruct to report SOB or any respiratory difficulty  - Respiratory Therapy support as indicated  - Manage/alleviate anxiety  - Monitor for signs/symptoms of CO2 retention  Outcome: Progressing     Problem: METABOLIC/FLUID AND ELECTROLYTES - ADULT  Goal: Glucose maintained within prescribed range  Description: INTERVENTIONS:  - Monitor Blood Glucose as ordered  - Assess for signs and symptoms of hyperglycemia and hypoglycemia  - Administer ordered medications to maintain glucose within target range  - Assess barriers to adequate nutritional intake and initiate nutrition consult as needed  - Instruct patient on self management of diabetes  Outcome: Progressing  Goal: Electrolytes maintained within normal limits  Description: INTERVENTIONS:  - Monitor Blood Glucose as ordered  - Assess for signs and symptoms of hyperglycemia and hypoglycemia  - Administer ordered medications to maintain glucose within target range  - Assess barriers to adequate nutritional intake and initiate nutrition consult as needed  - Instruct patient on self management of diabetes  Outcome: Progressing     Problem: SKIN/TISSUE INTEGRITY - ADULT  Goal: Skin integrity remains intact  Description: INTERVENTIONS  - Assess and document risk factors for pressure ulcer development  - Assess and document skin integrity  - Monitor for areas of redness and/or skin breakdown  - Initiate interventions, skin care algorithm/standards of care as needed  Outcome: Progressing     Problem: PAIN - ADULT  Goal: Verbalizes/displays adequate comfort level or patient's stated pain goal  Description: INTERVENTIONS:  - Encourage pt to monitor pain and request assistance  - Assess pain using appropriate pain scale  - Administer analgesics based on type and severity of pain and evaluate response  - Implement non-pharmacological measures as appropriate and evaluate response  - Consider cultural and social influences on pain and pain management  - Manage/alleviate anxiety  - Utilize distraction and/or relaxation techniques  - Monitor for opioid side effects  - Notify MD/LIP if interventions unsuccessful or patient reports new pain  - Anticipate increased pain with activity and pre-medicate as appropriate  Outcome: Progressing     Problem: RISK FOR INFECTION - ADULT  Goal: Absence of fever/infection during anticipated neutropenic period  Description: INTERVENTIONS  - Monitor WBC  - Administer growth factors as ordered  - Implement neutropenic guidelines  Outcome: Progressing     Problem: SAFETY ADULT - FALL  Goal: Free from fall injury  Description: INTERVENTIONS:  - Assess pt frequently for physical needs  - Identify cognitive and physical deficits and behaviors that affect risk of falls.   - [place] fall precautions as indicated by assessment.  - Educate pt/family on patient safety including physical limitations  - Instruct pt to call for assistance with activity based on assessment  - Modify environment to reduce risk of injury  - Provide assistive devices as appropriate  - Consider OT/PT consult to assist with strengthening/mobility  - Encourage toileting schedule  Outcome: Progressing     Problem: DISCHARGE PLANNING  Goal: Discharge to home or other facility with appropriate resources  Description: INTERVENTIONS:  - Identify barriers to discharge w/pt and caregiver  - Include patient/family/discharge partner in discharge planning  - Arrange for needed discharge resources and transportation as appropriate  - Identify discharge learning needs (meds, wound care, etc)  - Arrange for interpreters to assist at discharge as needed  - Consider post-discharge preferences of patient/family/discharge partner  - Complete POLST form as appropriate  - Assess patient's ability to be responsible for managing their own health  - Refer to Case Management Department for coordinating discharge planning if the patient needs post-hospital services based on physician/LIP order or complex needs related to functional status, cognitive ability or social support system  Outcome: Progressing     Problem: Altered Communication/Language Barrier  Goal: Patient/Family is able to understand and participate in their care  Description: Interventions:  - Assess communication ability and preferred communication style  - Implement communication aides and strategies  - Use visual cues when possible  - Listen attentively, be patient, do not interrupt  - Minimize distractions  - Allow time for understanding and response  - Establish method for patient to ask for assistance (call light)  - Provide an  as needed  - Communicate barriers and strategies to overcome with those who interact with patient  Outcome: Progressing

## 2023-03-03 LAB
ANION GAP SERPL CALC-SCNC: 10 MMOL/L (ref 0–18)
BUN BLD-MCNC: 93 MG/DL (ref 7–18)
CALCIUM BLD-MCNC: 8.5 MG/DL (ref 8.5–10.1)
CHLORIDE SERPL-SCNC: 95 MMOL/L (ref 98–112)
CO2 SERPL-SCNC: 25 MMOL/L (ref 21–32)
CREAT BLD-MCNC: 5.26 MG/DL
DSDNA IGG SERPL IA-ACNC: 1.7 IU/ML
ENA AB SER QL IA: 0.3 UG/L
ENA AB SER QL IA: NEGATIVE
GFR SERPLBLD BASED ON 1.73 SQ M-ARVRAT: 9 ML/MIN/1.73M2 (ref 60–?)
GLUCOSE BLD-MCNC: 132 MG/DL (ref 70–99)
GLUCOSE BLD-MCNC: 133 MG/DL (ref 70–99)
GLUCOSE BLD-MCNC: 153 MG/DL (ref 70–99)
GLUCOSE BLD-MCNC: 167 MG/DL (ref 70–99)
GLUCOSE BLD-MCNC: 212 MG/DL (ref 70–99)
GLUCOSE BLD-MCNC: 68 MG/DL (ref 70–99)
GLUCOSE BLD-MCNC: 70 MG/DL (ref 70–99)
GLUCOSE BLD-MCNC: 72 MG/DL (ref 70–99)
GLUCOSE BLD-MCNC: 90 MG/DL (ref 70–99)
OSMOLALITY SERPL CALC.SUM OF ELEC: 297 MOSM/KG (ref 275–295)
POTASSIUM SERPL-SCNC: 3.5 MMOL/L (ref 3.5–5.1)
SODIUM SERPL-SCNC: 130 MMOL/L (ref 136–145)
URATE SERPL-MCNC: 13.5 MG/DL

## 2023-03-03 PROCEDURE — 99232 SBSQ HOSP IP/OBS MODERATE 35: CPT | Performed by: INTERNAL MEDICINE

## 2023-03-03 PROCEDURE — 99233 SBSQ HOSP IP/OBS HIGH 50: CPT | Performed by: INTERNAL MEDICINE

## 2023-03-03 RX ORDER — POTASSIUM CHLORIDE 20 MEQ/1
40 TABLET, EXTENDED RELEASE ORAL ONCE
Status: COMPLETED | OUTPATIENT
Start: 2023-03-03 | End: 2023-03-03

## 2023-03-03 RX ORDER — CETIRIZINE HYDROCHLORIDE 5 MG/1
5 TABLET ORAL DAILY
Status: DISCONTINUED | OUTPATIENT
Start: 2023-03-03 | End: 2023-03-07

## 2023-03-03 NOTE — PLAN OF CARE
Pt alert able to make needs known   Up in chair. Pt has not urinated for this shift states she will go soon and to give her time to go \" I am the [address] at home \" . Dr Martine Duggan was paged awaiting call back. Blood glucose stable at lunch and tolerated well. Cont on antibiotics. Plan of care discussed with pt , verbalized understanding. Call light within reach . Problem: Diabetes/Glucose Control  Goal: Glucose maintained within prescribed range  Description: INTERVENTIONS:  - Monitor Blood Glucose as ordered  - Assess for signs and symptoms of hyperglycemia and hypoglycemia  - Administer ordered medications to maintain glucose within target range  - Assess barriers to adequate nutritional intake and initiate nutrition consult as needed  - Instruct patient on self management of diabetes  Outcome: Progressing     - See additional Care Plan goals for specific interventions  Outcome: Progressing     Problem: CARDIOVASCULAR - ADULT  Goal: Maintains optimal cardiac output and hemodynamic stability  Description: INTERVENTIONS:  - Monitor vital signs, rhythm, and trends  - Monitor for bleeding, hypotension and signs of decreased cardiac output  - Evaluate effectiveness of vasoactive medications to optimize hemodynamic stability  - Monitor arterial and/or venous puncture sites for bleeding and/or hematoma  - Assess quality of pulses, skin color and temperature  - Assess for signs of decreased coronary artery perfusion - ex.  Angina  - Evaluate fluid balance, assess for edema, trend weights  Outcome: Progressing  Goal: Absence of cardiac arrhythmias or at baseline  Description: INTERVENTIONS:  - Continuous cardiac monitoring, monitor vital signs, obtain 12 lead EKG if indicated  - Evaluate effectiveness of antiarrhythmic and heart rate control medications as ordered  - Initiate emergency measures for life threatening arrhythmias  - Monitor electrolytes and administer replacement therapy as ordered  Outcome: Progressing     Problem: RESPIRATORY - ADULT  Goal: Achieves optimal ventilation and oxygenation  Description: INTERVENTIONS:  - Assess for changes in respiratory status  - Assess for changes in mentation and behavior  - Position to facilitate oxygenation and minimize respiratory effort  - Oxygen supplementation based on oxygen saturation or ABGs  - Provide Smoking Cessation handout, if applicable  - Encourage broncho-pulmonary hygiene including cough, deep breathe, Incentive Spirometry  - Assess the need for suctioning and perform as needed  - Assess and instruct to report SOB or any respiratory difficulty  - Respiratory Therapy support as indicated  - Manage/alleviate anxiety  - Monitor for signs/symptoms of CO2 retention  Outcome: Progressing     Problem: METABOLIC/FLUID AND ELECTROLYTES - ADULT  Goal: Glucose maintained within prescribed range  Description: INTERVENTIONS:  - Monitor Blood Glucose as ordered  - Assess for signs and symptoms of hyperglycemia and hypoglycemia  - Administer ordered medications to maintain glucose within target range  - Assess barriers to adequate nutritional intake and initiate nutrition consult as needed  - Instruct patient on self management of diabetes  Outcome: Progressing  Goal: Electrolytes maintained within normal limits  Description: INTERVENTIONS:  - Monitor labs and rhythm and assess patient for signs and symptoms of electrolyte imbalances  - Administer electrolyte replacement as ordered  - Monitor response to electrolyte replacements, including rhythm and repeat lab results as appropriate  - Fluid restriction as ordered  - Instruct patient on fluid and nutrition restrictions as appropriate  Outcome: Progressing     Problem: SKIN/TISSUE INTEGRITY - ADULT  Goal: Skin integrity remains intact  Description: INTERVENTIONS  - Assess and document risk factors for pressure ulcer development  - Assess and document skin integrity  - Monitor for areas of redness and/or skin breakdown  - Initiate interventions, skin care algorithm/standards of care as needed  Outcome: Progressing     Problem: PAIN - ADULT  Goal: Verbalizes/displays adequate comfort level or patient's stated pain goal  Description: INTERVENTIONS:  - Encourage pt to monitor pain and request assistance  - Assess pain using appropriate pain scale  - Administer analgesics based on type and severity of pain and evaluate response  - Implement non-pharmacological measures as appropriate and evaluate response  - Consider cultural and social influences on pain and pain management  - Manage/alleviate anxiety  - Utilize distraction and/or relaxation techniques  - Monitor for opioid side effects  - Notify MD/LIP if interventions unsuccessful or patient reports new pain  - Anticipate increased pain with activity and pre-medicate as appropriate  Outcome: Progressing     Problem: RISK FOR INFECTION - ADULT  Goal: Absence of fever/infection during anticipated neutropenic period  Description: INTERVENTIONS  - Monitor WBC  - Administer growth factors as ordered  - Implement neutropenic guidelines  Outcome: Progressing     Problem: SAFETY ADULT - FALL  Goal: Free from fall injury  Description: INTERVENTIONS:  - Assess pt frequently for physical needs  - Identify cognitive and physical deficits and behaviors that affect risk of falls.   - [place] fall precautions as indicated by assessment.  - Educate pt/family on patient safety including physical limitations  - Instruct pt to call for assistance with activity based on assessment  - Modify environment to reduce risk of injury  - Provide assistive devices as appropriate  - Consider OT/PT consult to assist with strengthening/mobility  - Encourage toileting schedule  Outcome: Progressing     Problem: DISCHARGE PLANNING  Goal: Discharge to home or other facility with appropriate resources  Description: INTERVENTIONS:  - Identify barriers to discharge w/pt and caregiver  - Include patient/family/discharge partner in discharge planning  - Arrange for needed discharge resources and transportation as appropriate  - Identify discharge learning needs (meds, wound care, etc)  - Arrange for interpreters to assist at discharge as needed  - Consider post-discharge preferences of patient/family/discharge partner  - Complete POLST form as appropriate  - Assess patient's ability to be responsible for managing their own health  - Refer to Case Management Department for coordinating discharge planning if the patient needs post-hospital services based on physician/LIP order or complex needs related to functional status, cognitive ability or social support system  Outcome: Progressing     Problem: Altered Communication/Language Barrier  Goal: Patient/Family is able to understand and participate in their care  Description: Interventions:  - Assess communication ability and preferred communication style  - Implement communication aides and strategies  - Use visual cues when possible  - Listen attentively, be patient, do not interrupt  - Minimize distractions  - Allow time for understanding and response  - Establish method for patient to ask for assistance (call light)  - Provide an  as needed  - Communicate barriers and strategies to overcome with those who interact with patient  Outcome: Progressing

## 2023-03-03 NOTE — PLAN OF CARE
Pt a/ox4. Pt wearing 2L O2 while sleeping per pt request/comfort. Pt feels that the oxygen is what helped her dizziness go away when first admitted. Weaned to 1L this am. O2 sats wnl. NSR on tele. Vitals stable. Diminished urine output. Per patient this is her baseline. UA sent. Denies pain. Hypoglycemic this am 68. MD notified, protocol followed. Fluids infusing per order. All needs met at this time. Problem: Diabetes/Glucose Control  Goal: Glucose maintained within prescribed range  Description: INTERVENTIONS:  - Monitor Blood Glucose as ordered  - Assess for signs and symptoms of hyperglycemia and hypoglycemia  - Administer ordered medications to maintain glucose within target range  - Assess barriers to adequate nutritional intake and initiate nutrition consult as needed  - Instruct patient on self management of diabetes  Outcome: Progressing       Problem: CARDIOVASCULAR - ADULT  Goal: Maintains optimal cardiac output and hemodynamic stability  Description: INTERVENTIONS:  - Monitor vital signs, rhythm, and trends  - Monitor for bleeding, hypotension and signs of decreased cardiac output  - Evaluate effectiveness of vasoactive medications to optimize hemodynamic stability  - Monitor arterial and/or venous puncture sites for bleeding and/or hematoma  - Assess quality of pulses, skin color and temperature  - Assess for signs of decreased coronary artery perfusion - ex.  Angina  - Evaluate fluid balance, assess for edema, trend weights  Outcome: Progressing  Goal: Absence of cardiac arrhythmias or at baseline  Description: INTERVENTIONS:  - Continuous cardiac monitoring, monitor vital signs, obtain 12 lead EKG if indicated  - Evaluate effectiveness of antiarrhythmic and heart rate control medications as ordered  - Initiate emergency measures for life threatening arrhythmias  - Monitor electrolytes and administer replacement therapy as ordered  Outcome: Progressing     Problem: RESPIRATORY - ADULT  Goal: Achieves optimal ventilation and oxygenation  Description: INTERVENTIONS:  - Assess for changes in respiratory status  - Assess for changes in mentation and behavior  - Position to facilitate oxygenation and minimize respiratory effort  - Oxygen supplementation based on oxygen saturation or ABGs  - Provide Smoking Cessation handout, if applicable  - Encourage broncho-pulmonary hygiene including cough, deep breathe, Incentive Spirometry  - Assess the need for suctioning and perform as needed  - Assess and instruct to report SOB or any respiratory difficulty  - Respiratory Therapy support as indicated  - Manage/alleviate anxiety  - Monitor for signs/symptoms of CO2 retention  Outcome: Progressing     Problem: METABOLIC/FLUID AND ELECTROLYTES - ADULT  Goal: Glucose maintained within prescribed range  Description: INTERVENTIONS:  - Monitor Blood Glucose as ordered  - Assess for signs and symptoms of hyperglycemia and hypoglycemia  - Administer ordered medications to maintain glucose within target range  - Assess barriers to adequate nutritional intake and initiate nutrition consult as needed  - Instruct patient on self management of diabetes  Outcome: Progressing  Goal: Electrolytes maintained within normal limits  Description: INTERVENTIONS:  - Monitor labs and rhythm and assess patient for signs and symptoms of electrolyte imbalances  - Administer electrolyte replacement as ordered  - Monitor response to electrolyte replacements, including rhythm and repeat lab results as appropriate  - Fluid restriction as ordered  - Instruct patient on fluid and nutrition restrictions as appropriate  Outcome: Progressing     Problem: SKIN/TISSUE INTEGRITY - ADULT  Goal: Skin integrity remains intact  Description: INTERVENTIONS  - Assess and document risk factors for pressure ulcer development  - Assess and document skin integrity  - Monitor for areas of redness and/or skin breakdown  - Initiate interventions, skin care algorithm/standards of care as needed  Outcome: Progressing     Problem: PAIN - ADULT  Goal: Verbalizes/displays adequate comfort level or patient's stated pain goal  Description: INTERVENTIONS:  - Encourage pt to monitor pain and request assistance  - Assess pain using appropriate pain scale  - Administer analgesics based on type and severity of pain and evaluate response  - Implement non-pharmacological measures as appropriate and evaluate response  - Consider cultural and social influences on pain and pain management  - Manage/alleviate anxiety  - Utilize distraction and/or relaxation techniques  - Monitor for opioid side effects  - Notify MD/LIP if interventions unsuccessful or patient reports new pain  - Anticipate increased pain with activity and pre-medicate as appropriate  Outcome: Progressing     Problem: RISK FOR INFECTION - ADULT  Goal: Absence of fever/infection during anticipated neutropenic period  Description: INTERVENTIONS  - Monitor WBC  - Administer growth factors as ordered  - Implement neutropenic guidelines  Outcome: Progressing     Problem: SAFETY ADULT - FALL  Goal: Free from fall injury  Description: INTERVENTIONS:  - Assess pt frequently for physical needs  - Identify cognitive and physical deficits and behaviors that affect risk of falls.   - [place] fall precautions as indicated by assessment.  - Educate pt/family on patient safety including physical limitations  - Instruct pt to call for assistance with activity based on assessment  - Modify environment to reduce risk of injury  - Provide assistive devices as appropriate  - Consider OT/PT consult to assist with strengthening/mobility  - Encourage toileting schedule  Outcome: Progressing     Problem: DISCHARGE PLANNING  Goal: Discharge to home or other facility with appropriate resources  Description: INTERVENTIONS:  - Identify barriers to discharge w/pt and caregiver  - Include patient/family/discharge partner in discharge planning  - Arrange for needed discharge resources and transportation as appropriate  - Identify discharge learning needs (meds, wound care, etc)  - Arrange for interpreters to assist at discharge as needed  - Consider post-discharge preferences of patient/family/discharge partner  - Complete POLST form as appropriate  - Assess patient's ability to be responsible for managing their own health  - Refer to Case Management Department for coordinating discharge planning if the patient needs post-hospital services based on physician/LIP order or complex needs related to functional status, cognitive ability or social support system  Outcome: Progressing     Problem: Altered Communication/Language Barrier  Goal: Patient/Family is able to understand and participate in their care  Description: Interventions:  - Assess communication ability and preferred communication style  - Implement communication aides and strategies  - Use visual cues when possible  - Listen attentively, be patient, do not interrupt  - Minimize distractions  - Allow time for understanding and response  - Establish method for patient to ask for assistance (call light)  - Provide an  as needed  - Communicate barriers and strategies to overcome with those who interact with patient  Outcome: Progressing

## 2023-03-03 NOTE — PHYSICAL THERAPY NOTE
Order received, chart reviewed. Attempted to see pt for evaluation this AM, pt fatigued and not agreeable to PT session at this time. Will hold and follow as appropriate.      Ondina Dalton, PT  03/03/23

## 2023-03-03 NOTE — OCCUPATIONAL THERAPY NOTE
Attempted to see the pt for OT evaluation. OT and PT provided encouragement for her to be out of bed, but pt just shook her head. Eyes closed. OT will re-attempt to see the patient later, if schedule allows. Informed RN.

## 2023-03-04 LAB
ANION GAP SERPL CALC-SCNC: 6 MMOL/L (ref 0–18)
BASOPHILS # BLD AUTO: 0.08 X10(3) UL (ref 0–0.2)
BASOPHILS NFR BLD AUTO: 0.6 %
BUN BLD-MCNC: 90 MG/DL (ref 7–18)
CALCIUM BLD-MCNC: 8.7 MG/DL (ref 8.5–10.1)
CHLORIDE SERPL-SCNC: 101 MMOL/L (ref 98–112)
CO2 SERPL-SCNC: 24 MMOL/L (ref 21–32)
CREAT BLD-MCNC: 4.34 MG/DL
EOSINOPHIL # BLD AUTO: 0.73 X10(3) UL (ref 0–0.7)
EOSINOPHIL NFR BLD AUTO: 5.9 %
ERYTHROCYTE [DISTWIDTH] IN BLOOD BY AUTOMATED COUNT: 13.6 %
GFR SERPLBLD BASED ON 1.73 SQ M-ARVRAT: 12 ML/MIN/1.73M2 (ref 60–?)
GLUCOSE BLD-MCNC: 142 MG/DL (ref 70–99)
GLUCOSE BLD-MCNC: 149 MG/DL (ref 70–99)
GLUCOSE BLD-MCNC: 241 MG/DL (ref 70–99)
GLUCOSE BLD-MCNC: 57 MG/DL (ref 70–99)
GLUCOSE BLD-MCNC: 76 MG/DL (ref 70–99)
GLUCOSE BLD-MCNC: 94 MG/DL (ref 70–99)
HCT VFR BLD AUTO: 26 %
HGB BLD-MCNC: 8.4 G/DL
IMM GRANULOCYTES # BLD AUTO: 0.15 X10(3) UL (ref 0–1)
IMM GRANULOCYTES NFR BLD: 1.2 %
LYMPHOCYTES # BLD AUTO: 0.73 X10(3) UL (ref 1–4)
LYMPHOCYTES NFR BLD AUTO: 5.9 %
MAGNESIUM SERPL-MCNC: 2.4 MG/DL (ref 1.6–2.6)
MCH RBC QN AUTO: 29.4 PG (ref 26–34)
MCHC RBC AUTO-ENTMCNC: 32.3 G/DL (ref 31–37)
MCV RBC AUTO: 90.9 FL
MONOCYTES # BLD AUTO: 0.71 X10(3) UL (ref 0.1–1)
MONOCYTES NFR BLD AUTO: 5.7 %
NEUTROPHILS # BLD AUTO: 10 X10 (3) UL (ref 1.5–7.7)
NEUTROPHILS # BLD AUTO: 10 X10(3) UL (ref 1.5–7.7)
NEUTROPHILS NFR BLD AUTO: 80.7 %
OSMOLALITY SERPL CALC.SUM OF ELEC: 297 MOSM/KG (ref 275–295)
PLATELET # BLD AUTO: 405 10(3)UL (ref 150–450)
POTASSIUM SERPL-SCNC: 3.9 MMOL/L (ref 3.5–5.1)
RBC # BLD AUTO: 2.86 X10(6)UL
SODIUM SERPL-SCNC: 131 MMOL/L (ref 136–145)
URATE SERPL-MCNC: 6.9 MG/DL
WBC # BLD AUTO: 12.4 X10(3) UL (ref 4–11)

## 2023-03-04 PROCEDURE — 99233 SBSQ HOSP IP/OBS HIGH 50: CPT | Performed by: INTERNAL MEDICINE

## 2023-03-04 PROCEDURE — 99232 SBSQ HOSP IP/OBS MODERATE 35: CPT | Performed by: INTERNAL MEDICINE

## 2023-03-04 RX ORDER — HYDRALAZINE HYDROCHLORIDE 50 MG/1
50 TABLET, FILM COATED ORAL EVERY 8 HOURS SCHEDULED
Status: DISCONTINUED | OUTPATIENT
Start: 2023-03-04 | End: 2023-03-07

## 2023-03-04 NOTE — PLAN OF CARE
RN notified by lab @ 0900 of 57 blood glucose from blood draw collected @ 0721. Patient was awake alert, asymptomatic, Eating breakfast in chair, RN checked POC glucose, was 94, RN notified physician, Physician adjusted Levemir daily dose 120 unites to 100 units. Instructed by physician to still give today.

## 2023-03-04 NOTE — PLAN OF CARE
Patient alert and oriented x 4. Maintaining O2 saturation above 95% on 2L/min via [place]. NSR on tele monitor. Diminished urine output. Safety precautions in place, call light within reach.  at the bedside. Will continue to monitor. 0507 POC BS of 76. Patient didn't report any symptoms. Orange juice and crackers given. Problem: Diabetes/Glucose Control  Goal: Glucose maintained within prescribed range  Description: INTERVENTIONS:  - Monitor Blood Glucose as ordered  - Assess for signs and symptoms of hyperglycemia and hypoglycemia  - Administer ordered medications to maintain glucose within target range  - Assess barriers to adequate nutritional intake and initiate nutrition consult as needed  - Instruct patient on self management of diabetes  Outcome: Progressing     Problem: CARDIOVASCULAR - ADULT  Goal: Maintains optimal cardiac output and hemodynamic stability  Description: INTERVENTIONS:  - Monitor vital signs, rhythm, and trends  - Monitor for bleeding, hypotension and signs of decreased cardiac output  - Evaluate effectiveness of vasoactive medications to optimize hemodynamic stability  - Monitor arterial and/or venous puncture sites for bleeding and/or hematoma  - Assess quality of pulses, skin color and temperature  - Assess for signs of decreased coronary artery perfusion - ex.  Angina  - Evaluate fluid balance, assess for edema, trend weights  Outcome: Progressing  Goal: Absence of cardiac arrhythmias or at baseline  Description: INTERVENTIONS:  - Continuous cardiac monitoring, monitor vital signs, obtain 12 lead EKG if indicated  - Evaluate effectiveness of antiarrhythmic and heart rate control medications as ordered  - Initiate emergency measures for life threatening arrhythmias  - Monitor electrolytes and administer replacement therapy as ordered  Outcome: Progressing     Problem: RESPIRATORY - ADULT  Goal: Achieves optimal ventilation and oxygenation  Description: INTERVENTIONS:  - Assess for changes in respiratory status  - Assess for changes in mentation and behavior  - Position to facilitate oxygenation and minimize respiratory effort  - Oxygen supplementation based on oxygen saturation or ABGs  - Provide Smoking Cessation handout, if applicable  - Encourage broncho-pulmonary hygiene including cough, deep breathe, Incentive Spirometry  - Assess the need for suctioning and perform as needed  - Assess and instruct to report SOB or any respiratory difficulty  - Respiratory Therapy support as indicated  - Manage/alleviate anxiety  - Monitor for signs/symptoms of CO2 retention  Outcome: Progressing     Problem: METABOLIC/FLUID AND ELECTROLYTES - ADULT  Goal: Glucose maintained within prescribed range  Description: INTERVENTIONS:  - Monitor Blood Glucose as ordered  - Assess for signs and symptoms of hyperglycemia and hypoglycemia  - Administer ordered medications to maintain glucose within target range  - Assess barriers to adequate nutritional intake and initiate nutrition consult as needed  - Instruct patient on self management of diabetes  Outcome: Progressing  Goal: Electrolytes maintained within normal limits  Description: INTERVENTIONS:  - Monitor labs and rhythm and assess patient for signs and symptoms of electrolyte imbalances  - Administer electrolyte replacement as ordered  - Monitor response to electrolyte replacements, including rhythm and repeat lab results as appropriate  - Fluid restriction as ordered  - Instruct patient on fluid and nutrition restrictions as appropriate  Outcome: Progressing     Problem: SKIN/TISSUE INTEGRITY - ADULT  Goal: Skin integrity remains intact  Description: INTERVENTIONS  - Assess and document risk factors for pressure ulcer development  - Assess and document skin integrity  - Monitor for areas of redness and/or skin breakdown  - Initiate interventions, skin care algorithm/standards of care as needed  Outcome: Progressing     Problem: PAIN - ADULT  Goal: Verbalizes/displays adequate comfort level or patient's stated pain goal  Description: INTERVENTIONS:  - Encourage pt to monitor pain and request assistance  - Assess pain using appropriate pain scale  - Administer analgesics based on type and severity of pain and evaluate response  - Implement non-pharmacological measures as appropriate and evaluate response  - Consider cultural and social influences on pain and pain management  - Manage/alleviate anxiety  - Utilize distraction and/or relaxation techniques  - Monitor for opioid side effects  - Notify MD/LIP if interventions unsuccessful or patient reports new pain  - Anticipate increased pain with activity and pre-medicate as appropriate  Outcome: Progressing     Problem: RISK FOR INFECTION - ADULT  Goal: Absence of fever/infection during anticipated neutropenic period  Description: INTERVENTIONS  - Monitor WBC  - Administer growth factors as ordered  - Implement neutropenic guidelines  Outcome: Progressing     Problem: SAFETY ADULT - FALL  Goal: Free from fall injury  Description: INTERVENTIONS:  - Assess pt frequently for physical needs  - Identify cognitive and physical deficits and behaviors that affect risk of falls.   - [place] fall precautions as indicated by assessment.  - Educate pt/family on patient safety including physical limitations  - Instruct pt to call for assistance with activity based on assessment  - Modify environment to reduce risk of injury  - Provide assistive devices as appropriate  - Consider OT/PT consult to assist with strengthening/mobility  - Encourage toileting schedule  Outcome: Progressing     Problem: DISCHARGE PLANNING  Goal: Discharge to home or other facility with appropriate resources  Description: INTERVENTIONS:  - Identify barriers to discharge w/pt and caregiver  - Include patient/family/discharge partner in discharge planning  - Arrange for needed discharge resources and transportation as appropriate  - Identify discharge learning needs (meds, wound care, etc)  - Arrange for interpreters to assist at discharge as needed  - Consider post-discharge preferences of patient/family/discharge partner  - Complete POLST form as appropriate  - Assess patient's ability to be responsible for managing their own health  - Refer to Case Management Department for coordinating discharge planning if the patient needs post-hospital services based on physician/LIP order or complex needs related to functional status, cognitive ability or social support system  Outcome: Progressing     Problem: Altered Communication/Language Barrier  Goal: Patient/Family is able to understand and participate in their care  Description: Interventions:  - Assess communication ability and preferred communication style  - Implement communication aides and strategies  - Use visual cues when possible  - Listen attentively, be patient, do not interrupt  - Minimize distractions  - Allow time for understanding and response  - Establish method for patient to ask for assistance (call light)  - Provide an  as needed  - Communicate barriers and strategies to overcome with those who interact with patient  Outcome: Progressing

## 2023-03-05 LAB
ANION GAP SERPL CALC-SCNC: 6 MMOL/L (ref 0–18)
BASOPHILS # BLD: 0.15 X10(3) UL (ref 0–0.2)
BASOPHILS NFR BLD: 1 %
BUN BLD-MCNC: 79 MG/DL (ref 7–18)
CALCIUM BLD-MCNC: 8.8 MG/DL (ref 8.5–10.1)
CHLORIDE SERPL-SCNC: 103 MMOL/L (ref 98–112)
CO2 SERPL-SCNC: 22 MMOL/L (ref 21–32)
CREAT BLD-MCNC: 3.69 MG/DL
EOSINOPHIL # BLD: 1.18 X10(3) UL (ref 0–0.7)
EOSINOPHIL NFR BLD: 8 %
ERYTHROCYTE [DISTWIDTH] IN BLOOD BY AUTOMATED COUNT: 13.9 %
GFR SERPLBLD BASED ON 1.73 SQ M-ARVRAT: 14 ML/MIN/1.73M2 (ref 60–?)
GLUCOSE BLD-MCNC: 161 MG/DL (ref 70–99)
GLUCOSE BLD-MCNC: 168 MG/DL (ref 70–99)
GLUCOSE BLD-MCNC: 186 MG/DL (ref 70–99)
GLUCOSE BLD-MCNC: 238 MG/DL (ref 70–99)
GLUCOSE BLD-MCNC: 241 MG/DL (ref 70–99)
HCT VFR BLD AUTO: 24.9 %
HGB BLD-MCNC: 8 G/DL
LYMPHOCYTES NFR BLD: 0.59 X10(3) UL (ref 1–4)
LYMPHOCYTES NFR BLD: 4 %
MCH RBC QN AUTO: 29.4 PG (ref 26–34)
MCHC RBC AUTO-ENTMCNC: 32.1 G/DL (ref 31–37)
MCV RBC AUTO: 91.5 FL
MONOCYTES # BLD: 0.59 X10(3) UL (ref 0.1–1)
MONOCYTES NFR BLD: 4 %
MORPHOLOGY: NORMAL
NEUTROPHILS # BLD AUTO: 11.2 X10 (3) UL (ref 1.5–7.7)
NEUTROPHILS NFR BLD: 82 %
NEUTS BAND NFR BLD: 1 %
NEUTS HYPERSEG # BLD: 12.2 X10(3) UL (ref 1.5–7.7)
OSMOLALITY SERPL CALC.SUM OF ELEC: 300 MOSM/KG (ref 275–295)
PLATELET # BLD AUTO: 396 10(3)UL (ref 150–450)
PLATELET MORPHOLOGY: NORMAL
POTASSIUM SERPL-SCNC: 4.1 MMOL/L (ref 3.5–5.1)
RBC # BLD AUTO: 2.72 X10(6)UL
SODIUM SERPL-SCNC: 131 MMOL/L (ref 136–145)
TOTAL CELLS COUNTED BLD: 100
WBC # BLD AUTO: 14.7 X10(3) UL (ref 4–11)

## 2023-03-05 PROCEDURE — 99233 SBSQ HOSP IP/OBS HIGH 50: CPT | Performed by: INTERNAL MEDICINE

## 2023-03-05 PROCEDURE — 99232 SBSQ HOSP IP/OBS MODERATE 35: CPT | Performed by: INTERNAL MEDICINE

## 2023-03-05 NOTE — PLAN OF CARE
Assumed care of patient @ 2830. Patient is A&O x4, up standby assist with walker. Complaint of back pain relieved by norco.     Patient is normal sinus, on 2L NC for comfort. No chest pain, shortness of breath with exertion. Patient is Continent of bladder and bowel, Last BM patient reports 2/26. PRN miralax given. Fall precautions in place, bed and chair alarm in use, patient updated on plan of care. Problem: Diabetes/Glucose Control  Goal: Glucose maintained within prescribed range  Description: INTERVENTIONS:  - Monitor Blood Glucose as ordered  - Assess for signs and symptoms of hyperglycemia and hypoglycemia  - Administer ordered medications to maintain glucose within target range  - Assess barriers to adequate nutritional intake and initiate nutrition consult as needed  - Instruct patient on self management of diabetes  Outcome: Progressing     Problem: Patient/Family Goals  Goal: Patient/Family Long Term Goal  Description: Patient's Long Term Goal: To go home     Interventions:  - Monitor I&O  - See additional Care Plan goals for specific interventions  Outcome: Progressing  Goal: Patient/Family Short Term Goal  Description: Patient's Short Term Goal: TO go home     Interventions:   - daily weight, monitor I &O  - See additional Care Plan goals for specific interventions  Outcome: Progressing     Problem: CARDIOVASCULAR - ADULT  Goal: Maintains optimal cardiac output and hemodynamic stability  Description: INTERVENTIONS:  - Monitor vital signs, rhythm, and trends  - Monitor for bleeding, hypotension and signs of decreased cardiac output  - Evaluate effectiveness of vasoactive medications to optimize hemodynamic stability  - Monitor arterial and/or venous puncture sites for bleeding and/or hematoma  - Assess quality of pulses, skin color and temperature  - Assess for signs of decreased coronary artery perfusion - ex.  Angina  - Evaluate fluid balance, assess for edema, trend weights  Outcome: Progressing  Goal: Absence of cardiac arrhythmias or at baseline  Description: INTERVENTIONS:  - Continuous cardiac monitoring, monitor vital signs, obtain 12 lead EKG if indicated  - Evaluate effectiveness of antiarrhythmic and heart rate control medications as ordered  - Initiate emergency measures for life threatening arrhythmias  - Monitor electrolytes and administer replacement therapy as ordered  Outcome: Progressing     Problem: RESPIRATORY - ADULT  Goal: Achieves optimal ventilation and oxygenation  Description: INTERVENTIONS:  - Assess for changes in respiratory status  - Assess for changes in mentation and behavior  - Position to facilitate oxygenation and minimize respiratory effort  - Oxygen supplementation based on oxygen saturation or ABGs  - Provide Smoking Cessation handout, if applicable  - Encourage broncho-pulmonary hygiene including cough, deep breathe, Incentive Spirometry  - Assess the need for suctioning and perform as needed  - Assess and instruct to report SOB or any respiratory difficulty  - Respiratory Therapy support as indicated  - Manage/alleviate anxiety  - Monitor for signs/symptoms of CO2 retention  Outcome: Progressing     Problem: METABOLIC/FLUID AND ELECTROLYTES - ADULT  Goal: Glucose maintained within prescribed range  Description: INTERVENTIONS:  - Monitor Blood Glucose as ordered  - Assess for signs and symptoms of hyperglycemia and hypoglycemia  - Administer ordered medications to maintain glucose within target range  - Assess barriers to adequate nutritional intake and initiate nutrition consult as needed  - Instruct patient on self management of diabetes  Outcome: Progressing  Goal: Electrolytes maintained within normal limits  Description: INTERVENTIONS:  - Monitor labs and rhythm and assess patient for signs and symptoms of electrolyte imbalances  - Administer electrolyte replacement as ordered  - Monitor response to electrolyte replacements, including rhythm and repeat lab results as appropriate  - Fluid restriction as ordered  - Instruct patient on fluid and nutrition restrictions as appropriate  Outcome: Progressing     Problem: SKIN/TISSUE INTEGRITY - ADULT  Goal: Skin integrity remains intact  Description: INTERVENTIONS  - Assess and document risk factors for pressure ulcer development  - Assess and document skin integrity  - Monitor for areas of redness and/or skin breakdown  - Initiate interventions, skin care algorithm/standards of care as needed  Outcome: Progressing     Problem: PAIN - ADULT  Goal: Verbalizes/displays adequate comfort level or patient's stated pain goal  Description: INTERVENTIONS:  - Encourage pt to monitor pain and request assistance  - Assess pain using appropriate pain scale  - Administer analgesics based on type and severity of pain and evaluate response  - Implement non-pharmacological measures as appropriate and evaluate response  - Consider cultural and social influences on pain and pain management  - Manage/alleviate anxiety  - Utilize distraction and/or relaxation techniques  - Monitor for opioid side effects  - Notify MD/LIP if interventions unsuccessful or patient reports new pain  - Anticipate increased pain with activity and pre-medicate as appropriate  Outcome: Progressing     Problem: RISK FOR INFECTION - ADULT  Goal: Absence of fever/infection during anticipated neutropenic period  Description: INTERVENTIONS  - Monitor WBC  - Administer growth factors as ordered  - Implement neutropenic guidelines  Outcome: Progressing     Problem: SAFETY ADULT - FALL  Goal: Free from fall injury  Description: INTERVENTIONS:  - Assess pt frequently for physical needs  - Identify cognitive and physical deficits and behaviors that affect risk of falls.   - [place] fall precautions as indicated by assessment.  - Educate pt/family on patient safety including physical limitations  - Instruct pt to call for assistance with activity based on assessment  - Modify environment to reduce risk of injury  - Provide assistive devices as appropriate  - Consider OT/PT consult to assist with strengthening/mobility  - Encourage toileting schedule  Outcome: Progressing     Problem: DISCHARGE PLANNING  Goal: Discharge to home or other facility with appropriate resources  Description: INTERVENTIONS:  - Identify barriers to discharge w/pt and caregiver  - Include patient/family/discharge partner in discharge planning  - Arrange for needed discharge resources and transportation as appropriate  - Identify discharge learning needs (meds, wound care, etc)  - Arrange for interpreters to assist at discharge as needed  - Consider post-discharge preferences of patient/family/discharge partner  - Complete POLST form as appropriate  - Assess patient's ability to be responsible for managing their own health  - Refer to Case Management Department for coordinating discharge planning if the patient needs post-hospital services based on physician/LIP order or complex needs related to functional status, cognitive ability or social support system  Outcome: Progressing     Problem: Altered Communication/Language Barrier  Goal: Patient/Family is able to understand and participate in their care  Description: Interventions:  - Assess communication ability and preferred communication style  - Implement communication aides and strategies  - Use visual cues when possible  - Listen attentively, be patient, do not interrupt  - Minimize distractions  - Allow time for understanding and response  - Establish method for patient to ask for assistance (call light)  - Provide an  as needed  - Communicate barriers and strategies to overcome with those who interact with patient  Outcome: Progressing

## 2023-03-05 NOTE — PLAN OF CARE
Received patient in bed and advised to mobilize to chair for meals. 2L O2 at shift change, removed at 1100   Problem: Diabetes/Glucose Control  Goal: Glucose maintained within prescribed range  Description: INTERVENTIONS:  - Monitor Blood Glucose as ordered  - Assess for signs and symptoms of hyperglycemia and hypoglycemia  - Administer ordered medications to maintain glucose within target range  - Assess barriers to adequate nutritional intake and initiate nutrition consult as needed  - Instruct patient on self management of diabetes  Outcome: Progressing     Problem: Patient/Family Goals  Goal: Patient/Family Short Term Goal  Description: Patient's Short Term Goal: ***    Interventions:   - ***  - See additional Care Plan goals for specific interventions  Outcome: Progressing     Problem: CARDIOVASCULAR - ADULT  Goal: Maintains optimal cardiac output and hemodynamic stability  Description: INTERVENTIONS:  - Monitor vital signs, rhythm, and trends  - Monitor for bleeding, hypotension and signs of decreased cardiac output  - Evaluate effectiveness of vasoactive medications to optimize hemodynamic stability  - Monitor arterial and/or venous puncture sites for bleeding and/or hematoma  - Assess quality of pulses, skin color and temperature  - Assess for signs of decreased coronary artery perfusion - ex.  Angina  - Evaluate fluid balance, assess for edema, trend weights  Outcome: Progressing  Goal: Absence of cardiac arrhythmias or at baseline  Description: INTERVENTIONS:  - Continuous cardiac monitoring, monitor vital signs, obtain 12 lead EKG if indicated  - Evaluate effectiveness of antiarrhythmic and heart rate control medications as ordered  - Initiate emergency measures for life threatening arrhythmias  - Monitor electrolytes and administer replacement therapy as ordered  Outcome: Progressing     Problem: RESPIRATORY - ADULT  Goal: Achieves optimal ventilation and oxygenation  Description: INTERVENTIONS:  - Assess for changes in respiratory status  - Assess for changes in mentation and behavior  - Position to facilitate oxygenation and minimize respiratory effort  - Oxygen supplementation based on oxygen saturation or ABGs  - Provide Smoking Cessation handout, if applicable  - Encourage broncho-pulmonary hygiene including cough, deep breathe, Incentive Spirometry  - Assess the need for suctioning and perform as needed  - Assess and instruct to report SOB or any respiratory difficulty  - Respiratory Therapy support as indicated  - Manage/alleviate anxiety  - Monitor for signs/symptoms of CO2 retention  Outcome: Progressing     Problem: METABOLIC/FLUID AND ELECTROLYTES - ADULT  Goal: Glucose maintained within prescribed range  Description: INTERVENTIONS:  - Monitor Blood Glucose as ordered  - Assess for signs and symptoms of hyperglycemia and hypoglycemia  - Administer ordered medications to maintain glucose within target range  - Assess barriers to adequate nutritional intake and initiate nutrition consult as needed  - Instruct patient on self management of diabetes  Outcome: Progressing  Goal: Electrolytes maintained within normal limits  Description: INTERVENTIONS:  - Monitor labs and rhythm and assess patient for signs and symptoms of electrolyte imbalances  - Administer electrolyte replacement as ordered  - Monitor response to electrolyte replacements, including rhythm and repeat lab results as appropriate  - Fluid restriction as ordered  - Instruct patient on fluid and nutrition restrictions as appropriate  Outcome: Progressing     Problem: SKIN/TISSUE INTEGRITY - ADULT  Goal: Skin integrity remains intact  Description: INTERVENTIONS  - Assess and document risk factors for pressure ulcer development  - Assess and document skin integrity  - Monitor for areas of redness and/or skin breakdown  - Initiate interventions, skin care algorithm/standards of care as needed  Outcome: Progressing     Problem: PAIN - ADULT  Goal: Verbalizes/displays adequate comfort level or patient's stated pain goal  Description: INTERVENTIONS:  - Encourage pt to monitor pain and request assistance  - Assess pain using appropriate pain scale  - Administer analgesics based on type and severity of pain and evaluate response  - Implement non-pharmacological measures as appropriate and evaluate response  - Consider cultural and social influences on pain and pain management  - Manage/alleviate anxiety  - Utilize distraction and/or relaxation techniques  - Monitor for opioid side effects  - Notify MD/LIP if interventions unsuccessful or patient reports new pain  - Anticipate increased pain with activity and pre-medicate as appropriate  Outcome: Progressing     Problem: RISK FOR INFECTION - ADULT  Goal: Absence of fever/infection during anticipated neutropenic period  Description: INTERVENTIONS  - Monitor WBC  - Administer growth factors as ordered  - Implement neutropenic guidelines  Outcome: Progressing     Problem: SAFETY ADULT - FALL  Goal: Free from fall injury  Description: INTERVENTIONS:  - Assess pt frequently for physical needs  - Identify cognitive and physical deficits and behaviors that affect risk of falls.   - [place] fall precautions as indicated by assessment.  - Educate pt/family on patient safety including physical limitations  - Instruct pt to call for assistance with activity based on assessment  - Modify environment to reduce risk of injury  - Provide assistive devices as appropriate  - Consider OT/PT consult to assist with strengthening/mobility  - Encourage toileting schedule  Outcome: Progressing     Problem: DISCHARGE PLANNING  Goal: Discharge to home or other facility with appropriate resources  Description: INTERVENTIONS:  - Identify barriers to discharge w/pt and caregiver  - Include patient/family/discharge partner in discharge planning  - Arrange for needed discharge resources and transportation as appropriate  - Identify discharge learning needs (meds, wound care, etc)  - Arrange for interpreters to assist at discharge as needed  - Consider post-discharge preferences of patient/family/discharge partner  - Complete POLST form as appropriate  - Assess patient's ability to be responsible for managing their own health  - Refer to Case Management Department for coordinating discharge planning if the patient needs post-hospital services based on physician/LIP order or complex needs related to functional status, cognitive ability or social support system  Outcome: Progressing     Problem: Altered Communication/Language Barrier  Goal: Patient/Family is able to understand and participate in their care  Description: Interventions:  - Assess communication ability and preferred communication style  - Implement communication aides and strategies  - Use visual cues when possible  - Listen attentively, be patient, do not interrupt  - Minimize distractions  - Allow time for understanding and response  - Establish method for patient to ask for assistance (call light)  - Provide an  as needed  - Communicate barriers and strategies to overcome with those who interact with patient  Outcome: Progressing

## 2023-03-05 NOTE — PLAN OF CARE
Patient alert and oriented x 4. Maintaining O2 saturation above 96% on 2L/min via NC. NSR on tele monitor. Up stand by assist with the walker.  at the bedside. Complains of bilateral leg pain and lower back pain, PRN Norco given. Safety precautions in place, call light within reach, bed alarm on. Will continue to monitor. Problem: Diabetes/Glucose Control  Goal: Glucose maintained within prescribed range  Description: INTERVENTIONS:  - Monitor Blood Glucose as ordered  - Assess for signs and symptoms of hyperglycemia and hypoglycemia  - Administer ordered medications to maintain glucose within target range  - Assess barriers to adequate nutritional intake and initiate nutrition consult as needed  - Instruct patient on self management of diabetes  Outcome: Progressing    Problem: CARDIOVASCULAR - ADULT  Goal: Maintains optimal cardiac output and hemodynamic stability  Description: INTERVENTIONS:  - Monitor vital signs, rhythm, and trends  - Monitor for bleeding, hypotension and signs of decreased cardiac output  - Evaluate effectiveness of vasoactive medications to optimize hemodynamic stability  - Monitor arterial and/or venous puncture sites for bleeding and/or hematoma  - Assess quality of pulses, skin color and temperature  - Assess for signs of decreased coronary artery perfusion - ex.  Angina  - Evaluate fluid balance, assess for edema, trend weights  Outcome: Progressing  Goal: Absence of cardiac arrhythmias or at baseline  Description: INTERVENTIONS:  - Continuous cardiac monitoring, monitor vital signs, obtain 12 lead EKG if indicated  - Evaluate effectiveness of antiarrhythmic and heart rate control medications as ordered  - Initiate emergency measures for life threatening arrhythmias  - Monitor electrolytes and administer replacement therapy as ordered  Outcome: Progressing     Problem: RESPIRATORY - ADULT  Goal: Achieves optimal ventilation and oxygenation  Description: INTERVENTIONS:  - Assess for changes in respiratory status  - Assess for changes in mentation and behavior  - Position to facilitate oxygenation and minimize respiratory effort  - Oxygen supplementation based on oxygen saturation or ABGs  - Provide Smoking Cessation handout, if applicable  - Encourage broncho-pulmonary hygiene including cough, deep breathe, Incentive Spirometry  - Assess the need for suctioning and perform as needed  - Assess and instruct to report SOB or any respiratory difficulty  - Respiratory Therapy support as indicated  - Manage/alleviate anxiety  - Monitor for signs/symptoms of CO2 retention  Outcome: Progressing     Problem: METABOLIC/FLUID AND ELECTROLYTES - ADULT  Goal: Glucose maintained within prescribed range  Description: INTERVENTIONS:  - Monitor Blood Glucose as ordered  - Assess for signs and symptoms of hyperglycemia and hypoglycemia  - Administer ordered medications to maintain glucose within target range  - Assess barriers to adequate nutritional intake and initiate nutrition consult as needed  - Instruct patient on self management of diabetes  Outcome: Progressing  Goal: Electrolytes maintained within normal limits  Description: INTERVENTIONS:  - Monitor labs and rhythm and assess patient for signs and symptoms of electrolyte imbalances  - Administer electrolyte replacement as ordered  - Monitor response to electrolyte replacements, including rhythm and repeat lab results as appropriate  - Fluid restriction as ordered  - Instruct patient on fluid and nutrition restrictions as appropriate  Outcome: Progressing     Problem: SKIN/TISSUE INTEGRITY - ADULT  Goal: Skin integrity remains intact  Description: INTERVENTIONS  - Assess and document risk factors for pressure ulcer development  - Assess and document skin integrity  - Monitor for areas of redness and/or skin breakdown  - Initiate interventions, skin care algorithm/standards of care as needed  Outcome: Progressing     Problem: PAIN - ADULT  Goal: Verbalizes/displays adequate comfort level or patient's stated pain goal  Description: INTERVENTIONS:  - Encourage pt to monitor pain and request assistance  - Assess pain using appropriate pain scale  - Administer analgesics based on type and severity of pain and evaluate response  - Implement non-pharmacological measures as appropriate and evaluate response  - Consider cultural and social influences on pain and pain management  - Manage/alleviate anxiety  - Utilize distraction and/or relaxation techniques  - Monitor for opioid side effects  - Notify MD/LIP if interventions unsuccessful or patient reports new pain  - Anticipate increased pain with activity and pre-medicate as appropriate  Outcome: Progressing     Problem: RISK FOR INFECTION - ADULT  Goal: Absence of fever/infection during anticipated neutropenic period  Description: INTERVENTIONS  - Monitor WBC  - Administer growth factors as ordered  - Implement neutropenic guidelines  Outcome: Progressing     Problem: SAFETY ADULT - FALL  Goal: Free from fall injury  Description: INTERVENTIONS:  - Assess pt frequently for physical needs  - Identify cognitive and physical deficits and behaviors that affect risk of falls.   - [place] fall precautions as indicated by assessment.  - Educate pt/family on patient safety including physical limitations  - Instruct pt to call for assistance with activity based on assessment  - Modify environment to reduce risk of injury  - Provide assistive devices as appropriate  - Consider OT/PT consult to assist with strengthening/mobility  - Encourage toileting schedule  Outcome: Progressing     Problem: DISCHARGE PLANNING  Goal: Discharge to home or other facility with appropriate resources  Description: INTERVENTIONS:  - Identify barriers to discharge w/pt and caregiver  - Include patient/family/discharge partner in discharge planning  - Arrange for needed discharge resources and transportation as appropriate  - Identify discharge learning needs (meds, wound care, etc)  - Arrange for interpreters to assist at discharge as needed  - Consider post-discharge preferences of patient/family/discharge partner  - Complete POLST form as appropriate  - Assess patient's ability to be responsible for managing their own health  - Refer to Case Management Department for coordinating discharge planning if the patient needs post-hospital services based on physician/LIP order or complex needs related to functional status, cognitive ability or social support system  Outcome: Progressing     Problem: Altered Communication/Language Barrier  Goal: Patient/Family is able to understand and participate in their care  Description: Interventions:  - Assess communication ability and preferred communication style  - Implement communication aides and strategies  - Use visual cues when possible  - Listen attentively, be patient, do not interrupt  - Minimize distractions  - Allow time for understanding and response  - Establish method for patient to ask for assistance (call light)  - Provide an  as needed  - Communicate barriers and strategies to overcome with those who interact with patient  Outcome: Progressing

## 2023-03-05 NOTE — CM/SW NOTE
Chart reviewed for dc planning, recommendations for HHC at [place]. Per chart,  Patient lives with her  Bibi Handler) in an apartment in [place] during the evenings and weekends. She stays with her daughter (Norah Escalante) during the daytime at her home in [facility] while Meredith Boeck is working. Pt has hx with [facility] . Referral sent to [address] [facility] with new orders. Call placed to , however VM box full and unable to leave a VM at this time. Will jacques again otherwise, unit  to confirm DC plans.     JAIRO DickeyW  Discharge Planner  [id number]

## 2023-03-05 NOTE — PLAN OF CARE
Received pt in bed at shift change. Advised to ambulate to chair for meals. NSR, diminished lung. Pt was 96% on 2L NC O2 at shift change. O2 removed at 1100 and is tolerating room air at 94%. Lost IV access and was replaced w/22G in right forearm. Complains of pain to lower back and legs, tolerated ambulation to chair and is currently sitting in chair on room air. Patient understands plan of care for the day. Safety precautions in place.

## 2023-03-05 NOTE — PLAN OF CARE
Problem: Diabetes/Glucose Control  Goal: Glucose maintained within prescribed range  Description: INTERVENTIONS:  - Monitor Blood Glucose as ordered  - Assess for signs and symptoms of hyperglycemia and hypoglycemia  - Administer ordered medications to maintain glucose within target range  - Assess barriers to adequate nutritional intake and initiate nutrition consult as needed  - Instruct patient on self management of diabetes  Outcome: Progressing     Problem: Patient/Family Goals  Goal: Patient/Family Short Term Goal  Description: Patient's Short Term Goal: ***    Interventions:   - ***  - See additional Care Plan goals for specific interventions  Outcome: Progressing     Problem: CARDIOVASCULAR - ADULT  Goal: Maintains optimal cardiac output and hemodynamic stability  Description: INTERVENTIONS:  - Monitor vital signs, rhythm, and trends  - Monitor for bleeding, hypotension and signs of decreased cardiac output  - Evaluate effectiveness of vasoactive medications to optimize hemodynamic stability  - Monitor arterial and/or venous puncture sites for bleeding and/or hematoma  - Assess quality of pulses, skin color and temperature  - Assess for signs of decreased coronary artery perfusion - ex.  Angina  - Evaluate fluid balance, assess for edema, trend weights  Outcome: Progressing  Goal: Absence of cardiac arrhythmias or at baseline  Description: INTERVENTIONS:  - Continuous cardiac monitoring, monitor vital signs, obtain 12 lead EKG if indicated  - Evaluate effectiveness of antiarrhythmic and heart rate control medications as ordered  - Initiate emergency measures for life threatening arrhythmias  - Monitor electrolytes and administer replacement therapy as ordered  Outcome: Progressing     Problem: RESPIRATORY - ADULT  Goal: Achieves optimal ventilation and oxygenation  Description: INTERVENTIONS:  - Assess for changes in respiratory status  - Assess for changes in mentation and behavior  - Position to facilitate oxygenation and minimize respiratory effort  - Oxygen supplementation based on oxygen saturation or ABGs  - Provide Smoking Cessation handout, if applicable  - Encourage broncho-pulmonary hygiene including cough, deep breathe, Incentive Spirometry  - Assess the need for suctioning and perform as needed  - Assess and instruct to report SOB or any respiratory difficulty  - Respiratory Therapy support as indicated  - Manage/alleviate anxiety  - Monitor for signs/symptoms of CO2 retention  Outcome: Progressing     Problem: METABOLIC/FLUID AND ELECTROLYTES - ADULT  Goal: Glucose maintained within prescribed range  Description: INTERVENTIONS:  - Monitor Blood Glucose as ordered  - Assess for signs and symptoms of hyperglycemia and hypoglycemia  - Administer ordered medications to maintain glucose within target range  - Assess barriers to adequate nutritional intake and initiate nutrition consult as needed  - Instruct patient on self management of diabetes  Outcome: Progressing  Goal: Electrolytes maintained within normal limits  Description: INTERVENTIONS:  - Monitor labs and rhythm and assess patient for signs and symptoms of electrolyte imbalances  - Administer electrolyte replacement as ordered  - Monitor response to electrolyte replacements, including rhythm and repeat lab results as appropriate  - Fluid restriction as ordered  - Instruct patient on fluid and nutrition restrictions as appropriate  Outcome: Progressing     Problem: SKIN/TISSUE INTEGRITY - ADULT  Goal: Skin integrity remains intact  Description: INTERVENTIONS  - Assess and document risk factors for pressure ulcer development  - Assess and document skin integrity  - Monitor for areas of redness and/or skin breakdown  - Initiate interventions, skin care algorithm/standards of care as needed  Outcome: Progressing     Problem: PAIN - ADULT  Goal: Verbalizes/displays adequate comfort level or patient's stated pain goal  Description: INTERVENTIONS:  - Encourage pt to monitor pain and request assistance  - Assess pain using appropriate pain scale  - Administer analgesics based on type and severity of pain and evaluate response  - Implement non-pharmacological measures as appropriate and evaluate response  - Consider cultural and social influences on pain and pain management  - Manage/alleviate anxiety  - Utilize distraction and/or relaxation techniques  - Monitor for opioid side effects  - Notify MD/LIP if interventions unsuccessful or patient reports new pain  - Anticipate increased pain with activity and pre-medicate as appropriate  Outcome: Progressing     Problem: RISK FOR INFECTION - ADULT  Goal: Absence of fever/infection during anticipated neutropenic period  Description: INTERVENTIONS  - Monitor WBC  - Administer growth factors as ordered  - Implement neutropenic guidelines  Outcome: Progressing     Problem: SAFETY ADULT - FALL  Goal: Free from fall injury  Description: INTERVENTIONS:  - Assess pt frequently for physical needs  - Identify cognitive and physical deficits and behaviors that affect risk of falls.   - [place] fall precautions as indicated by assessment.  - Educate pt/family on patient safety including physical limitations  - Instruct pt to call for assistance with activity based on assessment  - Modify environment to reduce risk of injury  - Provide assistive devices as appropriate  - Consider OT/PT consult to assist with strengthening/mobility  - Encourage toileting schedule  Outcome: Progressing     Problem: DISCHARGE PLANNING  Goal: Discharge to home or other facility with appropriate resources  Description: INTERVENTIONS:  - Identify barriers to discharge w/pt and caregiver  - Include patient/family/discharge partner in discharge planning  - Arrange for needed discharge resources and transportation as appropriate  - Identify discharge learning needs (meds, wound care, etc)  - Arrange for interpreters to assist at discharge as needed  - Consider post-discharge preferences of patient/family/discharge partner  - Complete POLST form as appropriate  - Assess patient's ability to be responsible for managing their own health  - Refer to Case Management Department for coordinating discharge planning if the patient needs post-hospital services based on physician/LIP order or complex needs related to functional status, cognitive ability or social support system  Outcome: Progressing     Problem: Altered Communication/Language Barrier  Goal: Patient/Family is able to understand and participate in their care  Description: Interventions:  - Assess communication ability and preferred communication style  - Implement communication aides and strategies  - Use visual cues when possible  - Listen attentively, be patient, do not interrupt  - Minimize distractions  - Allow time for understanding and response  - Establish method for patient to ask for assistance (call light)  - Provide an  as needed  - Communicate barriers and strategies to overcome with those who interact with patient  Outcome: Progressing

## 2023-03-06 ENCOUNTER — TELEPHONE (OUTPATIENT)
Dept: CASE MANAGEMENT | Facility: HOSPITAL | Age: 53
End: 2023-03-06

## 2023-03-06 LAB
ANION GAP SERPL CALC-SCNC: 8 MMOL/L (ref 0–18)
BUN BLD-MCNC: 76 MG/DL (ref 7–18)
CALCIUM BLD-MCNC: 9.2 MG/DL (ref 8.5–10.1)
CHLORIDE SERPL-SCNC: 103 MMOL/L (ref 98–112)
CO2 SERPL-SCNC: 22 MMOL/L (ref 21–32)
CREAT BLD-MCNC: 3.24 MG/DL
GFR SERPLBLD BASED ON 1.73 SQ M-ARVRAT: 17 ML/MIN/1.73M2 (ref 60–?)
GLUCOSE BLD-MCNC: 100 MG/DL (ref 70–99)
GLUCOSE BLD-MCNC: 121 MG/DL (ref 70–99)
GLUCOSE BLD-MCNC: 209 MG/DL (ref 70–99)
GLUCOSE BLD-MCNC: 213 MG/DL (ref 70–99)
GLUCOSE BLD-MCNC: 288 MG/DL (ref 70–99)
MAGNESIUM SERPL-MCNC: 2.3 MG/DL (ref 1.6–2.6)
MYELOPEROX ANTIBODIES, IGG: 0 AU/ML
OSMOLALITY SERPL CALC.SUM OF ELEC: 305 MOSM/KG (ref 275–295)
POTASSIUM SERPL-SCNC: 4.5 MMOL/L (ref 3.5–5.1)
SERINE PROTEASE 3, IGG: 0 AU/ML
SODIUM SERPL-SCNC: 133 MMOL/L (ref 136–145)

## 2023-03-06 PROCEDURE — 99232 SBSQ HOSP IP/OBS MODERATE 35: CPT | Performed by: INTERNAL MEDICINE

## 2023-03-06 PROCEDURE — 99233 SBSQ HOSP IP/OBS HIGH 50: CPT | Performed by: INTERNAL MEDICINE

## 2023-03-06 RX ORDER — CARVEDILOL 12.5 MG/1
37.5 TABLET ORAL 2 TIMES DAILY WITH MEALS
Status: DISCONTINUED | OUTPATIENT
Start: 2023-03-06 | End: 2023-03-07

## 2023-03-06 RX ORDER — BUMETANIDE 2 MG/1
2 TABLET ORAL
Status: DISCONTINUED | OUTPATIENT
Start: 2023-03-06 | End: 2023-03-07

## 2023-03-06 NOTE — HOME CARE LIAISON
Received referral via [facility] for Home Health services. Spoke w/ patient and provided with list of [facility] providers from [facility], choice is [facility]. Agency reserved in [facility] and contact information placed on AVS.Financial interest disclosure provided.  Notified Effie Hall

## 2023-03-06 NOTE — DISCHARGE INSTRUCTIONS
Sometimes managing your health at home requires assistance. The [facility] team has recognized your preference to use [facility]. They can be reached by phone at [phone]. The fax number for your reference is [phone]. A representative from the home health agency will contact you or your family to schedule your first visit.

## 2023-03-06 NOTE — PLAN OF CARE
Assumed care for pt at 19:30 on 3/5    A&Ox4. Reports back pain, norco given. Lowered pain to 7/10. Able to sleep. VSS on RA while awake. During sleep, spO2 87-88%- placed on 1L. NSR on tele. . Lungs diminished. 1000 on IS best effort. Denies cough. Reports no BM for a week- colace and miralax given. Continent of B&B. Up with standby/walker. Heparin for VTE prophylaxis. Plan: Daily weight, Monitor O2//IS, AM BMP & Mag    Bed alarm on. Spouse at bedside. Call light in reach. Able to make needs known.

## 2023-03-06 NOTE — CM/SW NOTE
Patient is a 47 y/o female who admitted with UNIQUE (acute kidney injury), acute congestive heart failure. PT recommending HH. Patient has hx with [facility] however they are unable to provide staffing at this time. Extended referral in aidin. [facility] accepted and will follow up with patient regarding choice. SW acknowledged order for [facility]. Met with patient, who was alert and oriented, to discuss discharge planning. Patient was agreeable with HH. Informed her [facility] unable to accept her at this time. She lives with her  Sanjay Dave) and daughter (uHssein Chery). They are currently both staying at [facility] [address] Courtney Stroudaver works so she is home with [facility] during the day time. She requested SW contact Courtney Strickland to verify [facility]'s address. Discussed PHQ4 and [facility] resources. She feels she has enough support from her family. Spoke with Courtney Strickland and discussed above, he is also agreeable with HH. He provided SW with Haley's address. SW will remain available.      Daughter (Hussein Chery)  [address] [facility]  Discharge Planner  [phone]

## 2023-03-06 NOTE — CM/SW NOTE
Dayana Pimentel RN Case Manger with [employer] calling requesting to speak with SW/CM regarding discharge plans and needs. Please call back at [phone].

## 2023-03-07 VITALS
HEIGHT: 68 IN | OXYGEN SATURATION: 95 % | BODY MASS INDEX: 44.41 KG/M2 | WEIGHT: 293 LBS | DIASTOLIC BLOOD PRESSURE: 79 MMHG | HEART RATE: 77 BPM | TEMPERATURE: 98 F | RESPIRATION RATE: 20 BRPM | SYSTOLIC BLOOD PRESSURE: 175 MMHG

## 2023-03-07 LAB
ANION GAP SERPL CALC-SCNC: 6 MMOL/L (ref 0–18)
BUN BLD-MCNC: 73 MG/DL (ref 7–18)
CALCIUM BLD-MCNC: 9.3 MG/DL (ref 8.5–10.1)
CHLORIDE SERPL-SCNC: 106 MMOL/L (ref 98–112)
CO2 SERPL-SCNC: 22 MMOL/L (ref 21–32)
CREAT BLD-MCNC: 3.21 MG/DL
GFR SERPLBLD BASED ON 1.73 SQ M-ARVRAT: 17 ML/MIN/1.73M2 (ref 60–?)
GLUCOSE BLD-MCNC: 73 MG/DL (ref 70–99)
GLUCOSE BLD-MCNC: 87 MG/DL (ref 70–99)
GLUCOSE BLD-MCNC: 97 MG/DL (ref 70–99)
OSMOLALITY SERPL CALC.SUM OF ELEC: 298 MOSM/KG (ref 275–295)
POTASSIUM SERPL-SCNC: 4.4 MMOL/L (ref 3.5–5.1)
SODIUM SERPL-SCNC: 134 MMOL/L (ref 136–145)

## 2023-03-07 PROCEDURE — 99239 HOSP IP/OBS DSCHRG MGMT >30: CPT | Performed by: INTERNAL MEDICINE

## 2023-03-07 PROCEDURE — 99233 SBSQ HOSP IP/OBS HIGH 50: CPT | Performed by: INTERNAL MEDICINE

## 2023-03-07 RX ORDER — FUROSEMIDE 10 MG/ML
80 INJECTION INTRAMUSCULAR; INTRAVENOUS ONCE
Status: COMPLETED | OUTPATIENT
Start: 2023-03-07 | End: 2023-03-07

## 2023-03-07 RX ORDER — HYDRALAZINE HYDROCHLORIDE 50 MG/1
50 TABLET, FILM COATED ORAL EVERY 8 HOURS SCHEDULED
Qty: 90 TABLET | Refills: 3 | Status: SHIPPED | OUTPATIENT
Start: 2023-03-07

## 2023-03-07 NOTE — PLAN OF CARE
Patient received in bed, alert and oriented x 4, spouse at the bedside. Up  with standby assist and walker On RA. NSR on tele. Continent of bladder. Complains of  back pain 9/10, prn Norco given with relief. No complaints of shortness of breath or chest pain/discomfort. Patient updated on plan of care. Fall precautions in place. Call light within reach. Problem: Diabetes/Glucose Control  Goal: Glucose maintained within prescribed range  Description: INTERVENTIONS:  - Monitor Blood Glucose as ordered  - Assess for signs and symptoms of hyperglycemia and hypoglycemia  - Administer ordered medications to maintain glucose within target range  - Assess barriers to adequate nutritional intake and initiate nutrition consult as needed  - Instruct patient on self management of diabetes  Outcome: Progressing      Problem: CARDIOVASCULAR - ADULT  Goal: Maintains optimal cardiac output and hemodynamic stability  Description: INTERVENTIONS:  - Monitor vital signs, rhythm, and trends  - Monitor for bleeding, hypotension and signs of decreased cardiac output  - Evaluate effectiveness of vasoactive medications to optimize hemodynamic stability  - Monitor arterial and/or venous puncture sites for bleeding and/or hematoma  - Assess quality of pulses, skin color and temperature  - Assess for signs of decreased coronary artery perfusion - ex.  Angina  - Evaluate fluid balance, assess for edema, trend weights  Outcome: Progressing  Goal: Absence of cardiac arrhythmias or at baseline  Description: INTERVENTIONS:  - Continuous cardiac monitoring, monitor vital signs, obtain 12 lead EKG if indicated  - Evaluate effectiveness of antiarrhythmic and heart rate control medications as ordered  - Initiate emergency measures for life threatening arrhythmias  - Monitor electrolytes and administer replacement therapy as ordered  Outcome: Progressing     Problem: RESPIRATORY - ADULT  Goal: Achieves optimal ventilation and oxygenation  Description: INTERVENTIONS:  - Assess for changes in respiratory status  - Assess for changes in mentation and behavior  - Position to facilitate oxygenation and minimize respiratory effort  - Oxygen supplementation based on oxygen saturation or ABGs  - Provide Smoking Cessation handout, if applicable  - Encourage broncho-pulmonary hygiene including cough, deep breathe, Incentive Spirometry  - Assess the need for suctioning and perform as needed  - Assess and instruct to report SOB or any respiratory difficulty  - Respiratory Therapy support as indicated  - Manage/alleviate anxiety  - Monitor for signs/symptoms of CO2 retention  Outcome: Progressing     Problem: METABOLIC/FLUID AND ELECTROLYTES - ADULT  Goal: Glucose maintained within prescribed range  Description: INTERVENTIONS:  - Monitor Blood Glucose as ordered  - Assess for signs and symptoms of hyperglycemia and hypoglycemia  - Administer ordered medications to maintain glucose within target range  - Assess barriers to adequate nutritional intake and initiate nutrition consult as needed  - Instruct patient on self management of diabetes  Outcome: Progressing  Goal: Electrolytes maintained within normal limits  Description: INTERVENTIONS:  - Monitor labs and rhythm and assess patient for signs and symptoms of electrolyte imbalances  - Administer electrolyte replacement as ordered  - Monitor response to electrolyte replacements, including rhythm and repeat lab results as appropriate  - Fluid restriction as ordered  - Instruct patient on fluid and nutrition restrictions as appropriate  Outcome: Progressing     Problem: SKIN/TISSUE INTEGRITY - ADULT  Goal: Skin integrity remains intact  Description: INTERVENTIONS  - Assess and document risk factors for pressure ulcer development  - Assess and document skin integrity  - Monitor for areas of redness and/or skin breakdown  - Initiate interventions, skin care algorithm/standards of care as needed  Outcome: Progressing     Problem: PAIN - ADULT  Goal: Verbalizes/displays adequate comfort level or patient's stated pain goal  Description: INTERVENTIONS:  - Encourage pt to monitor pain and request assistance  - Assess pain using appropriate pain scale  - Administer analgesics based on type and severity of pain and evaluate response  - Implement non-pharmacological measures as appropriate and evaluate response  - Consider cultural and social influences on pain and pain management  - Manage/alleviate anxiety  - Utilize distraction and/or relaxation techniques  - Monitor for opioid side effects  - Notify MD/LIP if interventions unsuccessful or patient reports new pain  - Anticipate increased pain with activity and pre-medicate as appropriate  Outcome: Progressing     Problem: RISK FOR INFECTION - ADULT  Goal: Absence of fever/infection during anticipated neutropenic period  Description: INTERVENTIONS  - Monitor WBC  - Administer growth factors as ordered  - Implement neutropenic guidelines  Outcome: Progressing     Problem: SAFETY ADULT - FALL  Goal: Free from fall injury  Description: INTERVENTIONS:  - Assess pt frequently for physical needs  - Identify cognitive and physical deficits and behaviors that affect risk of falls.   - [place] fall precautions as indicated by assessment.  - Educate pt/family on patient safety including physical limitations  - Instruct pt to call for assistance with activity based on assessment  - Modify environment to reduce risk of injury  - Provide assistive devices as appropriate  - Consider OT/PT consult to assist with strengthening/mobility  - Encourage toileting schedule  Outcome: Progressing     Problem: DISCHARGE PLANNING  Goal: Discharge to home or other facility with appropriate resources  Description: INTERVENTIONS:  - Identify barriers to discharge w/pt and caregiver  - Include patient/family/discharge partner in discharge planning  - Arrange for needed discharge resources and transportation as appropriate  - Identify discharge learning needs (meds, wound care, etc)  - Arrange for interpreters to assist at discharge as needed  - Consider post-discharge preferences of patient/family/discharge partner  - Complete POLST form as appropriate  - Assess patient's ability to be responsible for managing their own health  - Refer to Case Management Department for coordinating discharge planning if the patient needs post-hospital services based on physician/LIP order or complex needs related to functional status, cognitive ability or social support system  Outcome: Progressing     Problem: Altered Communication/Language Barrier  Goal: Patient/Family is able to understand and participate in their care  Description: Interventions:  - Assess communication ability and preferred communication style  - Implement communication aides and strategies  - Use visual cues when possible  - Listen attentively, be patient, do not interrupt  - Minimize distractions  - Allow time for understanding and response  - Establish method for patient to ask for assistance (call light)  - Provide an  as needed  - Communicate barriers and strategies to overcome with those who interact with patient  Outcome: Progressing

## 2023-03-07 NOTE — PLAN OF CARE
RN SHIFT NOTE    Assumed care of pt at 0700. Pt denies pain at this time. Patient is alert and oriented x 4 (Reminders and reorientation completed). Patient is NSR on tele, S1 and S2 present and pt denies cardiac symptoms. Lung sounds diminished. Abdomen soft and round. Last BM on 3/6. Tolerating medications and care needs have been met at this time.      POC: possible dc this afternoon       Problem: Diabetes/Glucose Control  Goal: Glucose maintained within prescribed range  Description: INTERVENTIONS:  - Monitor Blood Glucose as ordered  - Assess for signs and symptoms of hyperglycemia and hypoglycemia  - Administer ordered medications to maintain glucose within target range  - Assess barriers to adequate nutritional intake and initiate nutrition consult as needed  - Instruct patient on self management of diabetes  Outcome: Progressing     Problem: Patient/Family Goals  Goal: Patient/Family Long Term Goal  Description: Patient's Long Term Goal: \" To remain out of hospital\"    Interventions:  - Follow up with doctors outpatient   - Take medications as prescribed  - Maintain healthy lifestyle  - See additional Care Plan goals for specific interventions  Outcome: Progressing  Goal: Patient/Family Short Term Goal  Description: Patient's Short Term Goal: \" To discharge home\"    Interventions:   - 1500 FR  - DW  -Echo  - IVF  - See additional Care Plan goals for specific interventions  Outcome: Progressing     Problem: CARDIOVASCULAR - ADULT  Goal: Maintains optimal cardiac output and hemodynamic stability  Description: INTERVENTIONS:  - Monitor vital signs, rhythm, and trends  - Monitor for bleeding, hypotension and signs of decreased cardiac output  - Evaluate effectiveness of vasoactive medications to optimize hemodynamic stability  - Monitor arterial and/or venous puncture sites for bleeding and/or hematoma  - Assess quality of pulses, skin color and temperature  - Assess for signs of decreased coronary artery perfusion - ex.  Angina  - Evaluate fluid balance, assess for edema, trend weights  Outcome: Progressing  Goal: Absence of cardiac arrhythmias or at baseline  Description: INTERVENTIONS:  - Continuous cardiac monitoring, monitor vital signs, obtain 12 lead EKG if indicated  - Evaluate effectiveness of antiarrhythmic and heart rate control medications as ordered  - Initiate emergency measures for life threatening arrhythmias  - Monitor electrolytes and administer replacement therapy as ordered  Outcome: Progressing     Problem: RESPIRATORY - ADULT  Goal: Achieves optimal ventilation and oxygenation  Description: INTERVENTIONS:  - Assess for changes in respiratory status  - Assess for changes in mentation and behavior  - Position to facilitate oxygenation and minimize respiratory effort  - Oxygen supplementation based on oxygen saturation or ABGs  - Provide Smoking Cessation handout, if applicable  - Encourage broncho-pulmonary hygiene including cough, deep breathe, Incentive Spirometry  - Assess the need for suctioning and perform as needed  - Assess and instruct to report SOB or any respiratory difficulty  - Respiratory Therapy support as indicated  - Manage/alleviate anxiety  - Monitor for signs/symptoms of CO2 retention  Outcome: Progressing     Problem: METABOLIC/FLUID AND ELECTROLYTES - ADULT  Goal: Glucose maintained within prescribed range  Description: INTERVENTIONS:  - Monitor Blood Glucose as ordered  - Assess for signs and symptoms of hyperglycemia and hypoglycemia  - Administer ordered medications to maintain glucose within target range  - Assess barriers to adequate nutritional intake and initiate nutrition consult as needed  - Instruct patient on self management of diabetes  Outcome: Progressing  Goal: Electrolytes maintained within normal limits  Description: INTERVENTIONS:  - Monitor labs and rhythm and assess patient for signs and symptoms of electrolyte imbalances  - Administer electrolyte replacement as ordered  - Monitor response to electrolyte replacements, including rhythm and repeat lab results as appropriate  - Fluid restriction as ordered  - Instruct patient on fluid and nutrition restrictions as appropriate  Outcome: Progressing     Problem: SKIN/TISSUE INTEGRITY - ADULT  Goal: Skin integrity remains intact  Description: INTERVENTIONS  - Assess and document risk factors for pressure ulcer development  - Assess and document skin integrity  - Monitor for areas of redness and/or skin breakdown  - Initiate interventions, skin care algorithm/standards of care as needed  Outcome: Progressing     Problem: PAIN - ADULT  Goal: Verbalizes/displays adequate comfort level or patient's stated pain goal  Description: INTERVENTIONS:  - Encourage pt to monitor pain and request assistance  - Assess pain using appropriate pain scale  - Administer analgesics based on type and severity of pain and evaluate response  - Implement non-pharmacological measures as appropriate and evaluate response  - Consider cultural and social influences on pain and pain management  - Manage/alleviate anxiety  - Utilize distraction and/or relaxation techniques  - Monitor for opioid side effects  - Notify MD/LIP if interventions unsuccessful or patient reports new pain  - Anticipate increased pain with activity and pre-medicate as appropriate  Outcome: Progressing     Problem: RISK FOR INFECTION - ADULT  Goal: Absence of fever/infection during anticipated neutropenic period  Description: INTERVENTIONS  - Monitor WBC  - Administer growth factors as ordered  - Implement neutropenic guidelines  Outcome: Progressing     Problem: SAFETY ADULT - FALL  Goal: Free from fall injury  Description: INTERVENTIONS:  - Assess pt frequently for physical needs  - Identify cognitive and physical deficits and behaviors that affect risk of falls.   - Hazard fall precautions as indicated by assessment.  - Educate pt/family on patient safety including physical limitations  - Instruct pt to call for assistance with activity based on assessment  - Modify environment to reduce risk of injury  - Provide assistive devices as appropriate  - Consider OT/PT consult to assist with strengthening/mobility  - Encourage toileting schedule  Outcome: Progressing     Problem: DISCHARGE PLANNING  Goal: Discharge to home or other facility with appropriate resources  Description: INTERVENTIONS:  - Identify barriers to discharge w/pt and caregiver  - Include patient/family/discharge partner in discharge planning  - Arrange for needed discharge resources and transportation as appropriate  - Identify discharge learning needs (meds, wound care, etc)  - Arrange for interpreters to assist at discharge as needed  - Consider post-discharge preferences of patient/family/discharge partner  - Complete POLST form as appropriate  - Assess patient's ability to be responsible for managing their own health  - Refer to Case Management Department for coordinating discharge planning if the patient needs post-hospital services based on physician/LIP order or complex needs related to functional status, cognitive ability or social support system  Outcome: Progressing     Problem: Altered Communication/Language Barrier  Goal: Patient/Family is able to understand and participate in their care  Description: Interventions:  - Assess communication ability and preferred communication style  - Implement communication aides and strategies  - Use visual cues when possible  - Listen attentively, be patient, do not interrupt  - Minimize distractions  - Allow time for understanding and response  - Establish method for patient to ask for assistance (call light)  - Provide an  as needed  - Communicate barriers and strategies to overcome with those who interact with patient  Outcome: Progressing

## 2023-03-08 NOTE — PAYOR COMM NOTE
Discharge Notification    Patient Name: Ronaldo Mayen: [employer]  Subscriber #: [id number]  Authorization Number: [id number]  Admit Date/Time: 3/1/2023 5:16 PM  Discharge Date/Time: 3/7/2023 6:24 PM

## 2023-03-24 ENCOUNTER — LAB REQUISITION (OUTPATIENT)
Dept: LAB | Age: 53
End: 2023-03-24
Payer: COMMERCIAL

## 2023-03-24 DIAGNOSIS — I13.0 HYPERTENSIVE HEART AND CHRONIC KIDNEY DISEASE WITH HEART FAILURE AND STAGE 1 THROUGH STAGE 4 CHRONIC KIDNEY DISEASE, OR UNSPECIFIED CHRONIC KIDNEY DISEASE (HCC): ICD-10-CM

## 2023-03-24 LAB
ANION GAP SERPL CALC-SCNC: 9 MMOL/L (ref 0–18)
BASOPHILS # BLD AUTO: 0.08 X10(3) UL (ref 0–0.2)
BASOPHILS NFR BLD AUTO: 1.1 %
BUN BLD-MCNC: 53 MG/DL (ref 7–18)
CALCIUM BLD-MCNC: 8.7 MG/DL (ref 8.5–10.1)
CHLORIDE SERPL-SCNC: 103 MMOL/L (ref 98–112)
CO2 SERPL-SCNC: 22 MMOL/L (ref 21–32)
CREAT BLD-MCNC: 3.25 MG/DL
EOSINOPHIL # BLD AUTO: 0.71 X10(3) UL (ref 0–0.7)
EOSINOPHIL NFR BLD AUTO: 9.6 %
ERYTHROCYTE [DISTWIDTH] IN BLOOD BY AUTOMATED COUNT: 14.1 %
GFR SERPLBLD BASED ON 1.73 SQ M-ARVRAT: 16 ML/MIN/1.73M2 (ref 60–?)
GLUCOSE BLD-MCNC: 148 MG/DL (ref 70–99)
HCT VFR BLD AUTO: 30.5 %
HGB BLD-MCNC: 9.7 G/DL
IMM GRANULOCYTES # BLD AUTO: 0.03 X10(3) UL (ref 0–1)
IMM GRANULOCYTES NFR BLD: 0.4 %
LYMPHOCYTES # BLD AUTO: 1.29 X10(3) UL (ref 1–4)
LYMPHOCYTES NFR BLD AUTO: 17.5 %
MCH RBC QN AUTO: 30.4 PG (ref 26–34)
MCHC RBC AUTO-ENTMCNC: 31.8 G/DL (ref 31–37)
MCV RBC AUTO: 95.6 FL
MONOCYTES # BLD AUTO: 0.48 X10(3) UL (ref 0.1–1)
MONOCYTES NFR BLD AUTO: 6.5 %
NEUTROPHILS # BLD AUTO: 4.78 X10 (3) UL (ref 1.5–7.7)
NEUTROPHILS # BLD AUTO: 4.78 X10(3) UL (ref 1.5–7.7)
NEUTROPHILS NFR BLD AUTO: 64.9 %
OSMOLALITY SERPL CALC.SUM OF ELEC: 295 MOSM/KG (ref 275–295)
PLATELET # BLD AUTO: 343 10(3)UL (ref 150–450)
POTASSIUM SERPL-SCNC: 4.3 MMOL/L (ref 3.5–5.1)
RBC # BLD AUTO: 3.19 X10(6)UL
SODIUM SERPL-SCNC: 134 MMOL/L (ref 136–145)
WBC # BLD AUTO: 7.4 X10(3) UL (ref 4–11)

## 2023-03-24 PROCEDURE — 85025 COMPLETE CBC W/AUTO DIFF WBC: CPT | Performed by: INTERNAL MEDICINE

## 2023-03-24 PROCEDURE — 80048 BASIC METABOLIC PNL TOTAL CA: CPT | Performed by: INTERNAL MEDICINE

## 2023-04-20 ENCOUNTER — OFFICE VISIT (OUTPATIENT)
Dept: NEPHROLOGY | Facility: CLINIC | Age: 53
End: 2023-04-20
Payer: COMMERCIAL

## 2023-04-20 VITALS — DIASTOLIC BLOOD PRESSURE: 74 MMHG | SYSTOLIC BLOOD PRESSURE: 132 MMHG

## 2023-04-20 DIAGNOSIS — I10 PRIMARY HYPERTENSION: ICD-10-CM

## 2023-04-20 DIAGNOSIS — E11.21 DIABETIC NEPHROPATHY ASSOCIATED WITH TYPE 2 DIABETES MELLITUS (HCC): ICD-10-CM

## 2023-04-20 DIAGNOSIS — N18.4 CKD (CHRONIC KIDNEY DISEASE) STAGE 4, GFR 15-29 ML/MIN (HCC): Primary | ICD-10-CM

## 2023-07-12 ENCOUNTER — HOSPITAL ENCOUNTER (INPATIENT)
Facility: HOSPITAL | Age: 53
LOS: 12 days | Discharge: HOME HEALTH | DRG: 291 | End: 2023-07-26
Attending: EMERGENCY MEDICINE | Admitting: HOSPITALIST
Payer: COMMERCIAL

## 2023-07-12 ENCOUNTER — APPOINTMENT (OUTPATIENT)
Dept: GENERAL RADIOLOGY | Facility: HOSPITAL | Age: 53
DRG: 291 | End: 2023-07-12
Attending: EMERGENCY MEDICINE
Payer: COMMERCIAL

## 2023-07-12 DIAGNOSIS — I50.33 ACUTE ON CHRONIC DIASTOLIC CONGESTIVE HEART FAILURE (HCC): Primary | ICD-10-CM

## 2023-07-12 PROCEDURE — 71045 X-RAY EXAM CHEST 1 VIEW: CPT | Performed by: EMERGENCY MEDICINE

## 2023-07-13 PROBLEM — R79.89 AZOTEMIA: Status: ACTIVE | Noted: 2023-07-13

## 2023-07-13 PROBLEM — I50.33 ACUTE ON CHRONIC HEART FAILURE WITH PRESERVED EJECTION FRACTION (HCC): Status: ACTIVE | Noted: 2022-08-04

## 2023-07-13 PROBLEM — R60.1 ANASARCA: Status: ACTIVE | Noted: 2023-07-13

## 2023-07-13 PROBLEM — N17.9 ACUTE KIDNEY INJURY (HCC): Status: ACTIVE | Noted: 2023-07-13

## 2023-07-13 PROBLEM — I50.33 ACUTE ON CHRONIC DIASTOLIC CONGESTIVE HEART FAILURE (HCC): Status: ACTIVE | Noted: 2023-07-13

## 2023-07-13 PROBLEM — N17.9 ACUTE KIDNEY INJURY: Status: ACTIVE | Noted: 2023-07-13

## 2023-07-13 LAB
ALBUMIN SERPL-MCNC: 3.1 G/DL (ref 3.4–5)
ALBUMIN/GLOB SERPL: 0.7 {RATIO} (ref 1–2)
ALP LIVER SERPL-CCNC: 110 U/L
ALT SERPL-CCNC: 18 U/L
ANION GAP SERPL CALC-SCNC: 11 MMOL/L (ref 0–18)
AST SERPL-CCNC: 12 U/L (ref 15–37)
ATRIAL RATE: 64 BPM
BASOPHILS # BLD AUTO: 0.07 X10(3) UL (ref 0–0.2)
BASOPHILS NFR BLD AUTO: 0.8 %
BILIRUB SERPL-MCNC: 0.3 MG/DL (ref 0.1–2)
BUN BLD-MCNC: 76 MG/DL (ref 7–18)
CALCIUM BLD-MCNC: 8.5 MG/DL (ref 8.5–10.1)
CHLORIDE SERPL-SCNC: 91 MMOL/L (ref 98–112)
CHOLEST SERPL-MCNC: 179 MG/DL (ref ?–200)
CO2 SERPL-SCNC: 23 MMOL/L (ref 21–32)
CREAT BLD-MCNC: 3.78 MG/DL
DEPRECATED HBV CORE AB SER IA-ACNC: 140.3 NG/ML
DEPRECATED HBV CORE AB SER IA-ACNC: 144.5 NG/ML
EOSINOPHIL # BLD AUTO: 0.44 X10(3) UL (ref 0–0.7)
EOSINOPHIL NFR BLD AUTO: 4.9 %
ERYTHROCYTE [DISTWIDTH] IN BLOOD BY AUTOMATED COUNT: 14.4 %
EST. AVERAGE GLUCOSE BLD GHB EST-MCNC: 128 MG/DL (ref 68–126)
FOLATE SERPL-MCNC: 15.6 NG/ML (ref 8.7–?)
GFR SERPLBLD BASED ON 1.73 SQ M-ARVRAT: 14 ML/MIN/1.73M2 (ref 60–?)
GLOBULIN PLAS-MCNC: 4.3 G/DL (ref 2.8–4.4)
GLUCOSE BLD-MCNC: 131 MG/DL (ref 70–99)
GLUCOSE BLD-MCNC: 136 MG/DL (ref 70–99)
GLUCOSE BLD-MCNC: 147 MG/DL (ref 70–99)
GLUCOSE BLD-MCNC: 169 MG/DL (ref 70–99)
GLUCOSE BLD-MCNC: 75 MG/DL (ref 70–99)
GLUCOSE BLD-MCNC: 76 MG/DL (ref 70–99)
GLUCOSE BLD-MCNC: 99 MG/DL (ref 70–99)
HBA1C MFR BLD: 6.1 % (ref ?–5.7)
HCT VFR BLD AUTO: 27 %
HDLC SERPL-MCNC: 61 MG/DL (ref 40–59)
HGB BLD-MCNC: 8.9 G/DL
IMM GRANULOCYTES # BLD AUTO: 0.03 X10(3) UL (ref 0–1)
IMM GRANULOCYTES NFR BLD: 0.3 %
IRON SATN MFR SERPL: 19 %
IRON SATN MFR SERPL: 20 %
IRON SERPL-MCNC: 51 UG/DL
IRON SERPL-MCNC: 54 UG/DL
LDLC SERPL CALC-MCNC: 89 MG/DL (ref ?–100)
LYMPHOCYTES # BLD AUTO: 0.6 X10(3) UL (ref 1–4)
LYMPHOCYTES NFR BLD AUTO: 6.7 %
MCH RBC QN AUTO: 28.3 PG (ref 26–34)
MCHC RBC AUTO-ENTMCNC: 33 G/DL (ref 31–37)
MCV RBC AUTO: 86 FL
MONOCYTES # BLD AUTO: 0.48 X10(3) UL (ref 0.1–1)
MONOCYTES NFR BLD AUTO: 5.4 %
NEUTROPHILS # BLD AUTO: 7.31 X10 (3) UL (ref 1.5–7.7)
NEUTROPHILS # BLD AUTO: 7.31 X10(3) UL (ref 1.5–7.7)
NEUTROPHILS NFR BLD AUTO: 81.9 %
NONHDLC SERPL-MCNC: 118 MG/DL (ref ?–130)
NT-PROBNP SERPL-MCNC: 7389 PG/ML (ref ?–125)
OSMOLALITY SERPL CALC.SUM OF ELEC: 285 MOSM/KG (ref 275–295)
P AXIS: 40 DEGREES
P-R INTERVAL: 214 MS
PLATELET # BLD AUTO: 331 10(3)UL (ref 150–450)
POTASSIUM SERPL-SCNC: 4.2 MMOL/L (ref 3.5–5.1)
PROT SERPL-MCNC: 7.4 G/DL (ref 6.4–8.2)
Q-T INTERVAL: 458 MS
QRS DURATION: 84 MS
QTC CALCULATION (BEZET): 472 MS
R AXIS: 38 DEGREES
RBC # BLD AUTO: 3.14 X10(6)UL
SODIUM SERPL-SCNC: 125 MMOL/L (ref 136–145)
T AXIS: 47 DEGREES
TIBC SERPL-MCNC: 268 UG/DL (ref 240–450)
TIBC SERPL-MCNC: 274 UG/DL (ref 240–450)
TRANSFERRIN SERPL-MCNC: 180 MG/DL (ref 200–360)
TRANSFERRIN SERPL-MCNC: 184 MG/DL (ref 200–360)
TRIGL SERPL-MCNC: 169 MG/DL (ref 30–149)
TROPONIN I HIGH SENSITIVITY: 100 NG/L
TROPONIN I HIGH SENSITIVITY: 106 NG/L
TROPONIN I HIGH SENSITIVITY: 111 NG/L
VENTRICULAR RATE: 64 BPM
VIT B12 SERPL-MCNC: 1757 PG/ML (ref 193–986)
VIT D+METAB SERPL-MCNC: 43.7 NG/ML (ref 30–100)
VLDLC SERPL CALC-MCNC: 27 MG/DL (ref 0–30)
WBC # BLD AUTO: 8.9 X10(3) UL (ref 4–11)

## 2023-07-13 PROCEDURE — 99254 IP/OBS CNSLTJ NEW/EST MOD 60: CPT | Performed by: INTERNAL MEDICINE

## 2023-07-13 PROCEDURE — 3044F HG A1C LEVEL LT 7.0%: CPT | Performed by: INTERNAL MEDICINE

## 2023-07-13 PROCEDURE — 99223 1ST HOSP IP/OBS HIGH 75: CPT | Performed by: HOSPITALIST

## 2023-07-13 RX ORDER — PRAVASTATIN SODIUM 20 MG
20 TABLET ORAL NIGHTLY
Status: DISCONTINUED | OUTPATIENT
Start: 2023-07-13 | End: 2023-07-26

## 2023-07-13 RX ORDER — LEVOTHYROXINE SODIUM 0.1 MG/1
200 TABLET ORAL
Status: DISCONTINUED | OUTPATIENT
Start: 2023-07-13 | End: 2023-07-26

## 2023-07-13 RX ORDER — ASPIRIN 325 MG
325 TABLET, DELAYED RELEASE (ENTERIC COATED) ORAL DAILY
Status: DISCONTINUED | OUTPATIENT
Start: 2023-07-13 | End: 2023-07-26

## 2023-07-13 RX ORDER — HYDRALAZINE HYDROCHLORIDE 50 MG/1
100 TABLET, FILM COATED ORAL EVERY 8 HOURS
Status: DISCONTINUED | OUTPATIENT
Start: 2023-07-13 | End: 2023-07-17

## 2023-07-13 RX ORDER — HEPARIN SODIUM 5000 [USP'U]/ML
5000 INJECTION, SOLUTION INTRAVENOUS; SUBCUTANEOUS EVERY 12 HOURS SCHEDULED
Status: DISCONTINUED | OUTPATIENT
Start: 2023-07-13 | End: 2023-07-13

## 2023-07-13 RX ORDER — NICOTINE POLACRILEX 4 MG
15 LOZENGE BUCCAL
Status: DISCONTINUED | OUTPATIENT
Start: 2023-07-13 | End: 2023-07-26

## 2023-07-13 RX ORDER — NICOTINE POLACRILEX 4 MG
30 LOZENGE BUCCAL
Status: DISCONTINUED | OUTPATIENT
Start: 2023-07-13 | End: 2023-07-26

## 2023-07-13 RX ORDER — DEXTROSE MONOHYDRATE 25 G/50ML
50 INJECTION, SOLUTION INTRAVENOUS
Status: DISCONTINUED | OUTPATIENT
Start: 2023-07-13 | End: 2023-07-26

## 2023-07-13 RX ORDER — HYDRALAZINE HYDROCHLORIDE 50 MG/1
50 TABLET, FILM COATED ORAL EVERY 8 HOURS
Status: DISCONTINUED | OUTPATIENT
Start: 2023-07-13 | End: 2023-07-13

## 2023-07-13 RX ORDER — BISACODYL 10 MG
10 SUPPOSITORY, RECTAL RECTAL
Status: DISCONTINUED | OUTPATIENT
Start: 2023-07-13 | End: 2023-07-26

## 2023-07-13 RX ORDER — GABAPENTIN 400 MG/1
400 CAPSULE ORAL 2 TIMES DAILY
Status: DISCONTINUED | OUTPATIENT
Start: 2023-07-13 | End: 2023-07-26

## 2023-07-13 RX ORDER — CLOPIDOGREL BISULFATE 75 MG/1
75 TABLET ORAL DAILY
Status: DISCONTINUED | OUTPATIENT
Start: 2023-07-13 | End: 2023-07-26

## 2023-07-13 RX ORDER — BUPROPION HYDROCHLORIDE 150 MG/1
150 TABLET ORAL DAILY
Status: DISCONTINUED | OUTPATIENT
Start: 2023-07-13 | End: 2023-07-26

## 2023-07-13 RX ORDER — HEPARIN SODIUM 5000 [USP'U]/ML
5000 INJECTION, SOLUTION INTRAVENOUS; SUBCUTANEOUS EVERY 12 HOURS SCHEDULED
Status: DISCONTINUED | OUTPATIENT
Start: 2023-07-13 | End: 2023-07-26

## 2023-07-13 RX ORDER — POLYETHYLENE GLYCOL 3350 17 G/17G
17 POWDER, FOR SOLUTION ORAL DAILY PRN
Status: DISCONTINUED | OUTPATIENT
Start: 2023-07-13 | End: 2023-07-26

## 2023-07-13 RX ORDER — CLONIDINE 0.1 MG/24H
1 PATCH, EXTENDED RELEASE TRANSDERMAL WEEKLY
Status: DISCONTINUED | OUTPATIENT
Start: 2023-07-13 | End: 2023-07-14

## 2023-07-13 RX ORDER — FUROSEMIDE 10 MG/ML
80 INJECTION INTRAMUSCULAR; INTRAVENOUS ONCE
Status: COMPLETED | OUTPATIENT
Start: 2023-07-13 | End: 2023-07-13

## 2023-07-13 RX ORDER — SENNOSIDES 8.6 MG
17.2 TABLET ORAL NIGHTLY PRN
Status: DISCONTINUED | OUTPATIENT
Start: 2023-07-13 | End: 2023-07-26

## 2023-07-13 RX ORDER — FUROSEMIDE 10 MG/ML
40 INJECTION INTRAMUSCULAR; INTRAVENOUS ONCE
Status: DISCONTINUED | OUTPATIENT
Start: 2023-07-13 | End: 2023-07-13

## 2023-07-13 RX ORDER — HYDROXYZINE HYDROCHLORIDE 25 MG/1
25 TABLET, FILM COATED ORAL 3 TIMES DAILY PRN
Status: DISCONTINUED | OUTPATIENT
Start: 2023-07-13 | End: 2023-07-26

## 2023-07-13 RX ORDER — CARVEDILOL 12.5 MG/1
37.5 TABLET ORAL 2 TIMES DAILY WITH MEALS
Status: DISCONTINUED | OUTPATIENT
Start: 2023-07-13 | End: 2023-07-26

## 2023-07-13 RX ORDER — ONDANSETRON 2 MG/ML
4 INJECTION INTRAMUSCULAR; INTRAVENOUS EVERY 6 HOURS PRN
Status: DISCONTINUED | OUTPATIENT
Start: 2023-07-13 | End: 2023-07-26

## 2023-07-13 RX ORDER — LEVOTHYROXINE SODIUM 0.07 MG/1
75 TABLET ORAL
Status: DISCONTINUED | OUTPATIENT
Start: 2023-07-13 | End: 2023-07-26

## 2023-07-13 RX ORDER — FUROSEMIDE 10 MG/ML
40 INJECTION INTRAMUSCULAR; INTRAVENOUS
Status: DISCONTINUED | OUTPATIENT
Start: 2023-07-13 | End: 2023-07-13

## 2023-07-13 RX ORDER — ACETAMINOPHEN 500 MG
500 TABLET ORAL EVERY 4 HOURS PRN
Status: DISCONTINUED | OUTPATIENT
Start: 2023-07-13 | End: 2023-07-26

## 2023-07-13 RX ORDER — HYDROCODONE BITARTRATE AND ACETAMINOPHEN 10; 325 MG/1; MG/1
1 TABLET ORAL EVERY 6 HOURS PRN
Status: DISCONTINUED | OUTPATIENT
Start: 2023-07-13 | End: 2023-07-22

## 2023-07-13 RX ORDER — PANTOPRAZOLE SODIUM 40 MG/1
40 TABLET, DELAYED RELEASE ORAL
Status: DISCONTINUED | OUTPATIENT
Start: 2023-07-13 | End: 2023-07-26

## 2023-07-13 RX ORDER — GABAPENTIN 400 MG/1
400 CAPSULE ORAL 2 TIMES DAILY
Status: DISCONTINUED | OUTPATIENT
Start: 2023-07-13 | End: 2023-07-13

## 2023-07-13 NOTE — ED QUICK NOTES
Orders for admission, patient is aware of plan and ready to go upstairs. Any questions, please call ED RN Shraddha Holguin at extension [phone].      Patient Covid vaccination status: Unvaccinated     COVID Test Ordered in ED: None    COVID Suspicion at Admission: N/A    Running Infusions:  None    Mental Status/LOC at time of transport: 4/4    Other pertinent information:   CIWA score: N/A   NIH score:  N/A

## 2023-07-13 NOTE — PROGRESS NOTES
Patient seen and examined  Chest: diminished with crackles B/L  CVS: S1, S2, RRR  ABD: Soft, NT, ND, BS+  EXT: No c/c , +edema b/l     Assessment/Plan  Appreciate consults  Abi Magallanes MD

## 2023-07-13 NOTE — PLAN OF CARE
Pt alert and oriented x 4. Continuous tele monitoring in place. NSR with 1st degree block on the monitor. Reports pain to lower extremities. Norco for comfort PRN. Denies dizziness. Mild shortness of breathe with activity noted. Room air. Clonidine patch added. IV lasix dc'd and bumex drip started. Hydralazine increased. Safety and comfort maintained. Will continue to monitor. Problem: Diabetes/Glucose Control  Goal: Glucose maintained within prescribed range  Description: INTERVENTIONS:  - Monitor Blood Glucose as ordered  - Assess for signs and symptoms of hyperglycemia and hypoglycemia  - Administer ordered medications to maintain glucose within target range  - Assess barriers to adequate nutritional intake and initiate nutrition consult as needed  - Instruct patient on self management of diabetes  7/13/2023 1622 by Soheila Oden RN  Outcome: Progressing  7/13/2023 1622 by Soheila Oden RN  Outcome: Progressing     Problem: CARDIOVASCULAR - ADULT  Goal: Maintains optimal cardiac output and hemodynamic stability  Description: INTERVENTIONS:  - Monitor vital signs, rhythm, and trends  - Monitor for bleeding, hypotension and signs of decreased cardiac output  - Evaluate effectiveness of vasoactive medications to optimize hemodynamic stability  - Monitor arterial and/or venous puncture sites for bleeding and/or hematoma  - Assess quality of pulses, skin color and temperature  - Assess for signs of decreased coronary artery perfusion - ex.  Angina  - Evaluate fluid balance, assess for edema, trend weights  Outcome: Progressing  Goal: Absence of cardiac arrhythmias or at baseline  Description: INTERVENTIONS:  - Continuous cardiac monitoring, monitor vital signs, obtain 12 lead EKG if indicated  - Evaluate effectiveness of antiarrhythmic and heart rate control medications as ordered  - Initiate emergency measures for life threatening arrhythmias  - Monitor electrolytes and administer replacement therapy as ordered  Outcome: Progressing

## 2023-07-13 NOTE — PLAN OF CARE
Tacey Screws Patient Status:  Observation    1970 MRN [id number]   [facility]-A Attending Margacasey Garciajoshua, [address] Day # 0 PCP Mela Casanova MD       Cardiology Nocturnal APN Note    Page Received: Dr. Cheek @ 0664    HPI:     Patient is a 48year old female with PMH of HTN, hypothyroidism, hyperlipidemia, DM II, HFpEF (EF 65%, Grade II DD by echocardiogram 3/2023), CKD Stage III, CVA, chronically elevated troponin, and anemia who presented to the ED with complaint of dyspnea and bilateral lower extremity edema. Patient reports progressively increasing lower extremity edema and dyspnea for the past week or 2. Patient also reported having some intermittent chest discomfort. No chest pain at time of notification. Patient reported not checking her weight on a consistent basis. [facility] consulted for acute on chronic congestive heart failure exacerbation. Patient follows with Dr. Lesly Fragoso as outpatient. HPI obtained from chart review and information provided by ED physician. ED Clinical Course    EKG: Sinus rhythm. No acute ischemic changes. Labs: Cr 3.78, Na 125, Troponin 111, BNP 7,389    Imaging: CXR unavailable for review.      Medications: Lasix 80 mg IVP x 1                Vital Signs:       2023     3:00 AM 2023     3:05 AM   Vitals History   /85 141/123   BP Location Right arm Right arm   Pulse 64 65   SpO2 91 % 89 %        Labs:   Lab Results   Component Value Date    WBC 8.9 2023    HGB 8.9 2023    HCT 27.0 2023    .0 2023    CREATSERUM 3.78 2023    BUN 76 2023     2023    K 4.2 2023    CL 91 2023    CO2 23.0 2023     2023    CA 8.5 2023    ALB 3.1 2023    ALKPHO 110 2023    BILT 0.3 2023    TP 7.4 2023    AST 12 2023    ALT 18 2023    TROPHS 111 2023       Diagnostics:         Allergies:  No Known Allergies    Medications:    aspirin    buPROPion ER    carvedilol    clopidogrel    gabapentin    hydrALAZINE    HYDROcodone-acetaminophen    hydrOXYzine    insulin detemir    levothyroxine    levothyroxine    pantoprazole    pravastatin    glucose **OR** glucose **OR** glucose-vitamin C **OR** dextrose **OR** glucose **OR** glucose **OR** glucose-vitamin C    insulin aspart    insulin aspart    furosemide    heparin    acetaminophen    ondansetron    polyethylene glycol (PEG 3350)    sennosides    bisacodyl       Assessment/Plan:      - Continue diuresis  - Continue to monitor overnight on telemetry  - Formal cardiology consult to follow in AM.     Lora Reed, [address]  7/13/2023  3:22 AM

## 2023-07-13 NOTE — PROGRESS NOTES
Admitted this 49 y/o female c/o BLE edema and SOB , alert and o x 4 , on RA ; c/o exertional dysnea. Applied tele , NSR on the monitor. Pt. C/o chronic back pain   medicated with Norco as needed, database / assessment completed. POC discussed with pt and spouse , verbalized understanding. Fall/safety precautions instructed , placed call light w/in reach.

## 2023-07-14 LAB
ANION GAP SERPL CALC-SCNC: 11 MMOL/L (ref 0–18)
BUN BLD-MCNC: 84 MG/DL (ref 7–18)
CALCIUM BLD-MCNC: 8.3 MG/DL (ref 8.5–10.1)
CHLORIDE SERPL-SCNC: 92 MMOL/L (ref 98–112)
CO2 SERPL-SCNC: 22 MMOL/L (ref 21–32)
CREAT BLD-MCNC: 3.85 MG/DL
GFR SERPLBLD BASED ON 1.73 SQ M-ARVRAT: 13 ML/MIN/1.73M2 (ref 60–?)
GLUCOSE BLD-MCNC: 103 MG/DL (ref 70–99)
GLUCOSE BLD-MCNC: 70 MG/DL (ref 70–99)
GLUCOSE BLD-MCNC: 84 MG/DL (ref 70–99)
GLUCOSE BLD-MCNC: 93 MG/DL (ref 70–99)
GLUCOSE BLD-MCNC: 94 MG/DL (ref 70–99)
GLUCOSE BLD-MCNC: 96 MG/DL (ref 70–99)
OSMOLALITY SERPL CALC.SUM OF ELEC: 284 MOSM/KG (ref 275–295)
POTASSIUM SERPL-SCNC: 3.7 MMOL/L (ref 3.5–5.1)
SODIUM SERPL-SCNC: 125 MMOL/L (ref 136–145)
SODIUM SERPL-SCNC: 50 MMOL/L

## 2023-07-14 PROCEDURE — 99233 SBSQ HOSP IP/OBS HIGH 50: CPT | Performed by: INTERNAL MEDICINE

## 2023-07-14 PROCEDURE — 99232 SBSQ HOSP IP/OBS MODERATE 35: CPT | Performed by: HOSPITALIST

## 2023-07-14 RX ORDER — CLONIDINE 0.2 MG/24H
1 PATCH, EXTENDED RELEASE TRANSDERMAL WEEKLY
Status: DISCONTINUED | OUTPATIENT
Start: 2023-07-14 | End: 2023-07-17

## 2023-07-14 NOTE — PLAN OF CARE
Assumed care around 1930. A/Ox4. On RA w/ sats >90. Monitor shows NSR. Continent of bowel and bladder. Reports lower back pain, treated w/ medication. Up 1x walker. Plan to cont IV diuresis, bumex drip in place. Safety precautions in place, call light within reach, family member at bedside. Approx 0430: Loss of IV access due to catheter kinking. Bumex drip on hold. Approx 0600: Multiple attempts made at reestablishing IV access by three different RN's. Cardiology APN paged. Vascular access consult placed.      Problem: Diabetes/Glucose Control  Goal: Glucose maintained within prescribed range  Description: INTERVENTIONS:  - Monitor Blood Glucose as ordered  - Assess for signs and symptoms of hyperglycemia and hypoglycemia  - Administer ordered medications to maintain glucose within target range  - Assess barriers to adequate nutritional intake and initiate nutrition consult as needed  - Instruct patient on self management of diabetes  Outcome: Progressing     Problem: Patient/Family Goals  Goal: Patient/Family Long Term Goal  Description: Patient's Long Term Goal: \"Go home\"    Interventions:  - MD consults as needed  - Diagnostics as needed  - See additional Care Plan goals for specific interventions  Outcome: Progressing  Goal: Patient/Family Short Term Goal  Description: Patient's Short Term Goal: \"Feel better\"    Interventions:   - Diuresis  - Pain meds as needed  - See additional Care Plan goals for specific interventions  Outcome: Progressing     Problem: CARDIOVASCULAR - ADULT  Goal: Maintains optimal cardiac output and hemodynamic stability  Description: INTERVENTIONS:  - Monitor vital signs, rhythm, and trends  - Monitor for bleeding, hypotension and signs of decreased cardiac output  - Evaluate effectiveness of vasoactive medications to optimize hemodynamic stability  - Monitor arterial and/or venous puncture sites for bleeding and/or hematoma  - Assess quality of pulses, skin color and temperature  - Assess for signs of decreased coronary artery perfusion - ex.  Angina  - Evaluate fluid balance, assess for edema, trend weights  Outcome: Progressing  Goal: Absence of cardiac arrhythmias or at baseline  Description: INTERVENTIONS:  - Continuous cardiac monitoring, monitor vital signs, obtain 12 lead EKG if indicated  - Evaluate effectiveness of antiarrhythmic and heart rate control medications as ordered  - Initiate emergency measures for life threatening arrhythmias  - Monitor electrolytes and administer replacement therapy as ordered  Outcome: Progressing

## 2023-07-14 NOTE — DISCHARGE INSTRUCTIONS
Going Home Instructions    In this section you will find the tools which will guide you through the first few days after you leave the hospital. Continued use of these tools will help you develop the skills necessary to keep your heart failure under control. Home Care Instructions Following Heart Failure - the most important things to do every day include:     Weigh yourself  Take your medicines as prescribed  Limit your sodium (salt) and fluid intake  Know when to call your cardiologist, primary doctor, or nurse  Know when to seek emergency care    Things for You to Remember:   1. See your doctor or healthcare provider. It is important that you attend this appointment to make sure your symptoms are under control. 2. Your recommended sodium intake is 0549-7682 mg daily    3. Limit your fluid intake to no more than 2 liters or 64 ounces per day    4. Some exercise and activity is important to help keep your heart functioning and strong. Unless instructed not to exercise, you may walk at a slow to moderate pace for 10-15 minutes 2-3 days per week to start. Pace your activity to prevent shortness of breath or fatigue. Stop exercise if you develop chest pain, lightheadedness, or significant shortness of breath.        Call Your Cardiologist If:   You gain 2 pounds overnight or 3-4 pounds in 3-5 days  You have more difficulty breathing  You are getting more tired with normal activity  You are more short of breath lying down, or awaken at night short of breath  You have swelling of your feet or legs  You urinate less often during the day and more often at night  You have cramps in your legs  You have blurred vision or see yellowish-green halos around objects of lights    Go to the Emergency Room If:   You have pain or tightness in your chest  You are extremely short of breath  You are coughing up pink-frothy mucus  You are traveling and develop symptoms of worsening heart failure      Sometimes managing your health at home requires assistance. The [facility] team has recognized your preference to use [facility]. They can be reached by phone at [phone]. The fax number for your reference is [phone]. A representative from the home health agency will contact you or your family to schedule your first visit. *Updated outpatient dialysis start date & time: Thursday 7/27 at 9:55am -- please arrive at least 30 minutes early for the first appointment!

## 2023-07-14 NOTE — CONSULTS
Heart Failure Discharge instructions added to patients AVS.    Gustavo Avila MSN, RN  Heart Failure Navigator

## 2023-07-14 NOTE — PLAN OF CARE
A/o x4. Stable on RA but requesting 2L O2 for comfort while she sleeps. VSS. Sugars controlled. Up w/ walker and 1 assist. NSR on tele. Continent and voiding. Updated on plan of care. All questions answered. Problem: Diabetes/Glucose Control  Goal: Glucose maintained within prescribed range  Description: INTERVENTIONS:  - Monitor Blood Glucose as ordered  - Assess for signs and symptoms of hyperglycemia and hypoglycemia  - Administer ordered medications to maintain glucose within target range  - Assess barriers to adequate nutritional intake and initiate nutrition consult as needed  - Instruct patient on self management of diabetes  Outcome: Progressing     Problem: Patient/Family Goals  Goal: Patient/Family Long Term Goal  Description: Patient's Long Term Goal: \"Go home\"    Interventions:  - MD consults as needed  - Diagnostics as needed  - See additional Care Plan goals for specific interventions  Outcome: Progressing  Goal: Patient/Family Short Term Goal  Description: Patient's Short Term Goal: \"Feel better\"    Interventions:   - Diuresis  - Pain meds as needed  - See additional Care Plan goals for specific interventions  Outcome: Progressing     Problem: CARDIOVASCULAR - ADULT  Goal: Maintains optimal cardiac output and hemodynamic stability  Description: INTERVENTIONS:  - Monitor vital signs, rhythm, and trends  - Monitor for bleeding, hypotension and signs of decreased cardiac output  - Evaluate effectiveness of vasoactive medications to optimize hemodynamic stability  - Monitor arterial and/or venous puncture sites for bleeding and/or hematoma  - Assess quality of pulses, skin color and temperature  - Assess for signs of decreased coronary artery perfusion - ex.  Angina  - Evaluate fluid balance, assess for edema, trend weights  Outcome: Progressing  Goal: Absence of cardiac arrhythmias or at baseline  Description: INTERVENTIONS:  - Continuous cardiac monitoring, monitor vital signs, obtain 12 lead EKG if indicated  - Evaluate effectiveness of antiarrhythmic and heart rate control medications as ordered  - Initiate emergency measures for life threatening arrhythmias  - Monitor electrolytes and administer replacement therapy as ordered  Outcome: Progressing

## 2023-07-15 LAB
ANION GAP SERPL CALC-SCNC: 10 MMOL/L (ref 0–18)
BUN BLD-MCNC: 85 MG/DL (ref 7–18)
CALCIUM BLD-MCNC: 8.3 MG/DL (ref 8.5–10.1)
CHLORIDE SERPL-SCNC: 92 MMOL/L (ref 98–112)
CO2 SERPL-SCNC: 23 MMOL/L (ref 21–32)
CREAT BLD-MCNC: 4.36 MG/DL
GFR SERPLBLD BASED ON 1.73 SQ M-ARVRAT: 12 ML/MIN/1.73M2 (ref 60–?)
GLUCOSE BLD-MCNC: 106 MG/DL (ref 70–99)
GLUCOSE BLD-MCNC: 124 MG/DL (ref 70–99)
GLUCOSE BLD-MCNC: 125 MG/DL (ref 70–99)
GLUCOSE BLD-MCNC: 128 MG/DL (ref 70–99)
GLUCOSE BLD-MCNC: 140 MG/DL (ref 70–99)
GLUCOSE BLD-MCNC: 60 MG/DL (ref 70–99)
GLUCOSE BLD-MCNC: 86 MG/DL (ref 70–99)
GLUCOSE BLD-MCNC: 91 MG/DL (ref 70–99)
OSMOLALITY SERPL CALC.SUM OF ELEC: 285 MOSM/KG (ref 275–295)
POTASSIUM SERPL-SCNC: 3.7 MMOL/L (ref 3.5–5.1)
SODIUM SERPL-SCNC: 125 MMOL/L (ref 136–145)

## 2023-07-15 PROCEDURE — 99232 SBSQ HOSP IP/OBS MODERATE 35: CPT | Performed by: HOSPITALIST

## 2023-07-15 PROCEDURE — 99233 SBSQ HOSP IP/OBS HIGH 50: CPT | Performed by: INTERNAL MEDICINE

## 2023-07-15 NOTE — PHYSICAL THERAPY NOTE
PHYSICAL THERAPY EVALUATION - INPATIENT     Room Number: 8532/6626-J  Evaluation Date: 7/15/2023  Type of Evaluation: Initial  Physician Order: PT Eval and Treat    Presenting Problem: shortness of breath; leg and abdominal swelling  Co-Morbidities : diastolic heart failure, CKD, DM2, h/o CVA, HTN, hypothyroidism  Reason for Therapy: Mobility Dysfunction and Discharge Planning    History related to current admission: Patient is a 48year old female admitted on 7/12/2023 from home for shortness of breath and swelling in legs and abdomen that has worsened over past week. Recent admissions:  3/1-3/6/23 - cough, diarrhea - DC rec HHPT; 24 hr care/supervision  8/4-8/11/22 - acute on chronic CHF - DC with HHPT    ASSESSMENT   In this PT evaluation, the patient presents with the following impairments: decreased functional endurance and activity tolerance, decreased strength, decreased balance, impaired posture, and pain rated 9/10 in legs. These impairments and comorbidities manifest themselves as functional limitations in independent bed mobility, transfers, and gait. The patient is below baseline and would benefit from skilled inpatient PT to address the above deficits to assist patient in returning to prior to level of function. Functional outcome measures completed include [facility]. The AM-PAC '6-Clicks' Inpatient Basic Mobility Short Form was completed and this patient is demonstrating a Approx Degree of Impairment: 50.57%  degree of impairment in mobility. Research supports that patients with this level of impairment may benefit from [address] DISCHARGE RECOMMENDATIONS  PT Discharge Recommendations: Home with home health PT;24 hour care/supervision    PLAN  PT Treatment Plan: Bed mobility; Body mechanics; Endurance; Energy conservation;Patient education; Family education;Gait training;Strengthening;Transfer training;Balance training;Stair training  Rehab Potential : Fair  Frequency (Obs): 3-5x/week  Number of Visits to Meet Established Goals: 4      CURRENT GOALS    Goal #1 Patient is able to demonstrate supine - sit EOB @ level: supervision     Goal #2 Patient is able to demonstrate transfers Sit to/from Stand at assistance level: supervision     Goal #3 Patient is able to ambulate 30 feet with assist device: walker - rolling at assistance level: supervision     Goal #4    Goal #5    Goal #6    Goal Comments: Goals established on 7/15/2023    HOME SITUATION  Type of Home: [address]: One level  Stairs to Enter : 4  Railing: Yes  Stairs to Bedroom: 0       Lives With: Family; Spouse;Daughter  Drives: No  Patient Owned Equipment: Rolling walker; Wheelchair;Rollator       Prior Level of Carmine: Pt ambulates short distances with RW. Typically, she is able to ambulate to bathroom and back without assistance. However, pt's  and/or daughter provide assistance when needed for transfers, ambulation, and ADLs. Pt is now staying primarily at daughter's house - 2 steps, rest, then short walk and 1 more step to enter home - as she has a hard time climbing the 4 stairs to enter her home. Pt uses wc for longer distances and when out of home. SUBJECTIVE  Pt states she is tired today. OBJECTIVE  Precautions: Bed/chair alarm  Fall Risk: High fall risk    WEIGHT BEARING RESTRICTION  Weight Bearing Restriction: None                PAIN ASSESSMENT  Ratin  Location: legs  Management Techniques:  Activity promotion;Repositioning;Relaxation    COGNITION  Overall Cognitive Status:  WFL - within functional limits    RANGE OF MOTION AND STRENGTH ASSESSMENT  Upper extremity ROM and strength are within functional limits; slightly weaker on right    Lower extremity ROM is within functional limits     Lower extremity strength is grossly within functional limits except right knee ext 3-/5; right ankle DF 3-/5; right hip flex not fully assessed but pt unable to lift right leg off leg rest in chair during today's session       BALANCE  Static Sitting: Fair  Dynamic Sitting: Fair  Static Standing: Fair -  Dynamic Standing: Poor +    ADDITIONAL TESTS                                    ACTIVITY TOLERANCE                         O2 WALK  Oxygen Therapy  SPO2% on Room Air at Rest: 94  SPO2% on Oxygen at Rest: 98  At rest oxygen flow (liters per minute): 2    NEUROLOGICAL FINDINGS  Neurological Findings: Sensation           Sensation: bilateral feet decreased to light touch         AM-PAC '6-Clicks' INPATIENT SHORT FORM - BASIC MOBILITY  How much difficulty does the patient currently have. .. Patient Difficulty: Turning over in bed (including adjusting bedclothes, sheets and blankets)?: A Little   Patient Difficulty: Sitting down on and standing up from a chair with arms (e.g., wheelchair, bedside commode, etc.): A Little   Patient Difficulty: Moving from lying on back to sitting on the side of the bed?: A Little   How much help from another person does the patient currently need. ..    Help from Another: Moving to and from a bed to a chair (including a wheelchair)?: A Little   Help from Another: Need to walk in hospital room?: A Little   Help from Another: Climbing 3-5 steps with a railing?: A Lot       AM-PAC Score:  Raw Score: 17   Approx Degree of Impairment: 50.57%   Standardized Score (AM-PAC Scale): 42.13   CMS Modifier (G-Code): CK    FUNCTIONAL ABILITY STATUS  Gait Assessment   Functional Mobility/Gait Assessment  Gait Assistance: Contact guard assist  Distance (ft): 2 x 8  Assistive Device: Rolling walker    Skilled Therapy Provided     Bed Mobility:    Supine to sit: min A with HOB elevated   Sit to supine: NT     Transfer Mobility:  Sit to stand: min A   Stand to sit: min A  Gait = Pt ambulated 2 x 8' (to/from bathroom) with RW and min A; cues for upright posture; manual assist to keep walker closer to body    Therapist's Comments: pt lying in bed and agreeable to PT. 2 L/min O2 via NC; per RN, O2 is prn, ok to remove when getting out of bed. Pt  present throughout session and provided assistance to patient with transfers and malina care. PT observed  assisting pt with transfers -  provided safe and appropriate assist. SpO2 94% after ambulating to chair; 2 L/min O2 placed back on pt per pt request. Reviewed set up at home with pt and  and discussed discharge recommendations. Exercise/Education Provided: Body mechanics  Functional activity tolerated  Gait training  Lower therapeutic exercise: Ankle pumps    Patient End of Session: Up in chair;Needs met;Call light within reach;RN aware of session/findings; All patient questions and concerns addressed; Alarm set; Family present      Patient Evaluation Complexity Level:  History High - 3 or more personal factors and/or co-morbidities   Examination of body systems Low - addressing 1-2 elements   Clinical Presentation Low - Stable   Clinical Decision Making Low - Stable       PT Session Time: 31 minutes    Therapeutic Activity: 10 minutes

## 2023-07-15 NOTE — PLAN OF CARE
Assumed care around 1930. A/Ox4. On RA w/ sats >90. Monitor shows NSR. Continent of bowel and bladder. Reports back pain, treated w/ medication. Up 1x walker. Plan to cont IV diuresis. Safety precautions in place, call light within reach, family member at bedside. Problem: Diabetes/Glucose Control  Goal: Glucose maintained within prescribed range  Description: INTERVENTIONS:  - Monitor Blood Glucose as ordered  - Assess for signs and symptoms of hyperglycemia and hypoglycemia  - Administer ordered medications to maintain glucose within target range  - Assess barriers to adequate nutritional intake and initiate nutrition consult as needed  - Instruct patient on self management of diabetes  Outcome: Progressing     Problem: Patient/Family Goals  Goal: Patient/Family Long Term Goal  Description: Patient's Long Term Goal: \"Go home\"    Interventions:  - MD consults as needed  - Diagnostics as needed  - See additional Care Plan goals for specific interventions  Outcome: Progressing  Goal: Patient/Family Short Term Goal  Description: Patient's Short Term Goal: \"Feel better\"    Interventions:   - Diuresis  - Pain meds as needed  - See additional Care Plan goals for specific interventions  Outcome: Progressing     Problem: CARDIOVASCULAR - ADULT  Goal: Maintains optimal cardiac output and hemodynamic stability  Description: INTERVENTIONS:  - Monitor vital signs, rhythm, and trends  - Monitor for bleeding, hypotension and signs of decreased cardiac output  - Evaluate effectiveness of vasoactive medications to optimize hemodynamic stability  - Monitor arterial and/or venous puncture sites for bleeding and/or hematoma  - Assess quality of pulses, skin color and temperature  - Assess for signs of decreased coronary artery perfusion - ex.  Angina  - Evaluate fluid balance, assess for edema, trend weights  Outcome: Progressing  Goal: Absence of cardiac arrhythmias or at baseline  Description: INTERVENTIONS:  - Continuous cardiac monitoring, monitor vital signs, obtain 12 lead EKG if indicated  - Evaluate effectiveness of antiarrhythmic and heart rate control medications as ordered  - Initiate emergency measures for life threatening arrhythmias  - Monitor electrolytes and administer replacement therapy as ordered  Outcome: Progressing

## 2023-07-15 NOTE — PLAN OF CARE
Problem: Diabetes/Glucose Control  Goal: Glucose maintained within prescribed range  Description: INTERVENTIONS:  - Monitor Blood Glucose as ordered  - Assess for signs and symptoms of hyperglycemia and hypoglycemia  - Administer ordered medications to maintain glucose within target range  - Assess barriers to adequate nutritional intake and initiate nutrition consult as needed  - Instruct patient on self management of diabetes  Outcome: Progressing     Problem: Patient/Family Goals  Goal: Patient/Family Long Term Goal  Description: Patient's Long Term Goal: \"Go home\"    Interventions:  - MD consults as needed  - Diagnostics as needed  - See additional Care Plan goals for specific interventions  Outcome: Progressing  Goal: Patient/Family Short Term Goal  Description: Patient's Short Term Goal: \"Feel better\"    Interventions:   - Diuresis  - Pain meds as needed  - See additional Care Plan goals for specific interventions  Outcome: Progressing     Problem: CARDIOVASCULAR - ADULT  Goal: Maintains optimal cardiac output and hemodynamic stability  Description: INTERVENTIONS:  - Monitor vital signs, rhythm, and trends  - Monitor for bleeding, hypotension and signs of decreased cardiac output  - Evaluate effectiveness of vasoactive medications to optimize hemodynamic stability  - Monitor arterial and/or venous puncture sites for bleeding and/or hematoma  - Assess quality of pulses, skin color and temperature  - Assess for signs of decreased coronary artery perfusion - ex.  Angina  - Evaluate fluid balance, assess for edema, trend weights  Outcome: Progressing  Goal: Absence of cardiac arrhythmias or at baseline  Description: INTERVENTIONS:  - Continuous cardiac monitoring, monitor vital signs, obtain 12 lead EKG if indicated  - Evaluate effectiveness of antiarrhythmic and heart rate control medications as ordered  - Initiate emergency measures for life threatening arrhythmias  - Monitor electrolytes and administer replacement therapy as ordered  Outcome: Progressing

## 2023-07-16 LAB
ALBUMIN SERPL-MCNC: 2.9 G/DL (ref 3.4–5)
ALBUMIN/GLOB SERPL: 0.8 {RATIO} (ref 1–2)
ALP LIVER SERPL-CCNC: 74 U/L
ALT SERPL-CCNC: 13 U/L
ANION GAP SERPL CALC-SCNC: 10 MMOL/L (ref 0–18)
AST SERPL-CCNC: 13 U/L (ref 15–37)
BILIRUB SERPL-MCNC: 0.2 MG/DL (ref 0.1–2)
BUN BLD-MCNC: 92 MG/DL (ref 7–18)
CALCIUM BLD-MCNC: 8.1 MG/DL (ref 8.5–10.1)
CHLORIDE SERPL-SCNC: 91 MMOL/L (ref 98–112)
CO2 SERPL-SCNC: 22 MMOL/L (ref 21–32)
CREAT BLD-MCNC: 4.17 MG/DL
GFR SERPLBLD BASED ON 1.73 SQ M-ARVRAT: 12 ML/MIN/1.73M2 (ref 60–?)
GLOBULIN PLAS-MCNC: 3.7 G/DL (ref 2.8–4.4)
GLUCOSE BLD-MCNC: 108 MG/DL (ref 70–99)
GLUCOSE BLD-MCNC: 127 MG/DL (ref 70–99)
GLUCOSE BLD-MCNC: 159 MG/DL (ref 70–99)
GLUCOSE BLD-MCNC: 171 MG/DL (ref 70–99)
GLUCOSE BLD-MCNC: 192 MG/DL (ref 70–99)
OSMOLALITY SERPL CALC.SUM OF ELEC: 288 MOSM/KG (ref 275–295)
POTASSIUM SERPL-SCNC: 3.7 MMOL/L (ref 3.5–5.1)
PROT SERPL-MCNC: 6.6 G/DL (ref 6.4–8.2)
SODIUM SERPL-SCNC: 123 MMOL/L (ref 136–145)
VITAMIN B1 WHOLE BLD: 106.7 NMOL/L

## 2023-07-16 PROCEDURE — 99233 SBSQ HOSP IP/OBS HIGH 50: CPT | Performed by: INTERNAL MEDICINE

## 2023-07-16 PROCEDURE — 99232 SBSQ HOSP IP/OBS MODERATE 35: CPT | Performed by: HOSPITALIST

## 2023-07-16 RX ORDER — TOLVAPTAN 15 MG/1
15 TABLET ORAL ONCE
Status: COMPLETED | OUTPATIENT
Start: 2023-07-16 | End: 2023-07-16

## 2023-07-16 NOTE — PLAN OF CARE
Received patient at 1. Patient A/O x 4. NSR on tele. O2 saturation 98% on RA. No C/O chest pain or SOB. Right extended in place with continuous bumex drip infusing. Pt tolerating well. Patient voiding with no issue. Daily weights. Bed is locked and in low position. Call light and personal items within reach. All needs met at this time      Pt refusing standing weight this morning and would like to get it when she's ready to use the restroom. .    Problem: Diabetes/Glucose Control  Goal: Glucose maintained within prescribed range  Description: INTERVENTIONS:  - Monitor Blood Glucose as ordered  - Assess for signs and symptoms of hyperglycemia and hypoglycemia  - Administer ordered medications to maintain glucose within target range  - Assess barriers to adequate nutritional intake and initiate nutrition consult as needed  - Instruct patient on self management of diabetes  Outcome: Progressing     Problem: Patient/Family Goals  Goal: Patient/Family Long Term Goal  Description: Patient's Long Term Goal: \"Go home\"    Interventions:  - MD consults as needed  - Diagnostics as needed  - See additional Care Plan goals for specific interventions  Outcome: Progressing  Goal: Patient/Family Short Term Goal  Description: Patient's Short Term Goal: \"Feel better\"    Interventions:   - Diuresis  - Pain meds as needed  - See additional Care Plan goals for specific interventions  Outcome: Progressing     Problem: CARDIOVASCULAR - ADULT  Goal: Maintains optimal cardiac output and hemodynamic stability  Description: INTERVENTIONS:  - Monitor vital signs, rhythm, and trends  - Monitor for bleeding, hypotension and signs of decreased cardiac output  - Evaluate effectiveness of vasoactive medications to optimize hemodynamic stability  - Monitor arterial and/or venous puncture sites for bleeding and/or hematoma  - Assess quality of pulses, skin color and temperature  - Assess for signs of decreased coronary artery perfusion - ex. Angina  - Evaluate fluid balance, assess for edema, trend weights  Outcome: Progressing  Goal: Absence of cardiac arrhythmias or at baseline  Description: INTERVENTIONS:  - Continuous cardiac monitoring, monitor vital signs, obtain 12 lead EKG if indicated  - Evaluate effectiveness of antiarrhythmic and heart rate control medications as ordered  - Initiate emergency measures for life threatening arrhythmias  - Monitor electrolytes and administer replacement therapy as ordered  Outcome: Progressing

## 2023-07-17 ENCOUNTER — APPOINTMENT (OUTPATIENT)
Dept: GENERAL RADIOLOGY | Facility: HOSPITAL | Age: 53
DRG: 291 | End: 2023-07-17
Attending: RADIOLOGY
Payer: COMMERCIAL

## 2023-07-17 ENCOUNTER — APPOINTMENT (OUTPATIENT)
Dept: INTERVENTIONAL RADIOLOGY/VASCULAR | Facility: HOSPITAL | Age: 53
DRG: 291 | End: 2023-07-17
Attending: INTERNAL MEDICINE
Payer: COMMERCIAL

## 2023-07-17 PROBLEM — N18.6 ESRD (END STAGE RENAL DISEASE) (HCC): Status: ACTIVE | Noted: 2023-07-17

## 2023-07-17 LAB
ALBUMIN SERPL-MCNC: 2.9 G/DL (ref 3.4–5)
ALBUMIN/GLOB SERPL: 0.8 {RATIO} (ref 1–2)
ALP LIVER SERPL-CCNC: 76 U/L
ALT SERPL-CCNC: 16 U/L
ANION GAP SERPL CALC-SCNC: 12 MMOL/L (ref 0–18)
AST SERPL-CCNC: 16 U/L (ref 15–37)
BILIRUB SERPL-MCNC: 0.2 MG/DL (ref 0.1–2)
BUN BLD-MCNC: 91 MG/DL (ref 7–18)
CALCIUM BLD-MCNC: 8.1 MG/DL (ref 8.5–10.1)
CHLORIDE SERPL-SCNC: 89 MMOL/L (ref 98–112)
CO2 SERPL-SCNC: 19 MMOL/L (ref 21–32)
CREAT BLD-MCNC: 4.69 MG/DL
GFR SERPLBLD BASED ON 1.73 SQ M-ARVRAT: 11 ML/MIN/1.73M2 (ref 60–?)
GLOBULIN PLAS-MCNC: 3.8 G/DL (ref 2.8–4.4)
GLUCOSE BLD-MCNC: 112 MG/DL (ref 70–99)
GLUCOSE BLD-MCNC: 116 MG/DL (ref 70–99)
GLUCOSE BLD-MCNC: 80 MG/DL (ref 70–99)
GLUCOSE BLD-MCNC: 83 MG/DL (ref 70–99)
GLUCOSE BLD-MCNC: 93 MG/DL (ref 70–99)
HBV SURFACE AG SER-ACNC: <0.1 [IU]/L
HBV SURFACE AG SERPL QL IA: NONREACTIVE
OSMOLALITY SERPL CALC.SUM OF ELEC: 279 MOSM/KG (ref 275–295)
POTASSIUM SERPL-SCNC: 3.9 MMOL/L (ref 3.5–5.1)
PROT SERPL-MCNC: 6.7 G/DL (ref 6.4–8.2)
SODIUM SERPL-SCNC: 120 MMOL/L (ref 136–145)

## 2023-07-17 PROCEDURE — 05HM33Z INSERTION OF INFUSION DEVICE INTO RIGHT INTERNAL JUGULAR VEIN, PERCUTANEOUS APPROACH: ICD-10-PCS | Performed by: INTERNAL MEDICINE

## 2023-07-17 PROCEDURE — B543ZZA ULTRASONOGRAPHY OF RIGHT JUGULAR VEINS, GUIDANCE: ICD-10-PCS | Performed by: INTERNAL MEDICINE

## 2023-07-17 PROCEDURE — 99232 SBSQ HOSP IP/OBS MODERATE 35: CPT | Performed by: HOSPITALIST

## 2023-07-17 PROCEDURE — 5A1D70Z PERFORMANCE OF URINARY FILTRATION, INTERMITTENT, LESS THAN 6 HOURS PER DAY: ICD-10-PCS | Performed by: INTERNAL MEDICINE

## 2023-07-17 PROCEDURE — 99233 SBSQ HOSP IP/OBS HIGH 50: CPT | Performed by: INTERNAL MEDICINE

## 2023-07-17 PROCEDURE — 71045 X-RAY EXAM CHEST 1 VIEW: CPT | Performed by: RADIOLOGY

## 2023-07-17 RX ORDER — MIDODRINE HYDROCHLORIDE 5 MG/1
10 TABLET ORAL ONCE
Status: COMPLETED | OUTPATIENT
Start: 2023-07-17 | End: 2023-07-17

## 2023-07-17 RX ORDER — HEPARIN SODIUM 5000 [USP'U]/ML
INJECTION, SOLUTION INTRAVENOUS; SUBCUTANEOUS
Status: COMPLETED
Start: 2023-07-17 | End: 2023-07-17

## 2023-07-17 RX ORDER — LOSARTAN POTASSIUM 100 MG/1
100 TABLET ORAL DAILY
Status: DISCONTINUED | OUTPATIENT
Start: 2023-07-17 | End: 2023-07-26

## 2023-07-17 RX ORDER — LIDOCAINE HYDROCHLORIDE 10 MG/ML
INJECTION, SOLUTION INFILTRATION; PERINEURAL
Status: COMPLETED
Start: 2023-07-17 | End: 2023-07-17

## 2023-07-17 RX ORDER — BUMETANIDE 2 MG/1
2 TABLET ORAL
Status: DISCONTINUED | OUTPATIENT
Start: 2023-07-17 | End: 2023-07-26

## 2023-07-17 RX ORDER — ALBUMIN (HUMAN) 12.5 G/50ML
25 SOLUTION INTRAVENOUS
Status: DISCONTINUED | OUTPATIENT
Start: 2023-07-17 | End: 2023-07-26

## 2023-07-17 RX ORDER — CLONIDINE 0.1 MG/24H
1 PATCH, EXTENDED RELEASE TRANSDERMAL WEEKLY
Status: DISCONTINUED | OUTPATIENT
Start: 2023-07-17 | End: 2023-07-18

## 2023-07-17 RX ORDER — TOLVAPTAN 15 MG/1
15 TABLET ORAL ONCE
Status: COMPLETED | OUTPATIENT
Start: 2023-07-17 | End: 2023-07-17

## 2023-07-17 NOTE — PHYSICAL THERAPY NOTE
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 Pt relies upon spouse for assistance with bed mobility and transfers, refusing to attempt without assistance. DISCHARGE RECOMMENDATIONS  PT Discharge Recommendations: Home with home health PT;24 hour care/supervision     PLAN  PT Treatment Plan: Bed mobility; Body mechanics; Endurance; Energy conservation;Patient education; Family education;Gait training;Strengthening;Transfer training;Balance training;Stair training  Rehab Potential : Fair  Frequency (Obs): 3-5x/week    CURRENT GOALS     Goal #1 Patient is able to demonstrate supine - sit EOB @ level: supervision      Goal #2 Patient is able to demonstrate transfers Sit to/from Stand at assistance level: supervision      Goal #3 Patient is able to ambulate 30 feet with assist device: walker - rolling at assistance level: supervision      Goal #4     Goal #5     Goal #6     Goal Comments: Goals established on 7/15/2023    2023 all goals ongoing      SUBJECTIVE  \"My legs are so swollen and painful right now. \"    OBJECTIVE  Precautions: Bed/chair alarm    WEIGHT BEARING RESTRICTION  Weight Bearing Restriction: None                PAIN ASSESSMENT   Ratin  Location: legs  Management Techniques:  Activity promotion;Repositioning;Relaxation    BALANCE                                                                                                                       Static Sitting: Fair  Dynamic Sitting: Fair           Static Standing: Poor +  Dynamic Standing: Poor +      AM-PAC '6-Clicks' INPATIENT SHORT FORM - BASIC MOBILITY  How much difficulty does the patient currently have. .. Patient Difficulty: Turning over in bed (including adjusting bedclothes, sheets and blankets)?: A Little   Patient Difficulty: Sitting down on and standing up from a chair with arms (e.g., wheelchair, bedside commode, etc.): A Little   Patient Difficulty: Moving from lying on back to sitting on the side of the bed?: A Little   How much help from another person does the patient currently need. .. Help from Another: Moving to and from a bed to a chair (including a wheelchair)?: A Little   Help from Another: Need to walk in hospital room?: A Little   Help from Another: Climbing 3-5 steps with a railing?: A Lot       AM-PAC Score:  Raw Score: 17   Approx Degree of Impairment: 50.57%   Standardized Score (AM-PAC Scale): 42.13   CMS Modifier (G-Code): CK    FUNCTIONAL ABILITY STATUS  Gait Assessment   Functional Mobility/Gait Assessment  Gait Assistance: Contact guard assist  Distance (ft): 15x2  Assistive Device: Rolling walker  Pattern:  (shoulder elevation, decreased step length and clearance, decreased heel-toe off)    Skilled Therapy Provided    Bed Mobility:  Rolling: NT  Supine>Sit: Pt elevates HOB to greater than 45 degrees, pt's spouse assists pt to EOB, hand held assist   Sit>Supine: NT     Transfer Mobility:  Sit<>Stand: Pt's spouse assists pt to stand, Min A  Stand<>Sit: Pt's spouse assists pt to sit, Min A  Gait: CGA, pt's spouse follows with chair at pt request.    Therapist's Comments: Pt provided verbal cues for shoulder relaxation. Pt able to correct initially, but unable to maintain correction. Pt encouraged to attempt sit<>stand for exercise from chair, but pt reports, \"I'll do exercises sitting, but I can't stand. \"      THERAPEUTIC EXERCISES  Lower Extremity Alternating marching  Heel raises  Hip adduction squeezes  LAQ  Toe raises     Upper Extremity      Position Sitting     Repetitions   x10 BLE   Sets   x1     Patient End of Session: Up in chair;Needs met;Call light within reach;RN aware of session/findings; All patient questions and concerns addressed; Family present; Alarm set    PT Session Time: 25 minutes  Gait Trainin minutes  Therapeutic Activity: 5 minutes  Therapeutic Exercise: 8 minutes

## 2023-07-17 NOTE — HOME CARE LIAISON
Received referral via [facility] for Home Health services. Patient has a history with [facility] [address] [facility]. Spoke w/ patient and her  and provided with list of [facility] providers from [facility], choice is [facility] [address] [facility] again. Agency reserved in [facility] and contact information placed on AVS.Financial interest disclosure provided.  Notified [facility]

## 2023-07-17 NOTE — PLAN OF CARE
Received pt at 0700. A/Ox4. Spouse at bedside. Placed NPO until determination of port for hemodialysis. NSR on monitor, room air. Lung sounds diminished bilaterally. Active bowel sounds to all 4 quadrants. Ambulates to bathroom with 1 and walker. Prominent edema present to both lower extremities and feet. Plan of care explained to patient and spouse, express understanding. Call light within reach. 1512 - temp port cath inserted    1430 - significant bleeding from cath site. Quick clot applied. Checked q15 min. Pt instructed to limit movement to lessen chances of re-bleed. Bleeding controlled. 1840 - bleeding again, manual pressure applied for 15 min. Pt encouraged again to restrain from large movements (reaching, bending neck). Bleeding controlled.     Problem: Diabetes/Glucose Control  Goal: Glucose maintained within prescribed range  Description: INTERVENTIONS:  - Monitor Blood Glucose as ordered  - Assess for signs and symptoms of hyperglycemia and hypoglycemia  - Administer ordered medications to maintain glucose within target range  - Assess barriers to adequate nutritional intake and initiate nutrition consult as needed  - Instruct patient on self management of diabetes  Outcome: Progressing     Problem: Patient/Family Goals  Goal: Patient/Family Long Term Goal  Description: Patient's Long Term Goal: \"Go home\"    Interventions:  - MD consults as needed  - Diagnostics as needed  - See additional Care Plan goals for specific interventions  Outcome: Progressing  Goal: Patient/Family Short Term Goal  Description: Patient's Short Term Goal: \"Feel better\"    Interventions:   - Diuresis  - Pain meds as needed  - See additional Care Plan goals for specific interventions  Outcome: Progressing     Problem: CARDIOVASCULAR - ADULT  Goal: Maintains optimal cardiac output and hemodynamic stability  Description: INTERVENTIONS:  - Monitor vital signs, rhythm, and trends  - Monitor for bleeding, hypotension and signs of decreased cardiac output  - Evaluate effectiveness of vasoactive medications to optimize hemodynamic stability  - Monitor arterial and/or venous puncture sites for bleeding and/or hematoma  - Assess quality of pulses, skin color and temperature  - Assess for signs of decreased coronary artery perfusion - ex.  Angina  - Evaluate fluid balance, assess for edema, trend weights  Outcome: Progressing  Goal: Absence of cardiac arrhythmias or at baseline  Description: INTERVENTIONS:  - Continuous cardiac monitoring, monitor vital signs, obtain 12 lead EKG if indicated  - Evaluate effectiveness of antiarrhythmic and heart rate control medications as ordered  - Initiate emergency measures for life threatening arrhythmias  - Monitor electrolytes and administer replacement therapy as ordered  Outcome: Progressing

## 2023-07-17 NOTE — CM/SW NOTE
ADONIS received order to arrange outpatient dialysis stating '[address] to start next week.' Chart reviewed, per nephrology note, anticipate daily HD. ADONIS messaged nephrologist Dr. Bo Valdez to clarify HD needs at discharge. Per Dr. Bo Valdez, pt will need daily HD inpatient due to fluid overload, but will transition to usual 3x/week HD schedule outpatient. Temporary HD cath to be placed today. ADONIS met with pt and pt's spouse at bedside. SW inquired on preferred HD schedule and chair time. Pt's spouse stated he works M-Th until 4:30pm. Pt's spouse stated he is off on Fridays and would be able to transport pt to/from dialysis. Pt's spouse requested SW speak with their dtr Haley regarding preferred HD schedule and chair time. Pt called dtr Haley while SW in the room to discuss HD. Pt stated her dtr drops off her son at school in the mornings, but is able to accommodate a chair time after 11am. ADONIS informed pt preferences will be added to referral, but the clinic will assign a chair time and schedule. ADONIS informed pt chair time and schedule can be changed after pt has had a few sessions at HD clinic. Pt's spouse stated he will be able to  pt in the afternoons after HD if their dtr is able to drop off. Pt stated she will discuss transport to/from dialysis with her dtr, but confirmed MWF schedule with chair time beginning after 11am is preferred. [facility] clinic located off [address] [facility] is - [address], [place], [facility] [address]. ADONIS initiated referral for outpatient HD through [facility] online portal and faxed required documents to [phone]. ADONIS messaged RN to order Hep B panel. ADONIS will fax procedure report for permacath, HD flowsheets, and Hep B panel to [facility] once available. ADONIS will continue to follow.      HUSSAIN Zapata  Discharge Planner

## 2023-07-17 NOTE — CM/SW NOTE
SW noted PT recommendation for [facility] services. SW received order for PHQ-4. [facility] referrals sent in [address] [place] [address] choice list will be provided once available. SW will continue to follow. Per PT eval:  HOME SITUATION  Type of Home: House   Home Layout: One level  Stairs to Enter : 4  Railing: Yes  Stairs to Bedroom: 0     Lives With: Family; Spouse;Daughter  Drives: No  Patient Owned Equipment: Rolling walker; Wheelchair;Rollator     Prior Level of [place]: Pt ambulates short distances with RW. Typically, she is able to ambulate to bathroom and back without assistance. However, pt's  and/or daughter provide assistance when needed for transfers, ambulation, and ADLs. Pt is now staying primarily at daughter's house - 2 steps, rest, then short walk and 1 more step to enter home - as she has a hard time climbing the 4 stairs to enter her home. Pt uses wc for longer distances and when out of home. Addendum: Notified by [facility] liaison [facility] can accept for [facility] services and will f/u with pt for [facility] choice. SW met with pt and pt's spouse at bedside. Discussed PT recommendation for [facility], pt stated she will think about it, uncertain if she would like  at this time. Pt's spouse confirmed they live with their dtr in [place]. Discussed PHQ-4 and offered [facility] resources, pt declined. SW will continue to remain available.      HUSSAIN Long  Discharge Planner

## 2023-07-17 NOTE — PLAN OF CARE
Received patient at 1. Patient A/O x 4. NSR on tele. O2 saturation 98%. Breath sounds clear. No C/O chest pain or SOB. Patient voiding with no issue. Bed is locked and in low position. Call light and personal items within reach. All needs met at this time.  Spouse here at bedside

## 2023-07-18 PROBLEM — N18.5 CKD (CHRONIC KIDNEY DISEASE) STAGE 5, GFR LESS THAN 15 ML/MIN (HCC): Status: ACTIVE | Noted: 2023-07-18

## 2023-07-18 LAB
ANION GAP SERPL CALC-SCNC: 8 MMOL/L (ref 0–18)
BUN BLD-MCNC: 74 MG/DL (ref 7–18)
CALCIUM BLD-MCNC: 8.2 MG/DL (ref 8.5–10.1)
CHLORIDE SERPL-SCNC: 93 MMOL/L (ref 98–112)
CO2 SERPL-SCNC: 24 MMOL/L (ref 21–32)
CREAT BLD-MCNC: 4 MG/DL
ERYTHROCYTE [DISTWIDTH] IN BLOOD BY AUTOMATED COUNT: 14.7 %
GFR SERPLBLD BASED ON 1.73 SQ M-ARVRAT: 13 ML/MIN/1.73M2 (ref 60–?)
GLUCOSE BLD-MCNC: 132 MG/DL (ref 70–99)
GLUCOSE BLD-MCNC: 169 MG/DL (ref 70–99)
GLUCOSE BLD-MCNC: 234 MG/DL (ref 70–99)
GLUCOSE BLD-MCNC: 80 MG/DL (ref 70–99)
GLUCOSE BLD-MCNC: 90 MG/DL (ref 70–99)
GLUCOSE BLD-MCNC: 96 MG/DL (ref 70–99)
HCT VFR BLD AUTO: 22.2 %
HGB BLD-MCNC: 7.5 G/DL
MCH RBC QN AUTO: 28.5 PG (ref 26–34)
MCHC RBC AUTO-ENTMCNC: 33.8 G/DL (ref 31–37)
MCV RBC AUTO: 84.4 FL
OSMOLALITY SERPL CALC.SUM OF ELEC: 281 MOSM/KG (ref 275–295)
PLATELET # BLD AUTO: 261 10(3)UL (ref 150–450)
POTASSIUM SERPL-SCNC: 3.8 MMOL/L (ref 3.5–5.1)
RBC # BLD AUTO: 2.63 X10(6)UL
SODIUM SERPL-SCNC: 125 MMOL/L (ref 136–145)
WBC # BLD AUTO: 8.7 X10(3) UL (ref 4–11)

## 2023-07-18 PROCEDURE — 99233 SBSQ HOSP IP/OBS HIGH 50: CPT | Performed by: INTERNAL MEDICINE

## 2023-07-18 PROCEDURE — 90792 PSYCH DIAG EVAL W/MED SRVCS: CPT | Performed by: OTHER

## 2023-07-18 PROCEDURE — 99233 SBSQ HOSP IP/OBS HIGH 50: CPT | Performed by: HOSPITALIST

## 2023-07-18 RX ORDER — HEPARIN SODIUM 1000 [USP'U]/ML
5000 INJECTION, SOLUTION INTRAVENOUS; SUBCUTANEOUS
Status: CANCELLED | OUTPATIENT
Start: 2023-07-18

## 2023-07-18 RX ORDER — HEPARIN SODIUM 1000 [USP'U]/ML
5000 INJECTION, SOLUTION INTRAVENOUS; SUBCUTANEOUS
Status: DISCONTINUED | OUTPATIENT
Start: 2023-07-18 | End: 2023-07-26

## 2023-07-18 NOTE — CONSULTS
[facility]  Report of Psychiatric Consultation    Kumar Soliz Patient Status:  Inpatient    1970 MRN [id number]   [facility] 2NE-A Attending Mike Carr MD   Hosp Day # 4 PCP Guillermina Ortega MD     Date of Admission: 23  Date of Consult: 23  Reason for Consultation: Depression    Impression: She is coping remarkably well with with her health issues and the need for HD. She is resilient and has good family support. Her family (especially the 2 grandchildren) and her spiritual tiki give her meaning. Primary Psychiatric Diagnosis:  Major depressive disorder, recurrent, moderate, in partial remission. 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 Pertinent Medical Diagnoses:  Acute on chronic HFpEF. UNIQUE on CKD. HTN. DM2. HLD. Morbid obesity. Hx CVA with residual right sided weakness. Recommendations:  1) Continue Wellbutrin XL 150mg daily for depression. 2) She declines referrals for individual psychotherapists. She talks to her  and daughter for support. 3) IF her depression worsens in the future and she develops anhedonia and hopeless feelings or suicidal ideation, the Wellbutrin XL should be increased to 300mg daily at that time and she should start individual psychotherapy. Lizeth Sanchez MD  2023  3:13 PM    History of Present Illness:  49 yo female with DM2 and HTN and CVA was admitted for treatment of acute on chronic CHF and UNIQUE on CKD. She is requiring HD now. She has a hx of depressive symptoms when her health issues worsened the past several years. She has residual Rt sided weakness from her CVA and uses a walker to ambulate. She has RIVERA due to deconditioning/obesity/CHF that worsens when she is fluid overloaded. She feels that her quality of life has decreased due to her physical limitations. She feels fatigued all the time.  Not being able to play physically with her grandsons (age 3 and 10) or help around the house makes her feel useless at times. Currently, she feels tired and frustrated and depressed and helpless, but not hopeless or worthless. She has no suicidal ideation. She has fair appetite and concentration and sleep. Psychiatry Review Systems: No voices or visions. No elevated mood, racing thoughts, decreased need for sleep, grandiose thoughts. Past Psychiatric History: Major depressive disorder. Substance Use History: None    Psych Family History: 2 grandson's with autistic spectrum disorder. The 3 yr old is non-verbal.     Social and Developmental History: She lives with her  and her daughter's family (including the 2 grandchildren, ages 3 and 10). She used to work in housekeeping. Supportive family. Past Medical History:   Diagnosis Date    Diabetes ([address]     Essential hypertension     High blood pressure     High cholesterol     Hyperlipidemia     Thyroid disease      Past Surgical History:   Procedure Laterality Date    TUBAL LIGATION       Family History   Problem Relation Age of Onset    Cancer Maternal Grandmother     Diabetes Maternal Grandfather     Hypertension Sister     Heart Disorder Sister     Diabetes Other       reports that she has never smoked. She has never used smokeless tobacco. She reports that she does not drink alcohol and does not use drugs.     Allergies:  No Known Allergies    Medications:    Current Facility-Administered Medications:     [START ON 7/19/2023] epoetin nile (Epogen, Procrit) 86570 UNIT/ML injection 10,000 Units, 10,000 Units, Intravenous, Once in dialysis    heparin (Porcine) 1000 UNIT/ML injection 5,000 Units, 5,000 Units, Intravenous, PRN Dialysis    [START ON 7/19/2023] insulin detemir (Levemir) 100 UNIT/ML FlexPen/FlexTouch 40 Units, 40 Units, Subcutaneous, Daily    losartan (Cozaar) tab 100 mg, 100 mg, Oral, Daily    bumetanide (Bumex) tab 2 mg, 2 mg, Oral, BID (Diuretic)    albumin human (Albuminar) 25% injection 25 g, 25 g, Intravenous, PRN Dialysis    aspirin DR tab 325 mg, 325 mg, Oral, Daily    buPROPion ER (Wellbutrin XL) 24 hr tab 150 mg, 150 mg, Oral, Daily    carvedilol (Coreg) tab 37.5 mg, 37.5 mg, Oral, BID with meals    [Held by provider] clopidogrel (Plavix) tab 75 mg, 75 mg, Oral, Daily    HYDROcodone-acetaminophen (Norco)  MG per tab 1 tablet, 1 tablet, Oral, Q6H PRN    hydrOXYzine (Atarax) tab 25 mg, 25 mg, Oral, TID PRN    levothyroxine (Synthroid) tab 75 mcg, 75 mcg, Oral, Before breakfast    levothyroxine (Synthroid) tab 200 mcg, 200 mcg, Oral, Before breakfast    pantoprazole (Protonix) DR tab 40 mg, 40 mg, Oral, QAM AC    pravastatin (Pravachol) tab 20 mg, 20 mg, Oral, Nightly    glucose (Dex4) 15 GM/59ML oral liquid 15 g, 15 g, Oral, Q15 Min PRN **OR** glucose (Glutose) 40% oral gel 15 g, 15 g, Oral, Q15 Min PRN **OR** glucose-vitamin C (Dex-4) chewable tab 4 tablet, 4 tablet, Oral, Q15 Min PRN **OR** dextrose 50% injection 50 mL, 50 mL, Intravenous, Q15 Min PRN **OR** glucose (Dex4) 15 GM/59ML oral liquid 30 g, 30 g, Oral, Q15 Min PRN **OR** glucose (Glutose) 40% oral gel 30 g, 30 g, Oral, Q15 Min PRN **OR** glucose-vitamin C (Dex-4) chewable tab 8 tablet, 8 tablet, Oral, Q15 Min PRN    insulin aspart (NovoLOG) 100 Units/mL FlexPen 1-10 Units, 1-10 Units, Subcutaneous, TID AC and HS    insulin aspart (NovoLOG) 100 Units/mL FlexPen 1-68 Units, 1-68 Units, Subcutaneous, TID CC    acetaminophen (Tylenol Extra Strength) tab 500 mg, 500 mg, Oral, Q4H PRN    ondansetron (Zofran) 4 MG/2ML injection 4 mg, 4 mg, Intravenous, Q6H PRN    polyethylene glycol (PEG 3350) (Miralax) 17 g oral packet 17 g, 17 g, Oral, Daily PRN    sennosides (Senokot) tab 17.2 mg, 17.2 mg, Oral, Nightly PRN    bisacodyl (Dulcolax) 10 MG rectal suppository 10 mg, 10 mg, Rectal, Daily PRN    gabapentin (Neurontin) cap 400 mg, 400 mg, Oral, BID    heparin (Porcine) 5000 UNIT/ML injection 5,000 Units, 5,000 Units, Subcutaneous, 2 times per day    Review of Systems   Constitutional:  Positive for malaise/fatigue. Psychiatric/Behavioral:  Positive for depression. Negative for suicidal ideas. The patient is nervous/anxious.       Mental Status Exam:     Objective       07/18/23  1330   BP: 154/64   Pulse: 67   Resp:    Temp:      Appearance: fair grooming  Behavior: normal psychomotor  Attitude: cooperative and consistent  Gait: not observed    Speech: normal rate, rhythm and volume    Mood: depressed and anxious  Affect: Congruent    Thought process: logical and sequential  Thought content: no delusions, obsessions, or other thought abnormalities  Perceptions: no hallucinations  Associations: Intact    Orientation: Alert and oriented x 4  Attention and Concentration: focused and attentive  Memory:  intact immediate, recent, remote  Language: Intact naming and repetition  Fund of Knowledge: Able to recite name of [place] president    Insight: fair  Judgment: fair    Laboratory Data:  Lab Results   Component Value Date    WBC 8.7 07/18/2023    HGB 7.5 07/18/2023    HCT 22.2 07/18/2023    .0 07/18/2023    CREATSERUM 4.00 07/18/2023    BUN 74 07/18/2023     07/18/2023    K 3.8 07/18/2023    CL 93 07/18/2023    CO2 24.0 07/18/2023    GLU 90 07/18/2023    CA 8.2 07/18/2023    PGLU 132 07/18/2023

## 2023-07-18 NOTE — CM/SW NOTE
Spoke with Byron Jackson [phone] from [facility] to follow up on pending dialysis referral. Confirmed MWF schedule with chair time beginning after 11am is preferred at the [facility] clinic located off [facility] [address], [place], [address]. Mariana will call back with an update. SW/CM to remain available for support and/or discharge planning. Addendum 11:53am: Spoke with Mariana from [facility], she states the only available chair time at the [facility] ) location is TTS at 1:40pm.    Spoke with patient's daughter Esdras Iglesias to provide update. She requested we try for a MWF chair time at the [facility] location, [address]. If they do not have a MWF chair time available she would consider the other location since it is a little closer. Spoke with Mariana from [facility] to provide update, await call back. 2:50pm: Spoke with Mariana from [facility] again, she shared that the [facility] location has the same chair time available as the other [place] location. Spoke with Selma regarding options, she chose to reserve chair time at the [facility] ) location is TTS at 1:40pm.    Spoke with Mariana again and confirmed location. Anticipate start date Tuesday 7/25. Discussed perma cath placement on Friday 7/21. Will need to send flowsheets, hep b panel/profile (only hep b surface lab done), and perma cath document once available. Await chair time confirmation letter.       Selma's Address(patient stays with her M-F while her  is working):  [address], [address]  Discharge [address] [facility]

## 2023-07-18 NOTE — PLAN OF CARE
Pt A&OX4. On RA. NSR on tele. Pt tolerated the HD well. 3L out put. Continent  Bowel & bladder, last BM 7/17, Up X1 with walker. Pain on lower back and BLE, relieved by PRN pain meds . No complaints of SOB or CP. Plan of care discussed w/t pt. Call light with in reach. Fall precaution in place. All needs met at this time. POC- HD tomorrow      Problem: Diabetes/Glucose Control  Goal: Glucose maintained within prescribed range  Description: INTERVENTIONS:  - Monitor Blood Glucose as ordered  - Assess for signs and symptoms of hyperglycemia and hypoglycemia  - Administer ordered medications to maintain glucose within target range  - Assess barriers to adequate nutritional intake and initiate nutrition consult as needed  - Instruct patient on self management of diabetes  Outcome: Progressing     Problem: Patient/Family Goals  Goal: Patient/Family Long Term Goal  Description: Patient's Long Term Goal: \"Go home\"    Interventions:  - MD consults as needed  - Diagnostics as needed  - See additional Care Plan goals for specific interventions  Outcome: Progressing  Goal: Patient/Family Short Term Goal  Description: Patient's Short Term Goal: \"Feel better\"    Interventions:   - Diuresis  - Pain meds as needed  - See additional Care Plan goals for specific interventions  Outcome: Progressing     Problem: CARDIOVASCULAR - ADULT  Goal: Maintains optimal cardiac output and hemodynamic stability  Description: INTERVENTIONS:  - Monitor vital signs, rhythm, and trends  - Monitor for bleeding, hypotension and signs of decreased cardiac output  - Evaluate effectiveness of vasoactive medications to optimize hemodynamic stability  - Monitor arterial and/or venous puncture sites for bleeding and/or hematoma  - Assess quality of pulses, skin color and temperature  - Assess for signs of decreased coronary artery perfusion - ex.  Angina  - Evaluate fluid balance, assess for edema, trend weights  Outcome: Progressing  Goal: Absence of cardiac arrhythmias or at baseline  Description: INTERVENTIONS:  - Continuous cardiac monitoring, monitor vital signs, obtain 12 lead EKG if indicated  - Evaluate effectiveness of antiarrhythmic and heart rate control medications as ordered  - Initiate emergency measures for life threatening arrhythmias  - Monitor electrolytes and administer replacement therapy as ordered  Outcome: Progressing

## 2023-07-18 NOTE — PROGRESS NOTES
PSYCH CONSULT    Date of Admission: 7/12/23  Date of Consult: 7/18/23  Reason for Consultation: Depression    Impression: She is coping remarkably well with with her health issues and the need for HD. She is resilient and has good family support. Her family (especially the 2 grandchildren) and her spiritual tiki give her meaning. Primary Psychiatric Diagnosis:  Major depressive disorder, recurrent, moderate, in partial remission. 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 Pertinent Medical Diagnoses:  Acute on chronic HFpEF. UNIQUE on CKD. HTN. DM2. HLD. Morbid obesity. Hx CVA with residual right sided weakness. Recommendations:  1) Continue Wellbutrin XL 150mg daily for depression. 2) She declines referrals for individual psychotherapists. She talks to her  and daughter for support. 3) IF her depression worsens in the future and she develops anhedonia and hopeless feelings or suicidal ideation, the Wellbutrin XL should be increased to 300mg daily at that time and she should start individual psychotherapy. Full note to follow.     Dr. Wood Kaylene

## 2023-07-19 PROBLEM — F33.1 MAJOR DEPRESSIVE DISORDER, RECURRENT EPISODE, MODERATE (HCC): Status: ACTIVE | Noted: 2023-07-19

## 2023-07-19 PROBLEM — E11.21 DIABETIC NEPHROPATHY ASSOCIATED WITH TYPE 2 DIABETES MELLITUS (HCC): Status: ACTIVE | Noted: 2023-07-19

## 2023-07-19 LAB
ANION GAP SERPL CALC-SCNC: 7 MMOL/L (ref 0–18)
BUN BLD-MCNC: 53 MG/DL (ref 7–18)
CALCIUM BLD-MCNC: 8.6 MG/DL (ref 8.5–10.1)
CHLORIDE SERPL-SCNC: 96 MMOL/L (ref 98–112)
CO2 SERPL-SCNC: 24 MMOL/L (ref 21–32)
CREAT BLD-MCNC: 3.23 MG/DL
ERYTHROCYTE [DISTWIDTH] IN BLOOD BY AUTOMATED COUNT: 14.8 %
GFR SERPLBLD BASED ON 1.73 SQ M-ARVRAT: 16 ML/MIN/1.73M2 (ref 60–?)
GLUCOSE BLD-MCNC: 116 MG/DL (ref 70–99)
GLUCOSE BLD-MCNC: 175 MG/DL (ref 70–99)
GLUCOSE BLD-MCNC: 175 MG/DL (ref 70–99)
GLUCOSE BLD-MCNC: 190 MG/DL (ref 70–99)
GLUCOSE BLD-MCNC: 223 MG/DL (ref 70–99)
HAV IGM SER QL: NONREACTIVE
HBV CORE IGM SER QL: NONREACTIVE
HBV SURFACE AG SERPL QL IA: NONREACTIVE
HCT VFR BLD AUTO: 26.2 %
HCV AB SERPL QL IA: NONREACTIVE
HGB BLD-MCNC: 8.6 G/DL
MCH RBC QN AUTO: 29.2 PG (ref 26–34)
MCHC RBC AUTO-ENTMCNC: 32.8 G/DL (ref 31–37)
MCV RBC AUTO: 88.8 FL
OSMOLALITY SERPL CALC.SUM OF ELEC: 283 MOSM/KG (ref 275–295)
PLATELET # BLD AUTO: 293 10(3)UL (ref 150–450)
POTASSIUM SERPL-SCNC: 3.9 MMOL/L (ref 3.5–5.1)
RBC # BLD AUTO: 2.95 X10(6)UL
SODIUM SERPL-SCNC: 127 MMOL/L (ref 136–145)
WBC # BLD AUTO: 9.6 X10(3) UL (ref 4–11)

## 2023-07-19 PROCEDURE — 99232 SBSQ HOSP IP/OBS MODERATE 35: CPT | Performed by: HOSPITALIST

## 2023-07-19 PROCEDURE — 99233 SBSQ HOSP IP/OBS HIGH 50: CPT | Performed by: INTERNAL MEDICINE

## 2023-07-19 NOTE — PLAN OF CARE
Assumed pt care at [address] [facility]. A&O x4. Tele rhythm NSR with heart block. SPO2 95% on room air. Breath sounds diminished bilaterally. HD today 3L removed. Pt voiding with no issues. No c/o chest pain or shortness of breath. Skin dry and intact. Bed is locked and in low position. Call light and personal items within reach. Reviewed plan of care and patient verbalizes understanding. POC: HD tomorrow 7/19  Problem: Patient/Family Goals  Goal: Patient/Family Long Term Goal  Description: Patient's Long Term Goal: \"Go home\"    Interventions:  - MD consults as needed  - Diagnostics as needed  - See additional Care Plan goals for specific interventions  Outcome: Progressing  Goal: Patient/Family Short Term Goal  Description: Patient's Short Term Goal: \"Feel better\"    Interventions:   - Diuresis  - Pain meds as needed  - See additional Care Plan goals for specific interventions  Outcome: Progressing     Problem: CARDIOVASCULAR - ADULT  Goal: Maintains optimal cardiac output and hemodynamic stability  Description: INTERVENTIONS:  - Monitor vital signs, rhythm, and trends  - Monitor for bleeding, hypotension and signs of decreased cardiac output  - Evaluate effectiveness of vasoactive medications to optimize hemodynamic stability  - Monitor arterial and/or venous puncture sites for bleeding and/or hematoma  - Assess quality of pulses, skin color and temperature  - Assess for signs of decreased coronary artery perfusion - ex.  Angina  - Evaluate fluid balance, assess for edema, trend weights  Outcome: Progressing  Goal: Absence of cardiac arrhythmias or at baseline  Description: INTERVENTIONS:  - Continuous cardiac monitoring, monitor vital signs, obtain 12 lead EKG if indicated  - Evaluate effectiveness of antiarrhythmic and heart rate control medications as ordered  - Initiate emergency measures for life threatening arrhythmias  - Monitor electrolytes and administer replacement therapy as ordered  Outcome: Progressing     Problem: Diabetes/Glucose Control  Goal: Glucose maintained within prescribed range  Description: INTERVENTIONS:  - Monitor Blood Glucose as ordered  - Assess for signs and symptoms of hyperglycemia and hypoglycemia  - Administer ordered medications to maintain glucose within target range  - Assess barriers to adequate nutritional intake and initiate nutrition consult as needed  - Instruct patient on self management of diabetes  Outcome: Progressing

## 2023-07-19 NOTE — PLAN OF CARE
Received patient at 0730. Alert and Oriented x4. Tele Rhythm SR with 1st AVB. O2 saturation 96% On room air. Breath sounds diminished. Bed is locked and in low position. Call light and personal items within reach. C/O pain to BLE and back, [place] given with relief. Pt voids small amounts in BR. Pt with generalized weakness and BLE swelling, up with assist and walker. ON PO bumex and dialysis for today. [facility] called to order dialysis for 7/19 and 7/20. Plan for permacath placement Friday. OP dialysis has been scheduled(T/T/Sat) to begin tue 7/25. Reviewed plan of care and patient verbalizes understanding.       Problem: Diabetes/Glucose Control  Goal: Glucose maintained within prescribed range  Description: INTERVENTIONS:  - Monitor Blood Glucose as ordered  - Assess for signs and symptoms of hyperglycemia and hypoglycemia  - Administer ordered medications to maintain glucose within target range  - Assess barriers to adequate nutritional intake and initiate nutrition consult as needed  - Instruct patient on self management of diabetes  Outcome: Progressing     Problem: Patient/Family Goals  Goal: Patient/Family Long Term Goal  Description: Patient's Long Term Goal: \"Go home\"    Interventions:  - MD consults as needed  - Diagnostics as needed  - See additional Care Plan goals for specific interventions  Outcome: Progressing  Goal: Patient/Family Short Term Goal  Description: Patient's Short Term Goal: \"Feel better\"    Interventions:   - Diuresis  - Pain meds as needed  - See additional Care Plan goals for specific interventions  Outcome: Progressing     Problem: CARDIOVASCULAR - ADULT  Goal: Maintains optimal cardiac output and hemodynamic stability  Description: INTERVENTIONS:  - Monitor vital signs, rhythm, and trends  - Monitor for bleeding, hypotension and signs of decreased cardiac output  - Evaluate effectiveness of vasoactive medications to optimize hemodynamic stability  - Monitor arterial and/or venous puncture sites for bleeding and/or hematoma  - Assess quality of pulses, skin color and temperature  - Assess for signs of decreased coronary artery perfusion - ex.  Angina  - Evaluate fluid balance, assess for edema, trend weights  Outcome: Progressing  Goal: Absence of cardiac arrhythmias or at baseline  Description: INTERVENTIONS:  - Continuous cardiac monitoring, monitor vital signs, obtain 12 lead EKG if indicated  - Evaluate effectiveness of antiarrhythmic and heart rate control medications as ordered  - Initiate emergency measures for life threatening arrhythmias  - Monitor electrolytes and administer replacement therapy as ordered  Outcome: Progressing     Problem: METABOLIC/FLUID AND ELECTROLYTES - ADULT  Goal: Electrolytes maintained within normal limits  Description: INTERVENTIONS:  - Monitor labs and rhythm and assess patient for signs and symptoms of electrolyte imbalances  - Administer electrolyte replacement as ordered  - Monitor response to electrolyte replacements, including rhythm and repeat lab results as appropriate  - Fluid restriction as ordered  - Instruct patient on fluid and nutrition restrictions as appropriate  Outcome: Progressing  Goal: Hemodynamic stability and optimal renal function maintained  Description: INTERVENTIONS:  - Monitor labs and assess for signs and symptoms of volume excess or deficit  - Monitor intake, output and patient weight  - Monitor urine specific gravity, serum osmolarity and serum sodium as indicated or ordered  - Monitor response to interventions for patient's volume status, including labs, urine output, blood pressure (other measures as available)  - Encourage oral intake as appropriate  - Instruct patient on fluid and nutrition restrictions as appropriate  Outcome: Progressing

## 2023-07-20 PROBLEM — G47.33 OSA (OBSTRUCTIVE SLEEP APNEA): Status: ACTIVE | Noted: 2023-07-20

## 2023-07-20 PROBLEM — N18.4 STAGE 4 CHRONIC KIDNEY DISEASE (HCC): Status: ACTIVE | Noted: 2023-07-20

## 2023-07-20 LAB
ANION GAP SERPL CALC-SCNC: 6 MMOL/L (ref 0–18)
BUN BLD-MCNC: 30 MG/DL (ref 7–18)
CALCIUM BLD-MCNC: 8.8 MG/DL (ref 8.5–10.1)
CHLORIDE SERPL-SCNC: 101 MMOL/L (ref 98–112)
CO2 SERPL-SCNC: 25 MMOL/L (ref 21–32)
CREAT BLD-MCNC: 2.56 MG/DL
EGFRCR SERPLBLD CKD-EPI 2021: 22 ML/MIN/1.73M2 (ref 60–?)
GLUCOSE BLD-MCNC: 137 MG/DL (ref 70–99)
GLUCOSE BLD-MCNC: 153 MG/DL (ref 70–99)
GLUCOSE BLD-MCNC: 154 MG/DL (ref 70–99)
GLUCOSE BLD-MCNC: 159 MG/DL (ref 70–99)
GLUCOSE BLD-MCNC: 199 MG/DL (ref 70–99)
MAGNESIUM SERPL-MCNC: 2.6 MG/DL (ref 1.6–2.6)
OSMOLALITY SERPL CALC.SUM OF ELEC: 283 MOSM/KG (ref 275–295)
PHOSPHATE SERPL-MCNC: 3.1 MG/DL (ref 2.5–4.9)
POTASSIUM SERPL-SCNC: 4.2 MMOL/L (ref 3.5–5.1)
SODIUM SERPL-SCNC: 132 MMOL/L (ref 136–145)

## 2023-07-20 PROCEDURE — 99233 SBSQ HOSP IP/OBS HIGH 50: CPT | Performed by: INTERNAL MEDICINE

## 2023-07-20 PROCEDURE — 99232 SBSQ HOSP IP/OBS MODERATE 35: CPT | Performed by: HOSPITALIST

## 2023-07-20 RX ORDER — HYDROCODONE BITARTRATE AND ACETAMINOPHEN 10; 325 MG/1; MG/1
1 TABLET ORAL ONCE
Status: COMPLETED | OUTPATIENT
Start: 2023-07-20 | End: 2023-07-20

## 2023-07-20 NOTE — PHYSICAL THERAPY NOTE
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 Pt is motivated to participate in skilled therapy, however, reports BLE discomfort d/t edema. Pt is below PLOF and will continue to benefit from ongoing skilled therapy to maximize functional independence. The AM-PAC '6-Clicks' Inpatient Basic Mobility Short Form was completed and this patient is demonstrating a 42% degree of impairment in mobility. Research supports that patients with this level of impairment may benefit from [address] DISCHARGE RECOMMENDATIONS  PT Discharge Recommendations: Home with home health PT;24 hour care/supervision     PLAN  PT Treatment Plan: Bed mobility; Body mechanics; Endurance; Energy conservation;Patient education; Family education;Gait training;Strengthening;Transfer training;Balance training;Stair training  Rehab Potential : Fair  Frequency (Obs): 3-5x/week    CURRENT GOALS     Goal #1 Patient is able to demonstrate supine - sit EOB @ level: supervision      Goal #2 Patient is able to demonstrate transfers Sit to/from Stand at assistance level: supervision      Goal #3 Patient is able to ambulate 30 feet with assist device: walker - rolling at assistance level: supervision      Goal #4     Goal #5     Goal #6     Goal Comments: Goals established on 7/15/2023    7/20/2023 all goals ongoing      SUBJECTIVE  \"It's a little easier to walk because they took some fluid off \"   Pt seen s/p HD     OBJECTIVE  Precautions: Bed/chair alarm    WEIGHT BEARING RESTRICTION  Weight Bearing Restriction: None                PAIN ASSESSMENT   Rating: Unable to rate  Location: BLE  Management Techniques: Repositioning; Activity promotion; Body mechanics    BALANCE                                                                                                                       Static Sitting: Fair  Dynamic Sitting: Fair           Static Standing: Fair -  Dynamic Standing: Poor +      AM-PAC '6-Clicks' INPATIENT SHORT FORM - BASIC MOBILITY  How much difficulty does the patient currently have. .. Patient Difficulty: Turning over in bed (including adjusting bedclothes, sheets and blankets)?: None   Patient Difficulty: Sitting down on and standing up from a chair with arms (e.g., wheelchair, bedside commode, etc.): None   Patient Difficulty: Moving from lying on back to sitting on the side of the bed?: A Little   How much help from another person does the patient currently need. .. Help from Another: Moving to and from a bed to a chair (including a wheelchair)?: A Little   Help from Another: Need to walk in hospital room?: A Little   Help from Another: Climbing 3-5 steps with a railing?: A Lot       AM-PAC Score:  Raw Score: 19   Approx Degree of Impairment: 41.77%   Standardized Score (AM-PAC Scale): 45.44   CMS Modifier (G-Code): CK    FUNCTIONAL ABILITY STATUS  Gait Assessment   Functional Mobility/Gait Assessment  Gait Assistance: Contact guard assist  Distance (ft): 45  Assistive Device: Rolling walker  Pattern: Within Functional Limits    Skilled Therapy Provided  Pt presents seated in BS chair. Pt gait trained c RW CGA chair follow d/t decreased endurance, no LOB  Pt wheeled back to room and performed seated threx for BLE  O2 sats and HR monitored and WNL on RA     Bed Mobility:  Rolling: NT  Supine>Sit: NT   Sit>Supine: NT     Transfer Mobility:  Sit<>Stand: supervision   Stand<>Sit: supervision   Gait: CGA, c chair follow and RW .         THERAPEUTIC EXERCISES  Lower Extremity Alternating marching  Ankle pumps  Hip adduction squeezes  Knee extension  LAQ  Toe raises     Upper Extremity      Position Sitting     Repetitions   x10 BLE   Sets   x1 Patient End of Session: Up in chair;Needs met;Call light within reach;RN aware of session/findings; All patient questions and concerns addressed; Alarm set    PT Session Time: 25 minutes  Gait Trainin minutes  Therapeutic Activity: 5 minutes  Therapeutic Exercise: 8 minutes

## 2023-07-20 NOTE — PLAN OF CARE
Alert and oriented x4 on tele monitor hr 70's sinus rhythm. Right permacath dressing c/d/I. Updated w/ poc and verbalized understanding. All needs attended and will continue to monitor. Call light within reach. Problem: Diabetes/Glucose Control  Goal: Glucose maintained within prescribed range  Description: INTERVENTIONS:  - Monitor Blood Glucose as ordered  - Assess for signs and symptoms of hyperglycemia and hypoglycemia  - Administer ordered medications to maintain glucose within target range  - Assess barriers to adequate nutritional intake and initiate nutrition consult as needed  - Instruct patient on self management of diabetes  Outcome: Progressing     Problem: Patient/Family Goals  Goal: Patient/Family Long Term Goal  Description: Patient's Long Term Goal: \"Go home\"    Interventions:  - MD consults as needed  - Diagnostics as needed  - See additional Care Plan goals for specific interventions  Outcome: Progressing  Goal: Patient/Family Short Term Goal  Description: Patient's Short Term Goal: \"Feel better\"    Interventions:   - Diuresis  - Pain meds as needed  - See additional Care Plan goals for specific interventions  Outcome: Progressing     Problem: CARDIOVASCULAR - ADULT  Goal: Maintains optimal cardiac output and hemodynamic stability  Description: INTERVENTIONS:  - Monitor vital signs, rhythm, and trends  - Monitor for bleeding, hypotension and signs of decreased cardiac output  - Evaluate effectiveness of vasoactive medications to optimize hemodynamic stability  - Monitor arterial and/or venous puncture sites for bleeding and/or hematoma  - Assess quality of pulses, skin color and temperature  - Assess for signs of decreased coronary artery perfusion - ex.  Angina  - Evaluate fluid balance, assess for edema, trend weights  Outcome: Progressing  Goal: Absence of cardiac arrhythmias or at baseline  Description: INTERVENTIONS:  - Continuous cardiac monitoring, monitor vital signs, obtain 12 lead EKG if indicated  - Evaluate effectiveness of antiarrhythmic and heart rate control medications as ordered  - Initiate emergency measures for life threatening arrhythmias  - Monitor electrolytes and administer replacement therapy as ordered  Outcome: Progressing     Problem: METABOLIC/FLUID AND ELECTROLYTES - ADULT  Goal: Electrolytes maintained within normal limits  Description: INTERVENTIONS:  - Monitor labs and rhythm and assess patient for signs and symptoms of electrolyte imbalances  - Administer electrolyte replacement as ordered  - Monitor response to electrolyte replacements, including rhythm and repeat lab results as appropriate  - Fluid restriction as ordered  - Instruct patient on fluid and nutrition restrictions as appropriate  Outcome: Progressing  Goal: Hemodynamic stability and optimal renal function maintained  Description: INTERVENTIONS:  - Monitor labs and assess for signs and symptoms of volume excess or deficit  - Monitor intake, output and patient weight  - Monitor urine specific gravity, serum osmolarity and serum sodium as indicated or ordered  - Monitor response to interventions for patient's volume status, including labs, urine output, blood pressure (other measures as available)  - Encourage oral intake as appropriate  - Instruct patient on fluid and nutrition restrictions as appropriate  Outcome: Progressing

## 2023-07-20 NOTE — CM/SW NOTE
Spoke with Kaz Givens [phone] from [facility], informed her of perma cath placement tomorrow. Faxed in hep b panel, labs, and flow sheets from Monday, Tuesday, Wednesday. Will send perma cath and additional flow sheets closer to discharge. Patient is set up for:  [address], [place], [facility], [facility] with chair time TTS at 1:40pm. Tentative start date Tuesday 7/25. ADONIS/VIVEK to remain available for support and/or discharge planning.     HUSSAIN Worley  Discharge Planner  [phone]

## 2023-07-21 PROBLEM — E11.649 UNCONTROLLED DIABETES MELLITUS WITH HYPOGLYCEMIA (HCC): Status: ACTIVE | Noted: 2023-07-21

## 2023-07-21 LAB
ANION GAP SERPL CALC-SCNC: 7 MMOL/L (ref 0–18)
BUN BLD-MCNC: 36 MG/DL (ref 7–18)
CALCIUM BLD-MCNC: 9 MG/DL (ref 8.5–10.1)
CHLORIDE SERPL-SCNC: 97 MMOL/L (ref 98–112)
CO2 SERPL-SCNC: 26 MMOL/L (ref 21–32)
CREAT BLD-MCNC: 2.97 MG/DL
EGFRCR SERPLBLD CKD-EPI 2021: 18 ML/MIN/1.73M2 (ref 60–?)
GLUCOSE BLD-MCNC: 127 MG/DL (ref 70–99)
GLUCOSE BLD-MCNC: 139 MG/DL (ref 70–99)
GLUCOSE BLD-MCNC: 140 MG/DL (ref 70–99)
GLUCOSE BLD-MCNC: 143 MG/DL (ref 70–99)
GLUCOSE BLD-MCNC: 159 MG/DL (ref 70–99)
INR BLD: 1.07 (ref 0.85–1.16)
MAGNESIUM SERPL-MCNC: 2.7 MG/DL (ref 1.6–2.6)
OSMOLALITY SERPL CALC.SUM OF ELEC: 280 MOSM/KG (ref 275–295)
POTASSIUM SERPL-SCNC: 4.4 MMOL/L (ref 3.5–5.1)
PROTHROMBIN TIME: 14 SECONDS (ref 11.6–14.8)
SODIUM SERPL-SCNC: 130 MMOL/L (ref 136–145)

## 2023-07-21 PROCEDURE — 99232 SBSQ HOSP IP/OBS MODERATE 35: CPT | Performed by: HOSPITALIST

## 2023-07-21 PROCEDURE — 99233 SBSQ HOSP IP/OBS HIGH 50: CPT | Performed by: INTERNAL MEDICINE

## 2023-07-21 RX ORDER — HYDRALAZINE HYDROCHLORIDE 25 MG/1
25 TABLET, FILM COATED ORAL 3 TIMES DAILY
Status: DISCONTINUED | OUTPATIENT
Start: 2023-07-21 | End: 2023-07-21

## 2023-07-21 RX ORDER — CYCLOBENZAPRINE HCL 5 MG
5 TABLET ORAL 3 TIMES DAILY PRN
Status: DISCONTINUED | OUTPATIENT
Start: 2023-07-21 | End: 2023-07-26

## 2023-07-21 NOTE — PLAN OF CARE
Assumed care around 1930. A/Ox4. On RA w/ sats >90. Monitor shows NSR. Continent of bowel and bladder. Reports back pain, treated w/ medication. Up SBA. Plan for permacath placement today, NPO as of 0000. Safety precautions in place, call light within reach, spouse at bedside. Problem: Diabetes/Glucose Control  Goal: Glucose maintained within prescribed range  Description: INTERVENTIONS:  - Monitor Blood Glucose as ordered  - Assess for signs and symptoms of hyperglycemia and hypoglycemia  - Administer ordered medications to maintain glucose within target range  - Assess barriers to adequate nutritional intake and initiate nutrition consult as needed  - Instruct patient on self management of diabetes  Outcome: Not Progressing     Problem: Patient/Family Goals  Goal: Patient/Family Long Term Goal  Description: Patient's Long Term Goal: \"Go home\"    Interventions:  - MD consults as needed  - Diagnostics as needed  - See additional Care Plan goals for specific interventions  Outcome: Not Progressing  Goal: Patient/Family Short Term Goal  Description: Patient's Short Term Goal: \"Feel better\"    Interventions:   - Diuresis  - Pain meds as needed  - See additional Care Plan goals for specific interventions  Outcome: Not Progressing     Problem: CARDIOVASCULAR - ADULT  Goal: Maintains optimal cardiac output and hemodynamic stability  Description: INTERVENTIONS:  - Monitor vital signs, rhythm, and trends  - Monitor for bleeding, hypotension and signs of decreased cardiac output  - Evaluate effectiveness of vasoactive medications to optimize hemodynamic stability  - Monitor arterial and/or venous puncture sites for bleeding and/or hematoma  - Assess quality of pulses, skin color and temperature  - Assess for signs of decreased coronary artery perfusion - ex.  Angina  - Evaluate fluid balance, assess for edema, trend weights  Outcome: Not Progressing  Goal: Absence of cardiac arrhythmias or at baseline  Description: INTERVENTIONS:  - Continuous cardiac monitoring, monitor vital signs, obtain 12 lead EKG if indicated  - Evaluate effectiveness of antiarrhythmic and heart rate control medications as ordered  - Initiate emergency measures for life threatening arrhythmias  - Monitor electrolytes and administer replacement therapy as ordered  Outcome: Not Progressing     Problem: METABOLIC/FLUID AND ELECTROLYTES - ADULT  Goal: Electrolytes maintained within normal limits  Description: INTERVENTIONS:  - Monitor labs and rhythm and assess patient for signs and symptoms of electrolyte imbalances  - Administer electrolyte replacement as ordered  - Monitor response to electrolyte replacements, including rhythm and repeat lab results as appropriate  - Fluid restriction as ordered  - Instruct patient on fluid and nutrition restrictions as appropriate  Outcome: Not Progressing  Goal: Hemodynamic stability and optimal renal function maintained  Description: INTERVENTIONS:  - Monitor labs and assess for signs and symptoms of volume excess or deficit  - Monitor intake, output and patient weight  - Monitor urine specific gravity, serum osmolarity and serum sodium as indicated or ordered  - Monitor response to interventions for patient's volume status, including labs, urine output, blood pressure (other measures as available)  - Encourage oral intake as appropriate  - Instruct patient on fluid and nutrition restrictions as appropriate  Outcome: Not Progressing

## 2023-07-21 NOTE — PLAN OF CARE
Assumed care of pt at 0800  A&Ox4, up with standby assist, no complaints of pain at this time  R/A, NSR, no chest pain, no shortness of breath  Skin is clean, dry, and intact, no wounds present  2+ pitting edema to bilateral lower extremities  Continent of bowel and bladder, last BM 7/20  Fall precautions in place, call light within reach, pt updated on plan of care, will continue to monitor  Plan for permacath placement today                      Problem: Diabetes/Glucose Control  Goal: Glucose maintained within prescribed range  Description: INTERVENTIONS:  - Monitor Blood Glucose as ordered  - Assess for signs and symptoms of hyperglycemia and hypoglycemia  - Administer ordered medications to maintain glucose within target range  - Assess barriers to adequate nutritional intake and initiate nutrition consult as needed  - Instruct patient on self management of diabetes  Outcome: Progressing     Problem: Patient/Family Goals  Goal: Patient/Family Long Term Goal  Description: Patient's Long Term Goal: \"Go home\"    Interventions:  - MD consults as needed  - Diagnostics as needed  - See additional Care Plan goals for specific interventions  Outcome: Progressing  Goal: Patient/Family Short Term Goal  Description: Patient's Short Term Goal: \"Feel better\"    Interventions:   - Diuresis  - Pain meds as needed  - See additional Care Plan goals for specific interventions  Outcome: Progressing     Problem: CARDIOVASCULAR - ADULT  Goal: Maintains optimal cardiac output and hemodynamic stability  Description: INTERVENTIONS:  - Monitor vital signs, rhythm, and trends  - Monitor for bleeding, hypotension and signs of decreased cardiac output  - Evaluate effectiveness of vasoactive medications to optimize hemodynamic stability  - Monitor arterial and/or venous puncture sites for bleeding and/or hematoma  - Assess quality of pulses, skin color and temperature  - Assess for signs of decreased coronary artery perfusion - ex. Angina  - Evaluate fluid balance, assess for edema, trend weights  Outcome: Progressing  Goal: Absence of cardiac arrhythmias or at baseline  Description: INTERVENTIONS:  - Continuous cardiac monitoring, monitor vital signs, obtain 12 lead EKG if indicated  - Evaluate effectiveness of antiarrhythmic and heart rate control medications as ordered  - Initiate emergency measures for life threatening arrhythmias  - Monitor electrolytes and administer replacement therapy as ordered  Outcome: Progressing     Problem: METABOLIC/FLUID AND ELECTROLYTES - ADULT  Goal: Electrolytes maintained within normal limits  Description: INTERVENTIONS:  - Monitor labs and rhythm and assess patient for signs and symptoms of electrolyte imbalances  - Administer electrolyte replacement as ordered  - Monitor response to electrolyte replacements, including rhythm and repeat lab results as appropriate  - Fluid restriction as ordered  - Instruct patient on fluid and nutrition restrictions as appropriate  Outcome: Progressing  Goal: Hemodynamic stability and optimal renal function maintained  Description: INTERVENTIONS:  - Monitor labs and assess for signs and symptoms of volume excess or deficit  - Monitor intake, output and patient weight  - Monitor urine specific gravity, serum osmolarity and serum sodium as indicated or ordered  - Monitor response to interventions for patient's volume status, including labs, urine output, blood pressure (other measures as available)  - Encourage oral intake as appropriate  - Instruct patient on fluid and nutrition restrictions as appropriate  Outcome: Progressing

## 2023-07-22 LAB
ANION GAP SERPL CALC-SCNC: 5 MMOL/L (ref 0–18)
BUN BLD-MCNC: 30 MG/DL (ref 7–18)
CALCIUM BLD-MCNC: 8.8 MG/DL (ref 8.5–10.1)
CHLORIDE SERPL-SCNC: 97 MMOL/L (ref 98–112)
CO2 SERPL-SCNC: 28 MMOL/L (ref 21–32)
CREAT BLD-MCNC: 2.55 MG/DL
EGFRCR SERPLBLD CKD-EPI 2021: 22 ML/MIN/1.73M2 (ref 60–?)
GLUCOSE BLD-MCNC: 155 MG/DL (ref 70–99)
GLUCOSE BLD-MCNC: 155 MG/DL (ref 70–99)
GLUCOSE BLD-MCNC: 158 MG/DL (ref 70–99)
GLUCOSE BLD-MCNC: 173 MG/DL (ref 70–99)
GLUCOSE BLD-MCNC: 208 MG/DL (ref 70–99)
OSMOLALITY SERPL CALC.SUM OF ELEC: 279 MOSM/KG (ref 275–295)
POTASSIUM SERPL-SCNC: 4.3 MMOL/L (ref 3.5–5.1)
SODIUM SERPL-SCNC: 130 MMOL/L (ref 136–145)

## 2023-07-22 PROCEDURE — 99232 SBSQ HOSP IP/OBS MODERATE 35: CPT | Performed by: HOSPITALIST

## 2023-07-22 PROCEDURE — 99233 SBSQ HOSP IP/OBS HIGH 50: CPT | Performed by: INTERNAL MEDICINE

## 2023-07-22 RX ORDER — ALBUMIN (HUMAN) 12.5 G/50ML
100 SOLUTION INTRAVENOUS AS NEEDED
Status: DISCONTINUED | OUTPATIENT
Start: 2023-07-22 | End: 2023-07-26

## 2023-07-22 RX ORDER — HYDROCODONE BITARTRATE AND ACETAMINOPHEN 10; 325 MG/1; MG/1
1 TABLET ORAL EVERY 4 HOURS PRN
Status: DISCONTINUED | OUTPATIENT
Start: 2023-07-22 | End: 2023-07-26

## 2023-07-22 RX ORDER — HEPARIN SODIUM 1000 [USP'U]/ML
1.5 INJECTION, SOLUTION INTRAVENOUS; SUBCUTANEOUS ONCE
Status: COMPLETED | OUTPATIENT
Start: 2023-07-22 | End: 2023-07-23

## 2023-07-22 NOTE — PLAN OF CARE
Assumed care of patient at 0700  A/Ox4, [facility] with activity but tolerating RA at rest, VSS on tele  PRN Norco and Flexeril for pain  Orders for UF per Nephrology. [facility] notified  Unable to send urine sample down, no UO by patient. Patient worked with PT, still recommending [facility]  Physician hold for ASA placed by Cardiology  Plan for permacath procedure on Monday  Patient currently resting in bed, call light within reach        Problem: Diabetes/Glucose Control  Goal: Glucose maintained within prescribed range  Description: INTERVENTIONS:  - Monitor Blood Glucose as ordered  - Assess for signs and symptoms of hyperglycemia and hypoglycemia  - Administer ordered medications to maintain glucose within target range  - Assess barriers to adequate nutritional intake and initiate nutrition consult as needed  - Instruct patient on self management of diabetes  Outcome: Progressing     Problem: Patient/Family Goals  Goal: Patient/Family Long Term Goal  Description: Patient's Long Term Goal: \"Go home\"    Interventions:  - MD consults as needed  - Diagnostics as needed  - See additional Care Plan goals for specific interventions  Outcome: Progressing  Goal: Patient/Family Short Term Goal  Description: Patient's Short Term Goal: \"Feel better\"    Interventions:   - Diuresis  - Pain meds as needed  - See additional Care Plan goals for specific interventions  Outcome: Progressing     Problem: CARDIOVASCULAR - ADULT  Goal: Maintains optimal cardiac output and hemodynamic stability  Description: INTERVENTIONS:  - Monitor vital signs, rhythm, and trends  - Monitor for bleeding, hypotension and signs of decreased cardiac output  - Evaluate effectiveness of vasoactive medications to optimize hemodynamic stability  - Monitor arterial and/or venous puncture sites for bleeding and/or hematoma  - Assess quality of pulses, skin color and temperature  - Assess for signs of decreased coronary artery perfusion - ex.  Angina  - Evaluate fluid balance, assess for edema, trend weights  Outcome: Progressing  Goal: Absence of cardiac arrhythmias or at baseline  Description: INTERVENTIONS:  - Continuous cardiac monitoring, monitor vital signs, obtain 12 lead EKG if indicated  - Evaluate effectiveness of antiarrhythmic and heart rate control medications as ordered  - Initiate emergency measures for life threatening arrhythmias  - Monitor electrolytes and administer replacement therapy as ordered  Outcome: Progressing     Problem: METABOLIC/FLUID AND ELECTROLYTES - ADULT  Goal: Electrolytes maintained within normal limits  Description: INTERVENTIONS:  - Monitor labs and rhythm and assess patient for signs and symptoms of electrolyte imbalances  - Administer electrolyte replacement as ordered  - Monitor response to electrolyte replacements, including rhythm and repeat lab results as appropriate  - Fluid restriction as ordered  - Instruct patient on fluid and nutrition restrictions as appropriate  Outcome: Progressing  Goal: Hemodynamic stability and optimal renal function maintained  Description: INTERVENTIONS:  - Monitor labs and assess for signs and symptoms of volume excess or deficit  - Monitor intake, output and patient weight  - Monitor urine specific gravity, serum osmolarity and serum sodium as indicated or ordered  - Monitor response to interventions for patient's volume status, including labs, urine output, blood pressure (other measures as available)  - Encourage oral intake as appropriate  - Instruct patient on fluid and nutrition restrictions as appropriate  Outcome: Progressing

## 2023-07-22 NOTE — PLAN OF CARE
Alert and oriented x4 on tele monitor hr 60's sinus rhythm. Right permacath dressing c/d/I. Updated w/ poc and verbalized understanding. Denies any pain at this time. All needs attended and will continue to monitor. Call light within reach. Problem: Diabetes/Glucose Control  Goal: Glucose maintained within prescribed range  Description: INTERVENTIONS:  - Monitor Blood Glucose as ordered  - Assess for signs and symptoms of hyperglycemia and hypoglycemia  - Administer ordered medications to maintain glucose within target range  - Assess barriers to adequate nutritional intake and initiate nutrition consult as needed  - Instruct patient on self management of diabetes  Outcome: Progressing     Problem: Patient/Family Goals  Goal: Patient/Family Long Term Goal  Description: Patient's Long Term Goal: \"Go home\"    Interventions:  - MD consults as needed  - Diagnostics as needed  - See additional Care Plan goals for specific interventions  Outcome: Progressing  Goal: Patient/Family Short Term Goal  Description: Patient's Short Term Goal: \"Feel better\"    Interventions:   - Diuresis  - Pain meds as needed  - See additional Care Plan goals for specific interventions  Outcome: Progressing     Problem: CARDIOVASCULAR - ADULT  Goal: Maintains optimal cardiac output and hemodynamic stability  Description: INTERVENTIONS:  - Monitor vital signs, rhythm, and trends  - Monitor for bleeding, hypotension and signs of decreased cardiac output  - Evaluate effectiveness of vasoactive medications to optimize hemodynamic stability  - Monitor arterial and/or venous puncture sites for bleeding and/or hematoma  - Assess quality of pulses, skin color and temperature  - Assess for signs of decreased coronary artery perfusion - ex.  Angina  - Evaluate fluid balance, assess for edema, trend weights  Outcome: Progressing  Goal: Absence of cardiac arrhythmias or at baseline  Description: INTERVENTIONS:  - Continuous cardiac monitoring, monitor vital signs, obtain 12 lead EKG if indicated  - Evaluate effectiveness of antiarrhythmic and heart rate control medications as ordered  - Initiate emergency measures for life threatening arrhythmias  - Monitor electrolytes and administer replacement therapy as ordered  Outcome: Progressing     Problem: METABOLIC/FLUID AND ELECTROLYTES - ADULT  Goal: Electrolytes maintained within normal limits  Description: INTERVENTIONS:  - Monitor labs and rhythm and assess patient for signs and symptoms of electrolyte imbalances  - Administer electrolyte replacement as ordered  - Monitor response to electrolyte replacements, including rhythm and repeat lab results as appropriate  - Fluid restriction as ordered  - Instruct patient on fluid and nutrition restrictions as appropriate  Outcome: Progressing  Goal: Hemodynamic stability and optimal renal function maintained  Description: INTERVENTIONS:  - Monitor labs and assess for signs and symptoms of volume excess or deficit  - Monitor intake, output and patient weight  - Monitor urine specific gravity, serum osmolarity and serum sodium as indicated or ordered  - Monitor response to interventions for patient's volume status, including labs, urine output, blood pressure (other measures as available)  - Encourage oral intake as appropriate  - Instruct patient on fluid and nutrition restrictions as appropriate  Outcome: Progressing

## 2023-07-22 NOTE — PHYSICAL THERAPY NOTE
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 Presents with dec balance and residual weakness of RLE, relies heavily on walker for support. Pt shows dec activity tolerance and fatigued after 35'. Patient needed rest breaks to tolerate sessions. The AM-PAC '6-Clicks' Inpatient Basic Mobility Short Form was completed and this patient is demonstrating a 42% degree of impairment in mobility. Research supports that patients with this level of impairment may benefit from [address] DISCHARGE RECOMMENDATIONS  PT Discharge Recommendations: Home with home health PT;24 hour care/supervision     PLAN  PT Treatment Plan: Bed mobility; Body mechanics; Endurance; Energy conservation;Patient education; Family education;Gait training;Strengthening;Transfer training;Balance training;Stair training  Rehab Potential : Fair  Frequency (Obs): 3-5x/week    CURRENT GOALS     Goal #1 Patient is able to demonstrate supine - sit EOB @ level: supervision      Goal #2 Patient is able to demonstrate transfers Sit to/from Stand at assistance level: supervision      Goal #3 Patient is able to ambulate 30 feet with assist device: walker - rolling at assistance level: supervision      Goal #4     Goal #5     Goal #6     Goal Comments: Goals established on 7/15/2023    7/20/2023 all goals ongoing      SUBJECTIVE  \"My right leg is weak so I am afraid it's going to give out. \"  OBJECTIVE  Precautions: Bed/chair alarm    WEIGHT BEARING RESTRICTION  Weight Bearing Restriction: None                PAIN ASSESSMENT   Rating: Unable to rate  Location: BLE  Management Techniques: Repositioning; Activity promotion; Body mechanics    BALANCE Static Sitting: Fair  Dynamic Sitting: Fair           Static Standing: Fair -  Dynamic Standing: Poor +      AM-PAC '6-Clicks' INPATIENT SHORT FORM - BASIC MOBILITY  How much difficulty does the patient currently have. .. Patient Difficulty: Turning over in bed (including adjusting bedclothes, sheets and blankets)?: None   Patient Difficulty: Sitting down on and standing up from a chair with arms (e.g., wheelchair, bedside commode, etc.): None   Patient Difficulty: Moving from lying on back to sitting on the side of the bed?: A Little   How much help from another person does the patient currently need. .. Help from Another: Moving to and from a bed to a chair (including a wheelchair)?: A Little   Help from Another: Need to walk in hospital room?: A Little   Help from Another: Climbing 3-5 steps with a railing?: A Lot       AM-PAC Score:  Raw Score: 19   Approx Degree of Impairment: 41.77%   Standardized Score (AM-PAC Scale): 45.44   CMS Modifier (G-Code): CK    FUNCTIONAL ABILITY STATUS  Gait Assessment   Functional Mobility/Gait Assessment  Gait Assistance: Contact guard assist  Distance (ft): 35x2 with seated rest  Assistive Device: Rolling walker  Pattern:  (slow troy, heavy reliance on RW, step though with pauses due to dec balance and RLE weakness. R flat foot)    Skilled Therapy Provided      Bed Mobility:  Rolling: NT  Supine>Sit: NT   Sit>Supine: NT     Transfer Mobility:  Sit<>Stand: supervision   Stand<>Sit: supervision   Gait: CGA with RW and chair follow by . Patient was received in chair. Role of therapy and plan of session discussed. Patient alert and oriented,  present. During gait training, patient was given cues for proper RW management, body mechanics when using a device, cues for step sequence, cues to relax shoulders and for upright posture provided,   Pt reported of diego le cramping. DF stretch with belt 30 sec x 3      THERAPEUTIC EXERCISES  Lower Extremity LAQ  Standing mini squats, cues to dec reliance on RW  Stnding step ups  Rest in between exercises due to fatigue. Upper Extremity      Position Sitting and standing     Repetitions   x10 BLE   Sets   x1     Patient End of Session: Up in chair;Needs met;Call light within reach;RN aware of session/findings; All patient questions and concerns addressed; Alarm set    PT Session Time: 30 minutes  Gait Training: 15 minutes  Therapeutic Activity: 5 minutes  Therapeutic Exercise: 10 minutes

## 2023-07-23 LAB
ANION GAP SERPL CALC-SCNC: 6 MMOL/L (ref 0–18)
BUN BLD-MCNC: 34 MG/DL (ref 7–18)
CALCIUM BLD-MCNC: 9 MG/DL (ref 8.5–10.1)
CHLORIDE SERPL-SCNC: 96 MMOL/L (ref 98–112)
CO2 SERPL-SCNC: 26 MMOL/L (ref 21–32)
CREAT BLD-MCNC: 2.97 MG/DL
EGFRCR SERPLBLD CKD-EPI 2021: 18 ML/MIN/1.73M2 (ref 60–?)
GLUCOSE BLD-MCNC: 148 MG/DL (ref 70–99)
GLUCOSE BLD-MCNC: 152 MG/DL (ref 70–99)
GLUCOSE BLD-MCNC: 152 MG/DL (ref 70–99)
GLUCOSE BLD-MCNC: 170 MG/DL (ref 70–99)
GLUCOSE BLD-MCNC: 247 MG/DL (ref 70–99)
OSMOLALITY SERPL CALC.SUM OF ELEC: 277 MOSM/KG (ref 275–295)
POTASSIUM SERPL-SCNC: 4.7 MMOL/L (ref 3.5–5.1)
SODIUM SERPL-SCNC: 128 MMOL/L (ref 136–145)

## 2023-07-23 PROCEDURE — 90935 HEMODIALYSIS ONE EVALUATION: CPT | Performed by: INTERNAL MEDICINE

## 2023-07-23 PROCEDURE — 99233 SBSQ HOSP IP/OBS HIGH 50: CPT | Performed by: HOSPITALIST

## 2023-07-23 RX ORDER — CLONIDINE HYDROCHLORIDE 0.1 MG/1
0.1 TABLET ORAL 2 TIMES DAILY PRN
Status: DISCONTINUED | OUTPATIENT
Start: 2023-07-23 | End: 2023-07-26

## 2023-07-23 RX ORDER — CLONIDINE HYDROCHLORIDE 0.2 MG/1
0.2 TABLET ORAL ONCE
Status: COMPLETED | OUTPATIENT
Start: 2023-07-23 | End: 2023-07-23

## 2023-07-23 RX ORDER — HYDRALAZINE HYDROCHLORIDE 20 MG/ML
10 INJECTION INTRAMUSCULAR; INTRAVENOUS
Status: DISCONTINUED | OUTPATIENT
Start: 2023-07-23 | End: 2023-07-26

## 2023-07-23 RX ORDER — HYDRALAZINE HYDROCHLORIDE 25 MG/1
25 TABLET, FILM COATED ORAL EVERY 8 HOURS SCHEDULED
Status: DISCONTINUED | OUTPATIENT
Start: 2023-07-23 | End: 2023-07-26

## 2023-07-23 NOTE — PLAN OF CARE
Pt is Alert and oriented x4. Tele rhythm NSR O2 saturation 94% on Room air. Breath sounds clear. Bed is locked and in low position. Call light and personal items within reach. No C/O chest pain or shortness of breath. Pt voiding with no issue. Skin dry/Intact. Reviewed plan of care and patient verbalizes understanding. HD 7/22 - 3L off. Clonidine/hydralazine stopped per renal   Diurese per renal   Continue cardiac meds    Judith notified about pt High BP. No new orders.              Problem: Diabetes/Glucose Control  Goal: Glucose maintained within prescribed range  Description: INTERVENTIONS:  - Monitor Blood Glucose as ordered  - Assess for signs and symptoms of hyperglycemia and hypoglycemia  - Administer ordered medications to maintain glucose within target range  - Assess barriers to adequate nutritional intake and initiate nutrition consult as needed  - Instruct patient on self management of diabetes  Outcome: Progressing     Problem: Patient/Family Goals  Goal: Patient/Family Long Term Goal  Description: Patient's Long Term Goal: \"Go home\"    Interventions:  - MD consults as needed  - Diagnostics as needed  - See additional Care Plan goals for specific interventions  Outcome: Progressing  Goal: Patient/Family Short Term Goal  Description: Patient's Short Term Goal: \"Feel better\"    Interventions:   - Diuresis  - Pain meds as needed  - See additional Care Plan goals for specific interventions  Outcome: Progressing

## 2023-07-23 NOTE — PLAN OF CARE
A/o x4. RA. VSS. Up sba and walker. Continent and voiding in bathroom. C/o pain in legs, managed with prn norco, flexaril, and hot packs. Dialysis called in today per nephrologist orders. Family in room participating in care. Updated on plan of care, bed in lowest position, call light in reach, all questions answered. Addendum: Pt systolic pressures elevated >200, nephrology paged, received orders for clonidine PO, pressures improved. Problem: Diabetes/Glucose Control  Goal: Glucose maintained within prescribed range  Description: INTERVENTIONS:  - Monitor Blood Glucose as ordered  - Assess for signs and symptoms of hyperglycemia and hypoglycemia  - Administer ordered medications to maintain glucose within target range  - Assess barriers to adequate nutritional intake and initiate nutrition consult as needed  - Instruct patient on self management of diabetes  Outcome: Progressing     Problem: Patient/Family Goals  Goal: Patient/Family Long Term Goal  Description: Patient's Long Term Goal: \"Go home\"    Interventions:  - MD consults as needed  - Diagnostics as needed  - See additional Care Plan goals for specific interventions  Outcome: Progressing  Goal: Patient/Family Short Term Goal  Description: Patient's Short Term Goal: \"Feel better\"    Interventions:   - Diuresis  - Pain meds as needed  - See additional Care Plan goals for specific interventions  Outcome: Progressing     Problem: CARDIOVASCULAR - ADULT  Goal: Maintains optimal cardiac output and hemodynamic stability  Description: INTERVENTIONS:  - Monitor vital signs, rhythm, and trends  - Monitor for bleeding, hypotension and signs of decreased cardiac output  - Evaluate effectiveness of vasoactive medications to optimize hemodynamic stability  - Monitor arterial and/or venous puncture sites for bleeding and/or hematoma  - Assess quality of pulses, skin color and temperature  - Assess for signs of decreased coronary artery perfusion - ex.  Angina  - Evaluate fluid balance, assess for edema, trend weights  Outcome: Progressing  Goal: Absence of cardiac arrhythmias or at baseline  Description: INTERVENTIONS:  - Continuous cardiac monitoring, monitor vital signs, obtain 12 lead EKG if indicated  - Evaluate effectiveness of antiarrhythmic and heart rate control medications as ordered  - Initiate emergency measures for life threatening arrhythmias  - Monitor electrolytes and administer replacement therapy as ordered  Outcome: Progressing     Problem: METABOLIC/FLUID AND ELECTROLYTES - ADULT  Goal: Electrolytes maintained within normal limits  Description: INTERVENTIONS:  - Monitor labs and rhythm and assess patient for signs and symptoms of electrolyte imbalances  - Administer electrolyte replacement as ordered  - Monitor response to electrolyte replacements, including rhythm and repeat lab results as appropriate  - Fluid restriction as ordered  - Instruct patient on fluid and nutrition restrictions as appropriate  Outcome: Progressing  Goal: Hemodynamic stability and optimal renal function maintained  Description: INTERVENTIONS:  - Monitor labs and assess for signs and symptoms of volume excess or deficit  - Monitor intake, output and patient weight  - Monitor urine specific gravity, serum osmolarity and serum sodium as indicated or ordered  - Monitor response to interventions for patient's volume status, including labs, urine output, blood pressure (other measures as available)  - Encourage oral intake as appropriate  - Instruct patient on fluid and nutrition restrictions as appropriate  Outcome: Progressing

## 2023-07-24 ENCOUNTER — APPOINTMENT (OUTPATIENT)
Dept: INTERVENTIONAL RADIOLOGY/VASCULAR | Facility: HOSPITAL | Age: 53
DRG: 291 | End: 2023-07-24
Attending: INTERNAL MEDICINE
Payer: COMMERCIAL

## 2023-07-24 ENCOUNTER — TELEPHONE (OUTPATIENT)
Dept: CASE MANAGEMENT | Facility: HOSPITAL | Age: 53
End: 2023-07-24

## 2023-07-24 LAB
ANION GAP SERPL CALC-SCNC: 6 MMOL/L (ref 0–18)
BUN BLD-MCNC: 27 MG/DL (ref 7–18)
CALCIUM BLD-MCNC: 8.9 MG/DL (ref 8.5–10.1)
CHLORIDE SERPL-SCNC: 99 MMOL/L (ref 98–112)
CO2 SERPL-SCNC: 25 MMOL/L (ref 21–32)
CREAT BLD-MCNC: 2.12 MG/DL
EGFRCR SERPLBLD CKD-EPI 2021: 27 ML/MIN/1.73M2 (ref 60–?)
GLUCOSE BLD-MCNC: 135 MG/DL (ref 70–99)
GLUCOSE BLD-MCNC: 140 MG/DL (ref 70–99)
GLUCOSE BLD-MCNC: 192 MG/DL (ref 70–99)
GLUCOSE BLD-MCNC: 202 MG/DL (ref 70–99)
GLUCOSE BLD-MCNC: 258 MG/DL (ref 70–99)
OSMOLALITY SERPL CALC.SUM OF ELEC: 277 MOSM/KG (ref 275–295)
POTASSIUM SERPL-SCNC: 4.1 MMOL/L (ref 3.5–5.1)
SODIUM SERPL-SCNC: 130 MMOL/L (ref 136–145)

## 2023-07-24 PROCEDURE — 02HV33Z INSERTION OF INFUSION DEVICE INTO SUPERIOR VENA CAVA, PERCUTANEOUS APPROACH: ICD-10-PCS | Performed by: RADIOLOGY

## 2023-07-24 PROCEDURE — 99233 SBSQ HOSP IP/OBS HIGH 50: CPT | Performed by: INTERNAL MEDICINE

## 2023-07-24 PROCEDURE — B518YZA FLUOROSCOPY OF SUPERIOR VENA CAVA USING OTHER CONTRAST, GUIDANCE: ICD-10-PCS | Performed by: RADIOLOGY

## 2023-07-24 PROCEDURE — 99232 SBSQ HOSP IP/OBS MODERATE 35: CPT | Performed by: HOSPITALIST

## 2023-07-24 PROCEDURE — 0JH63XZ INSERTION OF TUNNELED VASCULAR ACCESS DEVICE INTO CHEST SUBCUTANEOUS TISSUE AND FASCIA, PERCUTANEOUS APPROACH: ICD-10-PCS | Performed by: RADIOLOGY

## 2023-07-24 RX ORDER — HEPARIN SODIUM 5000 [USP'U]/ML
INJECTION, SOLUTION INTRAVENOUS; SUBCUTANEOUS
Status: COMPLETED
Start: 2023-07-24 | End: 2023-07-24

## 2023-07-24 RX ORDER — HEPARIN SODIUM 1000 [USP'U]/ML
1.5 INJECTION, SOLUTION INTRAVENOUS; SUBCUTANEOUS ONCE
Status: DISCONTINUED | OUTPATIENT
Start: 2023-07-24 | End: 2023-07-26

## 2023-07-24 RX ORDER — CEFAZOLIN SODIUM 1 G/3ML
INJECTION, POWDER, FOR SOLUTION INTRAMUSCULAR; INTRAVENOUS
Status: COMPLETED
Start: 2023-07-24 | End: 2023-07-24

## 2023-07-24 RX ORDER — MIDAZOLAM HYDROCHLORIDE 1 MG/ML
INJECTION INTRAMUSCULAR; INTRAVENOUS
Status: COMPLETED
Start: 2023-07-24 | End: 2023-07-24

## 2023-07-24 RX ORDER — LIDOCAINE HYDROCHLORIDE 10 MG/ML
INJECTION, SOLUTION INFILTRATION; PERINEURAL
Status: COMPLETED
Start: 2023-07-24 | End: 2023-07-24

## 2023-07-24 RX ORDER — LIDOCAINE HYDROCHLORIDE AND EPINEPHRINE BITARTRATE 20; .01 MG/ML; MG/ML
INJECTION, SOLUTION SUBCUTANEOUS
Status: COMPLETED
Start: 2023-07-24 | End: 2023-07-24

## 2023-07-24 NOTE — CM/SW NOTE
Linda Florian RN case manager with [facility] calling requesting to speak with SW/CM regarding discharge needs along with anticipated discharge date. Requesting call back at [phone].

## 2023-07-24 NOTE — PHYSICAL THERAPY NOTE
PT attempted to see patient this afternoon for follow up session. Per pt, she was just seen by OT. Discussed with pt importance of out of bed functional mobility, encouraged pt to ambulate with nursing staff. RN made aware, per RN pt will be receiving dialysis in a bit so pt will not be able to participate in therapy later this afternoon. Will continue to follow as clinically appropriate and as schedule allows.

## 2023-07-24 NOTE — PLAN OF CARE
Problem: Diabetes/Glucose Control  Goal: Glucose maintained within prescribed range  Description: INTERVENTIONS:  - Monitor Blood Glucose as ordered  - Assess for signs and symptoms of hyperglycemia and hypoglycemia  - Administer ordered medications to maintain glucose within target range  - Assess barriers to adequate nutritional intake and initiate nutrition consult as needed  - Instruct patient on self management of diabetes  7/24/2023 0407 by Hayde Cramer RN  Outcome: Progressing  7/24/2023 0404 by Hayde Cramer RN  Outcome: Progressing     Problem: Patient/Family Goals  Goal: Patient/Family Long Term Goal  Description: Patient's Long Term Goal: \"Go home\"    Interventions:  - MD consults as needed  - Diagnostics as needed  - See additional Care Plan goals for specific interventions  7/24/2023 0407 by Hayde Cramer RN  Outcome: Progressing  7/24/2023 0404 by Hayde Cramer RN  Outcome: Progressing  Goal: Patient/Family Short Term Goal  Description: Patient's Short Term Goal: \"Feel better\"    Interventions:   - Diuresis  - Pain meds as needed  - See additional Care Plan goals for specific interventions  7/24/2023 0407 by Hayde Cramer RN  Outcome: Progressing  7/24/2023 0404 by Hayde Cramer RN  Outcome: Progressing

## 2023-07-24 NOTE — CM/SW NOTE
Spoke with Toni Romero [phone] from [facility], informed her of perma cath placement completed today. Faxed in perma cath info and nephrology note from today. Awaiting patient to be medically cleared for discharge. Addendum 1:50pm: Updated Mariana that patient will not be discharging today, will keep Mariana updated on discharge. SW/CM to remain available for support and/or discharge planning.     HUSSAIN Araujo  Discharge Planner  [phone]

## 2023-07-24 NOTE — PLAN OF CARE
Pt is Alert and oriented x4. Tele rhythm NSR O2 saturation 94% on Room air. Breath sounds clear. Bed is locked and in low position. Call light and personal items within reach. No C/O chest pain or shortness of breath. Pt voiding with no issue. Skin dry/Intact. Reviewed plan of care and patient verbalizes understanding.         HD 7/23 - 4L Off   PRN hydralazine added  Diurese per renal   Continue cardiac meds               Problem: Diabetes/Glucose Control  Goal: Glucose maintained within prescribed range  Description: INTERVENTIONS:  - Monitor Blood Glucose as ordered  - Assess for signs and symptoms of hyperglycemia and hypoglycemia  - Administer ordered medications to maintain glucose within target range  - Assess barriers to adequate nutritional intake and initiate nutrition consult as needed  - Instruct patient on self management of diabetes  Outcome: Progressing     Problem: Patient/Family Goals  Goal: Patient/Family Long Term Goal  Description: Patient's Long Term Goal: \"Go home\"    Interventions:  - MD consults as needed  - Diagnostics as needed  - See additional Care Plan goals for specific interventions  Outcome: Progressing  Goal: Patient/Family Short Term Goal  Description: Patient's Short Term Goal: \"Feel better\"    Interventions:   - Diuresis  - Pain meds as needed  - See additional Care Plan goals for specific interventions  Outcome: Progressing

## 2023-07-25 PROBLEM — N18.6 ESRD ON HEMODIALYSIS (HCC): Status: ACTIVE | Noted: 2023-07-17

## 2023-07-25 PROBLEM — Z99.2 ESRD ON HEMODIALYSIS (HCC): Status: ACTIVE | Noted: 2023-07-17

## 2023-07-25 LAB
ANION GAP SERPL CALC-SCNC: 6 MMOL/L (ref 0–18)
BUN BLD-MCNC: 44 MG/DL (ref 7–18)
CALCIUM BLD-MCNC: 8.9 MG/DL (ref 8.5–10.1)
CHLORIDE SERPL-SCNC: 96 MMOL/L (ref 98–112)
CO2 SERPL-SCNC: 25 MMOL/L (ref 21–32)
CREAT BLD-MCNC: 2.85 MG/DL
EGFRCR SERPLBLD CKD-EPI 2021: 19 ML/MIN/1.73M2 (ref 60–?)
GLUCOSE BLD-MCNC: 119 MG/DL (ref 70–99)
GLUCOSE BLD-MCNC: 143 MG/DL (ref 70–99)
GLUCOSE BLD-MCNC: 145 MG/DL (ref 70–99)
GLUCOSE BLD-MCNC: 147 MG/DL (ref 70–99)
GLUCOSE BLD-MCNC: 219 MG/DL (ref 70–99)
OSMOLALITY SERPL CALC.SUM OF ELEC: 278 MOSM/KG (ref 275–295)
POTASSIUM SERPL-SCNC: 4.7 MMOL/L (ref 3.5–5.1)
SODIUM SERPL-SCNC: 127 MMOL/L (ref 136–145)

## 2023-07-25 PROCEDURE — 99233 SBSQ HOSP IP/OBS HIGH 50: CPT | Performed by: INTERNAL MEDICINE

## 2023-07-25 PROCEDURE — 99232 SBSQ HOSP IP/OBS MODERATE 35: CPT | Performed by: HOSPITALIST

## 2023-07-25 NOTE — PROGRESS NOTES
07/25/23 1056   Clinical Encounter Type   Visited With Patient   Routine Visit   (length of stay visit)   Tenriism Encounters   Tenriism Needs Prayer   Spiritual Requests During Visit / Hospitalization [address] Patient wants communion   Taxonomy   Intended Effects Build relationship of care and support   Interventions Active listening; Acknowledge current situation;[place]; Share words of hope and inspiration;Explain  role      checked in w/ the RN. Introduced self to [employer]. Inquired Spiritual care needs. Listened to her story, feelings, tiki, values and practices. Respected and supported. Per request, prayed and promoted a sense of inner peace and inspiration. Provided Spiritual Care card w/ more resources. Established a rapport. Spiritual Care support can be requested via an Epic consult. For urgent/immediate needs, please contact the On Call  at ext. [phone].

## 2023-07-25 NOTE — PLAN OF CARE
A/o x4. Stable on room air w/ sats >90%. NSR on tele. VSS, BP controlled with scheduled and PRN meds. Up sba and walker. Continent and voiding. Permacath c/d/I.  Updated on plan of care, all questions answered, all needs attended to, call light in reach. Problem: Diabetes/Glucose Control  Goal: Glucose maintained within prescribed range  Description: INTERVENTIONS:  - Monitor Blood Glucose as ordered  - Assess for signs and symptoms of hyperglycemia and hypoglycemia  - Administer ordered medications to maintain glucose within target range  - Assess barriers to adequate nutritional intake and initiate nutrition consult as needed  - Instruct patient on self management of diabetes  Outcome: Progressing     Problem: Patient/Family Goals  Goal: Patient/Family Long Term Goal  Description: Patient's Long Term Goal: \"Go home\"    Interventions:  - MD consults as needed  - Diagnostics as needed  - See additional Care Plan goals for specific interventions  Outcome: Progressing  Goal: Patient/Family Short Term Goal  Description: Patient's Short Term Goal: \"Feel better\"    Interventions:   - Diuresis  - Pain meds as needed  - See additional Care Plan goals for specific interventions  Outcome: Progressing     Problem: CARDIOVASCULAR - ADULT  Goal: Maintains optimal cardiac output and hemodynamic stability  Description: INTERVENTIONS:  - Monitor vital signs, rhythm, and trends  - Monitor for bleeding, hypotension and signs of decreased cardiac output  - Evaluate effectiveness of vasoactive medications to optimize hemodynamic stability  - Monitor arterial and/or venous puncture sites for bleeding and/or hematoma  - Assess quality of pulses, skin color and temperature  - Assess for signs of decreased coronary artery perfusion - ex.  Angina  - Evaluate fluid balance, assess for edema, trend weights  Outcome: Progressing  Goal: Absence of cardiac arrhythmias or at baseline  Description: INTERVENTIONS:  - Continuous cardiac monitoring, monitor vital signs, obtain 12 lead EKG if indicated  - Evaluate effectiveness of antiarrhythmic and heart rate control medications as ordered  - Initiate emergency measures for life threatening arrhythmias  - Monitor electrolytes and administer replacement therapy as ordered  Outcome: Progressing     Problem: METABOLIC/FLUID AND ELECTROLYTES - ADULT  Goal: Electrolytes maintained within normal limits  Description: INTERVENTIONS:  - Monitor labs and rhythm and assess patient for signs and symptoms of electrolyte imbalances  - Administer electrolyte replacement as ordered  - Monitor response to electrolyte replacements, including rhythm and repeat lab results as appropriate  - Fluid restriction as ordered  - Instruct patient on fluid and nutrition restrictions as appropriate  Outcome: Progressing  Goal: Hemodynamic stability and optimal renal function maintained  Description: INTERVENTIONS:  - Monitor labs and assess for signs and symptoms of volume excess or deficit  - Monitor intake, output and patient weight  - Monitor urine specific gravity, serum osmolarity and serum sodium as indicated or ordered  - Monitor response to interventions for patient's volume status, including labs, urine output, blood pressure (other measures as available)  - Encourage oral intake as appropriate  - Instruct patient on fluid and nutrition restrictions as appropriate  Outcome: Progressing

## 2023-07-25 NOTE — PLAN OF CARE
Received bedside report on this Pt at 0720, Pt was receiving HD. HD completed at 1100, had 4L out per HD nurse report, Pt received one dose of Albumin PRN for SBP less than 90. PRN Flexeril and Norco administered per Pt request for leg pain and cramping, effective, also hot packs applied. Pt A&Ox4, is in SR on Tele monitor, sats greater than 94% on RA. Pt ambulated in halls x1, tolerated well, encouraged to walk more, is sitting up in chair at this time. POC discussed with Pt and her questions answered. Call light is in reach. Pt's  visiting at this time.      Problem: Diabetes/Glucose Control  Goal: Glucose maintained within prescribed range  Description: INTERVENTIONS:  - Monitor Blood Glucose as ordered  - Assess for signs and symptoms of hyperglycemia and hypoglycemia  - Administer ordered medications to maintain glucose within target range  - Assess barriers to adequate nutritional intake and initiate nutrition consult as needed  - Instruct patient on self management of diabetes  Outcome: Progressing     Problem: Patient/Family Goals  Goal: Patient/Family Long Term Goal  Description: Patient's Long Term Goal: \"Go home\"    Interventions:  - MD consults as needed  - Diagnostics as needed  - See additional Care Plan goals for specific interventions  Outcome: Progressing  Goal: Patient/Family Short Term Goal  Description: Patient's Short Term Goal: \"Feel better\"    Interventions:   - Diuresis  - Pain meds as needed  - See additional Care Plan goals for specific interventions  Outcome: Progressing     Problem: CARDIOVASCULAR - ADULT  Goal: Maintains optimal cardiac output and hemodynamic stability  Description: INTERVENTIONS:  - Monitor vital signs, rhythm, and trends  - Monitor for bleeding, hypotension and signs of decreased cardiac output  - Evaluate effectiveness of vasoactive medications to optimize hemodynamic stability  - Monitor arterial and/or venous puncture sites for bleeding and/or hematoma  - Assess quality of pulses, skin color and temperature  - Assess for signs of decreased coronary artery perfusion - ex.  Angina  - Evaluate fluid balance, assess for edema, trend weights  Outcome: Progressing  Goal: Absence of cardiac arrhythmias or at baseline  Description: INTERVENTIONS:  - Continuous cardiac monitoring, monitor vital signs, obtain 12 lead EKG if indicated  - Evaluate effectiveness of antiarrhythmic and heart rate control medications as ordered  - Initiate emergency measures for life threatening arrhythmias  - Monitor electrolytes and administer replacement therapy as ordered  Outcome: Progressing     Problem: METABOLIC/FLUID AND ELECTROLYTES - ADULT  Goal: Electrolytes maintained within normal limits  Description: INTERVENTIONS:  - Monitor labs and rhythm and assess patient for signs and symptoms of electrolyte imbalances  - Administer electrolyte replacement as ordered  - Monitor response to electrolyte replacements, including rhythm and repeat lab results as appropriate  - Fluid restriction as ordered  - Instruct patient on fluid and nutrition restrictions as appropriate  Outcome: Progressing  Goal: Hemodynamic stability and optimal renal function maintained  Description: INTERVENTIONS:  - Monitor labs and assess for signs and symptoms of volume excess or deficit  - Monitor intake, output and patient weight  - Monitor urine specific gravity, serum osmolarity and serum sodium as indicated or ordered  - Monitor response to interventions for patient's volume status, including labs, urine output, blood pressure (other measures as available)  - Encourage oral intake as appropriate  - Instruct patient on fluid and nutrition restrictions as appropriate  Outcome: Progressing

## 2023-07-25 NOTE — PLAN OF CARE
Pt is Alert and oriented x4. Tele rhythm NSR O2 saturation 94% on Room air. Breath sounds clear. Bed is locked and in low position. Call light and personal items within reach. No C/O chest pain or shortness of breath. Pt voiding with no issue. Skin dry/Intact. Reviewed plan of care and patient verbalizes understanding.         HD 7/25 - Scheduled for 0600  Pain managemant           Problem: Diabetes/Glucose Control  Goal: Glucose maintained within prescribed range  Description: INTERVENTIONS:  - Monitor Blood Glucose as ordered  - Assess for signs and symptoms of hyperglycemia and hypoglycemia  - Administer ordered medications to maintain glucose within target range  - Assess barriers to adequate nutritional intake and initiate nutrition consult as needed  - Instruct patient on self management of diabetes  Outcome: Progressing     Problem: Patient/Family Goals  Goal: Patient/Family Long Term Goal  Description: Patient's Long Term Goal: \"Go home\"    Interventions:  - MD consults as needed  - Diagnostics as needed  - See additional Care Plan goals for specific interventions  Outcome: Progressing  Goal: Patient/Family Short Term Goal  Description: Patient's Short Term Goal: \"Feel better\"    Interventions:   - Diuresis  - Pain meds as needed  - See additional Care Plan goals for specific interventions  Outcome: Progressing

## 2023-07-26 VITALS
WEIGHT: 275.38 LBS | BODY MASS INDEX: 42 KG/M2 | HEART RATE: 71 BPM | SYSTOLIC BLOOD PRESSURE: 130 MMHG | DIASTOLIC BLOOD PRESSURE: 73 MMHG | TEMPERATURE: 98 F | RESPIRATION RATE: 20 BRPM | OXYGEN SATURATION: 96 %

## 2023-07-26 LAB
ANION GAP SERPL CALC-SCNC: 8 MMOL/L (ref 0–18)
BUN BLD-MCNC: 34 MG/DL (ref 7–18)
CALCIUM BLD-MCNC: 9.1 MG/DL (ref 8.5–10.1)
CHLORIDE SERPL-SCNC: 97 MMOL/L (ref 98–112)
CO2 SERPL-SCNC: 25 MMOL/L (ref 21–32)
CREAT BLD-MCNC: 2.52 MG/DL
EGFRCR SERPLBLD CKD-EPI 2021: 22 ML/MIN/1.73M2 (ref 60–?)
GLUCOSE BLD-MCNC: 168 MG/DL (ref 70–99)
GLUCOSE BLD-MCNC: 180 MG/DL (ref 70–99)
GLUCOSE BLD-MCNC: 187 MG/DL (ref 70–99)
OSMOLALITY SERPL CALC.SUM OF ELEC: 282 MOSM/KG (ref 275–295)
POTASSIUM SERPL-SCNC: 4.4 MMOL/L (ref 3.5–5.1)
SODIUM SERPL-SCNC: 130 MMOL/L (ref 136–145)

## 2023-07-26 PROCEDURE — 99233 SBSQ HOSP IP/OBS HIGH 50: CPT | Performed by: INTERNAL MEDICINE

## 2023-07-26 PROCEDURE — 99238 HOSP IP/OBS DSCHRG MGMT 30/<: CPT | Performed by: HOSPITALIST

## 2023-07-26 RX ORDER — CYCLOBENZAPRINE HCL 5 MG
5 TABLET ORAL 3 TIMES DAILY PRN
Status: DISCONTINUED | OUTPATIENT
Start: 2023-07-26 | End: 2023-07-26

## 2023-07-26 RX ORDER — HEPARIN SODIUM 1000 [USP'U]/ML
1.5 INJECTION, SOLUTION INTRAVENOUS; SUBCUTANEOUS ONCE
Status: DISCONTINUED | OUTPATIENT
Start: 2023-07-27 | End: 2023-07-26

## 2023-07-26 RX ORDER — CYCLOBENZAPRINE HCL 10 MG
10 TABLET ORAL 3 TIMES DAILY PRN
Status: DISCONTINUED | OUTPATIENT
Start: 2023-07-26 | End: 2023-07-26

## 2023-07-26 RX ORDER — LOSARTAN POTASSIUM 100 MG/1
100 TABLET ORAL DAILY
Qty: 30 TABLET | Refills: 3 | Status: SHIPPED | OUTPATIENT
Start: 2023-07-27

## 2023-07-26 RX ORDER — CLONIDINE HYDROCHLORIDE 0.1 MG/1
0.1 TABLET ORAL 2 TIMES DAILY
Status: DISCONTINUED | OUTPATIENT
Start: 2023-07-26 | End: 2023-07-26

## 2023-07-26 RX ORDER — CYCLOBENZAPRINE HCL 5 MG
5 TABLET ORAL 3 TIMES DAILY PRN
Qty: 15 TABLET | Refills: 0 | Status: SHIPPED | OUTPATIENT
Start: 2023-07-26

## 2023-07-26 RX ORDER — CLONIDINE HYDROCHLORIDE 0.1 MG/1
0.1 TABLET ORAL 2 TIMES DAILY
Qty: 60 TABLET | Refills: 3 | Status: SHIPPED | OUTPATIENT
Start: 2023-07-26

## 2023-07-26 NOTE — PLAN OF CARE
Pt is Alert and oriented x4. Tele rhythm NSR O2 saturation 94% on Room air. Breath sounds clear. Bed is locked and in low position. Call light and personal items within reach. No C/O chest pain or shortness of breath. Pt voiding with no issue. Skin dry/Intact. Reviewed plan of care and patient verbalizes understanding.         HD Outpatient  Pain managemant   BP management            Problem: Diabetes/Glucose Control  Goal: Glucose maintained within prescribed range  Description: INTERVENTIONS:  - Monitor Blood Glucose as ordered  - Assess for signs and symptoms of hyperglycemia and hypoglycemia  - Administer ordered medications to maintain glucose within target range  - Assess barriers to adequate nutritional intake and initiate nutrition consult as needed  - Instruct patient on self management of diabetes  Outcome: Progressing     Problem: Patient/Family Goals  Goal: Patient/Family Long Term Goal  Description: Patient's Long Term Goal: \"Go home\"    Interventions:  - MD consults as needed  - Diagnostics as needed  - See additional Care Plan goals for specific interventions  Outcome: Progressing  Goal: Patient/Family Short Term Goal  Description: Patient's Short Term Goal: \"Feel better\"    Interventions:   - Diuresis  - Pain meds as needed  - See additional Care Plan goals for specific interventions  Outcome: Progressing

## 2023-07-26 NOTE — PROGRESS NOTES
Explained discharge instructions including medications and follow-ups to the pt w spouse present. Verbalized understanding, IV removed, tele monitor discontinued. Will be transported via wheelchair.

## 2023-07-26 NOTE — PLAN OF CARE
Received pt at 0730. AxOx4. Room air. Denies pain/SOB. Vitals stable. NSR on tele. See flowsheets for additional assessments. Discharge planning today. Resume aspirin and plavix. Plan of care updated with pt, questions answered, verbalized understanding. Safety precautions in place. Instructed to call staff for help. Will continue to monitor. Problem: Diabetes/Glucose Control  Goal: Glucose maintained within prescribed range  Description: INTERVENTIONS:  - Monitor Blood Glucose as ordered  - Assess for signs and symptoms of hyperglycemia and hypoglycemia  - Administer ordered medications to maintain glucose within target range  - Assess barriers to adequate nutritional intake and initiate nutrition consult as needed  - Instruct patient on self management of diabetes  Outcome: Progressing     Problem: Patient/Family Goals  Goal: Patient/Family Long Term Goal  Description: Patient's Long Term Goal: \"Go home\"    Interventions:  - MD consults as needed  - Diagnostics as needed  - See additional Care Plan goals for specific interventions  Outcome: Progressing  Goal: Patient/Family Short Term Goal  Description: Patient's Short Term Goal: \"Feel better\"    Interventions:   - Diuresis  - Pain meds as needed  - See additional Care Plan goals for specific interventions  Outcome: Progressing     Problem: CARDIOVASCULAR - ADULT  Goal: Maintains optimal cardiac output and hemodynamic stability  Description: INTERVENTIONS:  - Monitor vital signs, rhythm, and trends  - Monitor for bleeding, hypotension and signs of decreased cardiac output  - Evaluate effectiveness of vasoactive medications to optimize hemodynamic stability  - Monitor arterial and/or venous puncture sites for bleeding and/or hematoma  - Assess quality of pulses, skin color and temperature  - Assess for signs of decreased coronary artery perfusion - ex.  Angina  - Evaluate fluid balance, assess for edema, trend weights  Outcome: Progressing  Goal: Absence of cardiac arrhythmias or at baseline  Description: INTERVENTIONS:  - Continuous cardiac monitoring, monitor vital signs, obtain 12 lead EKG if indicated  - Evaluate effectiveness of antiarrhythmic and heart rate control medications as ordered  - Initiate emergency measures for life threatening arrhythmias  - Monitor electrolytes and administer replacement therapy as ordered  Outcome: Progressing     Problem: METABOLIC/FLUID AND ELECTROLYTES - ADULT  Goal: Electrolytes maintained within normal limits  Description: INTERVENTIONS:  - Monitor labs and rhythm and assess patient for signs and symptoms of electrolyte imbalances  - Administer electrolyte replacement as ordered  - Monitor response to electrolyte replacements, including rhythm and repeat lab results as appropriate  - Fluid restriction as ordered  - Instruct patient on fluid and nutrition restrictions as appropriate  Outcome: Progressing  Goal: Hemodynamic stability and optimal renal function maintained  Description: INTERVENTIONS:  - Monitor labs and assess for signs and symptoms of volume excess or deficit  - Monitor intake, output and patient weight  - Monitor urine specific gravity, serum osmolarity and serum sodium as indicated or ordered  - Monitor response to interventions for patient's volume status, including labs, urine output, blood pressure (other measures as available)  - Encourage oral intake as appropriate  - Instruct patient on fluid and nutrition restrictions as appropriate  Outcome: Progressing

## 2023-07-26 NOTE — CM/SW NOTE
Spoke with Tresa Murphy [phone] from [facility] to informed her anticipating discharge today. Plan for patient to start with dialysis clinic tomorrow. She will update clinic. Patient is set up for:  [address], [place], [address] with chair time TTS at 1:40pm. Start date Thursday 7/27 at 9:55am (just for first tx)    Addendum 2:30pm: Spoke with Mariana who states due to high volume of new patient's tomorrow afternoon, they requested patient come at 9:55am and arrive about 30 minutes early. Spoke with patient's daughter Katiuska Chery) to provide update, she was agreeable with tomorrow's chair time. Updated AVS. SW will remain available. SW/CM to remain available for support and/or discharge planning.     HUSSAIN Dhaliwal  Discharge Planner  [phone]

## 2023-09-28 ENCOUNTER — TELEPHONE (OUTPATIENT)
Dept: TRANSPLANT | Age: 53
End: 2023-09-28

## 2023-09-28 DIAGNOSIS — N18.6 ESRD (END STAGE RENAL DISEASE) (CMD): Primary | ICD-10-CM

## 2023-10-04 ENCOUNTER — TELEPHONE (OUTPATIENT)
Dept: TRANSPLANT | Age: 53
End: 2023-10-04

## 2023-10-09 ENCOUNTER — TELEPHONE (OUTPATIENT)
Dept: TRANSPLANT | Age: 53
End: 2023-10-09

## 2023-10-17 ENCOUNTER — TELEPHONE (OUTPATIENT)
Dept: TRANSPLANT | Age: 53
End: 2023-10-17

## 2023-11-30 ENCOUNTER — LAB ENCOUNTER (OUTPATIENT)
Dept: LAB | Age: 53
End: 2023-11-30
Attending: INTERNAL MEDICINE
Payer: COMMERCIAL

## 2023-11-30 ENCOUNTER — OFFICE VISIT (OUTPATIENT)
Dept: NEPHROLOGY | Facility: CLINIC | Age: 53
End: 2023-11-30
Payer: COMMERCIAL

## 2023-11-30 VITALS — DIASTOLIC BLOOD PRESSURE: 60 MMHG | SYSTOLIC BLOOD PRESSURE: 108 MMHG | WEIGHT: 222 LBS | BODY MASS INDEX: 34 KG/M2

## 2023-11-30 DIAGNOSIS — Z00.5 TRANSPLANT DONOR EVALUATION: Primary | ICD-10-CM

## 2023-11-30 DIAGNOSIS — Z01.818 ENCOUNTER FOR PRE-TRANSPLANT EVALUATION FOR CHRONIC KIDNEY DISEASE: ICD-10-CM

## 2023-11-30 DIAGNOSIS — Z00.5 TRANSPLANT DONOR EVALUATION: ICD-10-CM

## 2023-11-30 LAB — RH BLOOD TYPE: POSITIVE

## 2023-11-30 PROCEDURE — 3074F SYST BP LT 130 MM HG: CPT | Performed by: INTERNAL MEDICINE

## 2023-11-30 PROCEDURE — 86901 BLOOD TYPING SEROLOGIC RH(D): CPT

## 2023-11-30 PROCEDURE — 99215 OFFICE O/P EST HI 40 MIN: CPT | Performed by: INTERNAL MEDICINE

## 2023-11-30 PROCEDURE — 86900 BLOOD TYPING SEROLOGIC ABO: CPT

## 2023-11-30 PROCEDURE — 3078F DIAST BP <80 MM HG: CPT | Performed by: INTERNAL MEDICINE

## 2023-12-24 ENCOUNTER — APPOINTMENT (OUTPATIENT)
Dept: GENERAL RADIOLOGY | Facility: HOSPITAL | Age: 53
End: 2023-12-24
Payer: COMMERCIAL

## 2023-12-24 ENCOUNTER — HOSPITAL ENCOUNTER (EMERGENCY)
Facility: HOSPITAL | Age: 53
Discharge: HOME | End: 2023-12-24
Attending: EMERGENCY MEDICINE
Payer: COMMERCIAL

## 2023-12-24 VITALS
RESPIRATION RATE: 20 BRPM | DIASTOLIC BLOOD PRESSURE: 80 MMHG | TEMPERATURE: 97 F | HEART RATE: 99 BPM | BODY MASS INDEX: 39 KG/M2 | OXYGEN SATURATION: 94 % | SYSTOLIC BLOOD PRESSURE: 141 MMHG | WEIGHT: 256 LBS

## 2023-12-24 DIAGNOSIS — E87.5 HYPERKALEMIA: ICD-10-CM

## 2023-12-24 DIAGNOSIS — J18.9 COMMUNITY ACQUIRED PNEUMONIA, UNSPECIFIED LATERALITY: Primary | ICD-10-CM

## 2023-12-24 DIAGNOSIS — R09.1 PLEURISY: ICD-10-CM

## 2023-12-24 DIAGNOSIS — E87.1 HYPONATREMIA: ICD-10-CM

## 2023-12-24 LAB
ALBUMIN SERPL-MCNC: 3.3 G/DL (ref 3.4–5)
ALBUMIN/GLOB SERPL: 0.6 {RATIO} (ref 1–2)
ALP LIVER SERPL-CCNC: 172 U/L
ALT SERPL-CCNC: 25 U/L
ANION GAP SERPL CALC-SCNC: 10 MMOL/L (ref 0–18)
AST SERPL-CCNC: 15 U/L (ref 15–37)
BASOPHILS # BLD AUTO: 0.1 X10(3) UL (ref 0–0.2)
BASOPHILS NFR BLD AUTO: 0.6 %
BILIRUB SERPL-MCNC: 0.3 MG/DL (ref 0.1–2)
BUN BLD-MCNC: 45 MG/DL (ref 9–23)
CALCIUM BLD-MCNC: 9.7 MG/DL (ref 8.5–10.1)
CHLORIDE SERPL-SCNC: 87 MMOL/L (ref 98–112)
CO2 SERPL-SCNC: 28 MMOL/L (ref 21–32)
CREAT BLD-MCNC: 6.31 MG/DL
EGFRCR SERPLBLD CKD-EPI 2021: 7 ML/MIN/1.73M2 (ref 60–?)
EOSINOPHIL # BLD AUTO: 0.37 X10(3) UL (ref 0–0.7)
EOSINOPHIL NFR BLD AUTO: 2.3 %
ERYTHROCYTE [DISTWIDTH] IN BLOOD BY AUTOMATED COUNT: 14.1 %
FLUAV + FLUBV RNA SPEC NAA+PROBE: NEGATIVE
FLUAV + FLUBV RNA SPEC NAA+PROBE: NEGATIVE
GLOBULIN PLAS-MCNC: 5.4 G/DL (ref 2.8–4.4)
GLUCOSE BLD-MCNC: 294 MG/DL (ref 70–99)
HCT VFR BLD AUTO: 32.2 %
HGB BLD-MCNC: 10.4 G/DL
IMM GRANULOCYTES # BLD AUTO: 0.24 X10(3) UL (ref 0–1)
IMM GRANULOCYTES NFR BLD: 1.5 %
LYMPHOCYTES # BLD AUTO: 1.22 X10(3) UL (ref 1–4)
LYMPHOCYTES NFR BLD AUTO: 7.5 %
MCH RBC QN AUTO: 31.3 PG (ref 26–34)
MCHC RBC AUTO-ENTMCNC: 32.3 G/DL (ref 31–37)
MCV RBC AUTO: 97 FL
MONOCYTES # BLD AUTO: 0.66 X10(3) UL (ref 0.1–1)
MONOCYTES NFR BLD AUTO: 4.1 %
NEUTROPHILS # BLD AUTO: 13.7 X10 (3) UL (ref 1.5–7.7)
NEUTROPHILS # BLD AUTO: 13.7 X10(3) UL (ref 1.5–7.7)
NEUTROPHILS NFR BLD AUTO: 84 %
OSMOLALITY SERPL CALC.SUM OF ELEC: 282 MOSM/KG (ref 275–295)
PLATELET # BLD AUTO: 394 10(3)UL (ref 150–450)
POTASSIUM SERPL-SCNC: 5.9 MMOL/L (ref 3.5–5.1)
PROT SERPL-MCNC: 8.7 G/DL (ref 6.4–8.2)
RBC # BLD AUTO: 3.32 X10(6)UL
RSV RNA SPEC NAA+PROBE: NEGATIVE
SARS-COV-2 RNA RESP QL NAA+PROBE: NOT DETECTED
SODIUM SERPL-SCNC: 125 MMOL/L (ref 136–145)
WBC # BLD AUTO: 16.3 X10(3) UL (ref 4–11)

## 2023-12-24 PROCEDURE — 80053 COMPREHEN METABOLIC PANEL: CPT | Performed by: EMERGENCY MEDICINE

## 2023-12-24 PROCEDURE — 36415 COLL VENOUS BLD VENIPUNCTURE: CPT

## 2023-12-24 PROCEDURE — 93005 ELECTROCARDIOGRAM TRACING: CPT

## 2023-12-24 PROCEDURE — 0241U SARS-COV-2/FLU A AND B/RSV BY PCR (GENEXPERT): CPT | Performed by: EMERGENCY MEDICINE

## 2023-12-24 PROCEDURE — 99284 EMERGENCY DEPT VISIT MOD MDM: CPT

## 2023-12-24 PROCEDURE — 71045 X-RAY EXAM CHEST 1 VIEW: CPT

## 2023-12-24 PROCEDURE — 99285 EMERGENCY DEPT VISIT HI MDM: CPT

## 2023-12-24 PROCEDURE — 85025 COMPLETE CBC W/AUTO DIFF WBC: CPT | Performed by: EMERGENCY MEDICINE

## 2023-12-24 RX ORDER — DOXYCYCLINE HYCLATE 100 MG/1
100 CAPSULE ORAL 2 TIMES DAILY
Qty: 20 CAPSULE | Refills: 0 | Status: SHIPPED | OUTPATIENT
Start: 2023-12-24 | End: 2024-01-03

## 2023-12-25 NOTE — DISCHARGE INSTRUCTIONS
Follow-up with your doctor or nephrologist to recheck your electrolytes within a couple days.   Return to ER if you feel worse or have any problems

## 2023-12-26 LAB
ATRIAL RATE: 105 BPM
P AXIS: -11 DEGREES
P-R INTERVAL: 174 MS
Q-T INTERVAL: 348 MS
QRS DURATION: 78 MS
QTC CALCULATION (BEZET): 459 MS
R AXIS: 30 DEGREES
T AXIS: 17 DEGREES
VENTRICULAR RATE: 105 BPM

## 2024-02-10 NOTE — PLAN OF CARE
Received patient at 0730. Alert and Oriented x4. Tele Rhythm NSR with 1st AVB. O2 saturation 96% On room air. Breath sounds diminished. Bed is locked and in low position. Call light and personal items within reach. Norco given with relief for back/leg pain. Pt voids small amounts. Dailysis in progress this am with 4L goal. Nausea better today. BLE swelling noted, with some improvement since admission per patient. Pt up with walker and assist. Plan for dialysis ([facility] notified) and premacath placement tomorrow. OP dialysis arranged to start tue 7/25. Reviewed plan of care and patient verbalizes understanding.       Problem: Diabetes/Glucose Control  Goal: Glucose maintained within prescribed range  Description: INTERVENTIONS:  - Monitor Blood Glucose as ordered  - Assess for signs and symptoms of hyperglycemia and hypoglycemia  - Administer ordered medications to maintain glucose within target range  - Assess barriers to adequate nutritional intake and initiate nutrition consult as needed  - Instruct patient on self management of diabetes  Outcome: Progressing     Problem: Patient/Family Goals  Goal: Patient/Family Long Term Goal  Description: Patient's Long Term Goal: \"Go home\"    Interventions:  - MD consults as needed  - Diagnostics as needed  - See additional Care Plan goals for specific interventions  Outcome: Progressing  Goal: Patient/Family Short Term Goal  Description: Patient's Short Term Goal: \"Feel better\"    Interventions:   - Diuresis  - Pain meds as needed  - See additional Care Plan goals for specific interventions  Outcome: Progressing     Problem: CARDIOVASCULAR - ADULT  Goal: Maintains optimal cardiac output and hemodynamic stability  Description: INTERVENTIONS:  - Monitor vital signs, rhythm, and trends  - Monitor for bleeding, hypotension and signs of decreased cardiac output  - Evaluate effectiveness of vasoactive medications to optimize hemodynamic stability  - Monitor arterial and/or venous puncture sites for bleeding and/or hematoma  - Assess quality of pulses, skin color and temperature  - Assess for signs of decreased coronary artery perfusion - ex.  Angina  - Evaluate fluid balance, assess for edema, trend weights  Outcome: Progressing  Goal: Absence of cardiac arrhythmias or at baseline  Description: INTERVENTIONS:  - Continuous cardiac monitoring, monitor vital signs, obtain 12 lead EKG if indicated  - Evaluate effectiveness of antiarrhythmic and heart rate control medications as ordered  - Initiate emergency measures for life threatening arrhythmias  - Monitor electrolytes and administer replacement therapy as ordered  Outcome: Progressing     Problem: METABOLIC/FLUID AND ELECTROLYTES - ADULT  Goal: Electrolytes maintained within normal limits  Description: INTERVENTIONS:  - Monitor labs and rhythm and assess patient for signs and symptoms of electrolyte imbalances  - Administer electrolyte replacement as ordered  - Monitor response to electrolyte replacements, including rhythm and repeat lab results as appropriate  - Fluid restriction as ordered  - Instruct patient on fluid and nutrition restrictions as appropriate  Outcome: Progressing  Goal: Hemodynamic stability and optimal renal function maintained  Description: INTERVENTIONS:  - Monitor labs and assess for signs and symptoms of volume excess or deficit  - Monitor intake, output and patient weight  - Monitor urine specific gravity, serum osmolarity and serum sodium as indicated or ordered  - Monitor response to interventions for patient's volume status, including labs, urine output, blood pressure (other measures as available)  - Encourage oral intake as appropriate  - Instruct patient on fluid and nutrition restrictions as appropriate  Outcome: Progressing 10-Feb-2024

## 2024-07-10 ENCOUNTER — HOSPITAL ENCOUNTER (INPATIENT)
Facility: HOSPITAL | Age: 54
LOS: 1 days | Discharge: HOME | End: 2024-07-10
Attending: EMERGENCY MEDICINE | Admitting: HOSPITALIST
Payer: COMMERCIAL

## 2024-07-10 ENCOUNTER — APPOINTMENT (OUTPATIENT)
Dept: ULTRASOUND IMAGING | Facility: HOSPITAL | Age: 54
End: 2024-07-10
Attending: SURGERY
Payer: COMMERCIAL

## 2024-07-10 ENCOUNTER — HOSPITAL ENCOUNTER (OUTPATIENT)
Facility: HOSPITAL | Age: 54
Setting detail: OBSERVATION
Discharge: HOME | End: 2024-07-10
Attending: EMERGENCY MEDICINE | Admitting: HOSPITALIST
Payer: COMMERCIAL

## 2024-07-10 VITALS
BODY MASS INDEX: 40.77 KG/M2 | OXYGEN SATURATION: 94 % | TEMPERATURE: 98 F | SYSTOLIC BLOOD PRESSURE: 141 MMHG | DIASTOLIC BLOOD PRESSURE: 73 MMHG | RESPIRATION RATE: 18 BRPM | WEIGHT: 269 LBS | HEART RATE: 96 BPM | HEIGHT: 68 IN

## 2024-07-10 DIAGNOSIS — N18.5 CHRONIC RENAL FAILURE, STAGE 5 (HCC): ICD-10-CM

## 2024-07-10 DIAGNOSIS — T82.590A MALFUNCTION OF ARTERIOVENOUS DIALYSIS FISTULA, INITIAL ENCOUNTER (HCC): Primary | ICD-10-CM

## 2024-07-10 DIAGNOSIS — T14.8XXA HEMATOMA: ICD-10-CM

## 2024-07-10 PROBLEM — N18.6 END-STAGE RENAL DISEASE ON HEMODIALYSIS (HCC): Status: ACTIVE | Noted: 2024-07-10

## 2024-07-10 PROBLEM — Z99.2 END-STAGE RENAL DISEASE ON HEMODIALYSIS (HCC): Status: ACTIVE | Noted: 2024-07-10

## 2024-07-10 LAB
ALBUMIN SERPL-MCNC: 3.4 G/DL (ref 3.4–5)
ALBUMIN/GLOB SERPL: 0.8 {RATIO} (ref 1–2)
ALP LIVER SERPL-CCNC: 103 U/L
ALT SERPL-CCNC: 17 U/L
ANION GAP SERPL CALC-SCNC: 5 MMOL/L (ref 0–18)
AST SERPL-CCNC: 8 U/L (ref 15–37)
BASOPHILS # BLD AUTO: 0.08 X10(3) UL (ref 0–0.2)
BASOPHILS NFR BLD AUTO: 0.6 %
BILIRUB SERPL-MCNC: 0.4 MG/DL (ref 0.1–2)
BUN BLD-MCNC: 26 MG/DL (ref 9–23)
CALCIUM BLD-MCNC: 9.4 MG/DL (ref 8.5–10.1)
CHLORIDE SERPL-SCNC: 97 MMOL/L (ref 98–112)
CO2 SERPL-SCNC: 32 MMOL/L (ref 21–32)
CREAT BLD-MCNC: 4.07 MG/DL
EGFRCR SERPLBLD CKD-EPI 2021: 12 ML/MIN/1.73M2 (ref 60–?)
EOSINOPHIL # BLD AUTO: 0.39 X10(3) UL (ref 0–0.7)
EOSINOPHIL NFR BLD AUTO: 3 %
ERYTHROCYTE [DISTWIDTH] IN BLOOD BY AUTOMATED COUNT: 14.5 %
EST. AVERAGE GLUCOSE BLD GHB EST-MCNC: 186 MG/DL (ref 68–126)
GLOBULIN PLAS-MCNC: 4.5 G/DL (ref 2.8–4.4)
GLUCOSE BLD-MCNC: 135 MG/DL (ref 70–99)
GLUCOSE BLD-MCNC: 178 MG/DL (ref 70–99)
GLUCOSE BLD-MCNC: 188 MG/DL (ref 70–99)
HBA1C MFR BLD: 8.1 % (ref ?–5.7)
HCT VFR BLD AUTO: 31.2 %
HGB BLD-MCNC: 10.3 G/DL
IMM GRANULOCYTES # BLD AUTO: 0.11 X10(3) UL (ref 0–1)
IMM GRANULOCYTES NFR BLD: 0.8 %
LYMPHOCYTES # BLD AUTO: 1.51 X10(3) UL (ref 1–4)
LYMPHOCYTES NFR BLD AUTO: 11.6 %
MCH RBC QN AUTO: 31.4 PG (ref 26–34)
MCHC RBC AUTO-ENTMCNC: 33 G/DL (ref 31–37)
MCV RBC AUTO: 95.1 FL
MONOCYTES # BLD AUTO: 0.7 X10(3) UL (ref 0.1–1)
MONOCYTES NFR BLD AUTO: 5.4 %
NEUTROPHILS # BLD AUTO: 10.2 X10 (3) UL (ref 1.5–7.7)
NEUTROPHILS # BLD AUTO: 10.2 X10(3) UL (ref 1.5–7.7)
NEUTROPHILS NFR BLD AUTO: 78.6 %
OSMOLALITY SERPL CALC.SUM OF ELEC: 285 MOSM/KG (ref 275–295)
PLATELET # BLD AUTO: 345 10(3)UL (ref 150–450)
POTASSIUM SERPL-SCNC: 4.7 MMOL/L (ref 3.5–5.1)
PROT SERPL-MCNC: 7.9 G/DL (ref 6.4–8.2)
RBC # BLD AUTO: 3.28 X10(6)UL
SODIUM SERPL-SCNC: 134 MMOL/L (ref 136–145)
WBC # BLD AUTO: 13 X10(3) UL (ref 4–11)

## 2024-07-10 PROCEDURE — 99223 1ST HOSP IP/OBS HIGH 75: CPT | Performed by: INTERNAL MEDICINE

## 2024-07-10 PROCEDURE — 99254 IP/OBS CNSLTJ NEW/EST MOD 60: CPT | Performed by: INTERNAL MEDICINE

## 2024-07-10 PROCEDURE — 93990 DOPPLER FLOW TESTING: CPT | Performed by: SURGERY

## 2024-07-10 RX ORDER — NICOTINE POLACRILEX 4 MG
15 LOZENGE BUCCAL
Status: DISCONTINUED | OUTPATIENT
Start: 2024-07-10 | End: 2024-07-10

## 2024-07-10 RX ORDER — HYDROXYZINE HYDROCHLORIDE 25 MG/1
25 TABLET, FILM COATED ORAL 3 TIMES DAILY PRN
Status: DISCONTINUED | OUTPATIENT
Start: 2024-07-10 | End: 2024-07-10

## 2024-07-10 RX ORDER — CYCLOBENZAPRINE HCL 10 MG
10 TABLET ORAL 3 TIMES DAILY PRN
COMMUNITY
Start: 2024-07-01

## 2024-07-10 RX ORDER — SENNA AND DOCUSATE SODIUM 50; 8.6 MG/1; MG/1
3 TABLET, FILM COATED ORAL DAILY
COMMUNITY

## 2024-07-10 RX ORDER — PANTOPRAZOLE SODIUM 40 MG/1
40 TABLET, DELAYED RELEASE ORAL
Status: DISCONTINUED | OUTPATIENT
Start: 2024-07-11 | End: 2024-07-10

## 2024-07-10 RX ORDER — LEVOTHYROXINE SODIUM 0.07 MG/1
75 TABLET ORAL
Status: DISCONTINUED | OUTPATIENT
Start: 2024-07-10 | End: 2024-07-10

## 2024-07-10 RX ORDER — CARVEDILOL 12.5 MG/1
37.5 TABLET ORAL 2 TIMES DAILY
Status: DISCONTINUED | OUTPATIENT
Start: 2024-07-10 | End: 2024-07-10

## 2024-07-10 RX ORDER — INSULIN DEGLUDEC 100 U/ML
60 INJECTION, SOLUTION SUBCUTANEOUS DAILY
Status: DISCONTINUED | OUTPATIENT
Start: 2024-07-11 | End: 2024-07-10

## 2024-07-10 RX ORDER — SUCROFERRIC OXYHYDROXIDE 500 MG/1
2 TABLET, CHEWABLE ORAL
COMMUNITY
Start: 2024-05-18

## 2024-07-10 RX ORDER — SENNA AND DOCUSATE SODIUM 50; 8.6 MG/1; MG/1
3 TABLET, FILM COATED ORAL DAILY
Status: DISCONTINUED | OUTPATIENT
Start: 2024-07-10 | End: 2024-07-10 | Stop reason: SDUPTHER

## 2024-07-10 RX ORDER — GABAPENTIN 400 MG/1
400 CAPSULE ORAL 3 TIMES DAILY
Status: DISCONTINUED | OUTPATIENT
Start: 2024-07-10 | End: 2024-07-10

## 2024-07-10 RX ORDER — DEXTROSE MONOHYDRATE 25 G/50ML
50 INJECTION, SOLUTION INTRAVENOUS
Status: DISCONTINUED | OUTPATIENT
Start: 2024-07-10 | End: 2024-07-10

## 2024-07-10 RX ORDER — BUMETANIDE 2 MG/1
2 TABLET ORAL DAILY
Status: DISCONTINUED | OUTPATIENT
Start: 2024-07-10 | End: 2024-07-10

## 2024-07-10 RX ORDER — CYCLOBENZAPRINE HCL 10 MG
10 TABLET ORAL 3 TIMES DAILY PRN
Status: DISCONTINUED | OUTPATIENT
Start: 2024-07-10 | End: 2024-07-10

## 2024-07-10 RX ORDER — HYDROCODONE BITARTRATE AND ACETAMINOPHEN 10; 325 MG/1; MG/1
1 TABLET ORAL 3 TIMES DAILY PRN
Status: DISCONTINUED | OUTPATIENT
Start: 2024-07-10 | End: 2024-07-10

## 2024-07-10 RX ORDER — LEVOTHYROXINE SODIUM 0.2 MG/1
200 TABLET ORAL
Status: DISCONTINUED | OUTPATIENT
Start: 2024-07-10 | End: 2024-07-10

## 2024-07-10 RX ORDER — ACETAMINOPHEN 500 MG
500 TABLET ORAL EVERY 4 HOURS PRN
Status: DISCONTINUED | OUTPATIENT
Start: 2024-07-10 | End: 2024-07-10

## 2024-07-10 RX ORDER — LORAZEPAM 2 MG/ML
1 INJECTION INTRAMUSCULAR EVERY 6 HOURS PRN
Status: DISCONTINUED | OUTPATIENT
Start: 2024-07-10 | End: 2024-07-10

## 2024-07-10 RX ORDER — PRAVASTATIN SODIUM 20 MG
20 TABLET ORAL NIGHTLY
Status: DISCONTINUED | OUTPATIENT
Start: 2024-07-10 | End: 2024-07-10

## 2024-07-10 RX ORDER — ALBUTEROL SULFATE 90 UG/1
2 AEROSOL, METERED RESPIRATORY (INHALATION) EVERY 6 HOURS PRN
Status: DISCONTINUED | OUTPATIENT
Start: 2024-07-10 | End: 2024-07-10

## 2024-07-10 RX ORDER — LORAZEPAM 2 MG/ML
0.5 INJECTION INTRAMUSCULAR EVERY 6 HOURS PRN
Status: DISCONTINUED | OUTPATIENT
Start: 2024-07-10 | End: 2024-07-10

## 2024-07-10 RX ORDER — ONDANSETRON 2 MG/ML
4 INJECTION INTRAMUSCULAR; INTRAVENOUS EVERY 6 HOURS PRN
Status: DISCONTINUED | OUTPATIENT
Start: 2024-07-10 | End: 2024-07-10

## 2024-07-10 RX ORDER — MELATONIN
3 NIGHTLY PRN
Status: DISCONTINUED | OUTPATIENT
Start: 2024-07-10 | End: 2024-07-10

## 2024-07-10 RX ORDER — HEPARIN SODIUM 5000 [USP'U]/ML
5000 INJECTION, SOLUTION INTRAVENOUS; SUBCUTANEOUS EVERY 12 HOURS SCHEDULED
Status: DISCONTINUED | OUTPATIENT
Start: 2024-07-11 | End: 2024-07-10

## 2024-07-10 RX ORDER — NICOTINE POLACRILEX 4 MG
30 LOZENGE BUCCAL
Status: DISCONTINUED | OUTPATIENT
Start: 2024-07-10 | End: 2024-07-10

## 2024-07-10 RX ORDER — BUPROPION HYDROCHLORIDE 150 MG/1
150 TABLET ORAL DAILY
Status: DISCONTINUED | OUTPATIENT
Start: 2024-07-10 | End: 2024-07-10

## 2024-07-10 RX ORDER — GABAPENTIN 400 MG/1
400 CAPSULE ORAL 2 TIMES DAILY
Status: DISCONTINUED | OUTPATIENT
Start: 2024-07-10 | End: 2024-07-10

## 2024-07-10 RX ORDER — SENNA AND DOCUSATE SODIUM 50; 8.6 MG/1; MG/1
3 TABLET, FILM COATED ORAL
Status: DISCONTINUED | OUTPATIENT
Start: 2024-07-10 | End: 2024-07-10

## 2024-07-10 RX ORDER — SODIUM CHLORIDE, SODIUM LACTATE, POTASSIUM CHLORIDE, CALCIUM CHLORIDE 600; 310; 30; 20 MG/100ML; MG/100ML; MG/100ML; MG/100ML
INJECTION, SOLUTION INTRAVENOUS CONTINUOUS
Status: DISCONTINUED | OUTPATIENT
Start: 2024-07-10 | End: 2024-07-10

## 2024-07-10 NOTE — PROGRESS NOTES
Full note to follow   No need for surgery - no compartment   Fistula infiltrated     Will need duplex today   Can eat     After duplex can likely be discharged

## 2024-07-10 NOTE — PROGRESS NOTES
Vascular surgery note    Imaging reviewed.  Fistula widely patent without stenosis.  She underwent recent intervention with balloon angioplasty and this is optimized.  However given her body habitus this will be a repeated issue to access the fistula  as it has been given the depth.  I recommend revision of her AV fistula with superficialization.  I will allow her fistula infiltration and edema to resolve.  She will need to follow-up in the vascular clinic in 3 weeks.  We will then schedule her for elective AV fistula revision.  She is okay for discharge from vascular surgery standpoint.

## 2024-07-10 NOTE — ED PROVIDER NOTES
Patient Seen in: [facility] Emergency Department      History     Chief Complaint   Patient presents with    Dialysis    Swelling Edema     Stated Complaint: pt had dialysis early and now has swelling around fistula    Subjective:   HPI    54-year-old female reports feeling tired. She had fistula surgery last week, which was the fourth attempt to correct it for usability. The fistula has only been used twice before, once for an hour and the next time for not even 15 minutes before it had to be removed due to rising blood pressure. An ultrasound was performed today to measure the depth of her veins and potential access points. She reports that her arm, which is usually skinny, has swollen up significantly. She believes this is due to her jerking in her sleep and possibly moving the needle during dialysis. She was informed by the nurse that it's dangerous and she needs to report any pain immediately as the needle could move and cause her arm to swell and potentially turn black. The swelling occurred about three hours ago. She had finished dialysis at 6:00 PM. This was the first time the fistula was used for almost the entire treatment.    Objective:   Past Medical History:    Anxiety    Diabetes (HCC)    End stage renal disease (HCC)    Essential hypertension    High blood pressure    High cholesterol    Hyperlipidemia    Hypertension    Thyroid disease              Past Surgical History:   Procedure Laterality Date    Hysterectomy      Tubal ligation                  Social History     Socioeconomic History    Marital status:    Tobacco Use    Smoking status: Never    Smokeless tobacco: Never   Substance and Sexual Activity    Alcohol use: Never    Drug use: Never   Social History Narrative    ** Merged History Encounter **          Social Determinants of Health      Received from [facility], [facility]    Social Connections    [facility]              Review of Systems    Positive for stated Chief Complaint: Dialysis and Swelling Edema    Other systems are as noted in HPI.  Constitutional and vital signs reviewed.      All other systems reviewed and negative except as noted above.    Physical Exam     ED Triage Vitals [07/10/24 0345]   /83   Pulse 95   Resp 20   Temp 97.1 °F (36.2 °C)   Temp src Temporal   SpO2 99 %   O2 Device None (Room air)       Current Vitals:   Vital Signs  BP: 90/50  Pulse: 95  Resp: 16  Temp: 97.1 °F (36.2 °C)  Temp src: Temporal    Oxygen Therapy  SpO2: 98 %  O2 Device: None (Room air)            Physical Exam      Vital signs reviewed  General appearance: Patient is alert and in moderate pain distress  HEENT: Pupils equal react to light extraocular muscles intact no scleral icterus, mucous membranes are moist, there is no erythema or exudate in the posterior pharynx  Neck: Supple no JVD no lymphadenopathy no meningismus no carotid bruit  CV: Regular rate and rhythm no murmur rub  Respiratory: Clear to auscultation bilaterally no crackles no wheezes no accessory muscle use  Abdomen: Soft nontender nondistended, no rebound no guarding  no hepatosplenomegaly bowel sounds are present , no pulsatile mass  Extremities: Left bicep is significantly swollen and tense.  There is a small area of ecchymosis.  Much larger than the right bicep.  Difficult and palpating a thrill over the fistula  Neuro: Cranial nerves II through XII intact with no gross focal sensory or motor abnormality.      ED Course     Labs Reviewed   CBC W/ DIFFERENTIAL - Abnormal; Notable for the following components:       Result Value    WBC 13.0 (*)     RBC 3.28 (*)     HGB 10.3 (*)     HCT 31.2 (*)     Neutrophil Absolute Prelim 10.20 (*)     Neutrophil Absolute 10.20 (*)     All other components within normal limits   CBC WITH DIFFERENTIAL WITH PLATELET    Narrative:     The following orders were created for panel order CBC With Differential With  Platelet.  Procedure                               Abnormality         Status                     ---------                               -----------         ------                     CBC W/ DIFFERENTIAL[[id number]]          Abnormal            Final result                 Please view results for these tests on the individual orders.   COMP METABOLIC PANEL (14)   RAINBOW DRAW BLUE             Patient was evaluated and does appear to when she had moved her arm may have dislodged with a needle and caused the fistula to bleed intramuscularly.  Will call Dr. Malin who is her nephrologist to see what he would like me to do.       Discussed case with nephrology who would like me to talk to vascular but stated to admit her and they will dialyze her here.  Did discuss case with vascular surgeon who states they will evaluate her today but did not feel I need to do any imaging.  Will admit to the hospitalist.  MDM      Differential diagnosis reflecting the complexity of care include: Fistula injury, bicep hematoma, pseudoaneurysm    Comorbidities that add complexity to management include: Chronic renal failure on dialysis with recent fistula revision      Discussions of management was done with: Discussed case with nephrology along with the hospitalist and vascular surgery.  They will see her later today.      Diagnostic tests and medications considered but not ordered were: CTA of the upper extremity was considered or an ultrasound but vascular states they will handle the imaging    Shared decision making was done by self and patient along with vascular and nephrology and the hospitalist.  Patient will be admitted for further observation dialysis and workup    Patient was evaluated in the emergency department and at this point patient will need admission for further management of medical condition.  Patient was stable in the emergency department and will be transferred to floor for further definitive care.  Patient's  questions were answered.    This note was prepared using Dragon Medical voice recognition dictation software. As a result errors may occur. When identified these errors have been corrected. While every attempt is made to correct errors during dictation discrepancies may still exist      Admission disposition: 7/10/2024  5:31 AM                                        Medical Decision Making      Disposition and Plan     Clinical Impression:  1. Malfunction of arteriovenous dialysis fistula, initial encounter (Formerly [facility])    2. Hematoma    3. Chronic renal failure, stage 5 (Formerly [facility])         Disposition:  Admit  7/10/2024  5:31 am    Follow-up:  No follow-up provider specified.        Medications Prescribed:  Current Discharge Medication List                            Hospital Problems       Present on Admission  Date Reviewed: 11/30/2023            ICD-10-CM Noted POA    * (Principal) Malfunction of arteriovenous dialysis fistula, initial encounter (Formerly [facility]) T82.590A 7/10/2024 Unknown

## 2024-07-10 NOTE — ED INITIAL ASSESSMENT (HPI)
Here for swelling to L AV fistula 2 hrs PTA. Per pt, been having difficulty with fistula for access 1st time yesterday to finish almost HD, per pt, possibly \"moved the needle during dialysis\".

## 2024-07-10 NOTE — CONSULTS
[facility]  Report of Consultation    Kimmy Sparks Patient Status:  Inpatient    1970 MRN [id number]   Location [facility] 3SW-A Attending Yeny Ga MD   Hosp Day # 0 PCP Alejandro Pedersen MD     Reason for Consultation:  AVF infiltration/ESRD on HD    History of Present Illness:  Kimmy Sparks is a a(n) 54 year old woman known to our service with mult med probs incl ESRD in setting of DM/HTN/CM on HD TTHS at [facility] who has a L upper arm AVF that has been maturing for quite some time with mult procedures required.  Of late she has used one needle in the fistula but yest during HD \"jerked\" her arm and this resulted in infiltration.  She came to ED for eval last PM and was admitted.  This AM the fistula has no bruit/thrill    History:  Past Medical History:    Anxiety    Diabetes (HCC)    End stage renal disease (HCC)    Essential hypertension    High blood pressure    High cholesterol    Hyperlipidemia    Hypertension    Thyroid disease     Past Surgical History:   Procedure Laterality Date    Hysterectomy      Tubal ligation       Family History   Problem Relation Age of Onset    Cancer Maternal Grandmother     Diabetes Maternal Grandfather     Hypertension Sister     Heart Disorder Sister     Diabetes Other       reports that she has never smoked. She has never used smokeless tobacco. She reports that she does not drink alcohol and does not use drugs.    Allergies:  No Known Allergies    Medications:    Current Facility-Administered Medications:     glucose (Dex4) 15 GM/59ML oral liquid 15 g, 15 g, Oral, Q15 Min PRN **OR** glucose (Glutose) 40% oral gel 15 g, 15 g, Oral, Q15 Min PRN **OR** glucose-vitamin C (Dex-4) chewable tab 4 tablet, 4 tablet, Oral, Q15 Min PRN **OR** dextrose 50% injection 50 mL, 50 mL, Intravenous, Q15 Min PRN **OR** glucose (Dex4) 15 GM/59ML oral liquid 30 g, 30 g, Oral, Q15 Min PRN **OR** glucose (Glutose) 40% oral gel 30 g, 30 g, Oral, Q15 Min PRN **OR**  glucose-vitamin C (Dex-4) chewable tab 8 tablet, 8 tablet, Oral, Q15 Min PRN    [START ON 2024] heparin (Porcine) 5000 UNIT/ML injection 5,000 Units, 5,000 Units, Subcutaneous, 2 times per day    acetaminophen (Tylenol Extra Strength) tab 500 mg, 500 mg, Oral, Q4H PRN    melatonin tab 3 mg, 3 mg, Oral, Nightly PRN    ondansetron (Zofran) 4 MG/2ML injection 4 mg, 4 mg, Intravenous, Q6H PRN    insulin aspart (NovoLOG) 100 Units/mL FlexPen 1-10 Units, 1-10 Units, Subcutaneous, TID AC and HS    insulin aspart (NovoLOG) 100 Units/mL FlexPen 1-68 Units, 1-68 Units, Subcutaneous, TID CC  No current outpatient medications on file.       Review of Systems:  Denies fever/chills  Denies wt loss/gain  Denies HA or visual changes  Denies CP or palpitations  Denies SOB/cough/hemoptysis  Denies abd or flank pain  Denies N/V/D  Denies change in urinary habits or gross hematuria  Denies LE edema  Denies skin rashes/myalgias/arthralgias      Physical Exam:   /87 (BP Location: Right arm)   Pulse 97   Temp 98.5 °F (36.9 °C) (Oral)   Resp 18   Ht 5' 8\" (1.727 m)   Wt 269 lb (122 kg)   SpO2 93%   BMI 40.90 kg/m²   Temp (24hrs), Av.8 °F (36.6 °C), Min:97.1 °F (36.2 °C), Max:98.5 °F (36.9 °C)     No intake or output data in the 24 hours ending 07/10/24 0837  Last 3 Weights   07/10/24 0345 269 lb (122 kg)   23 1855 256 lb (116.1 kg)   23 1409 222 lb (100.7 kg)   23 1255 275 lb 5.7 oz (124.9 kg)   23 0503 276 lb (125.2 kg)   23 0541 278 lb (126.1 kg)   23 0502 282 lb 3 oz (128 kg)   23 0716 280 lb 13.9 oz (127.4 kg)   23 0407 294 lb 1.5 oz (133.4 kg)   23 0511 297 lb (134.7 kg)   23 0941 (!) 302 lb 11.1 oz (137.3 kg)   23 0913 296 lb 11.8 oz (134.6 kg)   07/15/23 0452 296 lb 1.2 oz (134.3 kg)   23 0533 292 lb 8.8 oz (132.7 kg)   23 0259 291 lb (132 kg)   23 2222 293 lb 3.4 oz (133 kg)   23 0508 293 lb 4.8 oz (133 kg)   23  0518 292 lb 5.3 oz (132.6 kg)   03/03/23 1120 283 lb 1.1 oz (128.4 kg)   03/02/23 0143 273 lb (123.8 kg)   03/01/23 1417 268 lb (121.6 kg)     General: Alert and oriented in no apparent distress.  HEENT: No scleral icterus, MMM  Neck: Supple, no RAHAT or thyromegaly  Cardiac: Regular rate and rhythm, S1, S2 normal, no murmur or rub  Lungs: Clear without wheezes, rales, rhonchi.    Abdomen: Soft, non-tender. + bowel sounds, no palpable organomegaly  Extremities: Without clubbing, cyanosis or edema.  L arm swelling noted, AVF no bruit/thrill  Neurologic: Alert and oriented, cranial nerves grossly intact, moving all extremities  Skin: Warm and dry, no rashes      Laboratory Data:  Lab Results   Component Value Date    WBC 13.0 07/10/2024    HGB 10.3 07/10/2024    HCT 31.2 07/10/2024    .0 07/10/2024    CREATSERUM 4.07 07/10/2024    BUN 26 07/10/2024     07/10/2024    K 4.7 07/10/2024    CL 97 07/10/2024    CO2 32.0 07/10/2024     07/10/2024    CA 9.4 07/10/2024    ALB 3.4 07/10/2024    ALKPHO 103 07/10/2024    BILT 0.4 07/10/2024    TP 7.9 07/10/2024    AST 8 07/10/2024    ALT 17 07/10/2024       BUN (mg/dL)   Date Value   07/10/2024 26 (H)   04/09/2024 57 (H)   12/24/2023 45 (H)     Creatinine (mg/dL)   Date Value   07/10/2024 4.07 (H)   04/09/2024 6.31 (H)   12/24/2023 6.31 (H)       Malb/Cre Calc   Date Value Ref Range Status   08/05/2022 1,761.5 (H) <=30.0 ug/mg Final     Comment:     <30 ug/mg creatinine       Normal     ug/mg creatinine   Microalbuminuria   >300 ug/mg creatinine      Albuminuria           Recent Labs   Lab 07/10/24  0511   WBC 13.0*   HGB 10.3*   MCV 95.1   .0       Recent Labs   Lab 07/10/24  0512   *   K 4.7   CL 97*   CO2 32.0   BUN 26*   CREATSERUM 4.07*   CA 9.4   *       Recent Labs   Lab 07/10/24  0512   ALT 17   AST 8*   ALB 3.4       No results for input(s): \"PGLU\" in the last 168 hours.          Impression/Plan:    #1.  AVF infiltration-  appears to be clotted today.  Appreciate vascular eval. Ultrasound ordered    #2.  ESRD- in setting of DM/HTN/CM.  HD tomorrow per usual routine- likely as outpt    #3.  Anemia- due to ESRD.  RAMSES for goal hgb 10-11 gms    Thank you for allowing me to participate in the care of your patient. Please do not hesitate to call with any questions or concerns.         Anticipate discharge home later today      Irineo Read MD  7/10/2024  8:37 AM

## 2024-07-10 NOTE — H&P
[facility]                                                               [facility]         Kimmy Sparks Patient Status:  Inpatient    1970 MRN [id number]   Location [facility] 3SW-A Attending Yeny Ga MD   Hosp Day # 0 PCP Alejandro Pedersen MD     Chief Complaint: Left arm swelling and pain and bruising around the fistula site after dialysis yesterday    History of Present Illness:  Kimmy Sparks is a 54 year old female admitted with left arm swelling and pain and bruising around the fistula site after hemodialysis yesterday.  Denies any fever or chills.  Denies any chest pain or shortness of breath.  Denies any nausea vomiting.  Denies any abdominal pain.  Denies any constipation or diarrhea.  Patient states she has a permacath on her upper chest wall which the nephrologist use periodically for dialysis.  Patient states he has had issues with her AV fistula previously and states that she has had the left arm AV fistula accessed around 4 times in the last year and last episode was yesterday and had dialysis using single needle in the fistula.  Patient states was immobilized during the hemodialysis, but that she fell asleep during the hemodialysis and woke up and absentmindedly pulled/jerked her arm according to patient.  Patient states she had some pain at the site at that time, patient states she initially did not report the pain as she wanted to finish the fluid removal with hemodialysis.  Then patient states she reported the pain to the dialysis tech who then stopped the dialysis and patient went home.  After reaching home patient states she noticed bruising around the fistula site and increasing pain and swelling and came to the emergency room.  Vascular surgeon and nephrologist consulted from the emergency room and being admitted.  Patient seen with patient's  at bedside.  Patient afebrile.    History:  Past Medical History:    Anxiety    Diabetes (HCC)    End stage  renal disease (HCC)    Essential hypertension    High blood pressure    High cholesterol    Hyperlipidemia    Hypertension    Thyroid disease       Past Surgical History:   Procedure Laterality Date    Hysterectomy      Tubal ligation         Family history:  Family History   Problem Relation Age of Onset    Cancer Maternal Grandmother     Diabetes Maternal Grandfather     Hypertension Sister     Heart Disorder Sister     Diabetes Other       Reviewed    Social history:   reports that she has never smoked. She has never used smokeless tobacco. She reports that she does not drink alcohol and does not use drugs.    Allergies:  No Known Allergies    Home Medications:  Medications Prior to Admission   Medication Sig Dispense Refill Last Dose    cyclobenzaprine 10 MG Oral Tab Take 1 tablet (10 mg total) by mouth 3 (three) times daily as needed for Muscle spasms.   7/10/2024 at 0300    VELPHORO 500 MG Oral Chew Tab Chew 2 tablets by mouth 3 (three) times daily with meals.   7/9/2024 at 1900    Cholecalciferol (VITAMIN D-3 OR) Take 1 capsule by mouth daily.   7/9/2024 at 0500    senna-docusate 8.6-50 MG Oral Tab Take 3 tablets by mouth daily. Does not take on Tuesday, Thursday, and Saturday when pt has dialysis   7/8/2024    clopidogrel 75 MG Oral Tab Take 1 tablet (75 mg total) by mouth daily.   7/9/2024 at 0500    gabapentin 400 MG Oral Cap Take 1 capsule (400 mg total) by mouth 3 (three) times daily.   7/9/2024 at 0500    levothyroxine 200 MCG Oral Tab Take 1 tablet (200 mcg total) by mouth before breakfast.   7/9/2024 at 0500    pravastatin 20 MG Oral Tab Take 1 tablet (20 mg total) by mouth nightly.   7/8/2024    insulin glargine 100 UNIT/ML Subcutaneous Solution Pen-injector Inject 60 Units into the skin nightly.   7/10/2024 at 0200    ondansetron 4 MG Oral Tablet Dispersible Dissolve 4 mg on the tongue every 6 to 8 hours as needed for nausea.   Past Month at \"About 2-3 weeks ago\"    Naloxegol Oxalate 25 MG Oral Tab  Take 25 mg by mouth daily.   Past Month at \"About 3-4 weeks ago\"    bumetanide 2 MG Oral Tab Take 1 tablet (2 mg total) by mouth daily.   7/9/2024 at 0500    levothyroxine 75 MCG Oral Tab Take 1 tablet (75 mcg total) by mouth before breakfast.   7/9/2024 at 0500    Omeprazole 40 MG Oral Capsule Delayed Release Take 1 capsule (40 mg total) by mouth daily.   7/9/2024 at 0500    HYDROcodone-acetaminophen  MG Oral Tab Take 1 tablet by mouth 3 (three) times daily as needed for Pain (chronic back pain).   7/10/2024 at 0300    Insulin Aspart FlexPen 100 UNIT/ML Subcutaneous Solution Pen-injector Inject 15 Units into the skin 3 (three) times daily.   7/10/2024 at 0200    buPROPion HCl ER, XL, 150 MG Oral Tablet 24 Hr Take 1 tablet (150 mg total) by mouth daily.   7/9/2024 at 0500    carvedilol 12.5 MG Oral Tab Take 3 tablets (37.5 mg total) by mouth 2 (two) times daily.   Unknown    ergocalciferol 1.25 MG (76615 UT) Oral Cap Take 1 capsule (50,000 Units total) by mouth once a week.   7/7/2024    hydrOXYzine 25 MG Oral Tab Take 1 tablet (25 mg total) by mouth 3 (three) times daily as needed for Anxiety.   Unknown    albuterol 108 (90 Base) MCG/ACT Inhalation Aero Soln Inhale 2 puffs into the lungs every 4 to 6 hours as needed for Wheezing.   More than a month       Review of Systems:  A comprehensive 14 point review of systems was completed.  Pertinent positives and negatives noted in the the HPI.    Physical Exam:     Vital signs: Blood pressure 114/87, pulse 97, temperature 98.5 °F (36.9 °C), temperature source Oral, resp. rate 18, height 5' 8\" (1.727 m), weight 269 lb (122 kg), SpO2 93%.  General: No acute distress.   HEENT: Moist mucous membranes.  Respiratory: Clear to auscultation bilaterally.  No wheezes. No rhonchi.  Cardiovascular: S1, S2.  Regular rate and rhythm.    Abdomen: Soft, nontender, nondistended.  Positive bowel sounds.   Neurologic: Awake alert, no focal neurological deficits.  Musculoskeletal:  Full range of motion of all extremities.  No pedal edema or calf tenderness.  Integument: Left upper extremity AV fistula with some bruising around the hemodialysis access site, left upper extremity more swollen than the right.  No erythema or warmth on palpation.  Psychiatric: Appropriate mood and affect.      Diagnostic Data:      Laboratory Data:   Lab Results   Component Value Date    WBC 13.0 07/10/2024    HGB 10.3 07/10/2024    HCT 31.2 07/10/2024    .0 07/10/2024    CREATSERUM 4.07 07/10/2024    BUN 26 07/10/2024     07/10/2024    K 4.7 07/10/2024    CL 97 07/10/2024    CO2 32.0 07/10/2024     07/10/2024    CA 9.4 07/10/2024    ALB 3.4 07/10/2024    ALKPHO 103 07/10/2024    BILT 0.4 07/10/2024    TP 7.9 07/10/2024    AST 8 07/10/2024    ALT 17 07/10/2024       Imaging:  Ultrasound of AV fistula ordered by vascular surgeon.    ASSESSMENT / PLAN:     Left upper extremity swelling and bruising and pain due to AV fistula site infiltration/malfunction of AV fistula/ecchymosis with early hematoma of the AV fistula site  Seen by vascular surgeon, symptomatic management at this time, ultrasound of the AV fistula ordered by vascular surgeon pending at this time  End-stage renal disease on hemodialysis  Nephrology consult  Insulin-dependent diabetes mellitus type 2 with CKD end-stage renal disease on hemodialysis  Hypoglycemia protocol  Follow Accu-Cheks  Tresiba  NovoLog carb counting and correction factor  Anemia of chronic kidney disease end-stage renal disease  Follow CBC in a.m.  Hypertension  Continue home Coreg  Hyperlipidemia  Continue home statin  GERD  Continue home Protonix  Hypothyroidism  Continue home levothyroxine  Anxiety  Patient states hydroxyzine as needed for anxiety at home which was continued in hospital    Quality:  DVT Prophylaxis: DVT Mechanical Prophylaxis:   SCDs,    DVT Pharmacologic Prophylaxis   Medication    [START ON 7/11/2024] heparin (Porcine) 5000 UNIT/ML injection  5,000 Units              CODE status:   Code Status: Full Code  Lawrence: No urinary catheter in place      Plan of care discussed with patient, patient's  at bedside  Discharge planning once cleared by vascular surgeon and nephrologist, follow-up with them as outpatient as directed, follow-up with regular outpatient primary care physician Alejandro Pedersen MD within 1 week in office    Discussed with my colleague who received signout from ER Physician.      Yeny Ga MD  7/10/2024  8:07 AM

## 2024-07-10 NOTE — DISCHARGE INSTRUCTIONS
Please DO NOT USE THE FISTULA. ONLY USE THE CATHETER FOR DIALYSIS. Rest the arm to allow the swelling to go down.    Please call [phone] to make an appt with Dr. Phelps and be seen to discuss revision fistula surgery to bring the fistula to the surface for use.

## 2024-07-10 NOTE — ED QUICK NOTES
Orders for admission, patient is aware of plan and ready to go upstairs. Any questions, please call ED RN SOFIYA at extension [phone].     Patient Covid vaccination status: Unvaccinated     COVID Test Ordered in ED: None    COVID Suspicion at Admission: N/A    Running Infusions:  None    Mental Status/LOC at time of transport: ALERT X3    Other pertinent information: WHEELCHAIR DEPENDENT FOR NOW. + FATOU TUBE R UPPER CHEST. ALL CONSULT AWARE  CIWA score: N/A   NIH score:  N/A

## 2024-07-11 ENCOUNTER — PATIENT OUTREACH (OUTPATIENT)
Dept: CASE MANAGEMENT | Age: 54
End: 2024-07-11

## 2024-07-11 NOTE — PLAN OF CARE
A&O x 4. VSS. On RA. No c/o pain. Redness, swelling and bruising noted to left upper arm. Denies N/T to LUE. Plan for US and possible DC later today. Will continue to monitor.

## 2024-07-11 NOTE — PROGRESS NOTES
Hospital follow up.    Last A1C Value: 8.1% Date: [7/10/2024]    Alejandro Pedersen MD  Internal Medicine  [address]  [address]  [place] [address] [phone]  ASAP    Patient will call the office to schedule with spouse or daughter.  Confirmed with patient.    Closing encounter.

## 2024-07-11 NOTE — DISCHARGE PLANNING
DC instructions reviewed with pt and spouse. All questions answered. IV removed. Belongings sent with pt. DC home via personal car.

## 2024-07-12 NOTE — PAYOR COMM NOTE
--------------  DISCHARGE REVIEW    Payor: [facility] PPO  Subscriber #:  [id number]  Authorization Number: [id number]    Admit date: 7/10/24  Admit time:   7:13 AM  Discharge Date: 7/10/2024  7:00 PM     Admitting Physician: Andre Parker DO  Attending Physician:  No att. providers found  Primary Care Physician: Alejandro Pedersen MD

## 2024-07-12 NOTE — PAYOR COMM NOTE
--------------  ADMISSION REVIEW     Payor: BRADY CLEMENT  Subscriber #:  [id number]  Authorization Number: [id number]    Admit date: 7/10/24  Admit time:  7:13 AM       REVIEW DOCUMENTATION:     ED Provider Notes          Patient Seen in: [facility] Emergency Department      History     Chief Complaint   Patient presents with    Dialysis    Swelling Edema     Stated Complaint: pt had dialysis early and now has swelling around fistula    Subjective:   HPI    54-year-old female reports feeling tired. She had fistula surgery last week, which was the fourth attempt to correct it for usability. The fistula has only been used twice before, once for an hour and the next time for not even 15 minutes before it had to be removed due to rising blood pressure. An ultrasound was performed today to measure the depth of her veins and potential access points. She reports that her arm, which is usually skinny, has swollen up significantly. She believes this is due to her jerking in her sleep and possibly moving the needle during dialysis. She was informed by the nurse that it's dangerous and she needs to report any pain immediately as the needle could move and cause her arm to swell and potentially turn black. The swelling occurred about three hours ago. She had finished dialysis at 6:00 PM. This was the first time the fistula was used for almost the entire treatment.    Objective:   Past Medical History:    Anxiety    Diabetes (HCC)    End stage renal disease (HCC)    Essential hypertension    High blood pressure    High cholesterol    Hyperlipidemia    Hypertension    Thyroid disease     Review of Systems    Positive for stated Chief Complaint: Dialysis and Swelling Edema    Other systems are as noted in HPI.  Constitutional and vital signs reviewed.      All other systems reviewed and negative except as noted above.    Physical Exam     ED Triage Vitals [07/10/24 0345]   /83   Pulse 95   Resp 20   Temp 97.1 °F (36.2 °C)    Temp src Temporal   SpO2 99 %   O2 Device None (Room air)       Current Vitals:   Vital Signs  BP: 90/50  Pulse: 95  Resp: 16  Temp: 97.1 °F (36.2 °C)  Temp src: Temporal    Oxygen Therapy  SpO2: 98 %  O2 Device: None (Room air)            Physical Exam      Vital signs reviewed  General appearance: Patient is alert and in moderate pain distress  HEENT: Pupils equal react to light extraocular muscles intact no scleral icterus, mucous membranes are moist, there is no erythema or exudate in the posterior pharynx  Neck: Supple no JVD no lymphadenopathy no meningismus no carotid bruit  CV: Regular rate and rhythm no murmur rub  Respiratory: Clear to auscultation bilaterally no crackles no wheezes no accessory muscle use  Abdomen: Soft nontender nondistended, no rebound no guarding  no hepatosplenomegaly bowel sounds are present , no pulsatile mass  Extremities: Left bicep is significantly swollen and tense.  There is a small area of ecchymosis.  Much larger than the right bicep.  Difficult and palpating a thrill over the fistula  Neuro: Cranial nerves II through XII intact with no gross focal sensory or motor abnormality.      ED Course     Labs Reviewed   CBC W/ DIFFERENTIAL - Abnormal; Notable for the following components:       Result Value    WBC 13.0 (*)     RBC 3.28 (*)     HGB 10.3 (*)     HCT 31.2 (*)     Neutrophil Absolute Prelim 10.20 (*)     Neutrophil Absolute 10.20 (*)     All other components within normal limits   CBC WITH DIFFERENTIAL WITH PLATELET    Narrative:     The following orders were created for panel order CBC With Differential With Platelet.  Procedure                               Abnormality         Status                     ---------                               -----------         ------                     CBC W/ DIFFERENTIAL[[id number]]          Abnormal            Final result                 Please view results for these tests on the individual orders.   COMP METABOLIC PANEL (14)    RAINBOW DRAW BLUE      Patient was evaluated and does appear to when she had moved her arm may have dislodged with a needle and caused the fistula to bleed intramuscularly.  Will call Dr. Malin who is her nephrologist to see what he would like me to do.      Discussed case with nephrology who would like me to talk to vascular but stated to admit her and they will dialyze her here.  Did discuss case with vascular surgeon who states they will evaluate her today but did not feel I need to do any imaging.  Will admit to the hospitalist.  MDM      Differential diagnosis reflecting the complexity of care include: Fistula injury, bicep hematoma, pseudoaneurysm    Comorbidities that add complexity to management include: Chronic renal failure on dialysis with recent fistula revision      Discussions of management was done with: Discussed case with nephrology along with the hospitalist and vascular surgery.  They will see her later today.      Diagnostic tests and medications considered but not ordered were: CTA of the upper extremity was considered or an ultrasound but vascular states they will handle the imaging    Shared decision making was done by self and patient along with vascular and nephrology and the hospitalist.  Patient will be admitted for further observation dialysis and workup    Patient was evaluated in the emergency department and at this point patient will need admission for further management of medical condition.  Patient was stable in the emergency department and will be transferred to floor for further definitive care.  Patient's questions were answered.    This note was prepared using Dragon Medical voice recognition dictation software. As a result errors may occur. When identified these errors have been corrected. While every attempt is made to correct errors during dictation discrepancies may still exist      Admission disposition: 7/10/2024  5:31 AM      Medical Decision Making      Disposition and  Plan     Clinical Impression:  1. Malfunction of arteriovenous dialysis fistula, initial encounter ([facility])    2. Hematoma    3. Chronic renal failure, stage 5 ([facility])         Disposition:  Admit  7/10/2024  5:31 am       Hospital Problems       Present on Admission  Date Reviewed: 11/30/2023            ICD-10-CM Noted POA    * (Principal) Malfunction of arteriovenous dialysis fistula, initial encounter ([facility]) T82.590A 7/10/2024 Unknown               Signed by Ishan Zurita MD on 7/10/2024  5:34 AM             H&P signed by Yeny Ga MD at 7/10/2024  4:50 PM    [facility]                                                               History & Physical            Chief Complaint: Left arm swelling and pain and bruising around the fistula site after dialysis yesterday     History of Present Illness:  Kimmy Sparks is a 54 year old female admitted with left arm swelling and pain and bruising around the fistula site after hemodialysis yesterday.  Denies any fever or chills.  Denies any chest pain or shortness of breath.  Denies any nausea vomiting.  Denies any abdominal pain.  Denies any constipation or diarrhea.  Patient states she has a permacath on her upper chest wall which the nephrologist use periodically for dialysis.  Patient states he has had issues with her AV fistula previously and states that she has had the left arm AV fistula accessed around 4 times in the last year and last episode was yesterday and had dialysis using single needle in the fistula.  Patient states was immobilized during the hemodialysis, but that she fell asleep during the hemodialysis and woke up and absentmindedly pulled/jerked her arm according to patient.  Patient states she had some pain at the site at that time, patient states she initially did not report the pain as she wanted to finish the fluid removal with hemodialysis.  Then patient states she reported the pain to the dialysis tech who then stopped the dialysis and  patient went home.  After reaching home patient states she noticed bruising around the fistula site and increasing pain and swelling and came to the emergency room.  Vascular surgeon and nephrologist consulted from the emergency room and being admitted.  Patient seen with patient's  at bedside.  Patient afebrile.          ASSESSMENT / PLAN:      Left upper extremity swelling and bruising and pain due to AV fistula site infiltration/malfunction of AV fistula/ecchymosis with early hematoma of the AV fistula site  Seen by vascular surgeon, symptomatic management at this time, ultrasound of the AV fistula ordered by vascular surgeon pending at this time  End-stage renal disease on hemodialysis  Nephrology consult  Insulin-dependent diabetes mellitus type 2 with CKD end-stage renal disease on hemodialysis  Hypoglycemia protocol  Follow Accu-Cheks  Tresiba  NovoLog carb counting and correction factor  Anemia of chronic kidney disease end-stage renal disease  Follow CBC in a.m.  Hypertension  Continue home Coreg  Hyperlipidemia  Continue home statin  GERD  Continue home Protonix  Hypothyroidism  Continue home levothyroxine  Anxiety  Patient states hydroxyzine as needed for anxiety at home which was continued in hospital      Plan of care discussed with patient, patient's  at bedside  Discharge planning once cleared by vascular surgeon and nephrologist, follow-up with them as outpatient as directed, follow-up with regular outpatient primary care physician Alejandro Pedersen MD within 1 week in office     Discussed with my colleague who received signout from ER Physician.        Yeny Ga MD  7/10/2024  8:07 AM           Signed by Yeny Ga MD on 7/10/2024  4:50 PM         Consult      Reason for Consultation:  AVF infiltration/ESRD on HD     History of Present Illness:  Kimmy Sparks is a a(n) 54 year old woman known to our service with mult med probs incl ESRD in setting of DM/HTN/CM on HD TTHS at  [facility] who has a L upper arm AVF that has been maturing for quite some time with mult procedures required.  Of late she has used one needle in the fistula but yest during HD \"jerked\" her arm and this resulted in infiltration.  She came to ED for eval last PM and was admitted.  This AM the fistula has no bruit/thrill    Impression/Plan:     #1.  AVF infiltration- appears to be clotted today.  Appreciate vascular eval. Ultrasound ordered     #2.  ESRD- in setting of DM/HTN/CM.  HD tomorrow per usual routine- likely as outpt     #3.  Anemia- due to ESRD.  RAMSES for goal hgb 10-11 gms     Thank you for allowing me to participate in the care of your patient. Please do not hesitate to call with any questions or concerns.           Anticipate discharge home later today        Irineo Read MD  7/10/2024  8:37 AM          Vitals (last day) before discharge       Date/Time Temp Pulse Resp BP SpO2 Weight O2 Device O2 Flow Rate (L/min) [facility]    07/10/24 1619 98.2 °F (36.8 °C) 96 18 141/73 94 % -- None (Room air) -- CM    07/10/24 1204 98.3 °F (36.8 °C) 100 18 152/69 96 % -- None (Room air) -- CM    07/10/24 1010 -- -- -- -- -- 269 lb (122 kg) -- -- SF    07/10/24 0726 98.5 °F (36.9 °C) 97 18 114/87 93 % -- None (Room air) -- CM    07/10/24 0513 -- 95 16 90/50 98 % -- None (Room air) -- JF    07/10/24 0345 97.1 °F (36.2 °C) 95 20 142/83 99 % 269 lb (122 kg) None (Room air) -- EK          CIWA Scores (last day) before discharge       None

## 2024-09-04 ENCOUNTER — HOSPITAL ENCOUNTER (EMERGENCY)
Facility: HOSPITAL | Age: 54
Discharge: HOME | End: 2024-09-04
Attending: EMERGENCY MEDICINE
Payer: COMMERCIAL

## 2024-09-04 ENCOUNTER — APPOINTMENT (OUTPATIENT)
Dept: ULTRASOUND IMAGING | Facility: HOSPITAL | Age: 54
End: 2024-09-04
Attending: EMERGENCY MEDICINE
Payer: COMMERCIAL

## 2024-09-04 VITALS
HEART RATE: 85 BPM | TEMPERATURE: 97 F | OXYGEN SATURATION: 100 % | WEIGHT: 275 LBS | DIASTOLIC BLOOD PRESSURE: 85 MMHG | SYSTOLIC BLOOD PRESSURE: 167 MMHG | BODY MASS INDEX: 42 KG/M2 | RESPIRATION RATE: 19 BRPM

## 2024-09-04 DIAGNOSIS — N18.6 ESRD ON HEMODIALYSIS (HCC): ICD-10-CM

## 2024-09-04 DIAGNOSIS — M62.838 SPASM OF MUSCLE: ICD-10-CM

## 2024-09-04 DIAGNOSIS — Z99.2 ESRD ON HEMODIALYSIS (HCC): ICD-10-CM

## 2024-09-04 DIAGNOSIS — R60.0 PERIPHERAL EDEMA: Primary | ICD-10-CM

## 2024-09-04 LAB
ALBUMIN SERPL-MCNC: 4.8 G/DL (ref 3.2–4.8)
ALBUMIN/GLOB SERPL: 1.5 {RATIO} (ref 1–2)
ALP LIVER SERPL-CCNC: 95 U/L
ALT SERPL-CCNC: 10 U/L
ANION GAP SERPL CALC-SCNC: 10 MMOL/L (ref 0–18)
BASOPHILS # BLD AUTO: 0.09 X10(3) UL (ref 0–0.2)
BASOPHILS NFR BLD AUTO: 0.8 %
BILIRUB SERPL-MCNC: 0.2 MG/DL (ref 0.3–1.2)
CALCIUM BLD-MCNC: 9.4 MG/DL (ref 8.7–10.4)
CHLORIDE SERPL-SCNC: 98 MMOL/L (ref 98–112)
CO2 SERPL-SCNC: 25 MMOL/L (ref 21–32)
CREAT BLD-MCNC: 5.69 MG/DL
EGFRCR SERPLBLD CKD-EPI 2021: 8 ML/MIN/1.73M2 (ref 60–?)
EOSINOPHIL # BLD AUTO: 0.43 X10(3) UL (ref 0–0.7)
EOSINOPHIL NFR BLD AUTO: 3.7 %
ERYTHROCYTE [DISTWIDTH] IN BLOOD BY AUTOMATED COUNT: 13.6 %
GLOBULIN PLAS-MCNC: 3.3 G/DL (ref 2–3.5)
GLUCOSE BLD-MCNC: 222 MG/DL (ref 70–99)
HCT VFR BLD AUTO: 34.6 %
HGB BLD-MCNC: 11.7 G/DL
IMM GRANULOCYTES # BLD AUTO: 0.06 X10(3) UL (ref 0–1)
IMM GRANULOCYTES NFR BLD: 0.5 %
LYMPHOCYTES # BLD AUTO: 1.24 X10(3) UL (ref 1–4)
LYMPHOCYTES NFR BLD AUTO: 10.7 %
MCH RBC QN AUTO: 31.1 PG (ref 26–34)
MCHC RBC AUTO-ENTMCNC: 33.8 G/DL (ref 31–37)
MCV RBC AUTO: 92 FL
MONOCYTES # BLD AUTO: 0.57 X10(3) UL (ref 0.1–1)
MONOCYTES NFR BLD AUTO: 4.9 %
NEUTROPHILS # BLD AUTO: 9.18 X10 (3) UL (ref 1.5–7.7)
NEUTROPHILS # BLD AUTO: 9.18 X10(3) UL (ref 1.5–7.7)
NEUTROPHILS NFR BLD AUTO: 79.4 %
NT-PROBNP SERPL-MCNC: 6604 PG/ML (ref ?–125)
PLATELET # BLD AUTO: 378 10(3)UL (ref 150–450)
PROT SERPL-MCNC: 8.1 G/DL (ref 5.7–8.2)
RBC # BLD AUTO: 3.76 X10(6)UL
SODIUM SERPL-SCNC: 133 MMOL/L (ref 136–145)
WBC # BLD AUTO: 11.6 X10(3) UL (ref 4–11)

## 2024-09-04 PROCEDURE — 93971 EXTREMITY STUDY: CPT | Performed by: EMERGENCY MEDICINE

## 2024-09-04 PROCEDURE — 36415 COLL VENOUS BLD VENIPUNCTURE: CPT

## 2024-09-04 PROCEDURE — 93970 EXTREMITY STUDY: CPT | Performed by: EMERGENCY MEDICINE

## 2024-09-04 PROCEDURE — 99285 EMERGENCY DEPT VISIT HI MDM: CPT

## 2024-09-04 PROCEDURE — 83880 ASSAY OF NATRIURETIC PEPTIDE: CPT | Performed by: EMERGENCY MEDICINE

## 2024-09-04 PROCEDURE — 85025 COMPLETE CBC W/AUTO DIFF WBC: CPT | Performed by: EMERGENCY MEDICINE

## 2024-09-04 PROCEDURE — 85025 COMPLETE CBC W/AUTO DIFF WBC: CPT

## 2024-09-04 PROCEDURE — 80053 COMPREHEN METABOLIC PANEL: CPT | Performed by: EMERGENCY MEDICINE

## 2024-09-04 PROCEDURE — 99284 EMERGENCY DEPT VISIT MOD MDM: CPT

## 2024-09-04 PROCEDURE — 80053 COMPREHEN METABOLIC PANEL: CPT

## 2024-09-04 RX ORDER — HYDROCODONE BITARTRATE AND ACETAMINOPHEN 5; 325 MG/1; MG/1
1 TABLET ORAL EVERY 6 HOURS PRN
Qty: 20 TABLET | Refills: 0 | Status: SHIPPED | OUTPATIENT
Start: 2024-09-04

## 2024-09-04 RX ORDER — HYDROCODONE BITARTRATE AND ACETAMINOPHEN 5; 325 MG/1; MG/1
1 TABLET ORAL ONCE
Status: COMPLETED | OUTPATIENT
Start: 2024-09-04 | End: 2024-09-04

## 2024-09-04 RX ORDER — CYCLOBENZAPRINE HCL 10 MG
10 TABLET ORAL 3 TIMES DAILY PRN
Qty: 20 TABLET | Refills: 0 | Status: SHIPPED | OUTPATIENT
Start: 2024-09-04 | End: 2024-09-11

## 2024-09-04 RX ORDER — CYCLOBENZAPRINE HCL 10 MG
10 TABLET ORAL ONCE
Status: COMPLETED | OUTPATIENT
Start: 2024-09-04 | End: 2024-09-04

## 2024-09-04 RX ORDER — FUROSEMIDE 20 MG
20 TABLET ORAL 2 TIMES DAILY
Qty: 10 TABLET | Refills: 0 | Status: SHIPPED | OUTPATIENT
Start: 2024-09-04

## 2024-09-04 RX ORDER — FUROSEMIDE 40 MG
40 TABLET ORAL DAILY
Status: DISCONTINUED | OUTPATIENT
Start: 2024-09-04 | End: 2024-09-04

## 2024-09-04 RX ORDER — FUROSEMIDE 10 MG/ML
40 INJECTION INTRAMUSCULAR; INTRAVENOUS ONCE
Status: DISCONTINUED | OUTPATIENT
Start: 2024-09-04 | End: 2024-09-04

## 2024-09-04 NOTE — DISCHARGE INSTRUCTIONS
Keep your legs elevated as much as possible above the level of your heart to help with swelling.  Use compression stockings as instructed.

## 2024-09-04 NOTE — ED QUICK NOTES
Pt a difficult stick, RNs can only use her R arm for labs/IV, \"L arm is for a future fistula.\" Unsuccessful IV attempts by 2 RN's. Pt declined more attempts. She is requesting for IV US.     Dr Bates informed. RN who can do IV US is currently unavailable.

## 2024-09-04 NOTE — ED PROVIDER NOTES
Patient Seen in: [facility] Emergency Department      History     Chief Complaint   Patient presents with    Swelling Edema     Missed dialysis today c/o rt leg swelling and spasms     Stated Complaint: retaining water Right leg / spasm right leg    Subjective:   Patient is 54-year-old female presents emergency room for evaluation of bilateral lower extremity swelling.  Patient has a history of peripheral edema and has end-stage renal disease.  She skipped dialysis 1 day this week.  She is scheduled next to go to dialysis on Thursday.  Patient reports that she did not go because she was having muscle spasms in her legs but she ran out of her medications for pain and so she did not go.  Patient takes Norco at home Flexeril.  Patient reports that she may go to her dialysis extra tomorrow and then will go to dialysis on Thursday as scheduled.  Patient reports that she does not wear compression stockings.  She reports she keeps her legs elevated in recliner however when explained that has to be above the left lower heart patient seems surprised.    The history is provided by the patient and the spouse.           Objective:   Past Medical History:    Anxiety    Diabetes (HCC)    End stage renal disease (HCC)    Essential hypertension    High blood pressure    High cholesterol    Hyperlipidemia    Hypertension    Thyroid disease              No pertinent past surgical history.              No pertinent social history.            Review of Systems   Musculoskeletal:  Positive for myalgias.        Edema       Positive for stated Chief Complaint: Swelling Edema (Missed dialysis today c/o rt leg swelling and spasms)    Other systems are as noted in HPI.  Constitutional and vital signs reviewed.      All other systems reviewed and negative except as noted above.    Physical Exam     ED Triage Vitals [09/04/24 0019]   BP (!) 205/102   Pulse 88   Resp 16   Temp 97 °F (36.1 °C)   Temp src Temporal   SpO2 97 %   O2 Device  None (Room air)       Current Vitals:   Vital Signs  BP: (!) 167/85  Pulse: 85  Resp: 19  Temp: 97 °F (36.1 °C)  Temp src: Temporal  MAP (mmHg): (!) 108    Oxygen Therapy  SpO2: 100 %  O2 Device: None (Room air)            Physical Exam  Vitals and nursing note reviewed.   Constitutional:       General: She is not in acute distress.     Appearance: Normal appearance. She is obese. She is not toxic-appearing or diaphoretic.      Comments: Chronically ill-appearing adult female   HENT:      Head: Normocephalic and atraumatic.   Eyes:      Extraocular Movements: Extraocular movements intact.      Pupils: Pupils are equal, round, and reactive to light.   Cardiovascular:      Rate and Rhythm: Normal rate.      Pulses: Normal pulses.   Pulmonary:      Effort: Pulmonary effort is normal.   Abdominal:      Tenderness: There is no abdominal tenderness. There is no guarding.      Comments: Obese abdomen nontender   Musculoskeletal:      Comments: Chronic swelling +2+3 pitting edema bilateral lower extremities right greater than left +1 dorsal pedal pulse bilaterally however   Skin:     General: Skin is warm.      Capillary Refill: Capillary refill takes less than 2 seconds.      Findings: No erythema.   Neurological:      General: No focal deficit present.      Mental Status: She is oriented to person, place, and time.   Psychiatric:         Mood and Affect: Mood normal.         Behavior: Behavior normal.               ED Course     Labs Reviewed   CBC WITH DIFFERENTIAL WITH PLATELET - Abnormal; Notable for the following components:       Result Value    WBC 11.6 (*)     RBC 3.76 (*)     HGB 11.7 (*)     HCT 34.6 (*)     Neutrophil Absolute Prelim 9.18 (*)     Neutrophil Absolute 9.18 (*)     All other components within normal limits   COMP METABOLIC PANEL (14) - Abnormal; Notable for the following components:    Glucose 222 (*)     Sodium 133 (*)     Creatinine 5.69 (*)     eGFR-Cr 8 (*)     Bilirubin, Total 0.2 (*)     All  other components within normal limits   PRO BETA NATRIURETIC PEPTIDE - Abnormal; Notable for the following components:    Pro-Beta Natriuretic Peptide 6,604 (*)     All other components within normal limits   REDRAW CMP (P)             Ultrasound shows no evidence of DVT normal compression augmentation and blood flow.  Subcutaneous edema.         MDM      Social -negative tobacco, negative etoh, negative drugs  Family History-noncontributory  Past Medical History-hypertension, thyroid disease, high cholesterol, anxiety, hyperlipidemia, diabetes, end-stage renal disease on hemodialysis    Differential diagnosis before testing included peripheral edema, fluid overload, dependent edema, DVT    Co-morbidities that add to the complexity of management include: Patient is not keeping legs elevated above level of heart, not wearing compression stockings.  Patient had missed a dose of her dialysis, she was also recently taken off her water pill.    Testing ordered during this visit included baseline labs ultrasound     Radiographic images  I personally reviewed the radiographs and my individual interpretation shows no DVT  I also reviewed the official reports that showed Ultrasound shows no evidence of DVT normal compression augmentation and blood flow.  Subcutaneous edema.    External chart review showed review of care everywhere in epic system shows no listed diuretic and her list of medications currently    History obtained by an independent source included from patient, family    Discussion of management with patient, family    Social determinants of health that affect care include patient was recently taken off her diuretic.  Patient is already on end-stage renal disease on dialysis and she states she still makes urine I think this would be a beneficial thing for her at this time we will give a dose of IV Lasix here and will give a short course of Lasix for home.  Her BNP is elevated but not as high as it has been in  the past.      Medications Provided: Lasix Flexeril and Norco    Course of Events during Emergency Room Visit include 54-year-old female presents emergency room with peripheral edema will recommend SAMEER hose, keeping feet elevated above the level of heart, not skipping dialysis.  Will give dose of Lasix here will also give a short course of Lasix for home will get ultrasound to rule out DVT.  Will get baseline labs.  Patient to follow-up with her primary doctor and to not skip dialysis.      Patient wanted to have ultrasound guided IV access obtained however our nurse was available ultrasound guidance is tight up for an extended period of time with a critical patient.  Would recommend given that her labs show patient is at her baseline kidney function etc. that we will give p.o. dose of Lasix here and will restart her on her oral medications.  Her BNP is not elevated above her peak amounts in the previous lab findings.  Ultrasound does not show DVT.  Will give oral medication here give compression stockings recommend elevation.  Patient to follow-up with primary care physician    Disposition:        Discharge  I have discussed with the patient the results of test, differential diagnosis, treatment plan, warning signs and symptoms which should prompt immediate return.  They expressed understanding of these instructions and agrees to the following plan provided.  They were given written discharge instructions and agrees to return for any concerns and voiced understanding and all questions were answered.                                      Medical Decision Making      Disposition and Plan     Clinical Impression:  1. Peripheral edema    2. Spasm of muscle    3. ESRD on hemodialysis (HCC)         Disposition:  Discharge  9/4/2024  3:23 am    Follow-up:  Alejandro Pedersen MD  [address] [address]  [phone]    Schedule an appointment as soon as possible for a visit            Medications  Prescribed:  Discharge Medication List as of 9/4/2024  3:32 AM        START taking these medications    Details   furosemide 20 MG Oral Tab Take 1 tablet (20 mg total) by mouth 2 (two) times daily., Normal, Disp-10 tablet, R-0      !! cyclobenzaprine 10 MG Oral Tab Take 1 tablet (10 mg total) by mouth 3 (three) times daily as needed for Muscle spasms., Normal, Disp-20 tablet, R-0      HYDROcodone-acetaminophen 5-325 MG Oral Tab Take 1 tablet by mouth every 6 (six) hours as needed for Pain. Do not drive or operate machinery within 8 hours of taking this medication, Normal, Disp-20 tablet, R-0       !! - Potential duplicate medications found. Please discuss with provider.

## 2024-09-04 NOTE — ED INITIAL ASSESSMENT (HPI)
Pt arrives to ed w bilateral leg swelling. Per pt right leg more than left. Pt reports usually gets 4 days of dialysis and only had 3 this week. +sob. Hx of CHF.

## 2024-10-30 ENCOUNTER — HOSPITAL ENCOUNTER (OUTPATIENT)
Facility: HOSPITAL | Age: 54
Setting detail: OBSERVATION
Discharge: HOME | End: 2024-11-02
Attending: EMERGENCY MEDICINE | Admitting: EMERGENCY MEDICINE
Payer: COMMERCIAL

## 2024-10-30 ENCOUNTER — APPOINTMENT (OUTPATIENT)
Dept: GENERAL RADIOLOGY | Facility: HOSPITAL | Age: 54
End: 2024-10-30
Attending: EMERGENCY MEDICINE
Payer: COMMERCIAL

## 2024-10-30 DIAGNOSIS — E87.5 HYPERKALEMIA: Primary | ICD-10-CM

## 2024-10-30 PROBLEM — D64.9 ANEMIA: Status: ACTIVE | Noted: 2024-10-30

## 2024-10-30 LAB
ALBUMIN SERPL-MCNC: 4.2 G/DL (ref 3.2–4.8)
ALBUMIN/GLOB SERPL: 1.2 {RATIO} (ref 1–2)
ALP LIVER SERPL-CCNC: 124 U/L
ALT SERPL-CCNC: 13 U/L
ANION GAP SERPL CALC-SCNC: 8 MMOL/L (ref 0–18)
AST SERPL-CCNC: 16 U/L (ref ?–34)
BASE EXCESS BLDV CALC-SCNC: 0.8 MMOL/L
BASOPHILS # BLD AUTO: 0.08 X10(3) UL (ref 0–0.2)
BASOPHILS NFR BLD AUTO: 0.6 %
BILIRUB SERPL-MCNC: 0.2 MG/DL (ref 0.3–1.2)
BUN BLD-MCNC: 66 MG/DL (ref 9–23)
CALCIUM BLD-MCNC: 9.2 MG/DL (ref 8.7–10.4)
CHLORIDE SERPL-SCNC: 90 MMOL/L (ref 98–112)
CO2 SERPL-SCNC: 28 MMOL/L (ref 21–32)
CREAT BLD-MCNC: 7.42 MG/DL
EGFRCR SERPLBLD CKD-EPI 2021: 6 ML/MIN/1.73M2 (ref 60–?)
EOSINOPHIL # BLD AUTO: 0.79 X10(3) UL (ref 0–0.7)
EOSINOPHIL NFR BLD AUTO: 5.6 %
ERYTHROCYTE [DISTWIDTH] IN BLOOD BY AUTOMATED COUNT: 14.3 %
GLOBULIN PLAS-MCNC: 3.4 G/DL (ref 2–3.5)
GLUCOSE BLD-MCNC: 263 MG/DL (ref 70–99)
GLUCOSE BLD-MCNC: 84 MG/DL (ref 70–99)
HCO3 BLDV-SCNC: 24.7 MEQ/L (ref 22–26)
HCT VFR BLD AUTO: 29 %
HGB BLD-MCNC: 9.5 G/DL
IMM GRANULOCYTES # BLD AUTO: 0.08 X10(3) UL (ref 0–1)
IMM GRANULOCYTES NFR BLD: 0.6 %
LYMPHOCYTES # BLD AUTO: 1.18 X10(3) UL (ref 1–4)
LYMPHOCYTES NFR BLD AUTO: 8.4 %
MCH RBC QN AUTO: 29.4 PG (ref 26–34)
MCHC RBC AUTO-ENTMCNC: 32.8 G/DL (ref 31–37)
MCV RBC AUTO: 89.8 FL
MONOCYTES # BLD AUTO: 0.61 X10(3) UL (ref 0.1–1)
MONOCYTES NFR BLD AUTO: 4.3 %
NEUTROPHILS # BLD AUTO: 11.32 X10 (3) UL (ref 1.5–7.7)
NEUTROPHILS # BLD AUTO: 11.32 X10(3) UL (ref 1.5–7.7)
NEUTROPHILS NFR BLD AUTO: 80.5 %
OSMOLALITY SERPL CALC.SUM OF ELEC: 280 MOSM/KG (ref 275–295)
OXYHGB MFR BLDV: 65.4 % (ref 72–78)
PCO2 BLDV: 46 MM HG (ref 38–50)
PH BLDV: 7.37 [PH] (ref 7.33–7.43)
PLATELET # BLD AUTO: 292 10(3)UL (ref 150–450)
PO2 BLDV: 39 MM HG (ref 30–50)
POTASSIUM SERPL-SCNC: 6.2 MMOL/L (ref 3.5–5.1)
PROT SERPL-MCNC: 7.6 G/DL (ref 5.7–8.2)
RBC # BLD AUTO: 3.23 X10(6)UL
SODIUM SERPL-SCNC: 126 MMOL/L (ref 136–145)
WBC # BLD AUTO: 14.1 X10(3) UL (ref 4–11)

## 2024-10-30 PROCEDURE — 71045 X-RAY EXAM CHEST 1 VIEW: CPT | Performed by: EMERGENCY MEDICINE

## 2024-10-30 RX ORDER — HYDROCODONE BITARTRATE AND ACETAMINOPHEN 5; 325 MG/1; MG/1
1 TABLET ORAL ONCE
Status: COMPLETED | OUTPATIENT
Start: 2024-10-30 | End: 2024-10-30

## 2024-10-30 RX ORDER — ALBUTEROL SULFATE 0.83 MG/ML
2.5 SOLUTION RESPIRATORY (INHALATION) ONCE
Status: COMPLETED | OUTPATIENT
Start: 2024-10-30 | End: 2024-10-30

## 2024-10-30 RX ORDER — CALCIUM GLUCONATE 10 MG/ML
1 INJECTION, SOLUTION INTRAVENOUS ONCE
Status: COMPLETED | OUTPATIENT
Start: 2024-10-30 | End: 2024-10-31

## 2024-10-30 RX ORDER — DEXTROSE MONOHYDRATE 25 G/50ML
50 INJECTION, SOLUTION INTRAVENOUS ONCE
Status: COMPLETED | OUTPATIENT
Start: 2024-10-30 | End: 2024-10-30

## 2024-10-31 PROBLEM — N18.6 DIABETES MELLITUS DUE TO UNDERLYING CONDITION WITH CHRONIC KIDNEY DISEASE ON CHRONIC DIALYSIS (HCC): Status: ACTIVE | Noted: 2024-10-31

## 2024-10-31 PROBLEM — Z99.2 DIABETES MELLITUS DUE TO UNDERLYING CONDITION WITH CHRONIC KIDNEY DISEASE ON CHRONIC DIALYSIS (HCC): Status: ACTIVE | Noted: 2024-10-31

## 2024-10-31 PROBLEM — E08.22 DIABETES MELLITUS DUE TO UNDERLYING CONDITION WITH CHRONIC KIDNEY DISEASE ON CHRONIC DIALYSIS (HCC): Status: ACTIVE | Noted: 2024-10-31

## 2024-10-31 PROBLEM — E87.70 HYPERVOLEMIA: Status: ACTIVE | Noted: 2024-10-31

## 2024-10-31 PROBLEM — N18.6 ESRD (END STAGE RENAL DISEASE) (HCC): Status: ACTIVE | Noted: 2024-10-31

## 2024-10-31 LAB
ALBUMIN SERPL-MCNC: 4.1 G/DL (ref 3.2–4.8)
ALBUMIN/GLOB SERPL: 1.3 {RATIO} (ref 1–2)
ALP LIVER SERPL-CCNC: 119 U/L
ALT SERPL-CCNC: 13 U/L
ANION GAP SERPL CALC-SCNC: 9 MMOL/L (ref 0–18)
AST SERPL-CCNC: 16 U/L (ref ?–34)
ATRIAL RATE: 80 BPM
BASOPHILS # BLD AUTO: 0.06 X10(3) UL (ref 0–0.2)
BASOPHILS NFR BLD AUTO: 0.5 %
BILIRUB SERPL-MCNC: 0.4 MG/DL (ref 0.3–1.2)
BUN BLD-MCNC: 30 MG/DL (ref 9–23)
CALCIUM BLD-MCNC: 9 MG/DL (ref 8.7–10.4)
CHLORIDE SERPL-SCNC: 94 MMOL/L (ref 98–112)
CO2 SERPL-SCNC: 28 MMOL/L (ref 21–32)
CREAT BLD-MCNC: 3.92 MG/DL
EGFRCR SERPLBLD CKD-EPI 2021: 13 ML/MIN/1.73M2 (ref 60–?)
EOSINOPHIL # BLD AUTO: 0.65 X10(3) UL (ref 0–0.7)
EOSINOPHIL NFR BLD AUTO: 5.2 %
ERYTHROCYTE [DISTWIDTH] IN BLOOD BY AUTOMATED COUNT: 14.6 %
EST. AVERAGE GLUCOSE BLD GHB EST-MCNC: 220 MG/DL (ref 68–126)
GLOBULIN PLAS-MCNC: 3.1 G/DL (ref 2–3.5)
GLUCOSE BLD-MCNC: 119 MG/DL (ref 70–99)
GLUCOSE BLD-MCNC: 121 MG/DL (ref 70–99)
GLUCOSE BLD-MCNC: 134 MG/DL (ref 70–99)
GLUCOSE BLD-MCNC: 145 MG/DL (ref 70–99)
GLUCOSE BLD-MCNC: 207 MG/DL (ref 70–99)
GLUCOSE BLD-MCNC: 241 MG/DL (ref 70–99)
HBA1C MFR BLD: 9.3 % (ref ?–5.7)
HBV SURFACE AG SER-ACNC: <0.1 [IU]/L
HBV SURFACE AG SERPL QL IA: NONREACTIVE
HCT VFR BLD AUTO: 28.1 %
HGB BLD-MCNC: 9.2 G/DL
IMM GRANULOCYTES # BLD AUTO: 0.06 X10(3) UL (ref 0–1)
IMM GRANULOCYTES NFR BLD: 0.5 %
LYMPHOCYTES # BLD AUTO: 0.87 X10(3) UL (ref 1–4)
LYMPHOCYTES NFR BLD AUTO: 7 %
MAGNESIUM SERPL-MCNC: 2.1 MG/DL (ref 1.6–2.6)
MCH RBC QN AUTO: 29.9 PG (ref 26–34)
MCHC RBC AUTO-ENTMCNC: 32.7 G/DL (ref 31–37)
MCV RBC AUTO: 91.2 FL
MONOCYTES # BLD AUTO: 0.59 X10(3) UL (ref 0.1–1)
MONOCYTES NFR BLD AUTO: 4.7 %
NEUTROPHILS # BLD AUTO: 10.23 X10 (3) UL (ref 1.5–7.7)
NEUTROPHILS # BLD AUTO: 10.23 X10(3) UL (ref 1.5–7.7)
NEUTROPHILS NFR BLD AUTO: 82.1 %
OSMOLALITY SERPL CALC.SUM OF ELEC: 279 MOSM/KG (ref 275–295)
P AXIS: 16 DEGREES
P-R INTERVAL: 228 MS
PLATELET # BLD AUTO: 308 10(3)UL (ref 150–450)
POTASSIUM SERPL-SCNC: 3.8 MMOL/L (ref 3.5–5.1)
PROT SERPL-MCNC: 7.2 G/DL (ref 5.7–8.2)
Q-T INTERVAL: 410 MS
QRS DURATION: 90 MS
QTC CALCULATION (BEZET): 472 MS
R AXIS: 32 DEGREES
RBC # BLD AUTO: 3.08 X10(6)UL
SODIUM SERPL-SCNC: 131 MMOL/L (ref 136–145)
T AXIS: 61 DEGREES
VENTRICULAR RATE: 80 BPM
WBC # BLD AUTO: 12.5 X10(3) UL (ref 4–11)

## 2024-10-31 PROCEDURE — 99223 1ST HOSP IP/OBS HIGH 75: CPT | Performed by: INTERNAL MEDICINE

## 2024-10-31 RX ORDER — BUMETANIDE 2 MG/1
2 TABLET ORAL DAILY
Status: DISCONTINUED | OUTPATIENT
Start: 2024-10-31 | End: 2024-11-02

## 2024-10-31 RX ORDER — PRAVASTATIN SODIUM 20 MG
20 TABLET ORAL NIGHTLY
Status: DISCONTINUED | OUTPATIENT
Start: 2024-10-31 | End: 2024-11-02

## 2024-10-31 RX ORDER — HYDROMORPHONE HYDROCHLORIDE 1 MG/ML
0.8 INJECTION, SOLUTION INTRAMUSCULAR; INTRAVENOUS; SUBCUTANEOUS EVERY 2 HOUR PRN
Status: DISCONTINUED | OUTPATIENT
Start: 2024-10-31 | End: 2024-11-02

## 2024-10-31 RX ORDER — BUPROPION HYDROCHLORIDE 150 MG/1
150 TABLET ORAL DAILY
Status: DISCONTINUED | OUTPATIENT
Start: 2024-10-31 | End: 2024-11-02

## 2024-10-31 RX ORDER — CYCLOBENZAPRINE HCL 10 MG
10 TABLET ORAL 3 TIMES DAILY PRN
Status: DISCONTINUED | OUTPATIENT
Start: 2024-10-31 | End: 2024-11-02

## 2024-10-31 RX ORDER — NICOTINE POLACRILEX 4 MG
15 LOZENGE BUCCAL
Status: DISCONTINUED | OUTPATIENT
Start: 2024-10-31 | End: 2024-11-02

## 2024-10-31 RX ORDER — LEVOTHYROXINE SODIUM 100 UG/1
200 TABLET ORAL
Status: DISCONTINUED | OUTPATIENT
Start: 2024-10-31 | End: 2024-11-02

## 2024-10-31 RX ORDER — HYDRALAZINE HYDROCHLORIDE 20 MG/ML
10 INJECTION INTRAMUSCULAR; INTRAVENOUS EVERY 4 HOURS PRN
Status: DISCONTINUED | OUTPATIENT
Start: 2024-10-31 | End: 2024-11-02

## 2024-10-31 RX ORDER — ALBUTEROL SULFATE 90 UG/1
2 INHALANT RESPIRATORY (INHALATION) EVERY 4 HOURS PRN
Status: DISCONTINUED | OUTPATIENT
Start: 2024-10-31 | End: 2024-11-02

## 2024-10-31 RX ORDER — CARVEDILOL 12.5 MG/1
37.5 TABLET ORAL 2 TIMES DAILY
Status: DISCONTINUED | OUTPATIENT
Start: 2024-10-31 | End: 2024-11-02

## 2024-10-31 RX ORDER — HEPARIN SODIUM 1000 [USP'U]/ML
1500 INJECTION, SOLUTION INTRAVENOUS; SUBCUTANEOUS
Status: DISCONTINUED | OUTPATIENT
Start: 2024-10-31 | End: 2024-10-31

## 2024-10-31 RX ORDER — HYDROCODONE BITARTRATE AND ACETAMINOPHEN 5; 325 MG/1; MG/1
2 TABLET ORAL EVERY 6 HOURS PRN
Status: DISCONTINUED | OUTPATIENT
Start: 2024-10-31 | End: 2024-11-02

## 2024-10-31 RX ORDER — HYDROXYZINE HYDROCHLORIDE 25 MG/1
25 TABLET, FILM COATED ORAL 3 TIMES DAILY PRN
Status: DISCONTINUED | OUTPATIENT
Start: 2024-10-31 | End: 2024-11-02

## 2024-10-31 RX ORDER — HYDROMORPHONE HYDROCHLORIDE 1 MG/ML
0.2 INJECTION, SOLUTION INTRAMUSCULAR; INTRAVENOUS; SUBCUTANEOUS EVERY 2 HOUR PRN
Status: DISCONTINUED | OUTPATIENT
Start: 2024-10-31 | End: 2024-11-02

## 2024-10-31 RX ORDER — LIDOCAINE/PRILOCAINE 2.5 %-2.5%
CREAM (GRAM) TOPICAL AS NEEDED
Status: DISCONTINUED | OUTPATIENT
Start: 2024-10-31 | End: 2024-11-02

## 2024-10-31 RX ORDER — HYDROCODONE BITARTRATE AND ACETAMINOPHEN 5; 325 MG/1; MG/1
1-2 TABLET ORAL EVERY 6 HOURS PRN
Status: DISCONTINUED | OUTPATIENT
Start: 2024-10-31 | End: 2024-10-31 | Stop reason: SDUPTHER

## 2024-10-31 RX ORDER — ACETAMINOPHEN 325 MG/1
650 TABLET ORAL EVERY 4 HOURS PRN
Status: DISCONTINUED | OUTPATIENT
Start: 2024-10-31 | End: 2024-11-02

## 2024-10-31 RX ORDER — ONDANSETRON 2 MG/ML
4 INJECTION INTRAMUSCULAR; INTRAVENOUS EVERY 6 HOURS PRN
Status: DISCONTINUED | OUTPATIENT
Start: 2024-10-31 | End: 2024-11-02

## 2024-10-31 RX ORDER — CLOPIDOGREL BISULFATE 75 MG/1
75 TABLET ORAL DAILY
Status: DISCONTINUED | OUTPATIENT
Start: 2024-10-31 | End: 2024-11-02

## 2024-10-31 RX ORDER — PANTOPRAZOLE SODIUM 40 MG/1
40 TABLET, DELAYED RELEASE ORAL
Status: DISCONTINUED | OUTPATIENT
Start: 2024-10-31 | End: 2024-11-02

## 2024-10-31 RX ORDER — HYDROCODONE BITARTRATE AND ACETAMINOPHEN 5; 325 MG/1; MG/1
1 TABLET ORAL EVERY 4 HOURS PRN
Status: DISCONTINUED | OUTPATIENT
Start: 2024-10-31 | End: 2024-11-02

## 2024-10-31 RX ORDER — NICOTINE POLACRILEX 4 MG
30 LOZENGE BUCCAL
Status: DISCONTINUED | OUTPATIENT
Start: 2024-10-31 | End: 2024-11-02

## 2024-10-31 RX ORDER — HEPARIN SODIUM 5000 [USP'U]/ML
5000 INJECTION, SOLUTION INTRAVENOUS; SUBCUTANEOUS EVERY 8 HOURS SCHEDULED
Status: DISCONTINUED | OUTPATIENT
Start: 2024-10-31 | End: 2024-11-02

## 2024-10-31 RX ORDER — HYDROCODONE BITARTRATE AND ACETAMINOPHEN 5; 325 MG/1; MG/1
1 TABLET ORAL EVERY 6 HOURS PRN
Status: DISCONTINUED | OUTPATIENT
Start: 2024-10-31 | End: 2024-11-02

## 2024-10-31 RX ORDER — LEVOTHYROXINE SODIUM 75 UG/1
75 TABLET ORAL
Status: DISCONTINUED | OUTPATIENT
Start: 2024-10-31 | End: 2024-11-02

## 2024-10-31 RX ORDER — GABAPENTIN 400 MG/1
400 CAPSULE ORAL 2 TIMES DAILY
Status: DISCONTINUED | OUTPATIENT
Start: 2024-10-31 | End: 2024-11-02

## 2024-10-31 RX ORDER — DEXTROSE MONOHYDRATE 25 G/50ML
50 INJECTION, SOLUTION INTRAVENOUS
Status: DISCONTINUED | OUTPATIENT
Start: 2024-10-31 | End: 2024-11-02

## 2024-10-31 RX ORDER — ALBUMIN (HUMAN) 12.5 G/50ML
25 SOLUTION INTRAVENOUS
Status: DISPENSED | OUTPATIENT
Start: 2024-10-31 | End: 2024-11-02

## 2024-10-31 RX ORDER — HEPARIN SODIUM 1000 [USP'U]/ML
1500 INJECTION, SOLUTION INTRAVENOUS; SUBCUTANEOUS
Status: DISCONTINUED | OUTPATIENT
Start: 2024-10-31 | End: 2024-11-02

## 2024-10-31 RX ORDER — HYDROCODONE BITARTRATE AND ACETAMINOPHEN 5; 325 MG/1; MG/1
2 TABLET ORAL EVERY 4 HOURS PRN
Status: DISCONTINUED | OUTPATIENT
Start: 2024-10-31 | End: 2024-11-02

## 2024-10-31 RX ORDER — PROCHLORPERAZINE EDISYLATE 5 MG/ML
5 INJECTION INTRAMUSCULAR; INTRAVENOUS EVERY 8 HOURS PRN
Status: DISCONTINUED | OUTPATIENT
Start: 2024-10-31 | End: 2024-11-02

## 2024-10-31 RX ORDER — HYDROMORPHONE HYDROCHLORIDE 1 MG/ML
0.4 INJECTION, SOLUTION INTRAMUSCULAR; INTRAVENOUS; SUBCUTANEOUS EVERY 2 HOUR PRN
Status: DISCONTINUED | OUTPATIENT
Start: 2024-10-31 | End: 2024-11-02

## 2024-10-31 NOTE — H&P
[facility] Hospitalist History and Physical      PCP: Alejandro Pedersen MD    History of Present Illness: This is the story of Ms. Kimmy Sparks who is a 53 y/o F who presented to the hospital w/ chief complaints of missed dialysis session. Patient states that on Tuesday she was having swelling and severe spasms in both legs. This has been a chronic issue for her while she has been on dialysis. She missed dialysis on Tuesday due to the spasms and came in with SOB to the ED.  ED Vitals: wnl  Labs:   Na 126  K 6.2  Chloride 90  Cr 7.42  WBC 14.1  HgB 9.5    CXR: negative  Nephrology was consulted who did put her on HD overnight, and had 3L removed.    Active Inpatient Medical History:  ESRD TTS  HTN  HLD  HypoT  T2DM    Past Medical History:    Anxiety    Diabetes (HCC)    Dialysis patient (HCC)    Disorder of thyroid    End stage renal disease (HCC)    Essential hypertension    High blood pressure    High cholesterol    Hyperlipidemia    Hypertension    Renal disorder    Thyroid disease      Past Surgical History:   Procedure Laterality Date    Hysterectomy      Tubal ligation          No current outpatient medications on file.       Social History     Tobacco Use    Smoking status: Never    Smokeless tobacco: Never   Substance Use Topics    Alcohol use: Never        OBJECTIVE:  BP (!) 159/138 (BP Location: Radial)   Pulse 96   Temp 97.4 °F (36.3 °C) (Oral)   Resp 18   Ht 5' 8\" (1.727 m)   Wt 286 lb 2.5 oz (129.8 kg)   SpO2 98%   BMI 43.51 kg/m²   Gen: No acute distress, alert and oriented x3  Pulm: Lungs clear bilaterally, normal respiratory effort  CV: Heart with regular rate and rhythm  Abd: Abdomen soft, nontender, non-distended  Skin: no rashes or lesions  Extremities: trace to 1+ bilateral pitting edema  Neuro:  no focal deficits      Data Review:    Pertinent Labs:  Na improved  K resolved  Cr improved   WBC improving    Assessment/Plan:   Outpatient records or previous hospital records  reviewed.   [facility] Hospitalist to continue to follow patient while in house    A/P: 53 y/o w/ Pmhx of ESRD on HD TTS presenting for SOB after missed dialysis session    SOB/RIVERA  2/2 to Missed Dialysis session  S/p overnight HD w/ 3L removed  Plan:  - Nephrology consulted, planning for another round of UF this afternoon  - Continue Bumex daily    HFpEF  - Last ECHO 60-65%  Hx of CVA  HTN  HLD  - Continue Coreg  - Continue Statin  - Continue Statin    T2DM  - LDISS  - Gabapentin    HypoT  - Synthroid    Anemia of Chronic Disease  - stable    MDD/QUE  - Wellbutrin      A total of 38 minutes taken with patient and coordinating care.  Greater than 50% face to face encounter.      Miller Mcknight D.O.  [facility]

## 2024-10-31 NOTE — PLAN OF CARE
Received patient at 0730. Alert and Oriented x4, drowsy after dialysis. Tele Rhythm NSR. O2 saturation 96% On 1L NC, which she wore for comfort during dialysis. Breath sounds diminished but clear. Shortness of breath noted with exertion. Bed is locked and in low position. Call light and personal items within reach.  3L off with dialysis this am, plan for more output with ultrafiltration later today.  Pt with BLR pain/swelling, has been unable to ambulate, PT/OT ordered. Pain under control with Norco. (Dilaudid was needed during dialysis). . Reviewed plan of care and patient verbalizes understanding.      Problem: METABOLIC/FLUID AND ELECTROLYTES - ADULT  Goal: Glucose maintained within prescribed range  Description: INTERVENTIONS:  - Monitor Blood Glucose as ordered  - Assess for signs and symptoms of hyperglycemia and hypoglycemia  - Administer ordered medications to maintain glucose within target range  - Assess barriers to adequate nutritional intake and initiate nutrition consult as needed  - Instruct patient on self management of diabetes  Outcome: Progressing  Goal: Electrolytes maintained within normal limits  Description: INTERVENTIONS:  - Monitor labs and rhythm and assess patient for signs and symptoms of electrolyte imbalances  - Administer electrolyte replacement as ordered  - Monitor response to electrolyte replacements, including rhythm and repeat lab results as appropriate  - Fluid restriction as ordered  - Instruct patient on fluid and nutrition restrictions as appropriate  Outcome: Progressing  Goal: Hemodynamic stability and optimal renal function maintained  Description: INTERVENTIONS:  - Monitor labs and assess for signs and symptoms of volume excess or deficit  - Monitor intake, output and patient weight  - Monitor urine specific gravity, serum osmolarity and serum sodium as indicated or ordered  - Monitor response to interventions for patient's volume status, including labs, urine output, blood  pressure (other measures as available)  - Encourage oral intake as appropriate  - Instruct patient on fluid and nutrition restrictions as appropriate  Outcome: Progressing     Problem: PAIN - ADULT  Goal: Verbalizes/displays adequate comfort level or patient's stated pain goal  Description: INTERVENTIONS:  - Encourage pt to monitor pain and request assistance  - Assess pain using appropriate pain scale  - Administer analgesics based on type and severity of pain and evaluate response  - Implement non-pharmacological measures as appropriate and evaluate response  - Consider cultural and social influences on pain and pain management  - Manage/alleviate anxiety  - Utilize distraction and/or relaxation techniques  - Monitor for opioid side effects  - Notify MD/LIP if interventions unsuccessful or patient reports new pain  - Anticipate increased pain with activity and pre-medicate as appropriate  Outcome: Progressing     Problem: SAFETY ADULT - FALL  Goal: Free from fall injury  Description: INTERVENTIONS:  - Assess pt frequently for physical needs  - Identify cognitive and physical deficits and behaviors that affect risk of falls.  - [place] fall precautions as indicated by assessment.  - Educate pt/family on patient safety including physical limitations  - Instruct pt to call for assistance with activity based on assessment  - Modify environment to reduce risk of injury  - Provide assistive devices as appropriate  - Consider OT/PT consult to assist with strengthening/mobility  - Encourage toileting schedule  Outcome: Progressing     Problem: DISCHARGE PLANNING  Goal: Discharge to home or other facility with appropriate resources  Description: INTERVENTIONS:  - Identify barriers to discharge w/pt and caregiver  - Include patient/family/discharge partner in discharge planning  - Arrange for needed discharge resources and transportation as appropriate  - Identify discharge learning needs (meds, wound care, etc)  -  Arrange for interpreters to assist at discharge as needed  - Consider post-discharge preferences of patient/family/discharge partner  - Complete POLST form as appropriate  - Assess patient's ability to be responsible for managing their own health  - Refer to Case Management Department for coordinating discharge planning if the patient needs post-hospital services based on physician/LIP order or complex needs related to functional status, cognitive ability or social support system  Outcome: Progressing     Problem: Patient/Family Goals  Goal: Patient/Family Long Term Goal  Description: Patient's Long Term Goal: to go home    Interventions:  - labs, dialysis, increase activity, tele monitoring, pain control    - See additional Care Plan goals for specific interventions  Outcome: Progressing  Goal: Patient/Family Short Term Goal  Description: Patient's Short Term Goal: feel better    Interventions:   - - labs, dialysis, increase activity, tele monitoring, pain control    - See additional Care Plan goals for specific interventions  Outcome: Progressing     Problem: MUSCULOSKELETAL - ADULT  Goal: Return mobility to safest level of function  Description: INTERVENTIONS:  - Assess patient stability and activity tolerance for standing, transferring and ambulating w/ or w/o assistive devices  - Assist with transfers and ambulation using safe patient handling equipment as needed  - Ensure adequate protection for wounds/incisions during mobilization  - Obtain PT/OT consults as needed  - Advance activity as appropriate  - Communicate ordered activity level and limitations with patient/family  Outcome: Progressing  Goal: Maintain proper alignment of affected body part  Description: INTERVENTIONS:  - Support and protect limb and body alignment per provider's orders  - Instruct and reinforce with patient and family use of appropriate assistive device and precautions (e.g. spinal or hip dislocation precautions)  Outcome:  Progressing

## 2024-10-31 NOTE — PLAN OF CARE
Pt is A&O x4. BLE pain managed w/ PRN medications.  Maintaining O2 on RA. 1L nasal canula placed for comfort. RIVERA.   NSR on tele, S1&S2 present. Denies cardiac symptoms.  Bowel sounds active. Continent. Diminished urine production d/t HD.  Pt updated on plan of care. Bed in lowest position. Bed alarm on. Call light within reach.     Problem: METABOLIC/FLUID AND ELECTROLYTES - ADULT  Goal: Glucose maintained within prescribed range  Description: INTERVENTIONS:  - Monitor Blood Glucose as ordered  - Assess for signs and symptoms of hyperglycemia and hypoglycemia  - Administer ordered medications to maintain glucose within target range  - Assess barriers to adequate nutritional intake and initiate nutrition consult as needed  - Instruct patient on self management of diabetes  Outcome: Progressing  Goal: Electrolytes maintained within normal limits  Description: INTERVENTIONS:  - Monitor labs and rhythm and assess patient for signs and symptoms of electrolyte imbalances  - Administer electrolyte replacement as ordered  - Monitor response to electrolyte replacements, including rhythm and repeat lab results as appropriate  - Fluid restriction as ordered  - Instruct patient on fluid and nutrition restrictions as appropriate  Outcome: Progressing  Goal: Hemodynamic stability and optimal renal function maintained  Description: INTERVENTIONS:  - Monitor labs and assess for signs and symptoms of volume excess or deficit  - Monitor intake, output and patient weight  - Monitor urine specific gravity, serum osmolarity and serum sodium as indicated or ordered  - Monitor response to interventions for patient's volume status, including labs, urine output, blood pressure (other measures as available)  - Encourage oral intake as appropriate  - Instruct patient on fluid and nutrition restrictions as appropriate  Outcome: Progressing     Problem: PAIN - ADULT  Goal: Verbalizes/displays adequate comfort level or patient's stated pain  goal  Description: INTERVENTIONS:  - Encourage pt to monitor pain and request assistance  - Assess pain using appropriate pain scale  - Administer analgesics based on type and severity of pain and evaluate response  - Implement non-pharmacological measures as appropriate and evaluate response  - Consider cultural and social influences on pain and pain management  - Manage/alleviate anxiety  - Utilize distraction and/or relaxation techniques  - Monitor for opioid side effects  - Notify MD/LIP if interventions unsuccessful or patient reports new pain  - Anticipate increased pain with activity and pre-medicate as appropriate  Outcome: Progressing     Problem: SAFETY ADULT - FALL  Goal: Free from fall injury  Description: INTERVENTIONS:  - Assess pt frequently for physical needs  - Identify cognitive and physical deficits and behaviors that affect risk of falls.  - [place] fall precautions as indicated by assessment.  - Educate pt/family on patient safety including physical limitations  - Instruct pt to call for assistance with activity based on assessment  - Modify environment to reduce risk of injury  - Provide assistive devices as appropriate  - Consider OT/PT consult to assist with strengthening/mobility  - Encourage toileting schedule  Outcome: Progressing     Problem: DISCHARGE PLANNING  Goal: Discharge to home or other facility with appropriate resources  Description: INTERVENTIONS:  - Identify barriers to discharge w/pt and caregiver  - Include patient/family/discharge partner in discharge planning  - Arrange for needed discharge resources and transportation as appropriate  - Identify discharge learning needs (meds, wound care, etc)  - Arrange for interpreters to assist at discharge as needed  - Consider post-discharge preferences of patient/family/discharge partner  - Complete POLST form as appropriate  - Assess patient's ability to be responsible for managing their own health  - Refer to Case Management  Department for coordinating discharge planning if the patient needs post-hospital services based on physician/LIP order or complex needs related to functional status, cognitive ability or social support system  Outcome: Progressing

## 2024-10-31 NOTE — PHYSICAL THERAPY NOTE
Assessment/Plan:    1  Perforation of sigmoid colon due to diverticulitis  Comments:  will continue with antibiotics as advised and will follow with surgical team in 2 weeks and advise to call office or follow back for any concerns    2  Mood swings          BMI Counseling: Body mass index is 23 42 kg/m²  Discussed the patient's BMI with her  Patient Instructions:  Supportive care discussed and advised  Advised to RTO for any worsening and no improvement  Follow up for no improvement and worsening of conditions  Patient advised and educated when to see immediate medical care  Return if symptoms worsen or fail to improve  No future appointments  Subjective:      Patient ID: Kirti Tatum is a 46 y o  female  Chief Complaint   Patient presents with    Transition of Care Management     ac/lpn         Vitals:  /70   Pulse (!) 50   Temp (!) 97 3 °F (36 3 °C)   Resp 16   Ht 5' 8" (1 727 m)   Wt 69 9 kg (154 lb)   BMI 23 42 kg/m²     HPI  Patient was initially hospitalized for perforation of sigmoid colon secondary to diverticulitis  Ct scan showed localized/conatined perforation with small amount of extraluminal air surrounded by phelgmon  Patient was observed at hospital and was treated with antibiotics  Patient is discharged on Augmentin and flagyl for 13 days  Currently on soft diet for 3 days then will advance to low fiber diet for 2 weeks then will change to high fiber diet as advised by surgery team  Denies any fever, chills and stated that abdominal pain has mostly resolved except still having some soreness on lower abdominal area and left side mostly  Passing flatus and having BM  Tolerating current diet   Patient stated that might go for elective surgical intervention once infection clears                      The following portions of the patient's history were reviewed and updated as appropriate: allergies, current medications, past family history, past medical PT orders received and chart reviewed. Pt declining to participate in PT evaluation at this time despite education and encouragement. Requests PT come back another time. Will follow and re-attempt as able and appropriate.     Attempted to see pt in the PM for PT evaluation and pt requesting PT come back tomorrow. Will follow and re-attempt.   history, past social history, past surgical history and problem list       Review of Systems   Constitutional: Negative  Respiratory: Negative  Cardiovascular: Negative  Gastrointestinal: Negative for abdominal distention, blood in stool, constipation, diarrhea, nausea, rectal pain and vomiting  As noted in HPI     Genitourinary: Negative for difficulty urinating  Skin: Negative  Neurological: Negative for dizziness and headaches  Psychiatric/Behavioral: Negative  Objective:    Social History     Tobacco Use   Smoking Status Never Smoker   Smokeless Tobacco Never Used       Allergies: No Known Allergies      Current Outpatient Medications   Medication Sig Dispense Refill    amoxicillin-clavulanate (AUGMENTIN) 875-125 mg per tablet Take 1 tablet by mouth every 12 (twelve) hours for 13 days 26 tablet 0    escitalopram (LEXAPRO) 10 mg tablet Take 1 tablet (10 mg total) by mouth daily 90 tablet 3    metroNIDAZOLE (FLAGYL) 250 mg tablet Take 1 tablet (250 mg total) by mouth every 8 (eight) hours for 13 days 39 tablet 0    valACYclovir (VALTREX) 1,000 mg tablet Take 2 tablets (2,000 mg total) by mouth once for 1 dose Repeat dose after 12 hours 30 tablet 0     No current facility-administered medications for this visit  Physical Exam  Vitals reviewed  Constitutional:       Appearance: She is well-developed  Cardiovascular:      Rate and Rhythm: Normal rate and regular rhythm  Heart sounds: Normal heart sounds  Pulmonary:      Effort: Pulmonary effort is normal       Breath sounds: Normal breath sounds  Abdominal:      General: Bowel sounds are normal       Palpations: Abdomen is soft  Tenderness: There is abdominal tenderness (mildly) in the left lower quadrant  Skin:     General: Skin is warm and dry  Neurological:      Mental Status: She is alert and oriented to person, place, and time     Psychiatric:         Behavior: Behavior normal          Thought Content:  Thought content normal          Judgment: Judgment normal                      RANDELL Vasquez

## 2024-10-31 NOTE — ED INITIAL ASSESSMENT (HPI)
Known hemodialysis pt, missed dialysis 2x due increased swelling and pain to bilat legs. Per pt, usually takes flexeril & norco for pain. No meds for past few days as claimed. Known to have diuretics, non compliant. Due becoming anuric per pt per instructions

## 2024-10-31 NOTE — ED QUICK NOTES
Orders for admission, patient is aware of plan and ready to go upstairs. Any questions, please call ED RN Jazmine at extension [phone].     Patient Covid vaccination status: Unvaccinated     COVID Test Ordered in ED: None    COVID Suspicion at Admission: N/A    Running Infusions:  None    Mental Status/LOC at time of transport: A&O x3    Other pertinent information:   CIWA score: N/A   NIH score:  N/A

## 2024-10-31 NOTE — ED PROVIDER NOTES
Patient Seen in: [facility] Emergency Department      History     Chief Complaint   Patient presents with    Swelling Edema     Stated Complaint: lower leg swelling, cardiac hx    Subjective:   HPI      Patient presents with a leg swelling and spasms.  The patient states that she missed dialysis on Tuesday due to swelling and spasms in both legs.  She has been having this problem since she has been on dialysis.  She had a make-up session scheduled for yesterday but she was unaware of that.  She states she feels a little bit short of breath.  She has stopped the diuretic per instruction and makes very little urine.  She is now starting to have spasms in her right arm as well.    Objective:     Past Medical History:    Anxiety    Diabetes (HCC)    Dialysis patient (HCC)    Disorder of thyroid    End stage renal disease (HCC)    Essential hypertension    High blood pressure    High cholesterol    Hyperlipidemia    Hypertension    Renal disorder    Thyroid disease              Past Surgical History:   Procedure Laterality Date    Hysterectomy      Tubal ligation                  Social History     Socioeconomic History    Marital status:    Tobacco Use    Smoking status: Never    Smokeless tobacco: Never   Substance and Sexual Activity    Alcohol use: Never    Drug use: Never   Social History Narrative    ** Merged History Encounter **          Social Drivers of Health     Food Insecurity: No Food Insecurity (7/10/2024)    Food Insecurity     Food Insecurity: Never true   Transportation Needs: No Transportation Needs (7/10/2024)    Transportation Needs     Lack of Transportation: No    [facility], [facility]    Social Connections   Housing Stability: Low Risk  (7/10/2024)    Housing Stability     Housing Instability: No                  Physical Exam     ED Triage Vitals [10/30/24 1938]   /80   Pulse 86   Resp 16   Temp 96.6 °F (35.9 °C)   Temp  src Temporal   SpO2 97 %   O2 Device None (Room air)       Current Vitals:   Vital Signs  BP: 134/72  Pulse: 83  Resp: 19  Temp: 96.6 °F (35.9 °C)  Temp src: Temporal  MAP (mmHg): 91    Oxygen Therapy  SpO2: 100 %  O2 Device: None (Room air)        Physical Exam  General: Alert and oriented x3 in no acute distress, some intermittent jerking movement of her right arm.  HEENT: Normocephalic, atraumatic, pupils equal round and reactive to light, oropharynx clear.  Neck: Supple.  Cardiovascular: Regular rate and rhythm.  Respiratory: Lungs clear to auscultation.  Extremities: 1+ edema to lower extremities bilaterally.  Skin: Warm and dry.    ED Course     Labs Reviewed   CBC WITH DIFFERENTIAL WITH PLATELET - Abnormal; Notable for the following components:       Result Value    WBC 14.1 (*)     RBC 3.23 (*)     HGB 9.5 (*)     HCT 29.0 (*)     Neutrophil Absolute Prelim 11.32 (*)     Neutrophil Absolute 11.32 (*)     Eosinophil Absolute 0.79 (*)     All other components within normal limits   COMP METABOLIC PANEL (14) - Abnormal; Notable for the following components:    Sodium 126 (*)     Potassium 6.2 (*)     Chloride 90 (*)     BUN 66 (*)     Creatinine 7.42 (*)     eGFR-Cr 6 (*)     Alkaline Phosphatase 124 (*)     Bilirubin, Total 0.2 (*)     All other components within normal limits   VENOUS BLOOD GAS - Abnormal; Notable for the following components:    Venous O2Hb 65.4 (*)     All other components within normal limits   POCT GLUCOSE - Abnormal; Notable for the following components:    POC Glucose 263 (*)     All other components within normal limits   RAINBOW DRAW BLUE   RAINBOW DRAW GOLD     EKG    Rate, intervals and axes as noted on EKG Report.  Rate: 80  Rhythm: Sinus rhythm with first-degree AV block  Reading: No acute change from previous         I personally reviewed the chest films and no pulmonary edema noted.       XR CHEST AP PORTABLE  (CPT=71045)    Result Date: 10/30/2024  PROCEDURE:  XR CHEST AP  PORTABLE  (CPT=71045)  TECHNIQUE:  AP chest radiograph was obtained.  COMPARISON:  [facility] , XR, XR CHEST AP PORTABLE  (CPT=71045), 12/24/2023, 7:20 PM.  INDICATIONS:  lower leg swelling, cardiac hx  PATIENT STATED HISTORY: (As transcribed by Technologist)  Patient offered no additional history at this time.      FINDINGS:  Lung volumes are satisfactory.  Increased markings at the left lung base appear unchanged consistent with scarring.  No new consolidation.  CP angles remain sharp.  Heart and pulmonary vessels appear stable, with mild cardiomegaly.  Smooth mediastinal contours.  Right-sided dialysis catheter is again seen.             CONCLUSION:  No acute findings.  Stable frontal view of the chest.   LOCATION:  [facility]      Dictated by (CST): Lex Lilly MD on 10/30/2024 at 10:17 PM     Finalized by (CST): Lex Lilly MD on 10/30/2024 at 10:18 PM        Medications   calcium gluconate 1g in 100mL iso-NaCl IVPB premix (1 g Intravenous Handoff 10/30/24 2310)   insulin regular human (Novolin R, Humulin R) 100 UNIT/ML injection 10 Units (10 Units Intravenous Given 10/30/24 2259)   dextrose 50% injection 50 mL (50 mL Intravenous Given 10/30/24 2253)   sodium bicarbonate injection 50 mEq (50 mEq Intravenous Given 10/30/24 2301)   albuterol (Ventolin) (2.5 MG/3ML) 0.083% nebulizer solution 2.5 mg (2.5 mg Nebulization Given 10/30/24 2247)   HYDROcodone-acetaminophen (Norco) 5-325 MG per tab 1 tablet (1 tablet Oral Given 10/30/24 2304)     The patient was given the above medications to treat for hyperkalemia as well as Norco for her leg spasms.     MDM      Patient presents with leg pain and spasms with mild dyspnea.  Differential diagnosis includes but is not limited to pulmonary edema and electrolyte abnormalities including hyperkalemia.  The patient was found to be severely hyperkalemic.  She did not have any concerning changes on EKG.  She was given medications to lower her potassium.  She did not have evidence of  pulmonary edema on chest x-ray.  I notified the on-call nephrologist, Dr. Read.  The patient will be admitted primarily to Dr. Carranza from the hospitalist service and we have discussed the case.  Patient was counseled regarding the findings and plan of care.    Admission disposition: 10/30/2024 10:41 PM           [facility]    Disposition and Plan     Clinical Impression:  1. Hyperkalemia         Disposition:  Admit  10/30/2024 10:41 pm    Follow-up:  No follow-up provider specified.        Medications Prescribed:  Current Discharge Medication List              Supplementary Documentation:         Hospital Problems       Present on Admission  Date Reviewed: 11/30/2023            ICD-10-CM Noted POA    * (Principal) Hyperkalemia E87.5 10/30/2024 Unknown    Anemia D64.9 10/30/2024 Yes                           A total of 32 minutes of critical care time (exclusive of billable procedures) was administered to manage the patient's critical lab values due to her hyperkalemia.  This involved direct patient intervention, complex decision making, and/or extensive discussions with the patient, family, and clinical staff.

## 2024-10-31 NOTE — CONSULTS
[facility]  Report of Consultation    Kimmy Sparks Patient Status:  Observation    1970 MRN [id number]   Location [facility] 2NE-A Attending Miller Mcknight, DO   Hosp Day # 0 PCP Alejandro Pedersen MD     Reason for Consultation:  ESRD    History of Present Illness:  Kimmy Sparks is a a(n) 54 year old female with hx of ESRD, DM, HTN, HL, obesity presented to ER with edema- she missed HD session due to not feeling well.  No cp. Some sob   No n/v  Denies f/c    Had HD overnight -3 L UF - currently on NC O2  She states she feels better overall      History:  Past Medical History:    Anxiety    Diabetes (HCC)    Dialysis patient (HCC)    Disorder of thyroid    End stage renal disease (HCC)    Essential hypertension    High blood pressure    High cholesterol    Hyperlipidemia    Hypertension    Renal disorder    Thyroid disease     Past Surgical History:   Procedure Laterality Date    Hysterectomy      Tubal ligation       Family History   Problem Relation Age of Onset    Cancer Maternal Grandmother     Diabetes Maternal Grandfather     Hypertension Sister     Heart Disorder Sister     Diabetes Other       reports that she has never smoked. She has never used smokeless tobacco. She reports that she does not drink alcohol and does not use drugs.    Allergies:  Allergies[1]    Current Medications:    Current Facility-Administered Medications:     bumetanide (Bumex) tab 2 mg, 2 mg, Oral, Daily    clopidogrel (Plavix) tab 75 mg, 75 mg, Oral, Daily    buPROPion ER (Wellbutrin XL) 24 hr tab 150 mg, 150 mg, Oral, Daily    levothyroxine (Synthroid) tab 200 mcg, 200 mcg, Oral, Before breakfast    levothyroxine (Synthroid) tab 75 mcg, 75 mcg, Oral, Before breakfast    pantoprazole (Protonix) DR tab 40 mg, 40 mg, Oral, QAM AC    pravastatin (Pravachol) tab 20 mg, 20 mg, Oral, Nightly    carvedilol (Coreg) tab 37.5 mg, 37.5 mg, Oral, BID    melatonin tab 3 mg, 3 mg, Oral, Nightly PRN    ondansetron (Zofran) 4  MG/2ML injection 4 mg, 4 mg, Intravenous, Q6H PRN    prochlorperazine (Compazine) 10 MG/2ML injection 5 mg, 5 mg, Intravenous, Q8H PRN    heparin (Porcine) 5000 UNIT/ML injection 5,000 Units, 5,000 Units, Subcutaneous, Q8H JONATHAN    acetaminophen (Tylenol) tab 650 mg, 650 mg, Oral, Q4H PRN **OR** HYDROcodone-acetaminophen (Norco) 5-325 MG per tab 1 tablet, 1 tablet, Oral, Q4H PRN **OR** HYDROcodone-acetaminophen (Norco) 5-325 MG per tab 2 tablet, 2 tablet, Oral, Q4H PRN    glucose (Dex4) 15 GM/59ML oral liquid 15 g, 15 g, Oral, Q15 Min PRN **OR** glucose (Glutose) 40% oral gel 15 g, 15 g, Oral, Q15 Min PRN **OR** glucose-vitamin C (Dex-4) chewable tab 4 tablet, 4 tablet, Oral, Q15 Min PRN **OR** dextrose 50% injection 50 mL, 50 mL, Intravenous, Q15 Min PRN **OR** glucose (Dex4) 15 GM/59ML oral liquid 30 g, 30 g, Oral, Q15 Min PRN **OR** glucose (Glutose) 40% oral gel 30 g, 30 g, Oral, Q15 Min PRN **OR** glucose-vitamin C (Dex-4) chewable tab 8 tablet, 8 tablet, Oral, Q15 Min PRN    insulin aspart (NovoLOG) 100 Units/mL FlexPen 1-10 Units, 1-10 Units, Subcutaneous, TID AC and HS    HYDROmorphone (Dilaudid) 1 MG/ML injection 0.2 mg, 0.2 mg, Intravenous, Q2H PRN **OR** HYDROmorphone (Dilaudid) 1 MG/ML injection 0.4 mg, 0.4 mg, Intravenous, Q2H PRN **OR** HYDROmorphone (Dilaudid) 1 MG/ML injection 0.8 mg, 0.8 mg, Intravenous, Q2H PRN    heparin (Porcine) 1000 UNIT/ML injection 1,500 Units, 1,500 Units, Intravenous, PRN Dialysis    albuterol (Ventolin HFA) 108 (90 Base) MCG/ACT inhaler 2 puff, 2 puff, Inhalation, Q4H PRN    gabapentin (Neurontin) cap 400 mg, 400 mg, Oral, BID    hydrOXYzine (Atarax) tab 25 mg, 25 mg, Oral, TID PRN    HYDROcodone-acetaminophen (Norco) 5-325 MG per tab 1 tablet, 1 tablet, Oral, Q6H PRN **OR** HYDROcodone-acetaminophen (Norco) 5-325 MG per tab 2 tablet, 2 tablet, Oral, Q6H PRN  Home Medications:  No current outpatient medications on file.       Review of Systems:  See HPI; A total of 12  systems reviewed and otherwise unremarkable.    Physical Exam:  Vital signs: Blood pressure (!) 159/138, pulse 96, temperature 97.4 °F (36.3 °C), temperature source Oral, resp. rate 18, height 5' 8\" (1.727 m), weight 286 lb 2.5 oz (129.8 kg), SpO2 98%.  General: No acute distress. Alert and oriented x 3.  HEENT: Moist mucous membranes. EOM-I. PERRL  Neck: No lymphadenopathy.  No JVD. No carotid bruits.  Respiratory: diminished   Cardiovascular: S1, S2.  Regular rate and rhythm.  No murmurs. Equal pulses   Abdomen: Soft, nontender, nondistended.  Positive bowel sounds. No rebound tenderness   Neurologic: No focal neurological deficits.   Musculoskeletal: Full range of motion of all extremities.  +edema  Integument: No lesions. No erythema.  Psychiatric: Appropriate mood and affect.    Laboratory:  Lab Results   Component Value Date    WBC 12.5 10/31/2024    HGB 9.2 10/31/2024    HCT 28.1 10/31/2024    .0 10/31/2024    CREATSERUM 3.92 10/31/2024    BUN 30 10/31/2024     10/31/2024    K 3.8 10/31/2024    CL 94 10/31/2024    CO2 28.0 10/31/2024     10/31/2024    CA 9.0 10/31/2024    ALB 4.1 10/31/2024    ALKPHO 119 10/31/2024    BILT 0.4 10/31/2024    TP 7.2 10/31/2024    AST 16 10/31/2024    ALT 13 10/31/2024    MG 2.1 10/31/2024    PGLU 145 10/31/2024          BUN   Date Value   10/31/2024 30 mg/dL (H)   10/30/2024 66 mg/dL (H)   09/04/2024      Comment:     Test not reported due to hemolysis.  Test reordered by laboratory.         Creatinine (mg/dL)   Date Value   10/31/2024 3.92 (H)   10/30/2024 7.42 (H)   09/04/2024 5.69 (H)         Imaging:  Reviewed     Impression:  ESRD due to DM; on HD  Patient presented with volume overload  Improving w/ HD; plan for additional UF this afternoon  Spasms - related to HD/UF- will monitor; prn analgesics  HTN- controlled; continue home regimen; optimize volume w/ HD  DM  Anemia due to CKD- continue RAMSES w/ HD  Obesity           Thank you for allowing me to  participate in this patient's care.  Please feel free to call me with any questions or concerns.    Omkar Wong MD  10/31/2024  9:03 AM         [1]   Allergies  Allergen Reactions    Kiwi Extract UNKNOWN

## 2024-10-31 NOTE — CM/SW NOTE
10/31/24 1200   CM/SW Referral Data   Referral Source Social Work (self-referral)   Reason for Referral Discharge planning   Informant EMR;Clinical Staff Member;Patient   Medical Hx   Does patient have an established PCP? Yes   Patient Info   Patient's Current Mental Status at Time of Assessment Alert;Oriented   Patient's Home Environment Condo/Apt no elevator   Patient lives with Spouse/Significant other   Patient Status Prior to Admission   Services in place prior to admission Dialysis   Dialysis Clinic [facility]   Scheduled Dialysis days T-TH-SAT   Dialysis scheduled time 1350   Discharge Needs   Anticipated D/C needs To be determined     Patient is a 55 y/o female admitted due to hyperkalemia.     SW met with pt at bedside to discuss DC plan. Pt reports she lives in an apartment with her . Pt stated she owns a wheelchair, shower chair, and a raised toilet seat. Pt expressed recent difficulty with walking and reports using a WC for mobility.    Per pt she is established with [facility] in [place]. Per pt, she has a T/TH/Sat chair time at 13:50.     PT still pending to work with pt. SW will continue to monitor for discharge needs.      [facility]  [address]  [place], [place] [address]   P: [phone]   F: [phone]     HUSSAIN Bernstein  Discharge Planner  [phone]

## 2024-11-01 LAB
ANION GAP SERPL CALC-SCNC: 5 MMOL/L (ref 0–18)
BUN BLD-MCNC: 50 MG/DL (ref 9–23)
CALCIUM BLD-MCNC: 9 MG/DL (ref 8.7–10.4)
CHLORIDE SERPL-SCNC: 89 MMOL/L (ref 98–112)
CO2 SERPL-SCNC: 27 MMOL/L (ref 21–32)
CREAT BLD-MCNC: 5.95 MG/DL
EGFRCR SERPLBLD CKD-EPI 2021: 8 ML/MIN/1.73M2 (ref 60–?)
GLUCOSE BLD-MCNC: 240 MG/DL (ref 70–99)
GLUCOSE BLD-MCNC: 267 MG/DL (ref 70–99)
GLUCOSE BLD-MCNC: 275 MG/DL (ref 70–99)
GLUCOSE BLD-MCNC: 281 MG/DL (ref 70–99)
OSMOLALITY SERPL CALC.SUM OF ELEC: 275 MOSM/KG (ref 275–295)
POTASSIUM SERPL-SCNC: 5.4 MMOL/L (ref 3.5–5.1)
SODIUM SERPL-SCNC: 121 MMOL/L (ref 136–145)

## 2024-11-01 PROCEDURE — 90935 HEMODIALYSIS ONE EVALUATION: CPT | Performed by: INTERNAL MEDICINE

## 2024-11-01 NOTE — PROGRESS NOTES
[facility]  Progress Note    Kimmy Sparks Patient Status:  Observation    1970 MRN [id number]   [facility] 2NE-A Attending Miller Mcknight, DO   Hosp Day # 0 PCP Alejandro Pedersen MD        No acute events  Denies sob/cp  On RA  Resting      Current Facility-Administered Medications:     bumetanide (Bumex) tab 2 mg, 2 mg, Oral, Daily    clopidogrel (Plavix) tab 75 mg, 75 mg, Oral, Daily    buPROPion ER (Wellbutrin XL) 24 hr tab 150 mg, 150 mg, Oral, Daily    levothyroxine (Synthroid) tab 200 mcg, 200 mcg, Oral, Before breakfast    levothyroxine (Synthroid) tab 75 mcg, 75 mcg, Oral, Before breakfast    pantoprazole (Protonix) DR tab 40 mg, 40 mg, Oral, QAM AC    pravastatin (Pravachol) tab 20 mg, 20 mg, Oral, Nightly    carvedilol (Coreg) tab 37.5 mg, 37.5 mg, Oral, BID    melatonin tab 3 mg, 3 mg, Oral, Nightly PRN    ondansetron (Zofran) 4 MG/2ML injection 4 mg, 4 mg, Intravenous, Q6H PRN    prochlorperazine (Compazine) 10 MG/2ML injection 5 mg, 5 mg, Intravenous, Q8H PRN    heparin (Porcine) 5000 UNIT/ML injection 5,000 Units, 5,000 Units, Subcutaneous, Q8H JONATHAN    acetaminophen (Tylenol) tab 650 mg, 650 mg, Oral, Q4H PRN **OR** HYDROcodone-acetaminophen (Norco) 5-325 MG per tab 1 tablet, 1 tablet, Oral, Q4H PRN **OR** HYDROcodone-acetaminophen (Norco) 5-325 MG per tab 2 tablet, 2 tablet, Oral, Q4H PRN    glucose (Dex4) 15 GM/59ML oral liquid 15 g, 15 g, Oral, Q15 Min PRN **OR** glucose (Glutose) 40% oral gel 15 g, 15 g, Oral, Q15 Min PRN **OR** glucose-vitamin C (Dex-4) chewable tab 4 tablet, 4 tablet, Oral, Q15 Min PRN **OR** dextrose 50% injection 50 mL, 50 mL, Intravenous, Q15 Min PRN **OR** glucose (Dex4) 15 GM/59ML oral liquid 30 g, 30 g, Oral, Q15 Min PRN **OR** glucose (Glutose) 40% oral gel 30 g, 30 g, Oral, Q15 Min PRN **OR** glucose-vitamin C (Dex-4) chewable tab 8 tablet, 8 tablet, Oral, Q15 Min PRN    insulin aspart (NovoLOG) 100 Units/mL FlexPen 1-10 Units, 1-10 Units,  Subcutaneous, TID AC and HS    HYDROmorphone (Dilaudid) 1 MG/ML injection 0.2 mg, 0.2 mg, Intravenous, Q2H PRN **OR** HYDROmorphone (Dilaudid) 1 MG/ML injection 0.4 mg, 0.4 mg, Intravenous, Q2H PRN **OR** HYDROmorphone (Dilaudid) 1 MG/ML injection 0.8 mg, 0.8 mg, Intravenous, Q2H PRN    heparin (Porcine) 1000 UNIT/ML injection 1,500 Units, 1,500 Units, Intravenous, PRN Dialysis    albuterol (Ventolin HFA) 108 (90 Base) MCG/ACT inhaler 2 puff, 2 puff, Inhalation, Q4H PRN    gabapentin (Neurontin) cap 400 mg, 400 mg, Oral, BID    hydrOXYzine (Atarax) tab 25 mg, 25 mg, Oral, TID PRN    HYDROcodone-acetaminophen (Norco) 5-325 MG per tab 1 tablet, 1 tablet, Oral, Q6H PRN **OR** HYDROcodone-acetaminophen (Norco) 5-325 MG per tab 2 tablet, 2 tablet, Oral, Q6H PRN    sodium chloride 0.9 % IV bolus 100 mL, 100 mL, Intravenous, Q30 Min PRN **AND** albumin human (Albumin) 25% injection 25 g, 25 g, Intravenous, PRN Dialysis    lidocaine-prilocaine (Emla) 2.5-2.5 % cream, , Topical, PRN    hydrALAzine (Apresoline) 20 mg/mL injection 10 mg, 10 mg, Intravenous, Q4H PRN    cyclobenzaprine (Flexeril) tab 10 mg, 10 mg, Oral, TID PRN       Physical Exam:  Vital signs: Blood pressure 128/80, pulse 80, temperature 98 °F (36.7 °C), temperature source Oral, resp. rate 19, height 5' 8\" (1.727 m), weight 285 lb (129.3 kg), SpO2 95%.  General: No acute distress. Alert and oriented x 3.  HEENT: Moist mucous membranes. EOM-I. PERRL  Neck: No lymphadenopathy.  No JVD. No carotid bruits.  Respiratory: diminished   Cardiovascular: S1, S2.  Regular rate and rhythm.  No murmurs. Equal pulses   Abdomen: Soft, nontender, nondistended.  Positive bowel sounds. No rebound tenderness   Neurologic: No focal neurological deficits.   Musculoskeletal: Full range of motion of all extremities.  +edema  Integument: No lesions. No erythema.  Psychiatric: Appropriate mood and affect.    Laboratory:  Lab Results   Component Value Date    CREATSERUM 5.95 11/01/2024     BUN 50 11/01/2024     11/01/2024    K 5.4 11/01/2024    CL 89 11/01/2024    CO2 27.0 11/01/2024     11/01/2024    CA 9.0 11/01/2024    PGLU 267 11/01/2024          BUN (mg/dL)   Date Value   11/01/2024 50 (H)   10/31/2024 30 (H)   10/30/2024 66 (H)     Creatinine (mg/dL)   Date Value   11/01/2024 5.95 (H)   10/31/2024 3.92 (H)   10/30/2024 7.42 (H)         Imaging:  Reviewed     Impression:  ESRD due to DM; on HD; typically TTS  Patient presented with volume overload  Improving; pt requesting additional fluid removal today- will plan routine HD today   Spasms - related to HD/UF- will monitor; prn analgesics  HTN- controlled; continue home regimen; optimize volume w/ HD  DM  Anemia due to CKD- continue RAMSES w/ HD  Obesity    OK w/ dc today after HD: pt instructed to go for her routine HD tomorrow as well if discharged.        Thank you for allowing me to participate in this patient's care.  Please feel free to call me with any questions or concerns.    Omkar Wong MD  11/1/2024

## 2024-11-01 NOTE — PLAN OF CARE
Pt is A&O x4. BLE pain managed w/ PRN medications.  Maintaining O2 on RA. RIVERA. NSR on tele, S1&S2 present. Denies cardiac symptoms.  Bowel sounds active. Continent. Diminished urine production d/t HD.  HD completed at 2230. 3L out.   Pt updated on plan of care. Bed in lowest position. Bed alarm on. Call light within reach.    Problem: METABOLIC/FLUID AND ELECTROLYTES - ADULT  Goal: Glucose maintained within prescribed range  Description: INTERVENTIONS:  - Monitor Blood Glucose as ordered  - Assess for signs and symptoms of hyperglycemia and hypoglycemia  - Administer ordered medications to maintain glucose within target range  - Assess barriers to adequate nutritional intake and initiate nutrition consult as needed  - Instruct patient on self management of diabetes  Outcome: Progressing  Goal: Electrolytes maintained within normal limits  Description: INTERVENTIONS:  - Monitor labs and rhythm and assess patient for signs and symptoms of electrolyte imbalances  - Administer electrolyte replacement as ordered  - Monitor response to electrolyte replacements, including rhythm and repeat lab results as appropriate  - Fluid restriction as ordered  - Instruct patient on fluid and nutrition restrictions as appropriate  Outcome: Progressing  Goal: Hemodynamic stability and optimal renal function maintained  Description: INTERVENTIONS:  - Monitor labs and assess for signs and symptoms of volume excess or deficit  - Monitor intake, output and patient weight  - Monitor urine specific gravity, serum osmolarity and serum sodium as indicated or ordered  - Monitor response to interventions for patient's volume status, including labs, urine output, blood pressure (other measures as available)  - Encourage oral intake as appropriate  - Instruct patient on fluid and nutrition restrictions as appropriate  Outcome: Progressing     Problem: MUSCULOSKELETAL - ADULT  Goal: Return mobility to safest level of function  Description:  INTERVENTIONS:  - Assess patient stability and activity tolerance for standing, transferring and ambulating w/ or w/o assistive devices  - Assist with transfers and ambulation using safe patient handling equipment as needed  - Ensure adequate protection for wounds/incisions during mobilization  - Obtain PT/OT consults as needed  - Advance activity as appropriate  - Communicate ordered activity level and limitations with patient/family  Outcome: Progressing  Goal: Maintain proper alignment of affected body part  Description: INTERVENTIONS:  - Support and protect limb and body alignment per provider's orders  - Instruct and reinforce with patient and family use of appropriate assistive device and precautions (e.g. spinal or hip dislocation precautions)  Outcome: Progressing     Problem: PAIN - ADULT  Goal: Verbalizes/displays adequate comfort level or patient's stated pain goal  Description: INTERVENTIONS:  - Encourage pt to monitor pain and request assistance  - Assess pain using appropriate pain scale  - Administer analgesics based on type and severity of pain and evaluate response  - Implement non-pharmacological measures as appropriate and evaluate response  - Consider cultural and social influences on pain and pain management  - Manage/alleviate anxiety  - Utilize distraction and/or relaxation techniques  - Monitor for opioid side effects  - Notify MD/LIP if interventions unsuccessful or patient reports new pain  - Anticipate increased pain with activity and pre-medicate as appropriate  Outcome: Progressing     Problem: SAFETY ADULT - FALL  Goal: Free from fall injury  Description: INTERVENTIONS:  - Assess pt frequently for physical needs  - Identify cognitive and physical deficits and behaviors that affect risk of falls.  - [place] fall precautions as indicated by assessment.  - Educate pt/family on patient safety including physical limitations  - Instruct pt to call for assistance with activity based on  assessment  - Modify environment to reduce risk of injury  - Provide assistive devices as appropriate  - Consider OT/PT consult to assist with strengthening/mobility  - Encourage toileting schedule  Outcome: Progressing     Problem: DISCHARGE PLANNING  Goal: Discharge to home or other facility with appropriate resources  Description: INTERVENTIONS:  - Identify barriers to discharge w/pt and caregiver  - Include patient/family/discharge partner in discharge planning  - Arrange for needed discharge resources and transportation as appropriate  - Identify discharge learning needs (meds, wound care, etc)  - Arrange for interpreters to assist at discharge as needed  - Consider post-discharge preferences of patient/family/discharge partner  - Complete POLST form as appropriate  - Assess patient's ability to be responsible for managing their own health  - Refer to Case Management Department for coordinating discharge planning if the patient needs post-hospital services based on physician/LIP order or complex needs related to functional status, cognitive ability or social support system  Outcome: Progressing     Problem: Patient/Family Goals  Goal: Patient/Family Long Term Goal  Description: Patient's Long Term Goal: to go home    Interventions:  - labs, dialysis, increase activity, tele monitoring, pain control    - See additional Care Plan goals for specific interventions  Outcome: Progressing  Goal: Patient/Family Short Term Goal  Description: Patient's Short Term Goal: feel better    Interventions:   - - labs, dialysis, increase activity, tele monitoring, pain control    - See additional Care Plan goals for specific interventions  Outcome: Progressing

## 2024-11-01 NOTE — PLAN OF CARE
Received patient at 0730. Alert and oriented x4. Tele rhythm NSR. O2 sat 96. Lung sounds clear. Bed is locked and in low position. Call light and personal items within patient reach. No c/o chest pain or shortness of breath. C/o BLE pain. PT/OT saw today and walked short distance with patient. Pt is a 1 assist with walker. Skin is dry and intact. Reviewed plan of care for the day and patient verbalized understanding.       Problem: METABOLIC/FLUID AND ELECTROLYTES - ADULT  Goal: Glucose maintained within prescribed range  Description: INTERVENTIONS:  - Monitor Blood Glucose as ordered  - Assess for signs and symptoms of hyperglycemia and hypoglycemia  - Administer ordered medications to maintain glucose within target range  - Assess barriers to adequate nutritional intake and initiate nutrition consult as needed  - Instruct patient on self management of diabetes  Outcome: Progressing  Goal: Electrolytes maintained within normal limits  Description: INTERVENTIONS:  - Monitor labs and rhythm and assess patient for signs and symptoms of electrolyte imbalances  - Administer electrolyte replacement as ordered  - Monitor response to electrolyte replacements, including rhythm and repeat lab results as appropriate  - Fluid restriction as ordered  - Instruct patient on fluid and nutrition restrictions as appropriate  Outcome: Progressing  Goal: Hemodynamic stability and optimal renal function maintained  Description: INTERVENTIONS:  - Monitor labs and assess for signs and symptoms of volume excess or deficit  - Monitor intake, output and patient weight  - Monitor urine specific gravity, serum osmolarity and serum sodium as indicated or ordered  - Monitor response to interventions for patient's volume status, including labs, urine output, blood pressure (other measures as available)  - Encourage oral intake as appropriate  - Instruct patient on fluid and nutrition restrictions as appropriate  Outcome: Progressing     Problem:  PAIN - ADULT  Goal: Verbalizes/displays adequate comfort level or patient's stated pain goal  Description: INTERVENTIONS:  - Encourage pt to monitor pain and request assistance  - Assess pain using appropriate pain scale  - Administer analgesics based on type and severity of pain and evaluate response  - Implement non-pharmacological measures as appropriate and evaluate response  - Consider cultural and social influences on pain and pain management  - Manage/alleviate anxiety  - Utilize distraction and/or relaxation techniques  - Monitor for opioid side effects  - Notify MD/LIP if interventions unsuccessful or patient reports new pain  - Anticipate increased pain with activity and pre-medicate as appropriate  Outcome: Progressing     Problem: SAFETY ADULT - FALL  Goal: Free from fall injury  Description: INTERVENTIONS:  - Assess pt frequently for physical needs  - Identify cognitive and physical deficits and behaviors that affect risk of falls.  - [place] fall precautions as indicated by assessment.  - Educate pt/family on patient safety including physical limitations  - Instruct pt to call for assistance with activity based on assessment  - Modify environment to reduce risk of injury  - Provide assistive devices as appropriate  - Consider OT/PT consult to assist with strengthening/mobility  - Encourage toileting schedule  Outcome: Progressing     Problem: DISCHARGE PLANNING  Goal: Discharge to home or other facility with appropriate resources  Description: INTERVENTIONS:  - Identify barriers to discharge w/pt and caregiver  - Include patient/family/discharge partner in discharge planning  - Arrange for needed discharge resources and transportation as appropriate  - Identify discharge learning needs (meds, wound care, etc)  - Arrange for interpreters to assist at discharge as needed  - Consider post-discharge preferences of patient/family/discharge partner  - Complete POLST form as appropriate  - Assess patient's  ability to be responsible for managing their own health  - Refer to Case Management Department for coordinating discharge planning if the patient needs post-hospital services based on physician/LIP order or complex needs related to functional status, cognitive ability or social support system  Outcome: Progressing     Problem: Patient/Family Goals  Goal: Patient/Family Long Term Goal  Description: Patient's Long Term Goal: to go home    Interventions:  - labs, dialysis, increase activity, tele monitoring, pain control    - See additional Care Plan goals for specific interventions  Outcome: Progressing  Goal: Patient/Family Short Term Goal  Description: Patient's Short Term Goal: feel better    Interventions:   - - labs, dialysis, increase activity, tele monitoring, pain control    - See additional Care Plan goals for specific interventions  Outcome: Progressing     Problem: MUSCULOSKELETAL - ADULT  Goal: Return mobility to safest level of function  Description: INTERVENTIONS:  - Assess patient stability and activity tolerance for standing, transferring and ambulating w/ or w/o assistive devices  - Assist with transfers and ambulation using safe patient handling equipment as needed  - Ensure adequate protection for wounds/incisions during mobilization  - Obtain PT/OT consults as needed  - Advance activity as appropriate  - Communicate ordered activity level and limitations with patient/family  Outcome: Progressing  Goal: Maintain proper alignment of affected body part  Description: INTERVENTIONS:  - Support and protect limb and body alignment per provider's orders  - Instruct and reinforce with patient and family use of appropriate assistive device and precautions (e.g. spinal or hip dislocation precautions)  Outcome: Progressing

## 2024-11-01 NOTE — PHYSICAL THERAPY NOTE
PHYSICAL THERAPY EVALUATION - INPATIENT     Room Number: 2612/2612-A  Evaluation Date: 11/1/2024  Type of Evaluation: Initial  Physician Order: PT Eval and Treat    Presenting Problem: Hyperkalemia  Co-Morbidities : ESRD, DM, depressive disorder, CKD, HTN, CHF, CVA, obesity  Reason for Therapy: Mobility Dysfunction and Discharge Planning    PHYSICAL THERAPY ASSESSMENT   Patient is a 54 year old female admitted 10/30/2024 for SOB, LE swelling with 2 missed dialysis appointments.   Patient is currently functioning near baseline with bed mobility, transfers, and gait. Prior to admission, patient's baseline is mod I/cga depending on day with use of w/c and rollator. Spouse or family assists when needed.     Patient is at or near baseline with mobility. Pt with no skilled IP PT needs indicated at this time. Family assists as needed. PT will sign off. RN aware.    PLAN  Patient has been evaluated and presents with no skilled Physical Therapy needs at this time.  Patient discharged from Physical Therapy services.  Please re-order if a new functional limitation presents during this admission.    PT Device Recommendation: Rolling walker;Gait belt    GOALS  Patient was able to achieve the following goals ...    Patient was able to transfer Safely with supervision   Patient able to ambulate on level surfaces Safely for limited distance with supervision (at baseline only ambulates to bathroom)     HOME SITUATION  Type of Home: House  Home Layout: One level  Stairs to Enter :  (ramp)                  Lives With: Spouse    Drives: No   Patient Regularly Uses: None     Prior Level of [place]: Pt reports she mostly uses w/c in home, reports does ambulate limited distance to/from bathroom with rollator when able. Reports family assist with adl's. Pt does report history of falls in last year when R knee gabino (since history of CVA with R weakness). Pt reports she sleeps in recliner.     SUBJECTIVE  \"I pray for a miracle I'll  be able to walk again\"  \"I like going to dialysis, the people there are really nice,\" reporting she missed dialysis due to diarrhea and vomiting.     OBJECTIVE  Precautions:  (Pt reports history of R knee buckling)  Fall Risk: High fall risk    WEIGHT BEARING RESTRICTION     PAIN ASSESSMENT  Rating:  (no c/o pain during session, pt medicated prior to session)          COGNITION  Overall Cognitive Status:  WFL - within functional limits    RANGE OF MOTION AND STRENGTH ASSESSMENT  Upper extremity ROM and strength are within functional limits     Lower extremity ROM is within functional limits     Lower extremity strength is grossly 4/5 throughout    BALANCE  Static Sitting: Good  Dynamic Sitting: Not tested  Static Standing: Fair -  Dynamic Standing: Fair -        ACTIVITY TOLERANCE  Pulse: 82  Heart Rate Source: Monitor                   O2 WALK  Oxygen Therapy  SPO2% on Room Air at Rest: 93    NEUROLOGICAL FINDINGS                        AM-PAC '6-Clicks' INPATIENT SHORT FORM - BASIC MOBILITY  How much difficulty does the patient currently have...  Patient Difficulty: Turning over in bed (including adjusting bedclothes, sheets and blankets)?: None   Patient Difficulty: Sitting down on and standing up from a chair with arms (e.g., wheelchair, bedside commode, etc.): None   Patient Difficulty: Moving from lying on back to sitting on the side of the bed?: None   How much help from another person does the patient currently need...   Help from Another: Moving to and from a bed to a chair (including a wheelchair)?: A Little   Help from Another: Need to walk in hospital room?: A Little   Help from Another: Climbing 3-5 steps with a railing?: A Lot       AM-PAC Score:  Raw Score: 20   Approx Degree of Impairment: 35.83%   Standardized Score (AM-PAC Scale): 47.67   CMS Modifier (G-Code): CJ    FUNCTIONAL ABILITY STATUS  Gait Assessment   Functional Mobility/Gait Assessment  Gait Assistance: Contact guard assist  Distance  (ft): 10  Assistive Device: Rolling walker  Pattern: Shuffle    Skilled Therapy Provided     Bed Mobility:  Rolling: NT  Supine to sit: min assist   Sit to supine: cga     Transfer Mobility:  Sit to stand: sba   Stand to sit: sba  Gait = cga x 10 ft with walker and chair follow, no LOB noted    Therapist's comments:pt presents seated EOB, agreeable to PT. Pt mobility as above. Encouraged to be up to chair for meals, continue to ambulate short distances with staff and walker. Pt verbalizes understanding. RN aware of session and recs.     Exercise/Education Provided:  Bed mobility  Functional activity tolerated  Gait training  Posture  Transfer training    Patient End of Session: In bed;Needs met;Call light within reach;RN aware of session/findings;All patient questions and concerns addressed;Hospital anti-slip socks;Alarm set;Discussed recommendations with /    Patient Evaluation Complexity Level:  History Moderate - 1 or 2 personal factors and/or co-morbidities   Examination of body systems Low -  addressing 1-2 elements   Clinical Presentation Low- Stable   Clinical Decision Making Low Complexity       PT Session Time: 25 minutes  Gait Trainin minutes  Therapeutic Activity: 10 minutes

## 2024-11-01 NOTE — OCCUPATIONAL THERAPY NOTE
OCCUPATIONAL THERAPY EVALUATION - INPATIENT    Room Number: 2612/2612-A  Evaluation Date: 11/1/2024     Type of Evaluation: Initial  Presenting Problem: hyperkalemia    Physician Order: IP Consult to Occupational Therapy  Reason for Therapy:  ADL/IADL Dysfunction and Discharge Planning    OCCUPATIONAL THERAPY ASSESSMENT   Patient is a 54 year old female admitted on 10/30/2024 with Presenting Problem: hyperkalemia. Co-Morbidities : ESRD, DM, depressive disorder, CKD, HTN, CHF, CVA, obesity  Patient is currently functioning near baseline with toileting, upper body dressing, lower body dressing, grooming, bed mobility, transfers, static sitting balance, dynamic sitting balance, static standing balance, dynamic standing balance, and maintaining seated position.  Prior to admission, patient's baseline is CGA with short distance ambulation and use of W/C for longer and Min to Mod A for all ADLs and IADLs.  Patient met all OT goals at baseline level.  Patient reports no further questions/concerns at this time.     Patient will benefit from continued skilled OT Services at discharge to promote prior level of function and safety with additional support and return home with home health OT    Recommendations for nursing staff:   Transfers: CGA  Toileting location: Toilet    EVALUATION SESSION:  Patient at start of session: supine in bed for session    FUNCTIONAL TRANSFER ASSESSMENT  Sit to Stand: Edge of Bed; Chair  Edge of Bed: Supervision  Chair: Supervision    BED MOBILITY  Rolling: Supervision  Sit to Supine (OT): Minimal Assist (for legs into bed, pt sleeps in recliner)  Scooting: Sup to EOB and back into bed and able to reposition in bed    BALANCE ASSESSMENT  Static Sitting: Supervision  Static Standing: Supervision (to stand and then to amb short distance in room, required sup for safety)    FUNCTIONAL ADL ASSESSMENT  UB Dressing Seated: Minimal Assist (for robe)  LB Dressing Seated: Moderate Assist (for  socks)    ACTIVITY TOLERANCE: vitals stable                         O2 SATURATIONS       COGNITION  Overall Cognitive Status:  WFL - within functional limits    COGNITION ASSESSMENTS     Upper Extremity:   ROM: within functional limits   Strength: is within functional limits   Coordination:  Gross motor: WNL  Fine motor: WNL  Sensation: Light touch:  intact    EDUCATION PROVIDED  Patient Education : Role of Occupational Therapy; Plan of Care  Patient's Response to Education: Verbalized Understanding; Returned Demonstration    Equipment used: RW  Demonstrates functional use    Therapist comments: Pt reported fatigue at home, pt educated on work simplification and energy conservation for at home to assist with independence with fatigue at home.    Patient End of Session: Up in chair;Needs met;Call light within reach;All patient questions and concerns addressed;Hospital anti-slip socks;Alarm set    OCCUPATIONAL PROFILE    HOME SITUATION  Type of Home: House  Home Layout: One level  Lives With: Spouse    Toilet and Equipment: Comfort height toilet;Toilet riser with arms  Shower/Tub and Equipment: Walk-in shower;Shower chair;Grab bar  Other Equipment: Other (Comment) (W/C, sleeps in recliner)    Occupation/Status: retired     Drives: No  Patient Regularly Uses: None    Prior Level of Function: Prior to admission, patient's baseline is CGA with short distance ambulation and use of W/C for longer and Min to Mod A for all ADLs and IADLs.     SUBJECTIVE  Pt stated, \"I just want to be able to walk again.\"    PAIN ASSESSMENT  Ratin  Location: no pain at this time       OBJECTIVE  Precautions:  (Pt reports history of R knee buckling)  Fall Risk: High fall risk    WEIGHT BEARING RESTRICTION       AM-PAC ‘6-Clicks’ Inpatient Daily Activity Short Form  -   Putting on and taking off regular lower body clothing?: A Lot  -   Bathing (including washing, rinsing, drying)?: A Lot  -   Toileting, which includes using toilet, bedpan  or urinal? : A Lot  -   Putting on and taking off regular upper body clothing?: A Little  -   Taking care of personal grooming such as brushing teeth?: A Little  -   Eating meals?: A Little    AM-PAC Score:  Score: 15  Approx Degree of Impairment: 56.46%  Standardized Score (AM-PAC Scale): 34.69    ADDITIONAL TESTS     NEUROLOGICAL FINDINGS      PLAN   Patient has been evaluated and presents with no skilled Occupational Therapy needs at this time.  Patient discharged from Occupational Therapy services.  Please re-order if a new functional limitation presents during this admission.         Patient Evaluation Complexity Level:   Occupational Profile/Medical History LOW - Brief history including review of medical or therapy records    Specific performance deficits impacting engagement in ADL/IADL LOW  1 - 3 performance deficits    Client Assessment/Performance Deficits LOW - No comorbidities nor modifications of tasks    Clinical Decision Making LOW - Analysis of occupational profile, problem-focused assessments, limited treatment options    Overall Complexity LOW     OT Session Time: 25 minutes  Self-Care Home Management: 5 minutes  Therapeutic Activity: 10 minutes  Neuromuscular Re-education: 0 minutes  Therapeutic Exercise: 0 minutes  Cognitive Skills: 0 minutes  Sensory Integrative: 0 minutes  Orthotic Management and Trainin minutes  Can add/delete any of these

## 2024-11-02 VITALS
WEIGHT: 280.88 LBS | DIASTOLIC BLOOD PRESSURE: 69 MMHG | SYSTOLIC BLOOD PRESSURE: 120 MMHG | HEART RATE: 83 BPM | TEMPERATURE: 98 F | HEIGHT: 68 IN | OXYGEN SATURATION: 94 % | BODY MASS INDEX: 42.57 KG/M2 | RESPIRATION RATE: 18 BRPM

## 2024-11-02 LAB
GLUCOSE BLD-MCNC: 244 MG/DL (ref 70–99)
GLUCOSE BLD-MCNC: 259 MG/DL (ref 70–99)
GLUCOSE BLD-MCNC: 296 MG/DL (ref 70–99)

## 2024-11-02 PROCEDURE — 99232 SBSQ HOSP IP/OBS MODERATE 35: CPT | Performed by: INTERNAL MEDICINE

## 2024-11-02 NOTE — H&P
[facility]   part of [facility] Hospitalist Progress Note     Kimmy Sparks Patient Status:  Observation    1970 MRN [id number]   Location [facility] 2NE-A Attending Dawson Ga MD   Hosp Day # 0 PCP Alejandro Pedersen MD     Assessment & Plan:    A/P: 55 y/o w/ Pmhx of ESRD on HD TTS presenting for SOB after missed dialysis session    SOB/RIVERA  2/2 to Missed Dialysis session  S/p overnight HD w/ 3L removed  Plan:  - Nephrology consulted, doing another dialysis session this evening, can possibly be discharged after completion  - Continue Bumex daily    Muscle Spasms  2/2 to Dialysis UF?  Plan:  - PRN pain medications    HFpEF  - Last ECHO 60-65%  Hx of CVA  HTN  HLD  - Continue Coreg  - Continue Statin  - Continue Statin    T2DM  - LDISS  - Gabapentin    HypoT  - Synthroid    Anemia of Chronic Disease  - stable    MDD/QUE  - Wellbutrin    DVT Ppx: SQH  Code: Full    Subjective:     Patient seen and examined this morning at bedside. Tearful, having severe spasms in the lower extremities    Vital signs:  Temp:  [97.5 °F (36.4 °C)-98.3 °F (36.8 °C)] 97.9 °F (36.6 °C)  Pulse:  [77-87] 83  Resp:  [18-22] 18  BP: (102-144)/(45-88) 120/69  SpO2:  [92 %-96 %] 94 %    Physical Exam:    General: No acute distress.   Respiratory: Clear to auscultation bilaterally. No wheezes.  Cardiovascular: S1, S2. Regular rate and rhythm  Abdomen: Soft, nontender, nondistended.  Positive bowel sounds. No rebound or guarding.  Extremities: 1+ edema bilaterally  Neuro:  Grossly non focal, no motor deficits.      Diagnostic Data:    Pertinent Labs:  Reviewed    Miller Mcknight D.O.  [facility] Hospitalist

## 2024-11-02 NOTE — PROGRESS NOTES
[facility]   part of [facility] Hospitalist Progress Note     Kimmy Sparks Patient Status:  Observation    1970 MRN [id number]   Location [facility] 2NE-A Attending Dawson Ga MD   Hosp Day # 0 PCP Alejandro Pedersen MD     Assessment & Plan:    A/P: 53 y/o w/ Pmhx of ESRD on HD TTS presenting for SOB after missed dialysis session    SOB/RIVERA  2/2 to Missed Dialysis session  S/p overnight HD w/ 3L removed  Plan:  - Nephrology consulted, doing another dialysis session this evening, can possibly be discharged after completion  - Continue Bumex daily    Muscle Spasms  2/2 to Dialysis UF?  Plan:  - PRN pain medications    HFpEF  - Last ECHO 60-65%  Hx of CVA  HTN  HLD  - Continue Coreg  - Continue Statin  - Continue Statin    T2DM  - LDISS  - Gabapentin    HypoT  - Synthroid    Anemia of Chronic Disease  - stable    MDD/UQE  - Wellbutrin    DVT Ppx: SQH  Code: Full    Subjective:     Patient seen and examined this morning at bedside. Tearful, having severe spasms in the lower extremities    Vital signs:  Temp:  [97.5 °F (36.4 °C)-98.3 °F (36.8 °C)] 97.9 °F (36.6 °C)  Pulse:  [77-87] 83  Resp:  [18-22] 18  BP: (102-144)/(45-88) 120/69  SpO2:  [92 %-96 %] 94 %    Physical Exam:    General: No acute distress.   Respiratory: Clear to auscultation bilaterally. No wheezes.  Cardiovascular: S1, S2. Regular rate and rhythm  Abdomen: Soft, nontender, nondistended.  Positive bowel sounds. No rebound or guarding.  Extremities: 1+ edema bilaterally  Neuro:  Grossly non focal, no motor deficits.      Diagnostic Data:    Pertinent Labs:  Reviewed    Miller Mcknight D.O.  [facility] Hospitalist

## 2024-11-02 NOTE — PLAN OF CARE
Assumed pt care at 0730. A&O x 4. On room air. Reports back pain, pain medications given. Vital signs stable, NSR on tele. Up with x1 assist and walker.     Plan of care: HD, DC planning.    Plan of care updated with patient and . Questions answered, pt verbalized understanding. Call light is within reach. All needs met at this time.    Problem: METABOLIC/FLUID AND ELECTROLYTES - ADULT  Goal: Glucose maintained within prescribed range  Description: INTERVENTIONS:  - Monitor Blood Glucose as ordered  - Assess for signs and symptoms of hyperglycemia and hypoglycemia  - Administer ordered medications to maintain glucose within target range  - Assess barriers to adequate nutritional intake and initiate nutrition consult as needed  - Instruct patient on self management of diabetes  11/2/2024 1045 by Jacki Rasmussen, RN  Outcome: Progressing  11/2/2024 1045 by Jacki Rasmussen, RN  Outcome: Progressing  Goal: Electrolytes maintained within normal limits  Description: INTERVENTIONS:  - Monitor labs and rhythm and assess patient for signs and symptoms of electrolyte imbalances  - Administer electrolyte replacement as ordered  - Monitor response to electrolyte replacements, including rhythm and repeat lab results as appropriate  - Fluid restriction as ordered  - Instruct patient on fluid and nutrition restrictions as appropriate  11/2/2024 1045 by Jacki Rasmussen, RN  Outcome: Progressing  11/2/2024 1045 by Jacki Rasmussen, RN  Outcome: Progressing  Goal: Hemodynamic stability and optimal renal function maintained  Description: INTERVENTIONS:  - Monitor labs and assess for signs and symptoms of volume excess or deficit  - Monitor intake, output and patient weight  - Monitor urine specific gravity, serum osmolarity and serum sodium as indicated or ordered  - Monitor response to interventions for patient's volume status, including labs, urine output, blood pressure (other measures as available)  -  Encourage oral intake as appropriate  - Instruct patient on fluid and nutrition restrictions as appropriate  11/2/2024 1045 by Jacki Rasmussen RN  Outcome: Progressing  11/2/2024 1045 by Jakci Rasmussen RN  Outcome: Progressing     Problem: PAIN - ADULT  Goal: Verbalizes/displays adequate comfort level or patient's stated pain goal  Description: INTERVENTIONS:  - Encourage pt to monitor pain and request assistance  - Assess pain using appropriate pain scale  - Administer analgesics based on type and severity of pain and evaluate response  - Implement non-pharmacological measures as appropriate and evaluate response  - Consider cultural and social influences on pain and pain management  - Manage/alleviate anxiety  - Utilize distraction and/or relaxation techniques  - Monitor for opioid side effects  - Notify MD/LIP if interventions unsuccessful or patient reports new pain  - Anticipate increased pain with activity and pre-medicate as appropriate  11/2/2024 1045 by Jacki Rasmussen RN  Outcome: Progressing  11/2/2024 1045 by Jacki Rasmussen RN  Outcome: Progressing     Problem: SAFETY ADULT - FALL  Goal: Free from fall injury  Description: INTERVENTIONS:  - Assess pt frequently for physical needs  - Identify cognitive and physical deficits and behaviors that affect risk of falls.  - [place] fall precautions as indicated by assessment.  - Educate pt/family on patient safety including physical limitations  - Instruct pt to call for assistance with activity based on assessment  - Modify environment to reduce risk of injury  - Provide assistive devices as appropriate  - Consider OT/PT consult to assist with strengthening/mobility  - Encourage toileting schedule  11/2/2024 1045 by Jacki Rasmussen RN  Outcome: Progressing  11/2/2024 1045 by Jacki Rasmsusen RN  Outcome: Progressing     Problem: DISCHARGE PLANNING  Goal: Discharge to home or other facility with appropriate  resources  Description: INTERVENTIONS:  - Identify barriers to discharge w/pt and caregiver  - Include patient/family/discharge partner in discharge planning  - Arrange for needed discharge resources and transportation as appropriate  - Identify discharge learning needs (meds, wound care, etc)  - Arrange for interpreters to assist at discharge as needed  - Consider post-discharge preferences of patient/family/discharge partner  - Complete POLST form as appropriate  - Assess patient's ability to be responsible for managing their own health  - Refer to Case Management Department for coordinating discharge planning if the patient needs post-hospital services based on physician/LIP order or complex needs related to functional status, cognitive ability or social support system  11/2/2024 1045 by Jacki Rasmussen RN  Outcome: Progressing  11/2/2024 1045 by Jacki Rasmussen RN  Outcome: Progressing     Problem: Patient/Family Goals  Goal: Patient/Family Long Term Goal  Description: Patient's Long Term Goal: to go home    Interventions:  - labs, dialysis, increase activity, tele monitoring, pain control    - See additional Care Plan goals for specific interventions  11/2/2024 1045 by Jacki Rasmussen RN  Outcome: Progressing  11/2/2024 1045 by Jacki Rasmussen, RN  Outcome: Progressing  Goal: Patient/Family Short Term Goal  Description: Patient's Short Term Goal: feel better    Interventions:   - - labs, dialysis, increase activity, tele monitoring, pain control    - See additional Care Plan goals for specific interventions  11/2/2024 1045 by Jacki Rasmussen, RN  Outcome: Progressing  11/2/2024 1045 by Jacki Rasmussen, RN  Outcome: Progressing     Problem: MUSCULOSKELETAL - ADULT  Goal: Return mobility to safest level of function  Description: INTERVENTIONS:  - Assess patient stability and activity tolerance for standing, transferring and ambulating w/ or w/o assistive devices  - Assist with  transfers and ambulation using safe patient handling equipment as needed  - Ensure adequate protection for wounds/incisions during mobilization  - Obtain PT/OT consults as needed  - Advance activity as appropriate  - Communicate ordered activity level and limitations with patient/family  11/2/2024 1045 by Jacki Rasmussen RN  Outcome: Progressing  11/2/2024 1045 by Jacki Rasmussen RN  Outcome: Progressing  Goal: Maintain proper alignment of affected body part  Description: INTERVENTIONS:  - Support and protect limb and body alignment per provider's orders  - Instruct and reinforce with patient and family use of appropriate assistive device and precautions (e.g. spinal or hip dislocation precautions)  11/2/2024 1045 by Jacki Rasmussen RN  Outcome: Progressing  11/2/2024 1045 by Jacki Rasmussen, RN  Outcome: Progressing

## 2024-11-02 NOTE — PLAN OF CARE
Patrient is alert, oriented x4. On room air maintaining SPO2 >90%. Up with 1-2 assist. Dialysis completed with no complication and removed 3L fluid. PRN dilaudid and flexeril given for leg pain. Called [facility] to schedule dialysis for 11/02/24 per order.  in the room. Tele showing NSR.     All concern and question addressed. Kept monitored.      Problem: METABOLIC/FLUID AND ELECTROLYTES - ADULT  Goal: Electrolytes maintained within normal limits  Description: INTERVENTIONS:  - Monitor labs and rhythm and assess patient for signs and symptoms of electrolyte imbalances  - Administer electrolyte replacement as ordered  - Monitor response to electrolyte replacements, including rhythm and repeat lab results as appropriate  - Fluid restriction as ordered  - Instruct patient on fluid and nutrition restrictions as appropriate  Outcome: Progressing  Goal: Hemodynamic stability and optimal renal function maintained  Description: INTERVENTIONS:  - Monitor labs and assess for signs and symptoms of volume excess or deficit  - Monitor intake, output and patient weight  - Monitor urine specific gravity, serum osmolarity and serum sodium as indicated or ordered  - Monitor response to interventions for patient's volume status, including labs, urine output, blood pressure (other measures as available)  - Encourage oral intake as appropriate  - Instruct patient on fluid and nutrition restrictions as appropriate  Outcome: Progressing     Problem: SAFETY ADULT - FALL  Goal: Free from fall injury  Description: INTERVENTIONS:  - Assess pt frequently for physical needs  - Identify cognitive and physical deficits and behaviors that affect risk of falls.  - [place] fall precautions as indicated by assessment.  - Educate pt/family on patient safety including physical limitations  - Instruct pt to call for assistance with activity based on assessment  - Modify environment to reduce risk of injury  - Provide assistive devices as  appropriate  - Consider OT/PT consult to assist with strengthening/mobility  - Encourage toileting schedule  Outcome: Progressing     Problem: MUSCULOSKELETAL - ADULT  Goal: Return mobility to safest level of function  Description: INTERVENTIONS:  - Assess patient stability and activity tolerance for standing, transferring and ambulating w/ or w/o assistive devices  - Assist with transfers and ambulation using safe patient handling equipment as needed  - Ensure adequate protection for wounds/incisions during mobilization  - Obtain PT/OT consults as needed  - Advance activity as appropriate  - Communicate ordered activity level and limitations with patient/family  Outcome: Progressing     Problem: METABOLIC/FLUID AND ELECTROLYTES - ADULT  Goal: Glucose maintained within prescribed range  Description: INTERVENTIONS:  - Monitor Blood Glucose as ordered  - Assess for signs and symptoms of hyperglycemia and hypoglycemia  - Administer ordered medications to maintain glucose within target range  - Assess barriers to adequate nutritional intake and initiate nutrition consult as needed  - Instruct patient on self management of diabetes  Outcome: Not Progressing     Problem: PAIN - ADULT  Goal: Verbalizes/displays adequate comfort level or patient's stated pain goal  Description: INTERVENTIONS:  - Encourage pt to monitor pain and request assistance  - Assess pain using appropriate pain scale  - Administer analgesics based on type and severity of pain and evaluate response  - Implement non-pharmacological measures as appropriate and evaluate response  - Consider cultural and social influences on pain and pain management  - Manage/alleviate anxiety  - Utilize distraction and/or relaxation techniques  - Monitor for opioid side effects  - Notify MD/LIP if interventions unsuccessful or patient reports new pain  - Anticipate increased pain with activity and pre-medicate as appropriate  Outcome: Not Progressing

## 2024-11-02 NOTE — PLAN OF CARE
Discharge Note    Patient discharged at 1440. Explained discharge instructions and follow ups to patient. Verbalizes understanding, IV removed, tele monitor discontinued. Patient transported via wheelchair to [facility].

## 2024-11-02 NOTE — PROGRESS NOTES
[facility]   part of [facility]     Nephrology Progress Note    Kimmy Sparks Patient Status:  Observation    1970 MRN [id number]   Location [facility] 2NE-A Attending Dawson Ga MD   Hosp Day # 0 PCP Alejandro Pedersen MD       SUBJECTIVE:    Seen on HD.  stable      Physical Exam:   /69 (BP Location: Right arm)   Pulse 83   Temp 97.9 °F (36.6 °C) (Oral)   Resp 18   Ht 5' 8\" (1.727 m)   Wt 280 lb 13.9 oz (127.4 kg)   SpO2 94%   BMI 42.71 kg/m²   Temp (24hrs), Av °F (36.7 °C), Min:97.5 °F (36.4 °C), Max:98.3 °F (36.8 °C)       Intake/Output Summary (Last 24 hours) at 2024 1216  Last data filed at 2024 0100  Gross per 24 hour   Intake 960 ml   Output 3001 ml   Net -2041 ml     Last 3 Weights   24 0417 280 lb 13.9 oz (127.4 kg)   24 0530 285 lb (129.3 kg)   10/31/24 0700 286 lb 2.5 oz (129.8 kg)   10/30/24 1938 267 lb (121.1 kg)   24 0019 275 lb (124.7 kg)   07/10/24 1010 269 lb (122 kg)   07/10/24 0345 269 lb (122 kg)   23 1855 256 lb (116.1 kg)   23 1409 222 lb (100.7 kg)     General: Alert and oriented in no apparent distress.  HEENT: No scleral icterus, MMM  Neck: Supple, no RAHAT or thyromegaly  Cardiac: Regular rate and rhythm, S1, S2 normal, no murmur or rub  Lungs: Clear without wheezes, rales, rhonchi.    Abdomen: Soft, non-tender. + bowel sounds, no palpable organomegaly  Extremities: 1+ BLE edema.  Neurologic: Alert and oriented, cranial nerves grossly intact, moving all extremities  Skin: Warm and dry, no rash        Labs:     Recent Labs   Lab 10/30/24  2152 10/31/24  0641   WBC 14.1* 12.5*   HGB 9.5* 9.2*   MCV 89.8 91.2   .0 308.0       Recent Labs   Lab 10/30/24  2152 10/31/24  0641 11/01/24  1056   * 131* 121*   K 6.2* 3.8 5.4*   CL 90* 94* 89*   CO2 28.0 28.0 27.0   BUN 66* 30* 50*   CREATSERUM 7.42* 3.92* 5.95*   CA 9.2 9.0 9.0   MG  --  2.1  --    GLU 84 121* 275*       Recent Labs   Lab  10/30/24  2152 10/31/24  0641   ALT 13 13   AST 16 16   ALB 4.2 4.1       Recent Labs   Lab 11/01/24  0533 11/01/24  1213 11/01/24  1631 11/02/24  0000 11/02/24  0501   PGLU 281* 267* 240* 244* 259*       Meds:   Current Facility-Administered Medications   Medication Dose Route Frequency    bumetanide (Bumex) tab 2 mg  2 mg Oral Daily    clopidogrel (Plavix) tab 75 mg  75 mg Oral Daily    buPROPion ER (Wellbutrin XL) 24 hr tab 150 mg  150 mg Oral Daily    levothyroxine (Synthroid) tab 200 mcg  200 mcg Oral Before breakfast    levothyroxine (Synthroid) tab 75 mcg  75 mcg Oral Before breakfast    pantoprazole (Protonix) DR tab 40 mg  40 mg Oral QAM AC    pravastatin (Pravachol) tab 20 mg  20 mg Oral Nightly    carvedilol (Coreg) tab 37.5 mg  37.5 mg Oral BID    melatonin tab 3 mg  3 mg Oral Nightly PRN    ondansetron (Zofran) 4 MG/2ML injection 4 mg  4 mg Intravenous Q6H PRN    prochlorperazine (Compazine) 10 MG/2ML injection 5 mg  5 mg Intravenous Q8H PRN    heparin (Porcine) 5000 UNIT/ML injection 5,000 Units  5,000 Units Subcutaneous Q8H JONATHAN    acetaminophen (Tylenol) tab 650 mg  650 mg Oral Q4H PRN    Or    HYDROcodone-acetaminophen (Norco) 5-325 MG per tab 1 tablet  1 tablet Oral Q4H PRN    Or    HYDROcodone-acetaminophen (Norco) 5-325 MG per tab 2 tablet  2 tablet Oral Q4H PRN    glucose (Dex4) 15 GM/59ML oral liquid 15 g  15 g Oral Q15 Min PRN    Or    glucose (Glutose) 40% oral gel 15 g  15 g Oral Q15 Min PRN    Or    glucose-vitamin C (Dex-4) chewable tab 4 tablet  4 tablet Oral Q15 Min PRN    Or    dextrose 50% injection 50 mL  50 mL Intravenous Q15 Min PRN    Or    glucose (Dex4) 15 GM/59ML oral liquid 30 g  30 g Oral Q15 Min PRN    Or    glucose (Glutose) 40% oral gel 30 g  30 g Oral Q15 Min PRN    Or    glucose-vitamin C (Dex-4) chewable tab 8 tablet  8 tablet Oral Q15 Min PRN    insulin aspart (NovoLOG) 100 Units/mL FlexPen 1-10 Units  1-10 Units Subcutaneous TID AC and HS    HYDROmorphone (Dilaudid) 1  MG/ML injection 0.2 mg  0.2 mg Intravenous Q2H PRN    Or    HYDROmorphone (Dilaudid) 1 MG/ML injection 0.4 mg  0.4 mg Intravenous Q2H PRN    Or    HYDROmorphone (Dilaudid) 1 MG/ML injection 0.8 mg  0.8 mg Intravenous Q2H PRN    heparin (Porcine) 1000 UNIT/ML injection 1,500 Units  1,500 Units Intravenous PRN Dialysis    albuterol (Ventolin HFA) 108 (90 Base) MCG/ACT inhaler 2 puff  2 puff Inhalation Q4H PRN    gabapentin (Neurontin) cap 400 mg  400 mg Oral BID    hydrOXYzine (Atarax) tab 25 mg  25 mg Oral TID PRN    HYDROcodone-acetaminophen (Norco) 5-325 MG per tab 1 tablet  1 tablet Oral Q6H PRN    Or    HYDROcodone-acetaminophen (Norco) 5-325 MG per tab 2 tablet  2 tablet Oral Q6H PRN    lidocaine-prilocaine (Emla) 2.5-2.5 % cream   Topical PRN    hydrALAzine (Apresoline) 20 mg/mL injection 10 mg  10 mg Intravenous Q4H PRN    cyclobenzaprine (Flexeril) tab 10 mg  10 mg Oral TID PRN         Impression/Plan:    ESRD due to DM; on HD; typically TTS  Patient presented with volume overload  Improved with aggressive UF  HTN- controlled; continue home regimen; optimize volume w/ HD  Anemia due to CKD- continue RAMSES w/ HD    Questions/concerns were discussed with patient and/or family by bedside.      No objection to home from nephrology standpoint    Irineo Read MD  11/2/2024  12:16 PM

## 2024-11-04 ENCOUNTER — PATIENT OUTREACH (OUTPATIENT)
Dept: CASE MANAGEMENT | Age: 54
End: 2024-11-04

## 2024-11-04 NOTE — PROGRESS NOTES
Hospital follow up.    Last A1C Value: 9.3% Date: [10/31/2024]    Alejandro Pedersen MD  Internal Medicine  [address]  [address]  [place] [address] [phone]    Attempt #1:  No answer; mailbox is full, unable to leave a message.

## 2024-11-05 NOTE — PROGRESS NOTES
Hospital follow up.    Last A1C Value: 9.3% Date: [10/31/2024]    Alejandro Pedersen MD  Internal Medicine  [address].  [address]  [place] [address] [phone]    Attempt #2:  No answer; mailbox is full, unable to leave a message.

## 2024-11-06 NOTE — PROGRESS NOTES
-- DO NOT REPLY / DO NOT REPLY ALL --  -- Message is from Engagement Center Operations (ECO) --    ONLY TO BE USED WITHIN A REFILL MEDICATION ENCOUNTER    Med Refill  Is the patient currently having any symptoms?: No/Non-Emergent symptoms    Name of medication requested: See pended med    Is this the first request for the medication in the last 48 hours?: Yes    Patient is requesting a medication refill - medication is on active medication list    Patient is currently OUT of the requested medication - sent as HIGH priority    Full name of the provider who ordered the medication: [facility] site name / Account # for provider: [facility] - [address]     Preferred Pharmacy: Pharmacy  [id number] - [place] [address] [facility] [address]    Patient confirmed the above pharmacy as correct?  Yes    Caller Information         Type Contact Phone/Fax    04/23/2024 10:17 AM CDT Phone (Incoming) Lyn Fitzpatrick (Self) [phone] (M)            Alternative phone number: none    Can a detailed message be left?: Yes         Hospital follow up.    Last A1C Value: 9.3% Date: [10/31/2024]    Alejandro Pedersen MD  Internal Medicine  [address].  [address]  [place], [place] [id number] [phone]    Attempt #3:  No answer; mailbox is full, unable to leave a message. Closing encounter. Will re-open if patient returns call.

## 2024-11-29 ENCOUNTER — APPOINTMENT (OUTPATIENT)
Dept: CT IMAGING | Facility: HOSPITAL | Age: 54
End: 2024-11-29
Attending: EMERGENCY MEDICINE
Payer: COMMERCIAL

## 2024-11-29 ENCOUNTER — HOSPITAL ENCOUNTER (OUTPATIENT)
Facility: HOSPITAL | Age: 54
Setting detail: OBSERVATION
Discharge: HOME | End: 2024-12-01
Attending: EMERGENCY MEDICINE | Admitting: HOSPITALIST
Payer: COMMERCIAL

## 2024-11-29 DIAGNOSIS — R41.0 DISORIENTATION: Primary | ICD-10-CM

## 2024-11-29 LAB
ALBUMIN SERPL-MCNC: 4.4 G/DL (ref 3.2–4.8)
ALBUMIN/GLOB SERPL: 1.1 {RATIO} (ref 1–2)
ALP LIVER SERPL-CCNC: 119 U/L
ALT SERPL-CCNC: 12 U/L
ANION GAP SERPL CALC-SCNC: 16 MMOL/L (ref 0–18)
AST SERPL-CCNC: 21 U/L (ref ?–34)
ATRIAL RATE: 96 BPM
BASOPHILS # BLD AUTO: 0.09 X10(3) UL (ref 0–0.2)
BASOPHILS NFR BLD AUTO: 0.6 %
BILIRUB SERPL-MCNC: 0.4 MG/DL (ref 0.3–1.2)
BUN BLD-MCNC: 30 MG/DL (ref 9–23)
CALCIUM BLD-MCNC: 10.3 MG/DL (ref 8.7–10.4)
CHLORIDE SERPL-SCNC: 88 MMOL/L (ref 98–112)
CO2 SERPL-SCNC: 29 MMOL/L (ref 21–32)
CREAT BLD-MCNC: 4.47 MG/DL
EGFRCR SERPLBLD CKD-EPI 2021: 11 ML/MIN/1.73M2 (ref 60–?)
EOSINOPHIL # BLD AUTO: 0.32 X10(3) UL (ref 0–0.7)
EOSINOPHIL NFR BLD AUTO: 2.2 %
ERYTHROCYTE [DISTWIDTH] IN BLOOD BY AUTOMATED COUNT: 15.1 %
ETHANOL SERPL-MCNC: <3 MG/DL (ref ?–3)
GLOBULIN PLAS-MCNC: 4 G/DL (ref 2–3.5)
GLUCOSE BLD-MCNC: 183 MG/DL (ref 70–99)
GLUCOSE BLD-MCNC: 242 MG/DL (ref 70–99)
GLUCOSE BLD-MCNC: 320 MG/DL (ref 70–99)
GLUCOSE BLD-MCNC: 83 MG/DL (ref 70–99)
HCT VFR BLD AUTO: 33.4 %
HGB BLD-MCNC: 10.8 G/DL
IMM GRANULOCYTES # BLD AUTO: 0.12 X10(3) UL (ref 0–1)
IMM GRANULOCYTES NFR BLD: 0.8 %
LYMPHOCYTES # BLD AUTO: 1.2 X10(3) UL (ref 1–4)
LYMPHOCYTES NFR BLD AUTO: 8.2 %
MCH RBC QN AUTO: 29.6 PG (ref 26–34)
MCHC RBC AUTO-ENTMCNC: 32.3 G/DL (ref 31–37)
MCV RBC AUTO: 91.5 FL
MONOCYTES # BLD AUTO: 0.67 X10(3) UL (ref 0.1–1)
MONOCYTES NFR BLD AUTO: 4.6 %
NEUTROPHILS # BLD AUTO: 12.29 X10 (3) UL (ref 1.5–7.7)
NEUTROPHILS # BLD AUTO: 12.29 X10(3) UL (ref 1.5–7.7)
NEUTROPHILS NFR BLD AUTO: 83.6 %
OSMOLALITY SERPL CALC.SUM OF ELEC: 287 MOSM/KG (ref 275–295)
P AXIS: 25 DEGREES
P-R INTERVAL: 192 MS
PLATELET # BLD AUTO: 441 10(3)UL (ref 150–450)
POTASSIUM SERPL-SCNC: 4.8 MMOL/L (ref 3.5–5.1)
PROT SERPL-MCNC: 8.4 G/DL (ref 5.7–8.2)
Q-T INTERVAL: 388 MS
QRS DURATION: 74 MS
QTC CALCULATION (BEZET): 490 MS
R AXIS: 41 DEGREES
RBC # BLD AUTO: 3.65 X10(6)UL
SODIUM SERPL-SCNC: 133 MMOL/L (ref 136–145)
T AXIS: 80 DEGREES
TROPONIN I SERPL HS-MCNC: 11 NG/L
VENTRICULAR RATE: 96 BPM
WBC # BLD AUTO: 14.7 X10(3) UL (ref 4–11)

## 2024-11-29 PROCEDURE — 99222 1ST HOSP IP/OBS MODERATE 55: CPT | Performed by: INTERNAL MEDICINE

## 2024-11-29 PROCEDURE — 99223 1ST HOSP IP/OBS HIGH 75: CPT | Performed by: OTHER

## 2024-11-29 RX ORDER — PANTOPRAZOLE SODIUM 40 MG/1
40 TABLET, DELAYED RELEASE ORAL
Status: DISCONTINUED | OUTPATIENT
Start: 2024-11-29 | End: 2024-12-01

## 2024-11-29 RX ORDER — MORPHINE SULFATE 4 MG/ML
4 INJECTION, SOLUTION INTRAMUSCULAR; INTRAVENOUS EVERY 30 MIN PRN
Status: DISCONTINUED | OUTPATIENT
Start: 2024-11-29 | End: 2024-11-30

## 2024-11-29 RX ORDER — HEPARIN SODIUM 1000 [USP'U]/ML
1.5 INJECTION, SOLUTION INTRAVENOUS; SUBCUTANEOUS
Status: COMPLETED | OUTPATIENT
Start: 2024-11-30 | End: 2024-11-30

## 2024-11-29 RX ORDER — ACETAMINOPHEN 500 MG
500 TABLET ORAL EVERY 4 HOURS PRN
Status: DISCONTINUED | OUTPATIENT
Start: 2024-11-29 | End: 2024-12-01

## 2024-11-29 RX ORDER — HALOPERIDOL 5 MG/ML
2.5 INJECTION INTRAMUSCULAR ONCE
Status: COMPLETED | OUTPATIENT
Start: 2024-11-29 | End: 2024-11-29

## 2024-11-29 RX ORDER — CYCLOBENZAPRINE HCL 10 MG
10 TABLET ORAL 3 TIMES DAILY PRN
Status: DISCONTINUED | OUTPATIENT
Start: 2024-11-29 | End: 2024-12-01

## 2024-11-29 RX ORDER — ERGOCALCIFEROL 1.25 MG/1
50000 CAPSULE, LIQUID FILLED ORAL WEEKLY
Status: DISCONTINUED | OUTPATIENT
Start: 2024-12-01 | End: 2024-12-01

## 2024-11-29 RX ORDER — BUPROPION HYDROCHLORIDE 150 MG/1
150 TABLET ORAL DAILY
Status: DISCONTINUED | OUTPATIENT
Start: 2024-11-29 | End: 2024-12-01

## 2024-11-29 RX ORDER — ALBUMIN (HUMAN) 12.5 G/50ML
25 SOLUTION INTRAVENOUS
Status: ACTIVE | OUTPATIENT
Start: 2024-11-29 | End: 2024-12-01

## 2024-11-29 RX ORDER — SENNA AND DOCUSATE SODIUM 50; 8.6 MG/1; MG/1
3 TABLET, FILM COATED ORAL DAILY
Status: DISCONTINUED | OUTPATIENT
Start: 2024-11-29 | End: 2024-12-01

## 2024-11-29 RX ORDER — LEVOTHYROXINE SODIUM 75 UG/1
75 TABLET ORAL
Status: DISCONTINUED | OUTPATIENT
Start: 2024-11-29 | End: 2024-12-01

## 2024-11-29 RX ORDER — NICOTINE POLACRILEX 4 MG
30 LOZENGE BUCCAL
Status: DISCONTINUED | OUTPATIENT
Start: 2024-11-29 | End: 2024-12-01

## 2024-11-29 RX ORDER — LORAZEPAM 2 MG/ML
1 INJECTION INTRAMUSCULAR ONCE
Status: COMPLETED | OUTPATIENT
Start: 2024-11-29 | End: 2024-11-29

## 2024-11-29 RX ORDER — BUMETANIDE 1 MG/1
2 TABLET ORAL DAILY
Status: DISCONTINUED | OUTPATIENT
Start: 2024-11-29 | End: 2024-11-29

## 2024-11-29 RX ORDER — HEPARIN SODIUM 5000 [USP'U]/ML
5000 INJECTION, SOLUTION INTRAVENOUS; SUBCUTANEOUS EVERY 8 HOURS SCHEDULED
Status: DISCONTINUED | OUTPATIENT
Start: 2024-11-29 | End: 2024-12-01

## 2024-11-29 RX ORDER — NICOTINE POLACRILEX 4 MG
15 LOZENGE BUCCAL
Status: DISCONTINUED | OUTPATIENT
Start: 2024-11-29 | End: 2024-12-01

## 2024-11-29 RX ORDER — ALBUTEROL SULFATE 90 UG/1
2 INHALANT RESPIRATORY (INHALATION) EVERY 6 HOURS PRN
Status: DISCONTINUED | OUTPATIENT
Start: 2024-11-29 | End: 2024-12-01

## 2024-11-29 RX ORDER — LEVOTHYROXINE SODIUM 100 UG/1
200 TABLET ORAL
Status: DISCONTINUED | OUTPATIENT
Start: 2024-11-29 | End: 2024-12-01

## 2024-11-29 RX ORDER — HALOPERIDOL 5 MG/ML
INJECTION INTRAMUSCULAR
Status: COMPLETED
Start: 2024-11-29 | End: 2024-11-29

## 2024-11-29 RX ORDER — PRAVASTATIN SODIUM 20 MG
20 TABLET ORAL NIGHTLY
Status: DISCONTINUED | OUTPATIENT
Start: 2024-11-29 | End: 2024-12-01

## 2024-11-29 RX ORDER — HYDROCODONE BITARTRATE AND ACETAMINOPHEN 5; 325 MG/1; MG/1
1 TABLET ORAL EVERY 6 HOURS PRN
Status: DISCONTINUED | OUTPATIENT
Start: 2024-11-29 | End: 2024-12-01

## 2024-11-29 RX ORDER — DEXTROSE MONOHYDRATE 25 G/50ML
50 INJECTION, SOLUTION INTRAVENOUS
Status: DISCONTINUED | OUTPATIENT
Start: 2024-11-29 | End: 2024-12-01

## 2024-11-29 RX ORDER — ONDANSETRON 2 MG/ML
4 INJECTION INTRAMUSCULAR; INTRAVENOUS EVERY 6 HOURS PRN
Status: DISCONTINUED | OUTPATIENT
Start: 2024-11-29 | End: 2024-12-01

## 2024-11-29 RX ORDER — PROCHLORPERAZINE EDISYLATE 5 MG/ML
5 INJECTION INTRAMUSCULAR; INTRAVENOUS EVERY 8 HOURS PRN
Status: DISCONTINUED | OUTPATIENT
Start: 2024-11-29 | End: 2024-12-01

## 2024-11-29 RX ORDER — CLOPIDOGREL BISULFATE 75 MG/1
75 TABLET ORAL DAILY
Status: DISCONTINUED | OUTPATIENT
Start: 2024-11-29 | End: 2024-12-01

## 2024-11-29 RX ORDER — HEPARIN SODIUM 1000 [USP'U]/ML
1.5 INJECTION, SOLUTION INTRAVENOUS; SUBCUTANEOUS
Status: DISCONTINUED | OUTPATIENT
Start: 2024-11-29 | End: 2024-11-29

## 2024-11-29 RX ORDER — CARVEDILOL 12.5 MG/1
37.5 TABLET ORAL 2 TIMES DAILY PRN
Status: DISCONTINUED | OUTPATIENT
Start: 2024-11-29 | End: 2024-12-01

## 2024-11-29 NOTE — CONSULTS
[facility]   NEUROLOGY   CONSULT NOTE    Admission date: 11/29/2024  Reason for Consult: Altered mental status.  Chief Complaint:   Chief Complaint   Patient presents with    Pain   ________________________________________________________________    History     History of Presenting Illness  54 year old female with multiple medical problems including diabetes, end-stage renal disease, hypertension, hypercholesterolemia, history of back problems as well as lower extremity pain, chronic, as a result mostly wheelchair-bound as well as obesity consulted for altered mental status.  Patient states that while she was on her wheelchair she kind of dozed off and went to sleep and as a result had a fall.  She did not have any confusion after the fall.  Nobody witnessed jerking movements of the upper or lower extremity.  Patient denies any episode of tongue biting or bladder bowel incontinence.  Patient is complaining of moderate to severe pain in her rib area on the right where she had the injury.  Patient also complaining of spasm in the lower extremities.  Patient states that she has been on Norco as well as Flexeril for back problems and lower extremity spasms.  She has been evaluated in the past and is currently prescribed these medications.  No prior history of seizures.  Denies diplopia, dysarthria, dysphagia or asymmetric weakness.    History obtained from patient and chart review.    Past Medical History:    Anxiety    Diabetes (HCC)    Dialysis patient (HCC)    Disorder of thyroid    End stage renal disease (HCC)    Essential hypertension    High blood pressure    High cholesterol    Hyperlipidemia    Hypertension    Renal disorder    Thyroid disease     Past Surgical History:   Procedure Laterality Date    Hysterectomy      Tubal ligation       Social History     Socioeconomic History    Marital status:    Tobacco Use    Smoking status: Never    Smokeless tobacco: Never   Substance and  Sexual Activity    Alcohol use: Never    Drug use: Never   Social History Narrative    ** Merged History Encounter **          Social Drivers of Health     Food Insecurity: No Food Insecurity (11/29/2024)    Food Insecurity     Food Insecurity: Never true   Transportation Needs: No Transportation Needs (11/29/2024)    Transportation Needs     Lack of Transportation: No    [facility]    Social Connections   Housing Stability: Low Risk  (11/29/2024)    Housing Stability     Housing Instability: No     Family History   Problem Relation Age of Onset    Cancer Maternal Grandmother     Diabetes Maternal Grandfather     Hypertension Sister     Heart Disorder Sister     Diabetes Other      Allergies Allergies[1]    Home Meds  Current Outpatient Medications   Medication Instructions    albuterol 108 (90 Base) MCG/ACT Inhalation Aero Soln 2 puffs, EVERY 4 TO 6 HOURS PRN    buPROPion ER (WELLBUTRIN XL) 150 mg, Daily    carvedilol 12.5 MG Oral Tab 3 tablets, 2 times daily    Cholecalciferol (VITAMIN D-3 OR) 1 capsule, Daily    clopidogrel (PLAVIX) 75 mg, Daily    cyclobenzaprine (FLEXERIL) 10 mg, 3 times daily PRN    ergocalciferol (ERGOCALCIFEROL) 50,000 Units, Weekly    furosemide (LASIX) 20 mg, Oral, 2 times daily    gabapentin (NEURONTIN) 400 mg, 3 times daily    HYDROcodone-acetaminophen  MG Oral Tab 1 tablet, 3 times daily PRN    HYDROcodone-acetaminophen 5-325 MG Oral Tab 1 tablet, Oral, Every 6 hours PRN, Do not drive or operate machinery within 8 hours of taking this medication    hydrOXYzine (ATARAX) 25 mg, 3 times daily PRN    Insulin Aspart FlexPen 15 Units, 3 times daily    insulin glargine 40 Units, Nightly    levothyroxine (SYNTHROID) 75 mcg, Oral, Before breakfast, Total 275mcg daily    levothyroxine (SYNTHROID) 200 mcg, Oral, Before breakfast, Total 275mcg daily    Naloxegol Oxalate 25 mg, Daily    Omeprazole 40 mg, Daily    ondansetron 4  MG Oral Tablet Dispersible Dissolve 4 mg on the tongue every 6 to 8 hours as needed for nausea.    pravastatin (PRAVACHOL) 20 mg, Nightly    senna-docusate 8.6-50 MG Oral Tab 3 tablets, Daily    VELPHORO 500 MG Oral Chew Tab 2 tablets, 3 times daily with meals     Scheduled Meds:   clopidogrel  75 mg Oral Daily    [START ON 12/1/2024] ergocalciferol  50,000 Units Oral Weekly    levothyroxine  200 mcg Oral Before breakfast    levothyroxine  75 mcg Oral Before breakfast    pantoprazole  40 mg Oral QAM AC    pravastatin  20 mg Oral Nightly    senna-docusate  3 tablet Oral Daily    [Held by provider] Sucroferric Oxyhydroxide  2 tablet Oral TID CC    heparin  5,000 Units Subcutaneous Q8H JONATHAN    insulin aspart  1-5 Units Subcutaneous TID AC and HS    [START ON 11/30/2024] epoetin nile  10,000 Units Intravenous Once in dialysis    buPROPion ER  150 mg Oral Daily    gabapentin  400 mg Oral BID    [START ON 11/30/2024] heparin  1.5 mL Intracatheter Once     Continuous Infusions:  PRN Meds:  morphINE    albuterol    carvedilol    glucose **OR** glucose **OR** glucose-vitamin C **OR** dextrose **OR** glucose **OR** glucose **OR** glucose-vitamin C    acetaminophen    ondansetron    prochlorperazine    sodium chloride **AND** albumin human    cyclobenzaprine    HYDROcodone-acetaminophen    OBJECTIVE   VITAL SIGNS:   Temp:  [97.2 °F (36.2 °C)] 97.2 °F (36.2 °C)  Pulse:  [70-91] 79  Resp:  [16-24] 24  BP: ()/(59-92) 92/59  SpO2:  [95 %-100 %] 95 %    PHYSICAL EXAM:    NEUROLOGIC:    Mental Status:  A&O x 4, Follows simple commands, no obvious aphasia or dysarthria  Cranial nerves: PERRL.  Visual fields full.  EOMI.  Face symmetric with normal movement bilaterally.  Hearing grossly intact. Tongue midline with normal movements.   Motor: Motor bulk, tone and power in upper extremities was normal.  Lower extremity examination was limited because of pain.  Patient has antigravity strength in both lower extremities.  Reflexes  decreased, plantars downgoing.    Sensation: Vibration perception decreased in lower extremities after ankle.    Cerebellar: Normal Finger-To-Nose test      LABORATORY DATA:  Last 24 hour labs were reviewed in detail.  Recent Labs   Lab 11/29/24  0053   *   K 4.8   CL 88*   CO2 29.0   *   BUN 30*   CREATSERUM 4.47*     Recent Labs   Lab 11/29/24  0047   WBC 14.7*   HGB 10.8*   .0     Recent Labs   Lab 11/29/24  0053   ALT 12   AST 21     No results for input(s): \"MG\", \"PHOS\" in the last 168 hours.  Last A1c value was 9.3% done 10/31/2024.     Radiology:    No results found.  ASSESSMENT/PLAN   54 year old female with:    Following injury to the rib area while patient dozed off on her wheelchair.  Currently back to baseline as far as cognitive functioning is concerned.  No evidence to suggest TIA/CVA.  Continue monitoring.  Right rib pain status post fall.  Pain management as per patient's primary team.  History of chronic low back pain as well as radiation of pain in the lower extremities along with weakness.  Patient uses wheelchair off and on when pain is severe.  Further management per her spinal surgeon/pain clinic.  No further neurorecommendations at this time.  Please feel free to call for further queries.    Principal Problem:    Disorientation       Bo Yan MD  Neurohospitalist  [facility]    Disclaimer: This record was dictated using Dragon software. There may be errors due to voice recognition problems that were not realized and corrected during the completion of the note.           [1]   Allergies  Allergen Reactions    Kiwi Extract UNKNOWN

## 2024-11-29 NOTE — H&P
.CC:   Chief Complaint   Patient presents with    Pain        PCP: Alejandro Pedersen MD    History of Present Illness: Patient is a 54 year old female with PMH sig for dm, esrd on hd tths who presented with weakness and falling out of chair. She had stopped dialysis early yesterday due to leg cramping. Was taken to OSH and then went home. However there she became more lethargic and fell from wheelchair onto floor.     She was having agitation and confusion in ED. Was given morphine and ativan and haldol in an attempt to obtain CT brain but unable to do so on multiple attempts due to restlessness.     This morning patient is unable to provide any history.  She awakens for sternal rub and able to follow brief commands.  Discussed with nursing staff, nephrology and reviewed chart.      PMH  Past Medical History:    Anxiety    Diabetes (HCC)    Dialysis patient (HCC)    Disorder of thyroid    End stage renal disease (HCC)    Essential hypertension    High blood pressure    High cholesterol    Hyperlipidemia    Hypertension    Renal disorder    Thyroid disease        PSH  Past Surgical History:   Procedure Laterality Date    Hysterectomy      Tubal ligation          ALL:  Allergies[1]     Home Medications:  Medications Taking[2]      Soc Hx  Social History     Tobacco Use    Smoking status: Never    Smokeless tobacco: Never   Substance Use Topics    Alcohol use: Never        Fam Hx  Family History   Problem Relation Age of Onset    Cancer Maternal Grandmother     Diabetes Maternal Grandfather     Hypertension Sister     Heart Disorder Sister     Diabetes Other        Review of Systems  Comprehensive ROS reviewed and negative except for what's stated above.       OBJECTIVE:  BP 92/59 (BP Location: Right arm)   Pulse 79   Temp 97.2 °F (36.2 °C) (Axillary)   Resp 24   SpO2 95%     Gen-lethargic  HEENT- NCAT, anicteric sclera, MMM, OP clear  Lymph- no cervical LAD  CV- RRR no murmurs. No OSOROI  Lungs- CTAB, good respiratory  effort  Abd- soft, ntnd, no organomegaly, BS+  Derm- no rashes  MSK- good muscle strength and tone, no joint swelling  Neuro-lethargic, briefly responds to sternal rub and follows commands such as wiggle toes.  Moving all extremities      Diagnostic Data:    CBC/Chem  Recent Labs   Lab 11/29/24 0047   WBC 14.7*   HGB 10.8*   MCV 91.5   .0       Recent Labs   Lab 11/29/24 0053   *   K 4.8   CL 88*   CO2 29.0   BUN 30*   CREATSERUM 4.47*   *   CA 10.3       Recent Labs   Lab 11/29/24 0053   ALT 12   AST 21   ALB 4.4       No results for input(s): \"TROP\" in the last 168 hours.    Additional Diagnostics: personally reviewed EKG-  Nsr, no st/t abn      Radiology: XR CHEST AP PORTABLE  (CPT=71045)    Result Date: 10/30/2024  PROCEDURE:  XR CHEST AP PORTABLE  (CPT=71045)  TECHNIQUE:  AP chest radiograph was obtained.  COMPARISON:  MIGUEL ANGEL , RAVI, XR CHEST AP PORTABLE  (CPT=71045), 12/24/2023, 7:20 PM.  INDICATIONS:  lower leg swelling, cardiac hx  PATIENT STATED HISTORY: (As transcribed by Technologist)  Patient offered no additional history at this time.      FINDINGS:  Lung volumes are satisfactory.  Increased markings at the left lung base appear unchanged consistent with scarring.  No new consolidation.  CP angles remain sharp.  Heart and pulmonary vessels appear stable, with mild cardiomegaly.  Smooth mediastinal contours.  Right-sided dialysis catheter is again seen.             CONCLUSION:  No acute findings.  Stable frontal view of the chest.   LOCATION:  [facility]      Dictated by (CST): Lex Lilly MD on 10/30/2024 at 10:17 PM     Finalized by (CST): Lex Lilly MD on 10/30/2024 at 10:18 PM          Available outpatient records reviewed--    ASSESSMENT / PLAN:     54-year-old woman with history of end-stage renal disease on dialysis, diabetes, hypertension who presented with weakness and found to have significant altered mental status with agitation and confusion.    Altered mental  status  Agitation confusion  Weakness  - Neurology consult  - Unable to yet obtain head CT or other CTs due to agitation  - Currently very lethargic but I was told shortly after my visit that patient had awoken and was complaining of leg spasms  - May require further neurologic testing such as brain MRI with general anesthesia or EEG  - Patient on multiple narcotic type medications that could be contributing.  Try to limit or possible.  --Consider tox screen    Lower extremity pain and spasm  - Seems to be persistent complaint, not always associated with dialysis  - On Norco as needed, naloxegol, Flexeril.  For now we will resume Flexeril and Norco.    - I do not see previous lower extremity arterial Dopplers done and pulses in feet are not easily palpable.  Will check flow to see if this could be contributing to pain as well  -Patient is on Plavix and is seeing Dr. Phelps but believe that this may be more for her AV fistula for dialysis    ESRD on HD  - Dialysis per nephrology.  Labs currently stable    DM- glc lower today. Will hold long acting insulin and give ssi    SCDs        Pattie Washington MD  [facility] Hospitalist  Pager [phone]  Answering Service number: [phone]         [1]   Allergies  Allergen Reactions    Kiwi Extract UNKNOWN   [2]   Outpatient Medications Marked as Taking for the 11/29/24 encounter (Hospital Encounter)   Medication Sig Dispense Refill    cyclobenzaprine 10 MG Oral Tab Take 1 tablet (10 mg total) by mouth 3 (three) times daily as needed for Muscle spasms.      Cholecalciferol (VITAMIN D-3 OR) Take 1 capsule by mouth daily.      senna-docusate 8.6-50 MG Oral Tab Take 3 tablets by mouth daily. Does not take on Tuesday, Thursday, and Saturday when pt has dialysis      clopidogrel 75 MG Oral Tab Take 1 tablet (75 mg total) by mouth daily.      levothyroxine 200 MCG Oral Tab Take 1 tablet (200 mcg total) by mouth before breakfast.      pravastatin 20 MG Oral Tab Take 1 tablet (20 mg  total) by mouth nightly.      insulin glargine 100 UNIT/ML Subcutaneous Solution Pen-injector Inject 40 Units into the skin nightly.      ergocalciferol 1.25 MG (80287 UT) Oral Cap Take 1 capsule (50,000 Units total) by mouth once a week.      levothyroxine 75 MCG Oral Tab Take 1 tablet (75 mcg total) by mouth before breakfast.      Omeprazole 40 MG Oral Capsule Delayed Release Take 1 capsule (40 mg total) by mouth daily.      buPROPion HCl ER, XL, 150 MG Oral Tablet 24 Hr Take 1 tablet (150 mg total) by mouth daily.

## 2024-11-29 NOTE — ED QUICK NOTES
Back from CT, pt fighting in CT, trying to get out of CT bed. Unsafely for pt.  Pt very restless, unable to get CT scan. Back in room. ER MD aware.

## 2024-11-29 NOTE — ED QUICK NOTES
Report received at this time from ERIK Thomas. Assumed care for pt.   Pt restless, agitated in bed, trying to get out of bed.  Family at bedside.

## 2024-11-29 NOTE — ED PROVIDER NOTES
Patient Seen in: [facility] Emergency Department      History     Chief Complaint   Patient presents with    Pain     Stated Complaint: fell out of wheelchair, c/o rib pain    Subjective:   HPI      54-year-old female whose history is obtained per the  who is at bedside.  Patient will not follow commands or answer questions to get medical history of or do a thorough neurologic exam.  Patient did receive dialysis today but stopped an hour early due to leg cramps since dialysis patient's been acting hide he has a history of passing out and falling asleep when she is in a wheelchair family states that he must have because she has been having some rib pain since that time but cannot evaluate which side it was on.  Patient went to an [facility] emergency department chest were performed patient now presents to this emergency department for continued altered mental status no other exacerbating factors or associated symptoms.  No recent illness    Objective:     Past Medical History:    Anxiety    Diabetes (HCC)    Dialysis patient (HCC)    Disorder of thyroid    End stage renal disease (HCC)    Essential hypertension    High blood pressure    High cholesterol    Hyperlipidemia    Hypertension    Renal disorder    Thyroid disease              Past Surgical History:   Procedure Laterality Date    Hysterectomy      Tubal ligation                  Social History     Socioeconomic History    Marital status:    Tobacco Use    Smoking status: Never    Smokeless tobacco: Never   Substance and Sexual Activity    Alcohol use: Never    Drug use: Never   Social History Narrative    ** Merged History Encounter **          Social Drivers of Health     Food Insecurity: No Food Insecurity (10/31/2024)    Food Insecurity     Food Insecurity: Never true   Transportation Needs: No Transportation Needs (10/31/2024)    Transportation Needs     Lack of Transportation: No    Received from [facility]  Center, [facility]    Social Connections   Housing Stability: Low Risk  (10/31/2024)    Housing Stability     Housing Instability: No                  Physical Exam     ED Triage Vitals [11/29/24 0004]   /84   Pulse 91   Resp 18   Temp 97.2 °F (36.2 °C)   Temp src Temporal   SpO2 95 %   O2 Device None (Room air)       Current Vitals:   Vital Signs  BP: 131/81  Pulse: 86  Resp: 18  Temp: 97.2 °F (36.2 °C)  Temp src: Temporal  MAP (mmHg): 95    Oxygen Therapy  SpO2: 100 %  O2 Device: None (Room air)        Physical Exam  Awake alert HEENT exam normal lungs are clear cardiovascular exam regular rhythm abdomen soft nontender extremities no cyanosis or edema there was 1+ pitting edema of lower extremities no rash level 5 exemption taken    ED Course     Labs Reviewed   COMP METABOLIC PANEL (14) - Abnormal; Notable for the following components:       Result Value    Glucose 183 (*)     Sodium 133 (*)     Chloride 88 (*)     BUN 30 (*)     Creatinine 4.47 (*)     eGFR-Cr 11 (*)     Alkaline Phosphatase 119 (*)     Total Protein 8.4 (*)     Globulin  4.0 (*)     All other components within normal limits   CBC WITH DIFFERENTIAL WITH PLATELET - Abnormal; Notable for the following components:    WBC 14.7 (*)     RBC 3.65 (*)     HGB 10.8 (*)     HCT 33.4 (*)     Neutrophil Absolute Prelim 12.29 (*)     Neutrophil Absolute 12.29 (*)     All other components within normal limits   ETHYL ALCOHOL - Normal   TROPONIN I HIGH SENSITIVITY - Normal   URINALYSIS WITH CULTURE REFLEX   RAINBOW DRAW BLUE     EKG    Rate, intervals and axes as noted on EKG Report.  Rate: 9 6  Rhythm: Sinus Rhythm  Reading: no areas of acute ST segment elevation or depression prolonged QT noted                Differential diagnosis includes subarachnoid hemorrhage, pneumothorax       MDM              Medical Decision Making  54-year-old female presenting with pain and altered mental status IV established cardiac monitor shows a  sinus rhythm pulse ox shows no signs of hypoxia.  CBC shows a slight elevation white cell count metabolic panel shows stable renal function patient received multiple rounds of sedation here in the emergency department she was given 4 mg of morphine to help with her leg cramps she was given Ativan to help with her levels of agitation and multiple rounds of Haldol to see if we could perform a CT scan.  She still has periods of confusion and periods of lucency she has no signs of focal neurologic deficit no signs of seizure activity no signs of fever no signs of infection at this time.  I discussed the thought process with the  who was agreed to not place the patient on the ventilator at this time to perform a CT scan of the brain she has no signs of pain to palpation of the abdomen or to the chest upon palpation I have a concern that this may need to be be a medication reaction she will be admitted for further evaluation.    Problems Addressed:  Disorientation: acute illness or injury    Amount and/or Complexity of Data Reviewed  Labs: ordered. Decision-making details documented in ED Course.  Radiology: ordered and independent interpretation performed. Decision-making details documented in ED Course.  ECG/medicine tests: ordered and independent interpretation performed. Decision-making details documented in ED Course.        Disposition and Plan     Clinical Impression:  1. Disorientation         Disposition:  There is no disposition on file for this visit.  There is no disposition time on file for this visit.    Follow-up:  No follow-up provider specified.        Medications Prescribed:  Current Discharge Medication List              Supplementary Documentation:

## 2024-11-29 NOTE — CONSULTS
[facility]  Report of Consultation    Kimmy Sparks Patient Status:  Observation    1970 MRN [id number]   [facility] 7NE-A Attending Pattie Washington MD   Hosp Day # 0 PCP Alejandro Pedersen MD     Reason for Consultation:  ESRD on HD    History of Present Illness:  Kimmy Sparks is a a(n) 54 year old woman known to our service with mult med probs incl ESRD due to DM/HTN on HD TTHS at [facility] who was brought to ED for eval of weakness and a fall out of chair.  Hx obtained via d/w  as she is quite sluggish this AM.  He reports that she stopped her dialysis a bit early yest due to cramping in the legs.  She was taken to an OSH for this but he reports she was feeling better at that point.  At home she was a bit lethargic and fell from her chair onto the floor prompting her to be brought here.     History:  Past Medical History:    Anxiety    Diabetes (HCC)    Dialysis patient ([facility])    Disorder of thyroid    End stage renal disease (HCC)    Essential hypertension    High blood pressure    High cholesterol    Hyperlipidemia    Hypertension    Renal disorder    Thyroid disease     Past Surgical History:   Procedure Laterality Date    Hysterectomy      Tubal ligation       Family History   Problem Relation Age of Onset    Cancer Maternal Grandmother     Diabetes Maternal Grandfather     Hypertension Sister     Heart Disorder Sister     Diabetes Other       reports that she has never smoked. She has never used smokeless tobacco. She reports that she does not drink alcohol and does not use drugs.    Allergies:  Allergies[1]    Medications:    Current Facility-Administered Medications:     morphINE PF 4 MG/ML injection 4 mg, 4 mg, Intravenous, Q30 Min PRN    albuterol (Ventolin HFA) 108 (90 Base) MCG/ACT inhaler 2 puff, 2 puff, Inhalation, Q6H PRN    bumetanide (Bumex) tab 2 mg, 2 mg, Oral, Daily    carvedilol (Coreg) tab 37.5 mg, 37.5 mg, Oral, BID    clopidogrel (Plavix) tab 75 mg, 75  mg, Oral, Daily    [START ON 12/1/2024] ergocalciferol (Vitamin D2) cap 50,000 Units, 50,000 Units, Oral, Weekly    levothyroxine (Synthroid) tab 200 mcg, 200 mcg, Oral, Before breakfast    levothyroxine (Synthroid) tab 75 mcg, 75 mcg, Oral, Before breakfast    pantoprazole (Protonix) DR tab 40 mg, 40 mg, Oral, QAM AC    pravastatin (Pravachol) tab 20 mg, 20 mg, Oral, Nightly    senna-docusate (Senokot-S) 8.6-50 MG per tab 3 tablet, 3 tablet, Oral, Daily    Sucroferric Oxyhydroxide CHEW 2 tablet, 2 tablet, Oral, TID CC    glucose (Dex4) 15 GM/59ML oral liquid 15 g, 15 g, Oral, Q15 Min PRN **OR** glucose (Glutose) 40% oral gel 15 g, 15 g, Oral, Q15 Min PRN **OR** glucose-vitamin C (Dex-4) chewable tab 4 tablet, 4 tablet, Oral, Q15 Min PRN **OR** dextrose 50% injection 50 mL, 50 mL, Intravenous, Q15 Min PRN **OR** glucose (Dex4) 15 GM/59ML oral liquid 30 g, 30 g, Oral, Q15 Min PRN **OR** glucose (Glutose) 40% oral gel 30 g, 30 g, Oral, Q15 Min PRN **OR** glucose-vitamin C (Dex-4) chewable tab 8 tablet, 8 tablet, Oral, Q15 Min PRN    heparin (Porcine) 5000 UNIT/ML injection 5,000 Units, 5,000 Units, Subcutaneous, Q8H JONATHAN    acetaminophen (Tylenol Extra Strength) tab 500 mg, 500 mg, Oral, Q4H PRN    ondansetron (Zofran) 4 MG/2ML injection 4 mg, 4 mg, Intravenous, Q6H PRN    prochlorperazine (Compazine) 10 MG/2ML injection 5 mg, 5 mg, Intravenous, Q8H PRN    insulin aspart (NovoLOG) 100 Units/mL FlexPen 1-5 Units, 1-5 Units, Subcutaneous, TID AC and HS  No current outpatient medications on file.       Review of Systems:  Denies fever/chills  Denies wt loss/gain  Denies HA or visual changes  Denies CP or palpitations  Denies SOB/cough/hemoptysis  Denies abd or flank pain  Denies N/V/D  Denies change in urinary habits or gross hematuria  Denies LE edema  Denies skin rashes/myalgias/arthralgias      Physical Exam:   BP 92/59 (BP Location: Right arm)   Pulse 79   Temp 97.2 °F (36.2 °C) (Axillary)   Resp 24   SpO2 95%    Temp (24hrs), Av.2 °F (36.2 °C), Min:97.2 °F (36.2 °C), Max:97.2 °F (36.2 °C)       Intake/Output Summary (Last 24 hours) at 2024 1008  Last data filed at 2024 0408  Gross per 24 hour   Intake 100 ml   Output --   Net 100 ml     Last 3 Weights   24 0417 280 lb 13.9 oz (127.4 kg)   24 0530 285 lb (129.3 kg)   10/31/24 0700 286 lb 2.5 oz (129.8 kg)   10/30/24 1938 267 lb (121.1 kg)   24 0019 275 lb (124.7 kg)   07/10/24 1010 269 lb (122 kg)   07/10/24 0345 269 lb (122 kg)   23 1855 256 lb (116.1 kg)   23 1409 222 lb (100.7 kg)     General: Asleep but arouses, sluggish in no apparent distress.  HEENT: No scleral icterus, MMM  Neck: Supple, no RAHAT or thyromegaly  Cardiac: Regular rate and rhythm, S1, S2 normal, no murmur or rub  Lungs: Clear without wheezes, rales, rhonchi.    Abdomen: Soft, non-tender. + bowel sounds, no palpable organomegaly  Extremities: Without clubbing, cyanosis or edema.  Neurologic: moving all extremities  Skin: Warm and dry, no rashes      Laboratory Data:  Lab Results   Component Value Date    WBC 14.7 2024    HGB 10.8 2024    HCT 33.4 2024    .0 2024    CREATSERUM 4.47 2024    BUN 30 2024     2024    K 4.8 2024    CL 88 2024    CO2 29.0 2024     2024    CA 10.3 2024    ALB 4.4 2024    ALKPHO 119 2024    BILT 0.4 2024    TP 8.4 2024    AST 21 2024    ALT 12 2024    ETOH <3 2024       BUN (mg/dL)   Date Value   2024 30 (H)   2024 50 (H)   10/31/2024 30 (H)     Creatinine (mg/dL)   Date Value   2024 4.47 (H)   2024 5.95 (H)   10/31/2024 3.92 (H)       Malb/Cre Calc   Date Value Ref Range Status   2022 1,761.5 (H) <=30.0 ug/mg Final     Comment:     <30 ug/mg creatinine       Normal     ug/mg creatinine   Microalbuminuria   >300 ug/mg creatinine      Albuminuria           Recent Labs    Lab 11/29/24 0047   WBC 14.7*   HGB 10.8*   MCV 91.5   .0       Recent Labs   Lab 11/29/24 0053   *   K 4.8   CL 88*   CO2 29.0   BUN 30*   CREATSERUM 4.47*   CA 10.3   *       Recent Labs   Lab 11/29/24 0053   ALT 12   AST 21   ALB 4.4       No results for input(s): \"PGLU\" in the last 168 hours.          Impression/Plan:    #1.  ESRD- due to DM/HTN.  Cont HD per usual TTHS routine    #2.  Anemia- due to ESRD.  RAMSES for goal hgb 10-11 gms    #3.  AMS- etiology not clear.  Neurology has been consulted    #4.  HTN- cont carvedilol      Thank you for allowing me to participate in the care of your patient. Please do not hesitate to call with any questions or concerns.       Irineo Read MD  11/29/2024  10:08 AM         [1]   Allergies  Allergen Reactions    Kiwi Extract UNKNOWN

## 2024-11-29 NOTE — ED QUICK NOTES
This pct attempted to go to CT scan with patient a 2nd time scan was cancelled due to patient refusing to remain still only in the CT bed patient was cooperative when on stretcher. Patient has been continually restless with multiple attempts to stand up and leave the bed even with previous hx of falling earlier in the day. Educated  on not lowering side rails to ensure patient safety patients  disregarded education and proceeded to lower side rails as soon as this pct left the room. Patient is still continually trying to come to the edge of the bed.

## 2024-11-29 NOTE — PHYSICAL THERAPY NOTE
Order received, chart reviewed. Pt sleeping at this time, increased agitation and restlessness, safety concerns the night prior. Will hold at this time and follow as appropriate.     Rebecca Cordoba, PT, DPT  11/29/24

## 2024-11-29 NOTE — CM/SW NOTE
11/29/24 1100   CM/SW Referral Data   Referral Source Social Work (self-referral)   Reason for Referral Discharge planning   Patient Info   Patient's Home Environment House   Patient lives with Spouse/Significant other   Patient Status Prior to Admission   Services in place prior to admission Dialysis   Dialysis Clinic [facility]  ([facility])   Scheduled Dialysis days T-TH-SAT     Pt discussed in rounds this AM. Self referred for DC planning. Pt has ESRD on HD. Per previous SW note pt goes to [facility] T/TH/Sat chair time at 13:50.       SW/CM to remain available for any anticipated DC needs/recs.     HUSSAIN Demarco

## 2024-11-29 NOTE — ED QUICK NOTES
Orders for admission, patient is aware of plan and ready to go upstairs. Any questions, please call ED RN Sahra at extension [phone].     Patient Covid vaccination status: Unvaccinated     COVID Test Ordered in ED: None    COVID Suspicion at Admission: N/A    Running Infusions:  None    Mental Status/LOC at time of transport: AO x 1-2    Other pertinent information: Restless, confused.   CIWA score: N/A   NIH score:  N/A

## 2024-11-29 NOTE — OCCUPATIONAL THERAPY NOTE
OT orders received, pt chart reviewed. Per discussion with RN, pt sleeping at this time, increased agitation and restlessness, safety concerns the night prior. OT will hold at this time, will re-attempt as able and when appropriate. RN aware.

## 2024-11-29 NOTE — ED QUICK NOTES
Patient trying to get out of bed again. Reinforced with patient and  pt cannot get out of the bed at this time by self. Pt placed back in bed. Pt restless taking off monitor. Continued to try and get out of bed.  remains at bedside.

## 2024-11-30 ENCOUNTER — APPOINTMENT (OUTPATIENT)
Dept: GENERAL RADIOLOGY | Facility: HOSPITAL | Age: 54
End: 2024-11-30
Attending: HOSPITALIST
Payer: COMMERCIAL

## 2024-11-30 ENCOUNTER — APPOINTMENT (OUTPATIENT)
Dept: CT IMAGING | Facility: HOSPITAL | Age: 54
End: 2024-11-30
Attending: EMERGENCY MEDICINE
Payer: COMMERCIAL

## 2024-11-30 LAB
ALBUMIN SERPL-MCNC: 4.3 G/DL (ref 3.2–4.8)
ALBUMIN/GLOB SERPL: 1.5 {RATIO} (ref 1–2)
ALP LIVER SERPL-CCNC: 108 U/L
ALT SERPL-CCNC: 8 U/L
ANION GAP SERPL CALC-SCNC: 11 MMOL/L (ref 0–18)
AST SERPL-CCNC: 12 U/L (ref ?–34)
BASOPHILS # BLD AUTO: 0.07 X10(3) UL (ref 0–0.2)
BASOPHILS NFR BLD AUTO: 0.7 %
BILIRUB SERPL-MCNC: 0.3 MG/DL (ref 0.3–1.2)
BUN BLD-MCNC: 46 MG/DL (ref 9–23)
CALCIUM BLD-MCNC: 9 MG/DL (ref 8.7–10.4)
CHLORIDE SERPL-SCNC: 88 MMOL/L (ref 98–112)
CO2 SERPL-SCNC: 29 MMOL/L (ref 21–32)
CREAT BLD-MCNC: 6.15 MG/DL
EGFRCR SERPLBLD CKD-EPI 2021: 8 ML/MIN/1.73M2 (ref 60–?)
EOSINOPHIL # BLD AUTO: 0.32 X10(3) UL (ref 0–0.7)
EOSINOPHIL NFR BLD AUTO: 3.3 %
ERYTHROCYTE [DISTWIDTH] IN BLOOD BY AUTOMATED COUNT: 15 %
GLOBULIN PLAS-MCNC: 2.8 G/DL (ref 2–3.5)
GLUCOSE BLD-MCNC: 220 MG/DL (ref 70–99)
GLUCOSE BLD-MCNC: 313 MG/DL (ref 70–99)
GLUCOSE BLD-MCNC: 334 MG/DL (ref 70–99)
GLUCOSE BLD-MCNC: 339 MG/DL (ref 70–99)
GLUCOSE BLD-MCNC: 461 MG/DL (ref 70–99)
GLUCOSE BLD-MCNC: 484 MG/DL (ref 70–99)
HBV SURFACE AG SER-ACNC: <0.1 [IU]/L
HBV SURFACE AG SERPL QL IA: NONREACTIVE
HCT VFR BLD AUTO: 30.2 %
HGB BLD-MCNC: 9.4 G/DL
IMM GRANULOCYTES # BLD AUTO: 0.06 X10(3) UL (ref 0–1)
IMM GRANULOCYTES NFR BLD: 0.6 %
LYMPHOCYTES # BLD AUTO: 1.11 X10(3) UL (ref 1–4)
LYMPHOCYTES NFR BLD AUTO: 11.4 %
MAGNESIUM SERPL-MCNC: 2.5 MG/DL (ref 1.6–2.6)
MCH RBC QN AUTO: 29.6 PG (ref 26–34)
MCHC RBC AUTO-ENTMCNC: 31.1 G/DL (ref 31–37)
MCV RBC AUTO: 95 FL
MONOCYTES # BLD AUTO: 0.54 X10(3) UL (ref 0.1–1)
MONOCYTES NFR BLD AUTO: 5.5 %
NEUTROPHILS # BLD AUTO: 7.67 X10 (3) UL (ref 1.5–7.7)
NEUTROPHILS # BLD AUTO: 7.67 X10(3) UL (ref 1.5–7.7)
NEUTROPHILS NFR BLD AUTO: 78.5 %
OSMOLALITY SERPL CALC.SUM OF ELEC: 290 MOSM/KG (ref 275–295)
PLATELET # BLD AUTO: 379 10(3)UL (ref 150–450)
POTASSIUM SERPL-SCNC: 5.1 MMOL/L (ref 3.5–5.1)
PROT SERPL-MCNC: 7.1 G/DL (ref 5.7–8.2)
RBC # BLD AUTO: 3.18 X10(6)UL
SODIUM SERPL-SCNC: 128 MMOL/L (ref 136–145)
WBC # BLD AUTO: 9.8 X10(3) UL (ref 4–11)

## 2024-11-30 PROCEDURE — 71046 X-RAY EXAM CHEST 2 VIEWS: CPT | Performed by: HOSPITALIST

## 2024-11-30 PROCEDURE — 99232 SBSQ HOSP IP/OBS MODERATE 35: CPT | Performed by: INTERNAL MEDICINE

## 2024-11-30 PROCEDURE — 70450 CT HEAD/BRAIN W/O DYE: CPT | Performed by: EMERGENCY MEDICINE

## 2024-11-30 RX ORDER — DIAZEPAM 2 MG/1
5 TABLET ORAL ONCE AS NEEDED
Status: COMPLETED | OUTPATIENT
Start: 2024-11-30 | End: 2024-11-30

## 2024-11-30 RX ORDER — INSULIN DEGLUDEC 100 U/ML
40 INJECTION, SOLUTION SUBCUTANEOUS NIGHTLY
Status: DISCONTINUED | OUTPATIENT
Start: 2024-11-30 | End: 2024-12-01

## 2024-11-30 NOTE — PLAN OF CARE
CT and xray completed today  Valium given before heading to CT and RN stayed with patient to allow both procedures to be done  See results  IS taught to patient and education material given to both patient and   No questions   Glucose elevated this evening, see mar for updated administration. See orders for communication orders   PT will be done tomorrow  A&Ox4  RA during the day, needs o2 at night  Leg spasms managed by norco, flexeril, and gabapentin  All questions answered, Call light within reach          Problem: Diabetes/Glucose Control  Goal: Glucose maintained within prescribed range  Description: INTERVENTIONS:  - Monitor Blood Glucose as ordered  - Assess for signs and symptoms of hyperglycemia and hypoglycemia  - Administer ordered medications to maintain glucose within target range  - Assess barriers to adequate nutritional intake and initiate nutrition consult as needed  - Instruct patient on self management of diabetes  Outcome: Progressing     Problem: METABOLIC/FLUID AND ELECTROLYTES - ADULT  Goal: Glucose maintained within prescribed range  Description: INTERVENTIONS:  - Monitor Blood Glucose as ordered  - Assess for signs and symptoms of hyperglycemia and hypoglycemia  - Administer ordered medications to maintain glucose within target range  - Assess barriers to adequate nutritional intake and initiate nutrition consult as needed  - Instruct patient on self management of diabetes  Outcome: Progressing  Goal: Electrolytes maintained within normal limits  Description: INTERVENTIONS:  - Monitor labs and rhythm and assess patient for signs and symptoms of electrolyte imbalances  - Administer electrolyte replacement as ordered  - Monitor response to electrolyte replacements, including rhythm and repeat lab results as appropriate  - Fluid restriction as ordered  - Instruct patient on fluid and nutrition restrictions as appropriate  Outcome: Progressing  Goal: Hemodynamic stability and optimal  renal function maintained  Description: INTERVENTIONS:  - Monitor labs and assess for signs and symptoms of volume excess or deficit  - Monitor intake, output and patient weight  - Monitor urine specific gravity, serum osmolarity and serum sodium as indicated or ordered  - Monitor response to interventions for patient's volume status, including labs, urine output, blood pressure (other measures as available)  - Encourage oral intake as appropriate  - Instruct patient on fluid and nutrition restrictions as appropriate  Outcome: Progressing     Problem: NEUROLOGICAL - ADULT  Goal: Achieves stable or improved neurological status  Description: INTERVENTIONS  - Assess for and report changes in neurological status  - Initiate measures to prevent increased intracranial pressure  - Maintain blood pressure and fluid volume within ordered parameters to optimize cerebral perfusion and minimize risk of hemorrhage  - Monitor temperature, glucose, and sodium. Initiate appropriate interventions as ordered  Outcome: Progressing     Problem: Impaired Functional Mobility  Goal: Achieve highest/safest level of mobility/gait  Description: Interventions:  - Assess patient's functional ability and stability  - Promote increasing activity/tolerance for mobility and gait  - Educate and engage patient/family in tolerated activity level and precautions  - Recommend patient transfer to bedside chair toward strongest side  Outcome: Progressing

## 2024-11-30 NOTE — PROGRESS NOTES
.[facility] note    PCP: Alejandro Pedersen MD    Chief Complaint:  AMS, rib pain    SUBJECTIVE:  Much more awake and at baseline today. Intermittent pain from spasms continues. Reports she has had this for many years but worse since dialysis.    OBJECTIVE:  Temp:  [97.6 °F (36.4 °C)-98.3 °F (36.8 °C)] 98.3 °F (36.8 °C)  Pulse:  [83-97] 83  Resp:  [14-25] 25  BP: (112-137)/(68-82) 137/82  SpO2:  [94 %-98 %] 94 %    Intake/Output:    Intake/Output Summary (Last 24 hours) at 11/30/2024 1454  Last data filed at 11/29/2024 1700  Gross per 24 hour   Intake 310 ml   Output --   Net 310 ml       Last 3 Weights   11/02/24 0417 280 lb 13.9 oz (127.4 kg)   11/01/24 0530 285 lb (129.3 kg)   10/31/24 0700 286 lb 2.5 oz (129.8 kg)   10/30/24 1938 267 lb (121.1 kg)   09/04/24 0019 275 lb (124.7 kg)   07/10/24 1010 269 lb (122 kg)   07/10/24 0345 269 lb (122 kg)       Exam  Gen: No acute distress  HEENT: anicteric sclera, MMM  Pulm: Lungs clear, normal respiratory effort  CV: Heart with regular rate and rhythm, no peripheral edema  Abd: Abdomen soft, nontender, nondistended, no organomegaly, bowel sounds present  MSK: Full range of motion in extremities, no clubbing, no cyanosis  Skin: no rashes or lesions  Neuro: A&OX3, no focal deficits    Data Review:         Labs:     Recent Labs   Lab 11/29/24  0047 11/30/24  0615   WBC 14.7* 9.8   HGB 10.8* 9.4*   MCV 91.5 95.0   .0 379.0   NE 12.29* 7.67   LYMABS 1.20 1.11       Recent Labs   Lab 11/29/24  0053 11/30/24  0615   * 128*   K 4.8 5.1   CL 88* 88*   CO2 29.0 29.0   BUN 30* 46*   CREATSERUM 4.47* 6.15*   CA 10.3 9.0   MG  --  2.5   * 313*       Recent Labs   Lab 11/29/24  0053 11/30/24  0615   ALT 12 8*   AST 21 12   ALB 4.4 4.3       No results for input(s): \"TROP\", \"CK\", \"PBNP\", \"PCT\" in the last 168 hours.    No results for input(s): \"CRP\", \"MINA\", \"LDH\", \"DDIMER\" in the last 168 hours.    Recent Labs   Lab 11/29/24  1118 11/29/24  1544 11/29/24  2140  11/30/24  0528 11/30/24  1159   PGLU 83 242* 320* 339* 220*       Meds:   Scheduled Medication:   lidocaine-menthol  2 patch Transdermal Daily    clopidogrel  75 mg Oral Daily    [START ON 12/1/2024] ergocalciferol  50,000 Units Oral Weekly    levothyroxine  200 mcg Oral Before breakfast    levothyroxine  75 mcg Oral Before breakfast    pantoprazole  40 mg Oral QAM AC    pravastatin  20 mg Oral Nightly    senna-docusate  3 tablet Oral Daily    heparin  5,000 Units Subcutaneous Q8H JONATHAN    insulin aspart  1-5 Units Subcutaneous TID AC and HS    epoetin nile  10,000 Units Intravenous Once in dialysis    buPROPion ER  150 mg Oral Daily    gabapentin  400 mg Oral BID    Sucroferric Oxyhydroxide  2 tablet Oral TID with meals    lidocaine-menthol  1 patch Transdermal Daily     Continuous Infusing Medication:  PRN Medication:  albuterol    carvedilol    glucose **OR** glucose **OR** glucose-vitamin C **OR** dextrose **OR** glucose **OR** glucose **OR** glucose-vitamin C    acetaminophen    ondansetron    prochlorperazine    sodium chloride **AND** albumin human    cyclobenzaprine    HYDROcodone-acetaminophen       Microbiology:    No results found for this visit on 11/29/24.    Lab Results   Component Value Date    COVID19 Not Detected 12/24/2023    COVID19 Not Detected 03/02/2023    COVID19 Not Detected 03/01/2023        Assessment/Plan:     54-year-old woman with history of end-stage renal disease on dialysis, diabetes, hypertension who presented with weakness and found to have significant altered mental status with agitation and confusion. Additional history provided by patient today reveals that pt had gone to osh initially, not feeling well, but no clear diagnosis made. Came here after falling from her wheelchair and having rib pain.      Altered mental status  Agitation confusion  Weakness  - Neurology consult saw pt  --suspect that pt received meds in ED that resulted in confusion. She is much improved today. Unclear  what patient was exactly feeling that led to OSH eval before this one--> ?ended dialysis early due to cramping, weakness  --for completeness and due to CVA history, will obtain CT head  --she is on meds for pain/spasm, polypharmacy could be a concern in general.    Rib pain  - will check cxr now that pt more capable of undergoing testing. Eval for rib fractures  - lidocaine patches, heat have been helpful     Lower extremity pain and spasm  - Seems to be persistent complaint, not always associated with dialysis  - Pt endorses taking norco and flexeril routinely. Not sure that she has tried valium before.    --discussed with pt that LE arterial dopplers should be considered, could also be done as outpt     ESRD on HD  - Dialysis per nephrology.  Labs currently stable     DM- glc elevated now that pt is awake. Will restart tresiba. Increase ssi     SCDs           Pattie Washington MD  [facility]  Pager: [phone]

## 2024-11-30 NOTE — PLAN OF CARE
Assumed care at approx 1930.  Alert and oriented x4.   On 2L O2 NC, respirations even and unlabored.  NSR on tele.  Pain controlled with meds.  Voids in restroom.  Safety precautions maintained, patient reports needs met, call light within reach.      Problem: Diabetes/Glucose Control  Goal: Glucose maintained within prescribed range  Description: INTERVENTIONS:  - Monitor Blood Glucose as ordered  - Assess for signs and symptoms of hyperglycemia and hypoglycemia  - Administer ordered medications to maintain glucose within target range  - Assess barriers to adequate nutritional intake and initiate nutrition consult as needed  - Instruct patient on self management of diabetes  Outcome: Progressing

## 2024-11-30 NOTE — PROGRESS NOTES
[facility]   part of [facility]     Nephrology Progress Note    Kimmy Sparks Patient Status:  Observation    1970 MRN [id number]   Location [facility] 7NE-A Attending Pattie Washington MD   Hosp Day # 0 PCP Alejandro Pedersen MD       SUBJECTIVE:  Seen on HD, doing well today        Physical Exam:   /82 (BP Location: Right arm)   Pulse 83   Temp 98.3 °F (36.8 °C) (Oral)   Resp 25   SpO2 94%   Temp (24hrs), Av.9 °F (36.6 °C), Min:97.4 °F (36.3 °C), Max:98.3 °F (36.8 °C)       Intake/Output Summary (Last 24 hours) at 2024 1146  Last data filed at 2024 1700  Gross per 24 hour   Intake 670 ml   Output --   Net 670 ml     Last 3 Weights   24 0417 280 lb 13.9 oz (127.4 kg)   24 0530 285 lb (129.3 kg)   10/31/24 0700 286 lb 2.5 oz (129.8 kg)   10/30/24 1938 267 lb (121.1 kg)   24 0019 275 lb (124.7 kg)   07/10/24 1010 269 lb (122 kg)   07/10/24 0345 269 lb (122 kg)   23 1855 256 lb (116.1 kg)   23 1409 222 lb (100.7 kg)     General: Alert and oriented in no apparent distress.  HEENT: No scleral icterus, MMM  Neck: Supple, no RAHAT or thyromegaly  Cardiac: Regular rate and rhythm, S1, S2 normal, no murmur or rub  Lungs: Clear without wheezes, rales, rhonchi.    Abdomen: Soft, non-tender. + bowel sounds, no palpable organomegaly  Extremities: Without clubbing, cyanosis or edema.  Neurologic: Alert and oriented, cranial nerves grossly intact, moving all extremities  Skin: Warm and dry, no rash        Labs:     Recent Labs   Lab 24  0047 24  0615   WBC 14.7* 9.8   HGB 10.8* 9.4*   MCV 91.5 95.0   .0 379.0       Recent Labs   Lab 24  0053 24  0615   * 128*   K 4.8 5.1   CL 88* 88*   CO2 29.0 29.0   BUN 30* 46*   CREATSERUM 4.47* 6.15*   CA 10.3 9.0   MG  --  2.5   * 313*       Recent Labs   Lab 24  0053 24  0615   ALT 12 8*   AST 21 12   ALB 4.4 4.3       Recent Labs   Lab 24  1118  11/29/24  1544 11/29/24  2140 11/30/24  0528   PGLU 83 242* 320* 339*       Meds:   Current Facility-Administered Medications   Medication Dose Route Frequency    lidocaine-menthol 4-1 % patch 2 patch  2 patch Transdermal Daily    diazePAM (Valium) tab 5 mg  5 mg Oral Once PRN    albuterol (Ventolin HFA) 108 (90 Base) MCG/ACT inhaler 2 puff  2 puff Inhalation Q6H PRN    carvedilol (Coreg) tab 37.5 mg  37.5 mg Oral BID PRN    clopidogrel (Plavix) tab 75 mg  75 mg Oral Daily    [START ON 12/1/2024] ergocalciferol (Vitamin D2) cap 50,000 Units  50,000 Units Oral Weekly    levothyroxine (Synthroid) tab 200 mcg  200 mcg Oral Before breakfast    levothyroxine (Synthroid) tab 75 mcg  75 mcg Oral Before breakfast    pantoprazole (Protonix) DR tab 40 mg  40 mg Oral QAM AC    pravastatin (Pravachol) tab 20 mg  20 mg Oral Nightly    senna-docusate (Senokot-S) 8.6-50 MG per tab 3 tablet  3 tablet Oral Daily    glucose (Dex4) 15 GM/59ML oral liquid 15 g  15 g Oral Q15 Min PRN    Or    glucose (Glutose) 40% oral gel 15 g  15 g Oral Q15 Min PRN    Or    glucose-vitamin C (Dex-4) chewable tab 4 tablet  4 tablet Oral Q15 Min PRN    Or    dextrose 50% injection 50 mL  50 mL Intravenous Q15 Min PRN    Or    glucose (Dex4) 15 GM/59ML oral liquid 30 g  30 g Oral Q15 Min PRN    Or    glucose (Glutose) 40% oral gel 30 g  30 g Oral Q15 Min PRN    Or    glucose-vitamin C (Dex-4) chewable tab 8 tablet  8 tablet Oral Q15 Min PRN    heparin (Porcine) 5000 UNIT/ML injection 5,000 Units  5,000 Units Subcutaneous Q8H JONATHAN    acetaminophen (Tylenol Extra Strength) tab 500 mg  500 mg Oral Q4H PRN    ondansetron (Zofran) 4 MG/2ML injection 4 mg  4 mg Intravenous Q6H PRN    prochlorperazine (Compazine) 10 MG/2ML injection 5 mg  5 mg Intravenous Q8H PRN    insulin aspart (NovoLOG) 100 Units/mL FlexPen 1-5 Units  1-5 Units Subcutaneous TID AC and HS    epoetin nile (Epogen, Procrit) 45820 UNIT/ML injection 10,000 Units  10,000 Units Intravenous Once in  dialysis    sodium chloride 0.9 % IV bolus 100 mL  100 mL Intravenous Q30 Min PRN    And    albumin human (Albumin) 25% injection 25 g  25 g Intravenous PRN Dialysis    cyclobenzaprine (Flexeril) tab 10 mg  10 mg Oral TID PRN    buPROPion ER (Wellbutrin XL) 24 hr tab 150 mg  150 mg Oral Daily    gabapentin (Neurontin) cap 400 mg  400 mg Oral BID    HYDROcodone-acetaminophen (Norco) 5-325 MG per tab 1 tablet  1 tablet Oral Q6H PRN    heparin (Porcine) 1000 UNIT/ML injection 1,500 Units  1.5 mL Intracatheter Once    Sucroferric Oxyhydroxide CHEW 2 tablet **PATIENT SUPPLIED MED**  2 tablet Oral TID with meals    lidocaine-menthol 4-1 % patch 1 patch  1 patch Transdermal Daily         Impression/Plan:      #1.  ESRD- due to DM/HTN.  Cont HD per usual TTHS routine     #2.  Anemia- due to ESRD.  RAMSES for goal hgb 10-11 gms     #3.  AMS- etiology not clear.  ? Meds for LE spasm.  Neurology has seen, no recommendations     #4.  HTN- cont carvedilol    Questions/concerns were discussed with patient and/or family by bedside.          Irineo Read MD  11/30/2024  11:46 AM

## 2024-11-30 NOTE — PHYSICAL THERAPY NOTE
PT order received and chart reviewed. Pt is on HD. Will f/u later as schedule allows otherwise will be seen tomorrow

## 2024-12-01 VITALS
TEMPERATURE: 98 F | DIASTOLIC BLOOD PRESSURE: 86 MMHG | SYSTOLIC BLOOD PRESSURE: 116 MMHG | OXYGEN SATURATION: 92 % | RESPIRATION RATE: 18 BRPM | HEART RATE: 92 BPM

## 2024-12-01 LAB
GLUCOSE BLD-MCNC: 194 MG/DL (ref 70–99)
GLUCOSE BLD-MCNC: 214 MG/DL (ref 70–99)

## 2024-12-01 NOTE — OCCUPATIONAL THERAPY NOTE
OCCUPATIONAL THERAPY EVALUATION - INPATIENT    Room Number: 7611/7611-A  Evaluation Date: 12/1/2024     Type of Evaluation: Initial  Presenting Problem: fall from wheelchair, rib pain    Physician Order: IP Consult to Occupational Therapy  Reason for Therapy:  ADL/IADL Dysfunction and Discharge Planning      OCCUPATIONAL THERAPY ASSESSMENT   Patient met all OT goals at baseline level.    Patient reports no further questions/concerns at this time.   Discharge OT in hospital.          History: Patient is a 54 year old female admitted on 11/29/2024 with Presenting Problem: fall from wheelchair, rib pain. Co-Morbidities : ESRD, HD, DM, HTN, stroke with R residual weakness, LLE spasms, RLE injury    WEIGHT BEARING RESTRICTION                   Recommendations for nursing staff:   Transfers: assist with gait belt  Toileting location: bedside commode    EVALUATION SESSION:  ACTIVITY TOLERANCE   Vitals wfl    COGNITION  Following Commands:  follows one step commands consistently    UPPER EXTREMITY:   ROM: within functional limits except for the following:  R hand digits 3-5 IP/MP flexion contractures  Strength: is within functional limits except for the following;  R  weak, R elbow 3+/5, unable to tolerate shoulder testing due to rib pain    EDUCATION PROVIDED  Patient Education : Role of Occupational Therapy; Plan of Care; Functional Transfer Techniques; Fall Prevention  Patient's Response to Education: Verbalized Understanding    PATIENT START OF SESSION: semi supine  FUNCTIONAL TRANSFER ASSESSMENT  Sit to Stand: Edge of Bed  Edge of Bed: Minimal Assist    BED MOBILITY  Supine to Sit : Supervision    BALANCE ASSESSMENT     FUNCTIONAL ADL ASSESSMENT  LB Dressing Seated: Moderate Assist      THERAPIST COMMENTS: ADL/mobility as noted. Assist to don/doff B socks (does not wear), able to slide L foot into shoe, assist for R shoe (baseline). Min (A) stand pivot transfer to bedside chair.     Patient End of Session: Up in  chair;Needs met;Call light within reach;RN aware of session/findings;All patient questions and concerns addressed;Hospital anti-slip socks    OCCUPATIONAL PROFILE    HOME SITUATION  Type of Home: House  Home Layout: One level;Ramped entrance  Lives With: Spouse;Daughter (stays mostly at daughter's house due to accessibility; her own home has 5-7 wilian)    Toilet and Equipment: Toilet riser with arms;Standard height toilet  Shower/Tub and Equipment: Tub-shower combo (doesn't use, spongebathes)          Hand Dominance: Right  Drives: No  Patient Regularly Uses: Wheelchair (electric recliner, owns metal AFO and orthopedic shoe for RLE but does not wear due to heaviness and more difficulty)    Prior Level of Function: Pt has min-mod (A) for dressing, bathing, toileting at home. Uses wheelchair, which fits into bathroom. Usually has assist from daughter or spouse for transfers, occasionally performs toilet transfer alone if no one is home to help. Has not been able to go in her own home for 2 years.     SUBJECTIVE  Pt states her R hand is still weaker from her stroke.    PAIN ASSESSMENT  Rating: Unable to rate  Location: R LE spasms, rib pain  Management Techniques: Repositioning    OBJECTIVE  Precautions: Limb alert - right (RLE weakness)  Fall Risk: High fall risk    WEIGHT BEARING RESTRICTION                   AM-PAC ‘6-Clicks’ Inpatient Daily Activity Short Form  -   Putting on and taking off regular lower body clothing?: A Lot  -   Bathing (including washing, rinsing, drying)?: A Lot  -   Toileting, which includes using toilet, bedpan or urinal? : A Lot  -   Putting on and taking off regular upper body clothing?: A Little  -   Taking care of personal grooming such as brushing teeth?: A Little  -   Eating meals?: None    AM-PAC Score:  Score: 16  Approx Degree of Impairment: 53.32%  Standardized Score (AM-PAC Scale): 35.96      ADDITIONAL TESTS     NEUROLOGICAL FINDINGS        PLAN   Patient has been evaluated and  presents with no skilled Occupational Therapy needs at this time.  Patient discharged from Occupational Therapy services.  Please re-order if a new functional limitation presents during this admission.      Patient Evaluation Complexity Level:   Occupational Profile/Medical History LOW   Specific performance deficits impacting engagement in ADL/IADL LOW   Client Assessment/Performance Deficits LOW   Clinical Decision Making LOW   Overall Complexity LOW     OT Session Time: 25 minutes  Self-Care Home Management:  minutes  Therapeutic Activity: 10 minutes  Neuromuscular Re-education:  minutes  Therapeutic Exercise: minutes

## 2024-12-01 NOTE — PLAN OF CARE
Assumed pt care at 1930  Aox4, RA, VSS  Tele-NSR  Reported pain-administered PRN Norco  Reported no n/v/d  Cardiac diet; 1800Kcal restriction  Heparin sub DVT prophylaxis  continent  [id number] pivot to chair  Provided patient education to the patient and family member on using I.S. treatment  Safety precautions in place  Bed in lowest position and call light within reach  Updated with plan of care  All needs met at this time  Will continue plan of care        '      Problem: Diabetes/Glucose Control  Goal: Glucose maintained within prescribed range  Description: INTERVENTIONS:  - Monitor Blood Glucose as ordered  - Assess for signs and symptoms of hyperglycemia and hypoglycemia  - Administer ordered medications to maintain glucose within target range  - Assess barriers to adequate nutritional intake and initiate nutrition consult as needed  - Instruct patient on self management of diabetes  Outcome: Progressing     Problem: METABOLIC/FLUID AND ELECTROLYTES - ADULT  Goal: Glucose maintained within prescribed range  Description: INTERVENTIONS:  - Monitor Blood Glucose as ordered  - Assess for signs and symptoms of hyperglycemia and hypoglycemia  - Administer ordered medications to maintain glucose within target range  - Assess barriers to adequate nutritional intake and initiate nutrition consult as needed  - Instruct patient on self management of diabetes  Outcome: Progressing  Goal: Electrolytes maintained within normal limits  Description: INTERVENTIONS:  - Monitor labs and rhythm and assess patient for signs and symptoms of electrolyte imbalances  - Administer electrolyte replacement as ordered  - Monitor response to electrolyte replacements, including rhythm and repeat lab results as appropriate  - Fluid restriction as ordered  - Instruct patient on fluid and nutrition restrictions as appropriate  Outcome: Progressing  Goal: Hemodynamic stability and optimal renal function maintained  Description:  INTERVENTIONS:  - Monitor labs and assess for signs and symptoms of volume excess or deficit  - Monitor intake, output and patient weight  - Monitor urine specific gravity, serum osmolarity and serum sodium as indicated or ordered  - Monitor response to interventions for patient's volume status, including labs, urine output, blood pressure (other measures as available)  - Encourage oral intake as appropriate  - Instruct patient on fluid and nutrition restrictions as appropriate  Outcome: Progressing     Problem: NEUROLOGICAL - ADULT  Goal: Achieves stable or improved neurological status  Description: INTERVENTIONS  - Assess for and report changes in neurological status  - Initiate measures to prevent increased intracranial pressure  - Maintain blood pressure and fluid volume within ordered parameters to optimize cerebral perfusion and minimize risk of hemorrhage  - Monitor temperature, glucose, and sodium. Initiate appropriate interventions as ordered  Outcome: Progressing     Problem: Impaired Functional Mobility  Goal: Achieve highest/safest level of mobility/gait  Description: Interventions:  - Assess patient's functional ability and stability  - Promote increasing activity/tolerance for mobility and gait  - Educate and engage patient/family in tolerated activity level and precautions  - Recommend use of  RW for transfers and ambulation  Outcome: Progressing

## 2024-12-01 NOTE — PHYSICAL THERAPY NOTE
PHYSICAL THERAPY EVALUATION - INPATIENT     Room Number: 7611/7611-A  Evaluation Date: 12/1/2024  Type of Evaluation: Initial  Physician Order: PT Eval and Treat    Presenting Problem: AMS  Co-Morbidities : ESRD, HD, DM, HTN, stroke with R residual weakness, LLE spasms, RLE injury  Reason for Therapy: Mobility Dysfunction and Discharge Planning    PHYSICAL THERAPY ASSESSMENT   Patient is a 54 year old female admitted 11/29/2024 for AMS and fall.   CTH and XR negative for acute processes.  Patient is currently functioning at baseline with bed mobility, transfers, and maintaining seated position. Prior to admission, patient's baseline is wheelchair level; assist with transfers and ADLs. Given pt's support at home and baseline level, no further PT needs at this time.    PLAN  Patient has been evaluated and presents with no skilled Physical Therapy needs at this time.  Patient discharged from Physical Therapy services.  Please re-order if a new functional limitation presents during this admission.         GOALS  Patient was able to achieve the following goals ...    Patient was able to transfer At previous, functional level   Patient able to ambulate on level surfaces Unable before admission     HOME SITUATION  Type of Home: House  Home Layout: One level;Ramped entrance  Stairs to Enter : 2                  Lives With: Spouse;Daughter (stays mostly at daughter's house due to accessibility; her own home has 5-7 wilian)    Drives: No   Patient Regularly Uses: Wheelchair (electric recliner, owns metal AFO and orthopedic shoe for RLE but does not wear due to heaviness and more difficulty)     Prior Level of [place]: lives with daughter, has had a fall early in the year and has increased level of assistance/supervision with activities; mostly transfers - pivot style to surfaces; sleeps in recliner, uses w/c     SUBJECTIVE  C/o LE spasms/pain RLE>LLE    OBJECTIVE  Precautions: Limb alert - right (RLE weakness)  Fall Risk:  High fall risk    WEIGHT BEARING RESTRICTION     PAIN ASSESSMENT  Rating: 10  Location: ribs, LE  Management Techniques: Activity promotion;Repositioning;Breathing techniques;Body mechanics    COGNITION  A&Ox4, appropriate safety awareness and insight into deficits    RANGE OF MOTION AND STRENGTH ASSESSMENT  Upper extremity ROM and strength are within functional limits     Lower extremity ROM is within functional limits    Lower extremity strength is within functional limits   RLE: ankle DF, knee extension 3/5    BALANCE  Static Sitting: Good  Dynamic Sitting: Fair +  Static Standing: Fair -  Dynamic Standing: Poor +    ADDITIONAL TESTS                             RLE pitting edema pedal       ACTIVITY TOLERANCE                         O2 WALK       NEUROLOGICAL FINDINGS      Absent to light touch B feet, absent proprioception B great toes, intact at ankle                  AM-PAC '6-Clicks' INPATIENT SHORT FORM - BASIC MOBILITY  How much difficulty does the patient currently have...  Patient Difficulty: Turning over in bed (including adjusting bedclothes, sheets and blankets)?: None   Patient Difficulty: Sitting down on and standing up from a chair with arms (e.g., wheelchair, bedside commode, etc.): A Little   Patient Difficulty: Moving from lying on back to sitting on the side of the bed?: None   How much help from another person does the patient currently need...   Help from Another: Moving to and from a bed to a chair (including a wheelchair)?: A Little   Help from Another: Need to walk in hospital room?: A Lot   Help from Another: Climbing 3-5 steps with a railing?: A Lot       AM-PAC Score:  Raw Score: 18   Approx Degree of Impairment: 46.58%   Standardized Score (AM-PAC Scale): 43.63   CMS Modifier (G-Code): CK    FUNCTIONAL ABILITY STATUS  Gait Assessment        Skilled Therapy Provided     Bed Mobility:  Rolling: mod I  Supine to sit: mod I     Transfer Mobility:  Sit to stand: contact guard, able to align  equipment appropriately; uses UE support and leans forward, pivots/turns to sit, increased time to perform       Exercise/Education Provided:  Educated pt on activity recs of upright positioning given L lung infiltrate at base, safety when returning home/need for assistance/supervision at all times; discussed need for visual attention to BLE given altered sensation, Pt verbalized understanding of education provided; recommended home PT, however pt declined - states she is aware of mobility and what she needs to do, has had several therapy sessions in the past and daughter does not want visitors in the house    Patient End of Session: Up in chair;Needs met;Call light within reach;RN aware of session/findings;Hospital anti-slip socks;All patient questions and concerns addressed    Patient Evaluation Complexity Level:  History Moderate - 1 or 2 personal factors and/or co-morbidities   Examination of body systems Moderate - addressing a total of 3 or more elements   Clinical Presentation Low- Stable   Clinical Decision Making Low Complexity       PT Session Time: 25 minutes

## 2024-12-01 NOTE — PLAN OF CARE
Pt A & O x 4   RA IS taught and education given   It can found in the white folder on the desk  Norco given along with flexeril and gabapentin  Lidocaine patches added               Problem: Diabetes/Glucose Control  Goal: Glucose maintained within prescribed range  Description: INTERVENTIONS:  - Monitor Blood Glucose as ordered  - Assess for signs and symptoms of hyperglycemia and hypoglycemia  - Administer ordered medications to maintain glucose within target range  - Assess barriers to adequate nutritional intake and initiate nutrition consult as needed  - Instruct patient on self management of diabetes  Outcome: Progressing     Problem: METABOLIC/FLUID AND ELECTROLYTES - ADULT  Goal: Glucose maintained within prescribed range  Description: INTERVENTIONS:  - Monitor Blood Glucose as ordered  - Assess for signs and symptoms of hyperglycemia and hypoglycemia  - Administer ordered medications to maintain glucose within target range  - Assess barriers to adequate nutritional intake and initiate nutrition consult as needed  - Instruct patient on self management of diabetes  Outcome: Progressing  Goal: Electrolytes maintained within normal limits  Description: INTERVENTIONS:  - Monitor labs and rhythm and assess patient for signs and symptoms of electrolyte imbalances  - Administer electrolyte replacement as ordered  - Monitor response to electrolyte replacements, including rhythm and repeat lab results as appropriate  - Fluid restriction as ordered  - Instruct patient on fluid and nutrition restrictions as appropriate  Outcome: Progressing  Goal: Hemodynamic stability and optimal renal function maintained  Description: INTERVENTIONS:  - Monitor labs and assess for signs and symptoms of volume excess or deficit  - Monitor intake, output and patient weight  - Monitor urine specific gravity, serum osmolarity and serum sodium as indicated or ordered  - Monitor response to interventions for patient's volume status,  including labs, urine output, blood pressure (other measures as available)  - Encourage oral intake as appropriate  - Instruct patient on fluid and nutrition restrictions as appropriate  Outcome: Progressing     Problem: NEUROLOGICAL - ADULT  Goal: Achieves stable or improved neurological status  Description: INTERVENTIONS  - Assess for and report changes in neurological status  - Initiate measures to prevent increased intracranial pressure  - Maintain blood pressure and fluid volume within ordered parameters to optimize cerebral perfusion and minimize risk of hemorrhage  - Monitor temperature, glucose, and sodium. Initiate appropriate interventions as ordered  Outcome: Progressing     Problem: Impaired Functional Mobility  Goal: Achieve highest/safest level of mobility/gait  Description: Interventions:  - Assess patient's functional ability and stability  - Promote increasing activity/tolerance for mobility and gait  - Educate and engage patient/family in tolerated activity level and precautions  {Additional Mobility Interventions:[id number]:::0}  Outcome: Progressing

## 2024-12-01 NOTE — PLAN OF CARE
NURSING DISCHARGE NOTE    Discharged Home via Wheelchair.  Accompanied by Family member  Belongings Taken by patient/family.    Follow up and discharge instructions gone over with family and patient  No further questions at this time  IV removed  Dialysis schedule gone over  All safety measures maintained     Problem: Patient/Family Goals  Goal: Patient/Family Long Term Goal  Description: Patient's Long Term Goal: Patient will no longer have reoccurrence episodes of LOC     Interventions:  - Better med management and collaboration with doctors,   Keeping follow up appointments  - See additional Care Plan goals for specific interventions  Outcome: Adequate for Discharge  Goal: Patient/Family Short Term Goal  Description: Patient's Short Term Goal:     Interventions: Discharge home safely     -Collaborate with team for safe return  PT'OT eval      - See additional Care Plan goals for specific interventions  Outcome: Adequate for Discharge     Problem: Diabetes/Glucose Control  Goal: Glucose maintained within prescribed range  Description: INTERVENTIONS:  - Monitor Blood Glucose as ordered  - Assess for signs and symptoms of hyperglycemia and hypoglycemia  - Administer ordered medications to maintain glucose within target range  - Assess barriers to adequate nutritional intake and initiate nutrition consult as needed  - Instruct patient on self management of diabetes  12/1/2024 1725 by Samira Soriano, RN  Outcome: Adequate for Discharge  12/1/2024 0854 by Samira Soriano, RN  Outcome: Progressing     Problem: METABOLIC/FLUID AND ELECTROLYTES - ADULT  Goal: Glucose maintained within prescribed range  Description: INTERVENTIONS:  - Monitor Blood Glucose as ordered  - Assess for signs and symptoms of hyperglycemia and hypoglycemia  - Administer ordered medications to maintain glucose within target range  - Assess barriers to adequate nutritional intake and initiate nutrition consult as needed  - Instruct patient on self  management of diabetes  12/1/2024 1725 by Samira Soriano RN  Outcome: Adequate for Discharge  12/1/2024 0854 by Samira Soriano RN  Outcome: Progressing  Goal: Electrolytes maintained within normal limits  Description: INTERVENTIONS:  - Monitor labs and rhythm and assess patient for signs and symptoms of electrolyte imbalances  - Administer electrolyte replacement as ordered  - Monitor response to electrolyte replacements, including rhythm and repeat lab results as appropriate  - Fluid restriction as ordered  - Instruct patient on fluid and nutrition restrictions as appropriate  12/1/2024 1725 by Samira Soriano RN  Outcome: Adequate for Discharge  12/1/2024 0854 by Samira Soriano RN  Outcome: Progressing  Goal: Hemodynamic stability and optimal renal function maintained  Description: INTERVENTIONS:  - Monitor labs and assess for signs and symptoms of volume excess or deficit  - Monitor intake, output and patient weight  - Monitor urine specific gravity, serum osmolarity and serum sodium as indicated or ordered  - Monitor response to interventions for patient's volume status, including labs, urine output, blood pressure (other measures as available)  - Encourage oral intake as appropriate  - Instruct patient on fluid and nutrition restrictions as appropriate  12/1/2024 1725 by Samira Soriano RN  Outcome: Adequate for Discharge  12/1/2024 0854 by Samira Soriano RN  Outcome: Progressing     Problem: NEUROLOGICAL - ADULT  Goal: Achieves stable or improved neurological status  Description: INTERVENTIONS  - Assess for and report changes in neurological status  - Initiate measures to prevent increased intracranial pressure  - Maintain blood pressure and fluid volume within ordered parameters to optimize cerebral perfusion and minimize risk of hemorrhage  - Monitor temperature, glucose, and sodium. Initiate appropriate interventions as ordered  12/1/2024 1725 by Samira Soriano RN  Outcome: Adequate for Discharge  12/1/2024 0854 by  Samira Soriano, RN  Outcome: Progressing     Problem: Impaired Functional Mobility  Goal: Achieve highest/safest level of mobility/gait  Description: Interventions:  - Assess patient's functional ability and stability  - Promote increasing activity/tolerance for mobility and gait  - Educate and engage patient/family in tolerated activity level and precautions  - Recommend patient transfer to bedside chair toward strongest side  12/1/2024 1734 by Samira Soriano, RN  Outcome: Adequate for Discharge  12/1/2024 0854 by Samira Soriano, RN  Outcome: Progressing

## 2024-12-02 ENCOUNTER — PATIENT OUTREACH (OUTPATIENT)
Dept: CASE MANAGEMENT | Age: 54
End: 2024-12-02

## 2024-12-02 NOTE — PROGRESS NOTES
Called pt to schedule a hospital follow-up appt :      DM Request :    Last A1C Value: 9.3% Date: [10/31/2024]     Follow up with Alejandro Pedersen in 1 week Specialty: Internal Medicine [address] [place] [id number] [phone]  DECLINED - pt does not drive and has to schedule per her  or daughters schedule.  I did ask if she would like me to contact them directly, the pt further declined assistance with scheduling      No further assistance needed      Closing encounter

## 2024-12-06 NOTE — DISCHARGE SUMMARY
.[facility] Internal Medicine Discharge Summary    Patient ID:  Kimmy Sparks  [id number]  54 year old  6/11/1970    Admit date: 11/29/2024  Discharge date and time: 12/1/2024  Attending Physician: Pattie Washington MD  Primary Care Physician: Alejandro Pedersen MD     Admit Dx: Disorientation [R41.0]    Primary Diagnosis at discharge from Hospital: Other: AMS; no TCM follow-up needed     Secondary Diagnoses:  LE pain and spasms    Risk of readmission: Kimmy Sparks has Moderate Risk of readmission after discharge from the hospital.    Discharged Condition: fair    Disposition: home    Important follow up:  Alejandro Pedersen MD  [address] [facility] [address] [phone]    Follow up in 1 week(s)      Blake Malin MD  [address] [address] [phone]    Follow up in 2 week(s)  Call on Monday to make and appointment        Reason for admission and hospital course:   54-year-old woman with history of end-stage renal disease on dialysis, diabetes, hypertension who presented with weakness and found to have significant altered mental status with agitation and confusion. Additional history provided by patient reveals that pt had gone to osh initially, not feeling well, but no clear diagnosis made. Came here after falling from her wheelchair and having rib pain.      Altered mental status  Agitation confusion  Weakness  - Neurology consult saw pt  --suspect that pt received meds in ED that resulted in confusion. She seemed to improve to baseline on her own. Unclear what patient was exactly feeling that led to OSH eval before this one--> ?ended dialysis early due to cramping, weakness  --for completeness and due to CVA history, obtained ct head which was neg for acute abn  --she is on meds for pain/spasm, polypharmacy could be a concern in general.     Rib pain  - cxr neg for acute fractures, atelectasis seen.  - lidocaine patches, heat have been helpful     Lower extremity pain and spasm  -  Seems to be persistent complaint, not always associated with dialysis  - Pt endorses taking norco and flexeril routinely. Not sure that she has tried valium before.    --discussed with pt that LE arterial dopplers should be considered, could also be done as outpt. She plans to f/u with vascular  --given difficulties with symptom management, also consider f/u with pain specialist.     Other medical issues were relatively stable    Day of discharge Exam  Vitals:    12/01/24 1200   BP: 116/86   Pulse: 92   Resp: 18   Temp: 98.1 °F (36.7 °C)     Exam on day of discharge:  Gen: No acute distress, alert and oriented  CV: RRR, +s1/s2  Lungs: CTAB, good respiratory effort  Abdomen: s/nt/nd  Ext: Moves all 4 extremities, no c/c/e  Neuro: CN Intact, no focal deficits    Discharge meds     Medication List        START taking these medications      lidocaine-menthol 4-1 % Ptch  Place 2 patches onto the skin daily.            CONTINUE taking these medications      albuterol 108 (90 Base) MCG/ACT Aers  Commonly known as: Ventolin HFA     buPROPion  MG Tb24  Commonly known as: Wellbutrin XL     carvedilol 12.5 MG Tabs  Commonly known as: Coreg     clopidogrel 75 MG Tabs  Commonly known as: Plavix     cyclobenzaprine 10 MG Tabs  Commonly known as: Flexeril     ergocalciferol 1.25 MG (32494 UT) Caps  Commonly known as: ERGOCALCIFEROL     gabapentin 400 MG Caps  Commonly known as: Neurontin     * HYDROcodone-acetaminophen  MG Tabs  Commonly known as: Norco     * HYDROcodone-acetaminophen 5-325 MG Tabs  Commonly known as: Norco  Take 1 tablet by mouth every 6 (six) hours as needed for Pain. Do not drive or operate machinery within 8 hours of taking this medication     Insulin Aspart FlexPen 100 UNIT/ML Sopn     insulin glargine 100 UNIT/ML Sopn     * levothyroxine 75 MCG Tabs  Commonly known as: Synthroid     * levothyroxine 200 MCG Tabs  Commonly known as: Synthroid     Omeprazole 40 MG Cpdr     ondansetron 4 MG  Tbdp  Commonly known as: Zofran-ODT     pravastatin 20 MG Tabs  Commonly known as: Pravachol     senna-docusate 8.6-50 MG Tabs  Commonly known as: Senokot-S     Velphoro 500 MG Chew  Generic drug: Sucroferric Oxyhydroxide     VITAMIN D-3 OR           * This list has 4 medication(s) that are the same as other medications prescribed for you. Read the directions carefully, and ask your doctor or other care provider to review them with you.                STOP taking these medications      furosemide 20 MG Tabs  Commonly known as: Lasix     hydrOXYzine 25 MG Tabs  Commonly known as: Atarax     Naloxegol Oxalate 25 MG Tabs               Where to Get Your Medications        These medications were sent to [id number] - [place], [place] - [address] [facility] [address], [phone], [phone] [address] | [employer] [facility], [facility] [address]      Hours: 24-hours Phone: [phone]   lidocaine-menthol 4-1 % Ptch       Consults: IP CONSULT TO NEUROLOGY  IP CONSULT TO NEPHROLOGY  IP CONSULT TO NEUROLOGY  Radiology: CT BRAIN OR HEAD (CPT=70450)    Result Date: 11/30/2024  PROCEDURE:  CT BRAIN OR HEAD (61102)  COMPARISON:  None.  INDICATIONS:  Confusion  TECHNIQUE:  Noncontrast CT scanning is performed through the brain. Dose reduction techniques were used. Dose information is transmitted to the ACR (American College of Radiology) NRDR (National Radiology Data Registry) which includes the Dose Index Registry.  PATIENT STATED HISTORY: (As transcribed by Technologist)  Confusion. Fell out of wheelchair.    FINDINGS:  VENTRICLES/SULCI:  Ventricles and sulci are normal in size.  INTRACRANIAL:  There are no abnormal extraaxial fluid collections.  There is no midline shift.  There are no intraparenchymal brain abnormalities.  There is nothing specific for acute infarct.  There is no hemorrhage or mass lesion.  SINUSES:           No sign of acute sinusitis.  MASTOIDS:          No sign of acute  inflammation. SKULL:             No evidence for fracture or osseous abnormality. OTHER:             None.            CONCLUSION:  No acute process.    LOCATION:  [facility]   Dictated by (CST): Tristian Coburn MD on 11/30/2024 at 3:42 PM     Finalized by (CST): Tristian Coburn MD on 11/30/2024 at 3:44 PM       XR CHEST PA + LAT CHEST (CPT=71046)    Result Date: 11/30/2024  PROCEDURE:  XR CHEST PA + LAT CHEST (CPT=71046)  INDICATIONS:  Chest pain  COMPARISON:  [facility] , XR, XR CHEST AP PORTABLE  (CPT=71045), 10/30/2024, 10:08 PM.  TECHNIQUE:  PA and lateral chest radiographs were obtained.  PATIENT STATED HISTORY: (As transcribed by Technologist)  Patient offered no additional history at this time.              CONCLUSION:    Low lung volumes poor inspiration.  Increasing infiltrate left base may reflect pneumonia worsening or increasing atelectasis.  Minimal atelectasis probable at the right base.  Stable cardiomegaly.  Central venous catheter present tip in the SVC. No sizable effusion, but small effusion difficult to exclude on portable chest x-ray. No evidence for pneumothorax.  Consider upright PA and lateral examination of the chest, when tolerated.  LOCATION:  [facility]   Dictated by (CST): Tristian Coburn MD on 11/30/2024 at 3:22 PM     Finalized by (CST): Tristian Coburn MD on 11/30/2024 at 3:23 PM       Operative Procedures:   Activity: activity as tolerated  Diet: regular diet  Wound Care: none needed  Code Status: Full Code  O2: none  Total Time Coordinating Care: 35 minutes Patient had opportunity to ask questions and state understand and agree with therapeutic plan as outlined    I reconciled current and discharge medications on day of discharge.

## 2024-12-09 ENCOUNTER — APPOINTMENT (OUTPATIENT)
Dept: GENERAL RADIOLOGY | Facility: HOSPITAL | Age: 54
End: 2024-12-09
Payer: COMMERCIAL

## 2024-12-09 ENCOUNTER — HOSPITAL ENCOUNTER (EMERGENCY)
Facility: HOSPITAL | Age: 54
Discharge: HOME | End: 2024-12-10
Attending: EMERGENCY MEDICINE
Payer: COMMERCIAL

## 2024-12-09 ENCOUNTER — APPOINTMENT (OUTPATIENT)
Dept: ULTRASOUND IMAGING | Facility: HOSPITAL | Age: 54
End: 2024-12-09
Payer: COMMERCIAL

## 2024-12-09 DIAGNOSIS — R60.0 PERIPHERAL EDEMA: Primary | ICD-10-CM

## 2024-12-09 PROCEDURE — 93005 ELECTROCARDIOGRAM TRACING: CPT

## 2024-12-09 PROCEDURE — 93010 ELECTROCARDIOGRAM REPORT: CPT

## 2024-12-09 PROCEDURE — 80053 COMPREHEN METABOLIC PANEL: CPT | Performed by: EMERGENCY MEDICINE

## 2024-12-09 PROCEDURE — 71045 X-RAY EXAM CHEST 1 VIEW: CPT | Performed by: EMERGENCY MEDICINE

## 2024-12-09 PROCEDURE — 99284 EMERGENCY DEPT VISIT MOD MDM: CPT

## 2024-12-09 PROCEDURE — 99285 EMERGENCY DEPT VISIT HI MDM: CPT

## 2024-12-09 PROCEDURE — 93970 EXTREMITY STUDY: CPT

## 2024-12-09 PROCEDURE — 85025 COMPLETE CBC W/AUTO DIFF WBC: CPT | Performed by: EMERGENCY MEDICINE

## 2024-12-09 PROCEDURE — 36415 COLL VENOUS BLD VENIPUNCTURE: CPT

## 2024-12-09 PROCEDURE — 84484 ASSAY OF TROPONIN QUANT: CPT | Performed by: EMERGENCY MEDICINE

## 2024-12-10 VITALS
WEIGHT: 278 LBS | TEMPERATURE: 97 F | SYSTOLIC BLOOD PRESSURE: 145 MMHG | OXYGEN SATURATION: 98 % | BODY MASS INDEX: 42 KG/M2 | DIASTOLIC BLOOD PRESSURE: 97 MMHG | RESPIRATION RATE: 18 BRPM | HEART RATE: 88 BPM

## 2024-12-10 LAB
ALBUMIN SERPL-MCNC: 4.4 G/DL (ref 3.2–4.8)
ALBUMIN/GLOB SERPL: 1 {RATIO} (ref 1–2)
ALP LIVER SERPL-CCNC: 138 U/L
ALT SERPL-CCNC: 9 U/L
ANION GAP SERPL CALC-SCNC: 12 MMOL/L (ref 0–18)
AST SERPL-CCNC: 11 U/L (ref ?–34)
BASOPHILS # BLD AUTO: 0.08 X10(3) UL (ref 0–0.2)
BASOPHILS NFR BLD AUTO: 0.7 %
BILIRUB SERPL-MCNC: 0.2 MG/DL (ref 0.3–1.2)
BUN BLD-MCNC: 45 MG/DL (ref 9–23)
CALCIUM BLD-MCNC: 9.5 MG/DL (ref 8.7–10.4)
CHLORIDE SERPL-SCNC: 90 MMOL/L (ref 98–112)
CO2 SERPL-SCNC: 27 MMOL/L (ref 21–32)
CREAT BLD-MCNC: 6.52 MG/DL
EGFRCR SERPLBLD CKD-EPI 2021: 7 ML/MIN/1.73M2 (ref 60–?)
EOSINOPHIL # BLD AUTO: 0.42 X10(3) UL (ref 0–0.7)
EOSINOPHIL NFR BLD AUTO: 3.9 %
ERYTHROCYTE [DISTWIDTH] IN BLOOD BY AUTOMATED COUNT: 14.7 %
GLOBULIN PLAS-MCNC: 4.3 G/DL (ref 2–3.5)
GLUCOSE BLD-MCNC: 212 MG/DL (ref 70–99)
HCT VFR BLD AUTO: 30.7 %
HGB BLD-MCNC: 10 G/DL
IMM GRANULOCYTES # BLD AUTO: 0.09 X10(3) UL (ref 0–1)
IMM GRANULOCYTES NFR BLD: 0.8 %
LYMPHOCYTES # BLD AUTO: 1.13 X10(3) UL (ref 1–4)
LYMPHOCYTES NFR BLD AUTO: 10.5 %
MCH RBC QN AUTO: 29.8 PG (ref 26–34)
MCHC RBC AUTO-ENTMCNC: 32.6 G/DL (ref 31–37)
MCV RBC AUTO: 91.4 FL
MONOCYTES # BLD AUTO: 0.4 X10(3) UL (ref 0.1–1)
MONOCYTES NFR BLD AUTO: 3.7 %
NEUTROPHILS # BLD AUTO: 8.64 X10 (3) UL (ref 1.5–7.7)
NEUTROPHILS # BLD AUTO: 8.64 X10(3) UL (ref 1.5–7.7)
NEUTROPHILS NFR BLD AUTO: 80.4 %
OSMOLALITY SERPL CALC.SUM OF ELEC: 286 MOSM/KG (ref 275–295)
PLATELET # BLD AUTO: 391 10(3)UL (ref 150–450)
POTASSIUM SERPL-SCNC: 4.6 MMOL/L (ref 3.5–5.1)
PROT SERPL-MCNC: 8.7 G/DL (ref 5.7–8.2)
RBC # BLD AUTO: 3.36 X10(6)UL
SODIUM SERPL-SCNC: 129 MMOL/L (ref 136–145)
TROPONIN I SERPL HS-MCNC: 20 NG/L
WBC # BLD AUTO: 10.8 X10(3) UL (ref 4–11)

## 2024-12-10 NOTE — ED INITIAL ASSESSMENT (HPI)
Patient endorses bilateral leg swelling, pain to touch and skin is taut. Patient first noted the swelling 3 days ago. Patient takes plavix. Denies any injuries or traumas. Patient notes she did fall 1- 2 weeks ago. Pain is worse on right leg. Leg is red and warm to touch. Denies any shiny skin. Denies fever. + SOB. Dialysis 3-4 x per week. Last time she had Dialysis was Saturday patient also notes 10 lb weight gain since Saturday.

## 2024-12-10 NOTE — ED PROVIDER NOTES
Patient Seen in: [facility] Emergency Department      History     Chief Complaint   Patient presents with    Swelling Edema     Stated Complaint: lower leg swelling, CHF, dialysis on Saturday    Subjective:   HPI      54-year-old woman with history of end-stage renal disease on dialysis, diabetes, hypertension, persistent lower extremity pain and spasm, presents to ED for evaluation for lower extremity pain and swelling.  She gets dialyzed Tuesday Thursday Saturday.  Because of excessive fluid retention occasionally she gets dialyzed extra days.  Last dialysis was on Saturday.  Since then she has noted increased water weight especially swelling in the lower extremities bilaterally.  Legs seem quite taut.  She is a little short of breath.  She is scheduled for dialysis tomorrow.  No chest pain no fevers no other associated symptoms.  No other complaints.    Objective:     Past Medical History:    Anxiety    Diabetes (HCC)    Dialysis patient (HCC)    Disorder of thyroid    End stage renal disease (HCC)    Essential hypertension    High blood pressure    High cholesterol    Hyperlipidemia    Hypertension    Renal disorder    Thyroid disease              Past Surgical History:   Procedure Laterality Date    Hysterectomy      Tubal ligation                  Social History     Socioeconomic History    Marital status:    Tobacco Use    Smoking status: Never    Smokeless tobacco: Never   Vaping Use    Vaping status: Never Used   Substance and Sexual Activity    Alcohol use: Never    Drug use: Never   Social History Narrative    ** Merged History Encounter **          Social Drivers of Health     Food Insecurity: No Food Insecurity (11/29/2024)    Food Insecurity     Food Insecurity: Never true   Transportation Needs: No Transportation Needs (11/29/2024)    Transportation Needs     Lack of Transportation: No    [facility]    Social Connections    Housing Stability: Low Risk  (11/29/2024)    Housing Stability     Housing Instability: No                  Physical Exam     ED Triage Vitals [12/09/24 2007]   BP (!) 178/93   Pulse 90   Resp 18   Temp 97.1 °F (36.2 °C)   Temp src    SpO2 96 %   O2 Device None (Room air)       Current Vitals:   Vital Signs  BP: (!) 145/97  Pulse: 88  Resp: 18  Temp: 97.1 °F (36.2 °C)  MAP (mmHg): (!) 124    Oxygen Therapy  SpO2: 98 %  O2 Device: None (Room air)        Physical Exam  Vitals and nursing note reviewed.   Constitutional:       General: She is not in acute distress.     Appearance: She is well-developed. She is not toxic-appearing.   HENT:      Head: Normocephalic and atraumatic.   Eyes:      General: No scleral icterus.     Conjunctiva/sclera: Conjunctivae normal.   Cardiovascular:      Rate and Rhythm: Normal rate.   Pulmonary:      Effort: Pulmonary effort is normal. No respiratory distress.   Abdominal:      General: There is no distension.      Tenderness: There is no abdominal tenderness. There is no guarding.   Musculoskeletal:         General: No tenderness. Normal range of motion.      Cervical back: Normal range of motion and neck supple.   Skin:     General: Skin is warm and dry.      Findings: No rash.   Neurological:      Mental Status: She is alert and oriented to person, place, and time.      Motor: No abnormal muscle tone.      Coordination: Coordination normal.   Psychiatric:         Behavior: Behavior normal.            ED Course     Labs Reviewed   CBC WITH DIFFERENTIAL WITH PLATELET - Abnormal; Notable for the following components:       Result Value    RBC 3.36 (*)     HGB 10.0 (*)     HCT 30.7 (*)     Neutrophil Absolute Prelim 8.64 (*)     Neutrophil Absolute 8.64 (*)     All other components within normal limits   COMP METABOLIC PANEL (14) - Abnormal; Notable for the following components:    Glucose 212 (*)     Sodium 129 (*)     Chloride 90 (*)     BUN 45 (*)     Creatinine 6.52 (*)      eGFR-Cr 7 (*)     ALT 9 (*)     Alkaline Phosphatase 138 (*)     Bilirubin, Total 0.2 (*)     Total Protein 8.7 (*)     Globulin  4.3 (*)     All other components within normal limits   TROPONIN I HIGH SENSITIVITY - Normal   RAINBOW DRAW LAVENDER   RAINBOW DRAW LIGHT GREEN   RAINBOW DRAW BLUE     EKG    Rate, intervals and axes as noted on EKG Report.  Rate: 88  Rhythm: Sinus Rhythm  Reading: normal ekg                        MDM              -Comorbidities did add complexity to the management are mentioned in the HPI above        -I personally reviewed the prior external notes and the medical record to obtain additional history reviewed discharge summary from December 6, 2024.  Patient presented for altered mental status, agitation confusion.  Of unclear etiology.        -DDX: Includes but not limited to pulmonary edema, fluid overload congestive heart failure which is a medical condition that can pose a threat to life/function        -I personally reviewed the radiographs findings and they show minimal congestion.  Please refer to radiology report for official interpretation    Labs Reviewed   CBC WITH DIFFERENTIAL WITH PLATELET - Abnormal; Notable for the following components:       Result Value    RBC 3.36 (*)     HGB 10.0 (*)     HCT 30.7 (*)     Neutrophil Absolute Prelim 8.64 (*)     Neutrophil Absolute 8.64 (*)     All other components within normal limits   COMP METABOLIC PANEL (14) - Abnormal; Notable for the following components:    Glucose 212 (*)     Sodium 129 (*)     Chloride 90 (*)     BUN 45 (*)     Creatinine 6.52 (*)     eGFR-Cr 7 (*)     ALT 9 (*)     Alkaline Phosphatase 138 (*)     Bilirubin, Total 0.2 (*)     Total Protein 8.7 (*)     Globulin  4.3 (*)     All other components within normal limits   TROPONIN I HIGH SENSITIVITY - Normal   RAINBOW DRAW LAVENDER   RAINBOW DRAW LIGHT GREEN   RAINBOW DRAW BLUE     Labs reviewed potassium is normal.  Hemoglobin is 10.  EKG troponin negative for any  pattern of cardiac injury.    Discussed with nephrology.  Patient gets dialyzed today in a few hours actually.  No indication for admission or emergent dialysis.  Patient advised to proceed to dialysis later today to help alleviate some of the lower extremity edema she is having.  She demonstrates understanding was discharged home stable condition.          Medical Decision Making      Disposition and Plan     Clinical Impression:  1. Peripheral edema         Disposition:  Discharge  12/10/2024  2:22 am    Follow-up:  Irineo Read MD  [address] [place] [address]  [address] [address]  [phone]    Follow up            Medications Prescribed:  Discharge Medication List as of 12/10/2024  2:29 AM              Supplementary Documentation:

## 2024-12-12 LAB
ATRIAL RATE: 88 BPM
P AXIS: 15 DEGREES
P-R INTERVAL: 204 MS
Q-T INTERVAL: 382 MS
QRS DURATION: 82 MS
QTC CALCULATION (BEZET): 462 MS
R AXIS: 31 DEGREES
T AXIS: 60 DEGREES
VENTRICULAR RATE: 88 BPM

## 2025-03-25 ENCOUNTER — HOSPITAL ENCOUNTER (EMERGENCY)
Facility: HOSPITAL | Age: 55
Discharge: HOME | End: 2025-03-26
Attending: EMERGENCY MEDICINE
Payer: COMMERCIAL

## 2025-03-25 DIAGNOSIS — R89.9 ABNORMAL LABORATORY TEST RESULT: ICD-10-CM

## 2025-03-25 DIAGNOSIS — E87.1 HYPONATREMIA: ICD-10-CM

## 2025-03-25 DIAGNOSIS — N18.5 CHRONIC RENAL FAILURE, STAGE 5 (HCC): Primary | ICD-10-CM

## 2025-03-25 LAB
ALBUMIN SERPL-MCNC: 4 G/DL (ref 3.2–4.8)
ALBUMIN/GLOB SERPL: 1 {RATIO} (ref 1–2)
ALP LIVER SERPL-CCNC: 118 U/L
ALT SERPL-CCNC: 9 U/L
ANION GAP SERPL CALC-SCNC: 14 MMOL/L (ref 0–18)
BASOPHILS # BLD AUTO: 0.06 X10(3) UL (ref 0–0.2)
BASOPHILS NFR BLD AUTO: 0.4 %
BILIRUB SERPL-MCNC: 0.5 MG/DL (ref 0.3–1.2)
BUN BLD-MCNC: 25 MG/DL (ref 9–23)
CALCIUM BLD-MCNC: 9.2 MG/DL (ref 8.7–10.6)
CHLORIDE SERPL-SCNC: 95 MMOL/L (ref 98–112)
CO2 SERPL-SCNC: 21 MMOL/L (ref 21–32)
CREAT BLD-MCNC: 5.01 MG/DL
EGFRCR SERPLBLD CKD-EPI 2021: 10 ML/MIN/1.73M2 (ref 60–?)
EOSINOPHIL # BLD AUTO: 0.37 X10(3) UL (ref 0–0.7)
EOSINOPHIL NFR BLD AUTO: 2.7 %
ERYTHROCYTE [DISTWIDTH] IN BLOOD BY AUTOMATED COUNT: 16.9 %
GLOBULIN PLAS-MCNC: 4 G/DL (ref 2–3.5)
GLUCOSE BLD-MCNC: 202 MG/DL (ref 70–99)
HCT VFR BLD AUTO: 34.6 %
HGB BLD-MCNC: 11.3 G/DL
IMM GRANULOCYTES # BLD AUTO: 0.07 X10(3) UL (ref 0–1)
IMM GRANULOCYTES NFR BLD: 0.5 %
LYMPHOCYTES # BLD AUTO: 0.96 X10(3) UL (ref 1–4)
LYMPHOCYTES NFR BLD AUTO: 6.9 %
MCH RBC QN AUTO: 28.6 PG (ref 26–34)
MCHC RBC AUTO-ENTMCNC: 32.7 G/DL (ref 31–37)
MCV RBC AUTO: 87.6 FL
MONOCYTES # BLD AUTO: 0.5 X10(3) UL (ref 0.1–1)
MONOCYTES NFR BLD AUTO: 3.6 %
NEUTROPHILS # BLD AUTO: 11.9 X10 (3) UL (ref 1.5–7.7)
NEUTROPHILS # BLD AUTO: 11.9 X10(3) UL (ref 1.5–7.7)
NEUTROPHILS NFR BLD AUTO: 85.9 %
OSMOLALITY SERPL CALC.SUM OF ELEC: 280 MOSM/KG (ref 275–295)
PLATELET # BLD AUTO: 243 10(3)UL (ref 150–450)
PROT SERPL-MCNC: 8 G/DL (ref 5.7–8.2)
RBC # BLD AUTO: 3.95 X10(6)UL
SODIUM SERPL-SCNC: 130 MMOL/L (ref 136–145)
WBC # BLD AUTO: 13.9 X10(3) UL (ref 4–11)

## 2025-03-25 PROCEDURE — 80053 COMPREHEN METABOLIC PANEL: CPT

## 2025-03-25 PROCEDURE — 93005 ELECTROCARDIOGRAM TRACING: CPT

## 2025-03-25 PROCEDURE — 99283 EMERGENCY DEPT VISIT LOW MDM: CPT

## 2025-03-25 PROCEDURE — 99284 EMERGENCY DEPT VISIT MOD MDM: CPT

## 2025-03-25 PROCEDURE — 85025 COMPLETE CBC W/AUTO DIFF WBC: CPT

## 2025-03-25 PROCEDURE — 36415 COLL VENOUS BLD VENIPUNCTURE: CPT

## 2025-03-25 PROCEDURE — 93010 ELECTROCARDIOGRAM REPORT: CPT

## 2025-03-25 PROCEDURE — 80053 COMPREHEN METABOLIC PANEL: CPT | Performed by: EMERGENCY MEDICINE

## 2025-03-25 PROCEDURE — 85025 COMPLETE CBC W/AUTO DIFF WBC: CPT | Performed by: EMERGENCY MEDICINE

## 2025-03-26 VITALS
SYSTOLIC BLOOD PRESSURE: 125 MMHG | HEART RATE: 98 BPM | HEIGHT: 68 IN | RESPIRATION RATE: 16 BRPM | DIASTOLIC BLOOD PRESSURE: 79 MMHG | BODY MASS INDEX: 40.62 KG/M2 | OXYGEN SATURATION: 100 % | WEIGHT: 268 LBS | TEMPERATURE: 98 F

## 2025-03-26 LAB
ALT SERPL-CCNC: <7 U/L
AST SERPL-CCNC: 8 U/L (ref ?–34)
ATRIAL RATE: 104 BPM
BUN BLD-MCNC: 37 MG/DL (ref 9–23)
P AXIS: 23 DEGREES
P-R INTERVAL: 192 MS
POTASSIUM SERPL-SCNC: 4.8 MMOL/L (ref 3.5–5.1)
Q-T INTERVAL: 360 MS
QRS DURATION: 82 MS
QTC CALCULATION (BEZET): 473 MS
R AXIS: 45 DEGREES
T AXIS: 54 DEGREES
VENTRICULAR RATE: 104 BPM

## 2025-03-26 NOTE — ED QUICK NOTES
Rounding Completed    Plan of Care reviewed. Waiting for blood test results.  Elimination needs assessed.  Provided extra blanket.    Bed is locked and in lowest position. Call light within reach.

## 2025-03-26 NOTE — ED INITIAL ASSESSMENT (HPI)
Patient is a dialysis patient, she is going to have surgery on the 10th to place a new fistula. States she went to her MD today to have blood work for the surgery. MD called patient and was told her potassium was elevated \"they told me it was almost double\" patient reports bilateral leg swelling. Denies any chest pain.

## 2025-03-26 NOTE — ED PROVIDER NOTES
Patient Seen in: [facility] Emergency Department      History     Chief Complaint   Patient presents with    Abnormal Result     Stated Complaint: HD patient T/Th/Sa, sent in for elevated K+ and BLE swelling    Subjective:   HPI      54-year-old female who has been undergoing dialysis for about a year and a half. She was scheduled for fistula surgery and had blood work done during a recent visit to her doctor. Following this, while at her dialysis appointment, her daughter was contacted and informed that she needed to go to the hospital due to high potassium levels. The patient was receiving dialysis at the time of the call and completed the session. She mentioned missing two dialysis appointments because she was not feeling well, including one on a Thursday, but attended her session on the following Saturday, which is one of her regular days. The patient has a history of diabetes, which she attributes to her current need for dialysis.    Objective:     Past Medical History:    Anxiety    Diabetes (HCC)    Dialysis patient    Disorder of thyroid    End stage renal disease (HCC)    Essential hypertension    High blood pressure    High cholesterol    Hyperlipidemia    Hypertension    Renal disorder    Thyroid disease              Past Surgical History:   Procedure Laterality Date    Hysterectomy      Tubal ligation                  Social History     Socioeconomic History    Marital status:    Tobacco Use    Smoking status: Never    Smokeless tobacco: Never   Vaping Use    Vaping status: Never Used   Substance and Sexual Activity    Alcohol use: Never    Drug use: Never   Social History Narrative    ** Merged History Encounter **          Social Drivers of Health     Food Insecurity: No Food Insecurity (11/29/2024)    Food Insecurity     Food Insecurity: Never true   Transportation Needs: No Transportation Needs (11/29/2024)    Transportation Needs     Lack of Transportation: No   Housing Stability: Low  Risk  (11/29/2024)    Housing Stability     Housing Instability: No                  Physical Exam     ED Triage Vitals [03/25/25 2108]   /70   Pulse 102   Resp 20   Temp 98.1 °F (36.7 °C)   Temp src    SpO2 98 %   O2 Device None (Room air)       Current Vitals:   Vital Signs  BP: 133/87  Pulse: 101  Resp: 18  Temp: 98.1 °F (36.7 °C)  MAP (mmHg): (!) 102    Oxygen Therapy  SpO2: 99 %  O2 Device: None (Room air)        Physical Exam    Vital signs reviewed  General appearance: Patient is alert and in no acute distress  HEENT: Pupils equal react to light extraocular muscles intact no scleral icterus, mucous membranes are moist, there is no erythema or exudate in the posterior pharynx  Neck: Supple no JVD no lymphadenopathy no meningismus no carotid bruit  CV: Regular rate and rhythm no murmur rub  Respiratory: Clear to auscultation bilaterally no crackles no wheezes no accessory muscle use  Abdomen: Soft nontender nondistended, no rebound no guarding  no hepatosplenomegaly bowel sounds are present , no pulsatile mass  Extremities: No clubbing cyanosis or edema 2+ distal pulses.  Neuro: Cranial nerves II through XII intact with no gross focal sensory or motor abnormality.      ED Course     Labs Reviewed   CBC WITH DIFFERENTIAL WITH PLATELET - Abnormal; Notable for the following components:       Result Value    WBC 13.9 (*)     HGB 11.3 (*)     HCT 34.6 (*)     Neutrophil Absolute Prelim 11.90 (*)     Neutrophil Absolute 11.90 (*)     Lymphocyte Absolute 0.96 (*)     All other components within normal limits   COMP METABOLIC PANEL (14) - Abnormal; Notable for the following components:    Glucose 202 (*)     Sodium 130 (*)     Chloride 95 (*)     BUN 25 (*)     Creatinine 5.01 (*)     eGFR-Cr 10 (*)     ALT 9 (*)     Alkaline Phosphatase 118 (*)     Globulin  4.0 (*)     All other components within normal limits   REDRAW CMP (P) - Abnormal; Notable for the following components:    BUN 37 (*)     ALT <7 (*)      All other components within normal limits   RAINBOW DRAW BLUE   RAINBOW DRAW LAVENDER   RAINBOW DRAW LIGHT GREEN     EKG    Rate, intervals and axes as noted on EKG Report.  Rate: 104  Rhythm: Sinus Rhythm  Reading: Sinus tachycardia                Patient was evaluated had a CBC chemistry drawn.  The fact that she had missed a dialysis appointment and then was in dialysis today when she got the phone call and hoping her potassium comes back normal.  EKG does not show any signs of hyperkalemia.  Will reassess after her CBC and chemistry come back.     Patient's sodium was 130 but that does appear to be around her baseline.  Potassium did come back at 4.6 so not significant elevated but do feel dialysis is probably what fixed it.  Patient can follow-up with her nephrologist.  Return if any worsening symptoms.  Make sure she does not miss dialysis  MDM      Differential diagnosis reflecting the complexity of care include: Chronic renal failure, hyperkalemia, hyperkalemia due to missed dialysis    Comorbidities that add complexity to management include: Chronic renal failure due to diabetes on chronic dialysis    External chart review was done and was noted: Patient's potassium came back at 6.5 and was told to go to the ER according to her physicians note      Diagnostic tests and medications considered but not ordered were: Potassium lowering drugs initially were considered but EKG was normal and since she did have dialysis we will wait serum potassium    Social determinants of health that affect care: Chronic dialysis and missed dialysis    Shared decision making was done by myself patient and her .  Her potassium is normalized.  Her sodium is about baseline.  She should follow back up with her nephrologist make sure she does not miss her dialysis as we discussed the problems with hyperkalemia            Medical Decision Making      Disposition and Plan     Clinical Impression:  1. Chronic renal failure, stage  5 (HCC)    2. Abnormal laboratory test result    3. Hyponatremia         Disposition:  Discharge  3/26/2025 12:23 am    Follow-up:  Alejandro Pedersen MD  [address] [address]  [phone]    Follow up            Medications Prescribed:  Current Discharge Medication List              Supplementary Documentation: Patient was screened and evaluated during this visit.  As the treating physician attending to the patient, I determined within reasonable clinical confidence and prior to discharge, that an emergency medical condition was not or was no longer present.  There was no indication for further evaluation, treatment, or admission on an emergency basis.  Comprehensive verbal and written discharge and follow-up instructions were provided to help prevent relapse or worsening.  Patient was instructed to follow-up with primary care provider for further evaluation treatment, return immediately to ER for worsening, concerning, new, or changing/persisting symptoms.  I discussed the case with the patient and they had no questions, complaints, or concerns.  Patient was comfortable going home.      Dictation Disclaimer Note:   To increase efficiency this document may have been prepared using voice recognition technology. Every effort has been made to correct any errors made during preparation of this note. However, if a word or phrase is confusing, or does not make sense, this is likely due to a recognition error within the program which was not discovered during editing. Please do not hesitate to contact to address any significant errors.    Note to Patient:   The 21st Century Cures Act makes medical notes like these available to patients in the interest of transparency. Please be advised this is a medical document. Medical documents are intended to carry relevant information, facts as evident, and the clinical opinion of the practitioner. The medical note is intended as peer to peer communication and may  appear blunt or direct. It is written in medical language and may contain abbreviations or verbiage that are unfamiliar.

## 2025-04-02 RX ORDER — MIDODRINE HYDROCHLORIDE 5 MG/1
5 TABLET ORAL DAILY
COMMUNITY

## 2025-04-02 RX ORDER — CINACALCET 30 MG/1
30 TABLET, FILM COATED ORAL 2 TIMES DAILY WITH MEALS
COMMUNITY

## 2025-04-02 RX ORDER — CARVEDILOL 25 MG/1
25 TABLET ORAL 2 TIMES DAILY WITH MEALS
COMMUNITY

## 2025-04-02 RX ORDER — ROPINIROLE 0.25 MG/1
0.25 TABLET, FILM COATED ORAL 3 TIMES DAILY
COMMUNITY

## 2025-04-07 NOTE — PAT NURSING NOTE
PAT nursing note: LUE limb restriction; spoke with patient and spouse Dawson via telephone; shower with antibacterial soap; npo after 2200; continue to take all prescribed medications as directed except: the morning of surgery only take Carvedilol, Ropinerole, Gabapentin, Plavix, pain medication with a sip of water; last dose of vitamins, supplements and herbal products 4/16, fast acting insulin 4/16 pm, Glargine 4/16 pm; denies taking ASA, other blood thinners, ACEs, ARBs, weight loss medications; no PPM, ICD, no nerve or spinal cord stimulator; needs ride home; 2 visitors welcome; park in the [employer] garage at [facility]; register at the [facility] at 0630 am; keep personal possessions to a minimum: bring insurance card(s)/picture ID, wear comfortable clothing, do not wear contacts-bring glasses/eye glass case, bring denture cup/supplies; leave all valuables at home, including jewelry; discussed intra-op and post-op instructions; all questions answered; patient and spouse verbalized understanding of all instructions.

## 2025-04-17 ENCOUNTER — ANESTHESIA EVENT (OUTPATIENT)
Dept: CARDIAC SURGERY | Facility: HOSPITAL | Age: 55
End: 2025-04-17
Payer: COMMERCIAL

## 2025-04-17 ENCOUNTER — HOSPITAL ENCOUNTER (OUTPATIENT)
Facility: HOSPITAL | Age: 55
Setting detail: HOSPITAL OUTPATIENT SURGERY
Discharge: HOME | End: 2025-04-17
Attending: SURGERY | Admitting: SURGERY
Payer: COMMERCIAL

## 2025-04-17 ENCOUNTER — ANESTHESIA (OUTPATIENT)
Dept: CARDIAC SURGERY | Facility: HOSPITAL | Age: 55
End: 2025-04-17
Payer: COMMERCIAL

## 2025-04-17 VITALS
HEIGHT: 68 IN | SYSTOLIC BLOOD PRESSURE: 142 MMHG | DIASTOLIC BLOOD PRESSURE: 91 MMHG | TEMPERATURE: 98 F | WEIGHT: 264 LBS | HEART RATE: 98 BPM | RESPIRATION RATE: 16 BRPM | OXYGEN SATURATION: 98 % | BODY MASS INDEX: 40.01 KG/M2

## 2025-04-17 LAB
ANION GAP SERPL CALC-SCNC: 11 MMOL/L (ref 0–18)
BUN BLD-MCNC: 34 MG/DL (ref 9–23)
CALCIUM BLD-MCNC: 10 MG/DL (ref 8.7–10.6)
CHLORIDE SERPL-SCNC: 97 MMOL/L (ref 98–112)
CO2 SERPL-SCNC: 28 MMOL/L (ref 21–32)
CREAT BLD-MCNC: 5.36 MG/DL (ref 0.55–1.02)
EGFRCR SERPLBLD CKD-EPI 2021: 9 ML/MIN/1.73M2 (ref 60–?)
GLUCOSE BLD-MCNC: 106 MG/DL (ref 70–99)
GLUCOSE BLD-MCNC: 99 MG/DL (ref 70–99)
OSMOLALITY SERPL CALC.SUM OF ELEC: 290 MOSM/KG (ref 275–295)
POTASSIUM SERPL-SCNC: 5 MMOL/L (ref 3.5–5.1)
SODIUM SERPL-SCNC: 136 MMOL/L (ref 136–145)

## 2025-04-17 PROCEDURE — 82962 GLUCOSE BLOOD TEST: CPT

## 2025-04-17 PROCEDURE — 80048 BASIC METABOLIC PNL TOTAL CA: CPT | Performed by: SURGERY

## 2025-04-17 PROCEDURE — 76942 ECHO GUIDE FOR BIOPSY: CPT | Performed by: STUDENT IN AN ORGANIZED HEALTH CARE EDUCATION/TRAINING PROGRAM

## 2025-04-17 RX ORDER — LABETALOL HYDROCHLORIDE 5 MG/ML
5 INJECTION, SOLUTION INTRAVENOUS EVERY 5 MIN PRN
Status: DISCONTINUED | OUTPATIENT
Start: 2025-04-17 | End: 2025-04-17

## 2025-04-17 RX ORDER — HYDROCODONE BITARTRATE AND ACETAMINOPHEN 5; 325 MG/1; MG/1
2 TABLET ORAL ONCE AS NEEDED
Status: DISCONTINUED | OUTPATIENT
Start: 2025-04-17 | End: 2025-04-17

## 2025-04-17 RX ORDER — MIDAZOLAM HYDROCHLORIDE 1 MG/ML
INJECTION INTRAMUSCULAR; INTRAVENOUS AS NEEDED
Status: DISCONTINUED | OUTPATIENT
Start: 2025-04-17 | End: 2025-04-17 | Stop reason: SURG

## 2025-04-17 RX ORDER — HYDROCODONE BITARTRATE AND ACETAMINOPHEN 5; 325 MG/1; MG/1
1 TABLET ORAL ONCE AS NEEDED
Status: DISCONTINUED | OUTPATIENT
Start: 2025-04-17 | End: 2025-04-17

## 2025-04-17 RX ORDER — HYDROMORPHONE HYDROCHLORIDE 1 MG/ML
0.6 INJECTION, SOLUTION INTRAMUSCULAR; INTRAVENOUS; SUBCUTANEOUS EVERY 5 MIN PRN
Status: DISCONTINUED | OUTPATIENT
Start: 2025-04-17 | End: 2025-04-17

## 2025-04-17 RX ORDER — NICOTINE POLACRILEX 4 MG
15 LOZENGE BUCCAL
Status: DISCONTINUED | OUTPATIENT
Start: 2025-04-17 | End: 2025-04-17

## 2025-04-17 RX ORDER — HYDROMORPHONE HYDROCHLORIDE 1 MG/ML
0.2 INJECTION, SOLUTION INTRAMUSCULAR; INTRAVENOUS; SUBCUTANEOUS EVERY 5 MIN PRN
Status: DISCONTINUED | OUTPATIENT
Start: 2025-04-17 | End: 2025-04-17

## 2025-04-17 RX ORDER — PROCHLORPERAZINE EDISYLATE 5 MG/ML
5 INJECTION INTRAMUSCULAR; INTRAVENOUS EVERY 8 HOURS PRN
Status: DISCONTINUED | OUTPATIENT
Start: 2025-04-17 | End: 2025-04-17

## 2025-04-17 RX ORDER — HYDROMORPHONE HYDROCHLORIDE 1 MG/ML
0.4 INJECTION, SOLUTION INTRAMUSCULAR; INTRAVENOUS; SUBCUTANEOUS EVERY 5 MIN PRN
Status: DISCONTINUED | OUTPATIENT
Start: 2025-04-17 | End: 2025-04-17

## 2025-04-17 RX ORDER — LIDOCAINE HYDROCHLORIDE 10 MG/ML
INJECTION, SOLUTION EPIDURAL; INFILTRATION; INTRACAUDAL; PERINEURAL AS NEEDED
Status: DISCONTINUED | OUTPATIENT
Start: 2025-04-17 | End: 2025-04-17 | Stop reason: SURG

## 2025-04-17 RX ORDER — SODIUM CHLORIDE 9 MG/ML
INJECTION, SOLUTION INTRAVENOUS CONTINUOUS PRN
Status: DISCONTINUED | OUTPATIENT
Start: 2025-04-17 | End: 2025-04-17 | Stop reason: SURG

## 2025-04-17 RX ORDER — SODIUM CHLORIDE, SODIUM LACTATE, POTASSIUM CHLORIDE, CALCIUM CHLORIDE 600; 310; 30; 20 MG/100ML; MG/100ML; MG/100ML; MG/100ML
INJECTION, SOLUTION INTRAVENOUS CONTINUOUS
Status: DISCONTINUED | OUTPATIENT
Start: 2025-04-17 | End: 2025-04-17

## 2025-04-17 RX ORDER — HEPARIN SODIUM 5000 [USP'U]/ML
5000 INJECTION, SOLUTION INTRAVENOUS; SUBCUTANEOUS ONCE
OUTPATIENT
Start: 2025-04-17 | End: 2025-04-17

## 2025-04-17 RX ORDER — MIDAZOLAM HYDROCHLORIDE 1 MG/ML
1 INJECTION INTRAMUSCULAR; INTRAVENOUS EVERY 5 MIN PRN
Status: DISCONTINUED | OUTPATIENT
Start: 2025-04-17 | End: 2025-04-17

## 2025-04-17 RX ORDER — INSULIN ASPART 100 [IU]/ML
INJECTION, SOLUTION INTRAVENOUS; SUBCUTANEOUS ONCE
Status: DISCONTINUED | OUTPATIENT
Start: 2025-04-17 | End: 2025-04-17

## 2025-04-17 RX ORDER — NICOTINE POLACRILEX 4 MG
30 LOZENGE BUCCAL
Status: DISCONTINUED | OUTPATIENT
Start: 2025-04-17 | End: 2025-04-17

## 2025-04-17 RX ORDER — DEXAMETHASONE SODIUM PHOSPHATE 4 MG/ML
VIAL (ML) INJECTION AS NEEDED
Status: DISCONTINUED | OUTPATIENT
Start: 2025-04-17 | End: 2025-04-17 | Stop reason: SURG

## 2025-04-17 RX ORDER — NALOXONE HYDROCHLORIDE 0.4 MG/ML
80 INJECTION, SOLUTION INTRAMUSCULAR; INTRAVENOUS; SUBCUTANEOUS AS NEEDED
Status: DISCONTINUED | OUTPATIENT
Start: 2025-04-17 | End: 2025-04-17

## 2025-04-17 RX ORDER — ACETAMINOPHEN 500 MG
1000 TABLET ORAL ONCE AS NEEDED
Status: DISCONTINUED | OUTPATIENT
Start: 2025-04-17 | End: 2025-04-17

## 2025-04-17 RX ORDER — ONDANSETRON 2 MG/ML
INJECTION INTRAMUSCULAR; INTRAVENOUS AS NEEDED
Status: DISCONTINUED | OUTPATIENT
Start: 2025-04-17 | End: 2025-04-17 | Stop reason: SURG

## 2025-04-17 RX ORDER — DEXTROSE MONOHYDRATE 25 G/50ML
50 INJECTION, SOLUTION INTRAVENOUS
Status: DISCONTINUED | OUTPATIENT
Start: 2025-04-17 | End: 2025-04-17

## 2025-04-17 RX ORDER — ONDANSETRON 2 MG/ML
4 INJECTION INTRAMUSCULAR; INTRAVENOUS EVERY 6 HOURS PRN
Status: DISCONTINUED | OUTPATIENT
Start: 2025-04-17 | End: 2025-04-17

## 2025-04-17 RX ADMIN — DEXAMETHASONE SODIUM PHOSPHATE 4 MG: 4 MG/ML VIAL (ML) INJECTION at 08:15:00

## 2025-04-17 RX ADMIN — ONDANSETRON 4 MG: 2 INJECTION INTRAMUSCULAR; INTRAVENOUS at 08:15:00

## 2025-04-17 RX ADMIN — LIDOCAINE HYDROCHLORIDE 50 MG: 10 INJECTION, SOLUTION EPIDURAL; INFILTRATION; INTRACAUDAL; PERINEURAL at 07:50:00

## 2025-04-17 RX ADMIN — SODIUM CHLORIDE: 9 INJECTION, SOLUTION INTRAVENOUS at 07:45:00

## 2025-04-17 RX ADMIN — MIDAZOLAM HYDROCHLORIDE 2 MG: 1 INJECTION INTRAMUSCULAR; INTRAVENOUS at 07:45:00

## 2025-04-17 NOTE — ANESTHESIA PREPROCEDURE EVALUATION
PRE-OP EVALUATION    Patient Name: Kimmy Sparks    Admit Diagnosis: Malfunction of Arteriovenous Dialsys Catheter    Pre-op Diagnosis: Malfunction of Arteriovenous Dialsys Catheter    LEFT BRACHIOCEPHALIC ARTERIOVENOUS FISTULA REVISION    Anesthesia Procedure: LEFT BRACHIOCEPHALIC ARTERIOVENOUS FISTULA REVISION (Left)    Surgeons and Role:     * Roberto Phelps MD - Primary    Pre-op vitals reviewed.  Temp: 97.5 °F (36.4 °C)  Pulse: 88  Resp: 14  SpO2: 95 %  Body mass index is 40.14 kg/m².    Current medications reviewed.  Hospital Medications:  Current Medications[1]    Outpatient Medications:   Prescriptions Prior to Admission[2]    Allergies: Kiwi extract      Anesthesia Evaluation    Patient summary reviewed.    Anesthetic Complications  (-) history of anesthetic complications         GI/Hepatic/Renal      (+) GERD       (+) chronic renal disease and ESRD                   Cardiovascular        Exercise tolerance: good     MET: >4    (+) obesity  (+) hypertension                     (+) CHF                Endo/Other      (+) diabetes  type 2, using insulin                         Pulmonary                    (+) sleep apnea       Neuro/Psych          (+) CVA                            Past Surgical History[3]  Social Hx on file[4]  History   Drug Use Unknown     Available pre-op labs reviewed.  Lab Results   Component Value Date    WBC 13.9 (H) 03/25/2025    RBC 3.95 03/25/2025    HGB 11.3 (L) 03/25/2025    HCT 34.6 (L) 03/25/2025    MCV 87.6 03/25/2025    MCH 28.6 03/25/2025    MCHC 32.7 03/25/2025    RDW 16.9 03/25/2025    .0 03/25/2025     Lab Results   Component Value Date     04/17/2025    K 5.0 04/17/2025    CL 97 (L) 04/17/2025    CO2 28.0 04/17/2025    BUN 34 (H) 04/17/2025    CREATSERUM 5.36 (H) 04/17/2025     (H) 04/17/2025    CA 10.0 04/17/2025            Airway      Mallampati: III  Mouth opening: >3 FB  TM distance: > 6 cm  Neck ROM: full Cardiovascular    Cardiovascular  exam normal.  Rhythm: regular  Rate: normal     Dental    Dentition appears grossly intact         Pulmonary    Pulmonary exam normal.  Breath sounds clear to auscultation bilaterally.               Other findings              ASA: 3   Plan: MAC  NPO status verified and patient meets guidelines.  Patient has not taken beta blockers in last 24 hours.  Post-procedure pain management plan discussed with surgeon and patient.      Plan/risks discussed with: patient and spouse                Present on Admission:  **None**             [1]   No current facility-administered medications on file as of 4/17/2025.   [2]   Medications Prior to Admission   Medication Sig Dispense Refill Last Dose/Taking    rOPINIRole 0.25 MG Oral Tab Take 1 tablet (0.25 mg total) by mouth in the morning and 1 tablet (0.25 mg total) in the evening and 1 tablet (0.25 mg total) before bedtime.   4/16/2025 at  9:00 PM    Naloxone HCl 4 MG/0.1ML Nasal Liquid 4 mg by Nasal route as needed. If patient remains unresponsive, repeat dose in other nostril 2-5 minutes after first dose.   Taking As Needed    cinacalcet 30 MG Oral Tab Take 1 tablet (30 mg total) by mouth 2 (two) times daily with meals. Takes on Tues, Thurs, Sat   Taking    midodrine 5 MG Oral Tab Take 1 tablet (5 mg total) by mouth in the morning. Tues, Thurs, Sat.   Past Month    cyclobenzaprine 10 MG Oral Tab Take 1 tablet (10 mg total) by mouth as needed in the morning and 1 tablet (10 mg total) as needed at noon and 1 tablet (10 mg total) as needed in the evening for Muscle spasms.   4/17/2025 at  5:00 AM    VELPHORO 500 MG Oral Chew Tab Chew 2 tablets by mouth in the morning and 2 tablets at noon and 2 tablets in the evening. Chew with meals.   4/16/2025    senna-docusate 8.6-50 MG Oral Tab Take 3 tablets by mouth in the morning. Does not take on Tuesday, Thursday, and Saturday when pt has dialysis .   Past Week    clopidogrel 75 MG Oral Tab Take 1 tablet (75 mg total) by mouth in the  morning.   4/15/2025    gabapentin 400 MG Oral Cap Take 1 capsule (400 mg total) by mouth in the morning and 1 capsule (400 mg total) in the evening and 1 capsule (400 mg total) before bedtime.   4/16/2025 at  9:00 PM    levothyroxine 200 MCG Oral Tab Take 1 tablet (200 mcg total) by mouth before breakfast. Total 275mcg daily   4/15/2025    pravastatin 20 MG Oral Tab Take 1 tablet (20 mg total) by mouth nightly.   4/16/2025 at  9:00 PM    insulin glargine 100 UNIT/ML Subcutaneous Solution Pen-injector Inject 40 Units into the skin nightly.   4/15/2025    ondansetron 4 MG Oral Tablet Dispersible Dissolve 4 mg on the tongue every 6 to 8 hours as needed for nausea.   Past Month    ergocalciferol 1.25 MG (95250 UT) Oral Cap Take 1 capsule (50,000 Units total) by mouth once a week.   Past Week    levothyroxine 75 MCG Oral Tab Take 1 tablet (75 mcg total) by mouth before breakfast. Total 275mcg daily   Taking    Omeprazole 40 MG Oral Capsule Delayed Release Take 1 capsule (40 mg total) by mouth in the morning.   4/15/2025    albuterol 108 (90 Base) MCG/ACT Inhalation Aero Soln Inhale 2 puffs into the lungs every 4 to 6 hours as needed for Wheezing.   Taking As Needed    HYDROcodone-acetaminophen  MG Oral Tab Take 1 tablet by mouth 3 (three) times daily as needed for Pain (chronic back pain).   Taking As Needed    Insulin Aspart FlexPen 100 UNIT/ML Subcutaneous Solution Pen-injector Inject 15 Units into the skin in the morning and 15 Units in the evening and 15 Units before bedtime.   4/17/2025 at  5:00 AM    buPROPion HCl ER, XL, 150 MG Oral Tablet 24 Hr Take 1 tablet (150 mg total) by mouth in the morning.   4/15/2025    carvedilol 25 MG Oral Tab Take 1 tablet (25 mg total) by mouth in the morning and 1 tablet (25 mg total) in the evening. Take with meals.   More than a month    Cholecalciferol (VITAMIN D-3 OR) Take 1 capsule by mouth in the morning.   More than a month   [3]   Past Surgical History:  Procedure  Laterality Date    Hysterectomy      Tubal ligation     [4]   Social History  Socioeconomic History    Marital status:    Tobacco Use    Smoking status: Never    Smokeless tobacco: Never   Vaping Use    Vaping status: Never Used   Substance and Sexual Activity    Alcohol use: Never    Drug use: Never

## 2025-04-17 NOTE — OPERATIVE REPORT
Vascular Surgery Op Note  Patients Name:    Kimmy Sparks    Operating Physician: Roberto Phelps MD  CSN:    [id number]                                                           Location:  OR  MRN:     [id number]                                            YOB: 1970    Operation Date:     04/17/2025            PREOPERATIVE DIAGNOSIS:    1.       End-stage renal disease on HD        POSTOPERATIVE DIAGNOSIS:    1.       End-stage renal disease on HD     PROCEDURE: Left upper arm radiocephalic arteriovenous fistula creation with transposition.     SURGEON: Roberto Phelps MD    ASSISTANT: Sachin Lyles SA      ANESTHESIA: MAC/Regional     ESTIMATED BLOOD LOSS:  100 mL.     DRAINS:  None.       TRANSFUSIONS:  None.     COMPLICATION:  None apparent.     DISPOSITION:  Awoken from anesthesia and was taken to the recovery room in stable condition.  At the completion of procedure, the patient was noted to have a doppler radial signal and triphasic ulnar signal with strong in the fistula.     INDICATIONS:  This is a 54 year old female with numerous comorbidities who was referred by nephrologyfor dialysis access creation. She underwent percutaneous AV fistula creation without maturation. Vein mapping revealed adequate vein for AV fistula creation in the cephalic vein and high bifurcation of the brachial artery and as such, I recommended left radiocephalic AV fistula creatioon.  The risks and benefits of the procedure were discussed.  Informed consent was directly obtained.     OPERATIVE TECHNIQUE:  On the morning of 04/17, the patient was brought to the operating room and placed in the supine position. Regional anesthesia was initiated without any issues.  Antibiotics were administered for prophylaxis.  The patient was subsequently prepped and draped in the usual sterile fashion.  Time-out was performed. With the use of the ultrasound, the cephalic vein was inspected and found to be of adequate caliber and the  skin was marked over the course of the vein and radial artery. A 5 cm longitudinal  incision was made above the antecubital fossa with a 10 blade and dissection was carried down with electrocautery through the subcutaneous tissues.  The cephalic vein was circumferentially dissected and encircled with a vessel loop.  In a similar fashion, the brachial artery was circumferentially dissected and encircled with vessel loops.  The patient was systemically heparinized. The distal end of the vein was and ligated with the use of clips.  The vein was then dilated with heparinized saline.  We then cinched our clamps proximal and distally to obtain total vascular control of the brachial artery.  An 11 blade is used to perform an arteriotomy which was extended with Manjarrez scissors for approximately 6mm in length.  Then the vein was spatulated, and then with the use of 6-0 Prolene suture, an end-to-side anastomosis was created in running fashion.  Prior to cinching the sutures, the artery was forward back-bled and flushed with heparinized saline in addition to the vein.  The sutures were then stitched and tied thereby completing the reconstruction then the clamps were removed thereby restoring perfusion. Strong signal was confirmed in the fistula with doppler in radial and ulnar. Radial had appropriate augmentation with compression and the ulnar signal was independent of the fistula. At this point, I removed some of the surrounding adipose tissue for superficialization and liberated the vein from under the incision proximally for 4cm. At this point we were satisfied with our intervention and opted to proceed with closure. Electrocautery was used to obtain hemostasis of the wound bed as well as Floseal. The deep subcutaneous tissues were reapproximated with the use of 3-0 Vicryl suture in interupted fashion.  The skin was closed with staples followed by bacitracin telfa and tegaderm dressigns. The patient awoke from anesthesia and  was taken to recovery in stable condition.  All counts were correct at the end of the case.  I was present and performed all aspects of the procedure.     Roberto Phelps MD  [facility]  Vascular Surgery     10/2017

## 2025-04-17 NOTE — ANESTHESIA POSTPROCEDURE EVALUATION
[facility]    Kimmy Sparks Patient Status:  Hospital Outpatient Surgery   Age/Gender 54 year old female MRN [id number]   Location [facility] PERIOPERATIVE SERVICE Attending Roberto Phelps MD   Hosp Day # 0 PCP Alejandro Pedersen MD       Anesthesia Post-op Note    LEFT BRACHIOCEPHALIC ARTERIOVENOUS FISTULA REVISION    Procedure Summary       Date: 04/17/25 Room / Location:  [facility] 01 /  [facility]    Anesthesia Start: 0745 Anesthesia Stop: 0947    Procedure: LEFT BRACHIOCEPHALIC ARTERIOVENOUS FISTULA REVISION (Left) Diagnosis: (Malfunction of Arteriovenous Dialsys Catheter)    Surgeons: Roberto Phelps MD Anesthesiologist: Kirby Field MD    Anesthesia Type: MAC ASA Status: 3            Anesthesia Type: MAC    Vitals Value Taken Time   /68 04/17/25 09:48   Temp 97.8 °F (36.6 °C) 04/17/25 09:48   Pulse 81 04/17/25 09:48   Resp 16 04/17/25 09:48   SpO2 99 % 04/17/25 09:48   Vitals shown include unfiled device data.        Patient Location: Same Day Surgery    Anesthesia Type: MAC    Airway Patency: patent    Postop Pain Control: adequate    Mental Status: mildly sedated but able to meaningfully participate in the post-anesthesia evaluation    Nausea/Vomiting: none    Cardiopulmonary/Hydration status: stable euvolemic    Complications: no apparent anesthesia related complications    Postop vital signs: stable    Dental Exam: Unchanged from Preop    Patient to be discharged home when criteria met.

## 2025-04-17 NOTE — DISCHARGE INSTRUCTIONS
You may shower with soap and water starting on 04/19/2025 .  No soaking in bath or pool for 2 weeks.    You should resume all home medications as previously.    Take tylenol as needed for pain.    No heavy lifting or straining for 2 weeks. You may then resume to normal activity.    Drink plenty of fluids to stay well hydrated.     You will need to followup with Dr. Phelps in the vascular clinic in 2 weeks. Please call [phone] to make an appt.    If you have any fevers, chills, chest pain, shortness of breath, drainage, swelling or bleeding, please call the office in order to return to clinic sooner for evaluation.

## 2025-04-17 NOTE — ANESTHESIA PROCEDURE NOTES
Regional Block    Date/Time: 4/17/2025 7:53 AM    Performed by: Kirby Field MD  Authorized by: Kirby Field MD      General Information and Staff    Start Time:  4/17/2025 7:53 AM  End Time:  4/17/2025 7:56 AM  Anesthesiologist:  Kirby Field MD  Performed by:  Anesthesiologist  Patient Location:  OR      Site Identification: real time ultrasound guided and image stored and retrievable    Block site/laterality marked before start: site marked  Reason for Block: at surgeon's request and post-op pain management    Preanesthetic Checklist: 2 patient identifers, IV checked, site marked, risks and benefits discussed, monitors and equipment checked, pre-op evaluation, timeout performed, anesthesia consent, sterile technique used, no prohibitive neurological deficits and no local skin infection at insertion site      Procedure Details    Patient Position:  Supine  Prep: ChloraPrep    Monitoring:  Cardiac monitor, continuous pulse ox, blood pressure cuff and heart rate  Injection Technique:  Single-shot    Needle    Needle Type:  Short-bevel and echogenic  Needle Gauge:  21 G  Needle Length:  100 mm  Needle Localization:  Ultrasound guidance  Reason for Ultrasound Use: appropriate spread of the medication was noted in real time and no ultrasound evidence of intravascular and/or intraneural injection            Assessment    Injection Assessment:  Good spread noted, negative resistance, negative aspiration for heme, incremental injection, low pressure, local visualized surrounding nerve on ultrasound and no pain on injection  Paresthesia Pain:  Prolonged  Heart Rate Change: No    - Patient tolerated block procedure well without evidence of immediate block related complications.     Medications  4/17/2025 7:53 AM      Additional Comments    Medication:  Ropivacaine 0.5% 20 mL, PF Dexamethasone 2 mg

## 2025-04-17 NOTE — PROGRESS NOTES
Here today for creation of a AV fistula for dialysis. Took regular insulin this am for BS of 224, forgot to take long acting insulin last nite, skipped plavix yesterday and today.  Only took Norco and flexaril and lisinopril this am. Pt did not follow the pre admission medication instructions.

## 2025-04-17 NOTE — H&P
Vascular Surgery  H&P    Name: Kimmy Sparks  MRN: [id number]  : 1970    Preop    HPI: 54 year old female with history of hypertension, hyperlipidemia, diabetes, obesity, end-stage renal disease on dialysis who underwent percutaneous AV fistula creation with subsequent optimization. This has not been able to be used successfully and thus was referred back to the vascular clinic. She denies any arm pain, numbness, tingling, weakness. She is undergoing dialysis via right IJ permacath. She strongly desires AV fistula creation. Denies any changes since last seen in clinic.      Past Medical History:   Diagnosis Date   Diabetes (HCC)   Essential hypertension   Hyperlipidemia   Obstructive sleep apnea 2024   [place] HST AHI 7.9   Thyroid disease       No past surgical history on file.      Current Outpatient Medications:   Current Outpatient Medications:   BD PEN NEEDLE SHORT U/F 31G X 8 MM Does not apply Misc, USE AS DIRECTED EVERY DAY, Disp: 100 each, Rfl: 3  HYDROcodone-acetaminophen  MG Oral Tab, Take 1 tablet by mouth every 8 (eight) hours as needed for Pain., Disp: 90 tablet, Rfl: 0  cyclobenzaprine 10 MG Oral Tab, Take 1 tablet (10 mg total) by mouth 3 (three) times daily., Disp: 120 tablet, Rfl: 0  ergocalciferol 1.25 MG (56841 UT) Oral Cap, Take 1 capsule (50,000 Units total) by mouth once a week., Disp: 5 capsule, Rfl: 0  pravastatin 20 MG Oral Tab, Take 1 tablet (20 mg total) by mouth daily., Disp: 90 tablet, Rfl: 3  Omeprazole 40 MG Oral Capsule Delayed Release, Take 1 capsule (40 mg total) by mouth daily. Before meal, Disp: 90 capsule, Rfl: 3  gabapentin 400 MG Oral Cap, Take 1 capsule (400 mg total) by mouth 3 (three) times daily., Disp: 270 capsule, Rfl: 3  rOPINIRole 0.25 MG Oral Tab, Take 1 tablet (0.25 mg total) by mouth 3 (three) times daily., Disp: 270 tablet, Rfl: 3  buPROPion  MG Oral Tablet 24 Hr, Take 1 tablet (150 mg total) by mouth daily., Disp: 90 tablet, Rfl: 3  Naloxone  HCl 4 MG/0.1ML Nasal Liquid, 4 mg by Nasal route as needed. If patient remains unresponsive, repeat dose in other nostril 2-5 minutes after first dose., Disp: 1 kit, Rfl: 0  lidocaine 5 % External Patch, Place 1 patch over 12 hours onto the skin in the morning., Disp: 90 patch, Rfl: 3  ONETOUCH ULTRA In Vitro Strip, 1 each by Other route in the morning, at noon, in the evening, and at bedtime. Use one strip daily to help check the sugars., Disp: 400 strip, Rfl: 5  cinacalcet 30 MG Oral Tab, Take 30 mg by mouth 2 (two) times daily with meals., Disp: , Rfl:   VELPHORO 500 MG Oral Chew Tab, Chew 2 tablets by mouth 3 (three) times daily., Disp: , Rfl:   midodrine 5 MG Oral Tab, Take 5 mg by mouth daily. Sometimes they give her two tabs., Disp: , Rfl:   carvedilol 25 MG Oral Tab, TK 1 AND 1/2 TS PO BID, Disp: , Rfl:   VENTOLIN  (90 Base) MCG/ACT Inhalation Aero Soln, INL 2 PFS ITL Q 4 TO 6 H PRF COUGH, Disp: , Rfl:       Objective:   Physical Examination    Skin no rashes or skin lesions identified  Neck supple; no LAD; trachea midline   RRR  CTAB; breathing comfortably  Abd soft, NT, ND; no palpable abdominal masses.  RUE: Palpable radial and ulnar pulse  LUE: Palpable radial and ulnar pulse  Neurologic: CN II-XII grossly intact  5/5 sensorimotor function in the bilateral upper and lower extremities.  Faintly palpable thrill. Large habitus of the arm.    Labs: No recent labs available.     Imaging: US: Impressions   Left: Duplex imaging demonstrates a patent radial artery [place] A-V   fistula creation with 2 of 2 radial veins coiled (high brachial bifurcation   in the axilla). The basilic vein has a purely venous waveform with no   arterialization measuring 2.6mm at 14.0mm deep. The medial and lateral   radial veins in the upper arm have been coiled. The cephalic vein measures   7.2mm with 235ml/min flow(previous 8.9mm with 876ml/min flow) and a depth of   3.6mm at the fossa.      ----------------------------------------------------------------------------     ----------------------------------------------------------------------------   Venous flow:     +--------------------+----------+----------+------+------+-------+---------+   !Location !PSV !EDV !D AP !D Tr !Depth !Flow !   +--------------------+----------+----------+------+------+-------+---------+   !Artery inflow !125cm/sec !61.9cm/sec!------!------!-------!260ml/min!   +--------------------+----------+----------+------+------+-------+---------+   !Anastomosis !718cm/sec !412cm/sec !------!3.10mm!-------!474ml/min!   +--------------------+----------+----------+------+------+-------+---------+   !Cephalic Outflow - !47cm/sec !30.6cm/sec!5.20mm!------!7.90mm !200ml/min!   !Proximal UA ! ! ! ! ! ! !   +--------------------+----------+----------+------+------+-------+---------+   !Cephalic Outflow - !42.3cm/sec!31.4cm/sec!5.80mm!------!7.40mm !208ml/min!   !Mid UA ! ! ! ! ! ! !   +--------------------+----------+----------+------+------+-------+---------+   !Cephalic Outflow - !69.8cm/sec!41.5cm/sec!7.20mm!------!3.60mm !235ml/min!   !Distal UA ! ! ! ! ! ! !   +--------------------+----------+----------+------+------+-------+---------+   ! vein !237cm/sec !121cm/sec !------!------!-------!330ml/min!   +--------------------+----------+----------+------+------+-------+---------+   !Basilic Outflow - !16.2cm/sec!4.6cm/sec !4.40mm!------!20.30mm!53ml/min !   !proximal UA ! ! ! ! ! ! !   +--------------------+----------+----------+------+------+-------+---------+   !Basilic Outflow - !21.3cm/sec!9.1cm/sec !3.70mm!------!13.70mm!40ml/min !   !mid UA ! ! ! ! ! ! !   +--------------------+----------+----------+------+------+-------+---------+   !Basilic Outflow - !22.5cm/sec!15.4cm/sec!2.60mm!------!14.00mm!24ml/min !   !distal UA ! ! ! ! ! ! !   +--------------------+----------+----------+------+------+-------+---------+      Ultrasound unchanged from previously.     Assessment and Plan:  54 year old female with end-stage renal disease on dialysis via right IJ permacath who underwent previous AV fistula creation percutaneously. This has not matured adequately despite attempts to mature this. She has adequate caliber vein but inadequate flow. I have thus recommended left arm AV fistula revision with superficialization. I discussed the risk and benefits of the procedure not limited to the risk of bleeding, infection, steal syndrome. She affirms her understanding. Consent obtained. Proceed to OR  as above.  All questions and concerns were addressed.    Thank you for allowing me to participate in this patient's care.    Roberto Phelps MD  [facility]  Vascular Surgery

## 2025-05-23 ENCOUNTER — APPOINTMENT (OUTPATIENT)
Dept: CT IMAGING | Facility: HOSPITAL | Age: 55
End: 2025-05-23
Payer: COMMERCIAL

## 2025-05-23 ENCOUNTER — HOSPITAL ENCOUNTER (EMERGENCY)
Facility: HOSPITAL | Age: 55
Discharge: HOME | End: 2025-05-23
Attending: EMERGENCY MEDICINE
Payer: COMMERCIAL

## 2025-05-23 ENCOUNTER — APPOINTMENT (OUTPATIENT)
Dept: GENERAL RADIOLOGY | Facility: HOSPITAL | Age: 55
End: 2025-05-23
Attending: EMERGENCY MEDICINE
Payer: COMMERCIAL

## 2025-05-23 VITALS
HEART RATE: 87 BPM | HEIGHT: 68 IN | TEMPERATURE: 98 F | WEIGHT: 260 LBS | SYSTOLIC BLOOD PRESSURE: 105 MMHG | OXYGEN SATURATION: 96 % | DIASTOLIC BLOOD PRESSURE: 64 MMHG | RESPIRATION RATE: 18 BRPM | BODY MASS INDEX: 39.4 KG/M2
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
## 2025-05-23 DIAGNOSIS — S22.42XA CLOSED FRACTURE OF MULTIPLE RIBS OF LEFT SIDE, INITIAL ENCOUNTER: ICD-10-CM

## 2025-05-23 DIAGNOSIS — S00.83XA CONTUSION OF FACE, INITIAL ENCOUNTER: Primary | ICD-10-CM

## 2025-05-23 PROCEDURE — 99284 EMERGENCY DEPT VISIT MOD MDM: CPT

## 2025-05-23 PROCEDURE — 70450 CT HEAD/BRAIN W/O DYE: CPT

## 2025-05-23 PROCEDURE — 71101 X-RAY EXAM UNILAT RIBS/CHEST: CPT | Performed by: EMERGENCY MEDICINE

## 2025-05-23 PROCEDURE — 70486 CT MAXILLOFACIAL W/O DYE: CPT | Performed by: EMERGENCY MEDICINE

## 2025-05-24 NOTE — ED PROVIDER NOTES
Patient Seen in: [facility] Emergency Department        History  Chief Complaint   Patient presents with    Trauma    Head Neck Injury     Stated Complaint: fell out of recliner yesterday morning, left sided head injury, bruising noted *    Subjective:   HPI            54-year-old female, reports falling from a recliner in the very early morning hours, resulting in her face slamming onto the wooden floor. She experienced spasms in her legs prior to the fall and attempted to alleviate them by stretching her leg on the recliner. She has bruising from the fall and is experiencing rib pain, which is exacerbated by deep breaths. She recently underwent fistula surgery and was concerned about the stitches. The patient has diabetes, which was a concern during the surgery. She did not attend dialysis due to a massive headache and the unavailability of her Norco medication for back and leg pain, as it was delayed at the pharmacy. She attributes her leg spasms to dialysis and electrolyte imbalances. She plans to contact the dialysis center to check for an opening for treatment.      Objective:     Past Medical History:    Anxiety    Diabetes (HCC)    Dialysis patient    Disorder of thyroid    End stage renal disease (HCC)    Essential hypertension    High blood pressure    High cholesterol    Hyperlipidemia    Hypertension    Neuropathy    Renal disorder    Thyroid disease              Past Surgical History:   Procedure Laterality Date    Hysterectomy      Tubal ligation                  Social History     Socioeconomic History    Marital status:    Tobacco Use    Smoking status: Never    Smokeless tobacco: Never   Vaping Use    Vaping status: Never Used   Substance and Sexual Activity    Alcohol use: Never    Drug use: Never   Social History Narrative    ** Merged History Encounter **          Social Drivers of Health     Food Insecurity: No Food Insecurity (11/29/2024)    Food Insecurity     Food Insecurity:  Never true   Transportation Needs: No Transportation Needs (11/29/2024)    Transportation Needs     Lack of Transportation: No   Housing Stability: Low Risk  (11/29/2024)    Housing Stability     Housing Instability: No                                Physical Exam    ED Triage Vitals [05/23/25 2123]   /78   Pulse 91   Resp 18   Temp 98.1 °F (36.7 °C)   Temp src Temporal   SpO2 96 %   O2 Device None (Room air)       Current Vitals:   Vital Signs  BP: 105/64  Pulse: 87  Resp: 18  Temp: 98.1 °F (36.7 °C)  Temp src: Temporal    Oxygen Therapy  SpO2: 96 %  O2 Device: None (Room air)            Physical Exam    Vital signs reviewed  General appearance: Patient is alert and in no acute distress  HEENT: Pupils equal react to light extraocular muscles intact no scleral icterus, mucous membranes are moist, there is no erythema or exudate in the posterior pharynx, patient has ecchymosis over the left upper eyelid  Neck: Supple no JVD no lymphadenopathy no meningismus no carotid bruit  CV: Regular rate and rhythm no murmur rub  Respiratory: Clear to auscultation bilaterally no crackles no wheezes no accessory muscle use, patient has some mild left-sided lateral rib tenderness but no crepitance  Abdomen: Soft nontender nondistended, no rebound no guarding  no hepatosplenomegaly bowel sounds are present , no pulsatile mass  Extremities: No clubbing cyanosis 2+ pitting edema.  Fistula site in left arm looks intact and no dehiscence of recent surgical site   neuro: Cranial nerves II through XII intact with no gross focal sensory or motor abnormality.          ED Course  Labs Reviewed - No data to display       Patient was evaluated in the emergency department will have an x-ray of her ribs and a CT scan of her head and neck.    XR RIBS WITH CHEST (3 VIEWS), LEFT  (CPT=71101)  Result Date: 5/23/2025  CONCLUSION:  1. Posterolateral left 5th through 7th rib fractures. 2. Mild interstitial CTs which may represent mild edema.     LOCATION:  [facility]     Dictated by (CST): Rosalinda Hammer MD on 5/23/2025 at 11:03 PM     Finalized by (CST): Rosalinda Hammer MD on 5/23/2025 at 11:04 PM       CT FACIAL BONES (CPT=70486)  Result Date: 5/23/2025  CONCLUSION:  No acute fractures.   LOCATION:  [facility]   Dictated by (CST): Rosalinda Hammer MD on 5/23/2025 at 10:11 PM     Finalized by (CST): Rosalinda Hammer MD on 5/23/2025 at 10:13 PM       CT BRAIN OR HEAD (CPT=70450)  Result Date: 5/23/2025  CONCLUSION: No acute intracranial findings.    LOCATION:  [facility]   Dictated by (CST): Rosalinda Hammer MD on 5/23/2025 at 10:05 PM     Finalized by (CST): Rosalinda Hammer MD on 5/23/2025 at 10:06 PM       Patient unfortunately has multiple rib fractures and some mild interstitial edema.  CT brain and facial bones were unremarkable.  Patient was given an incentive spirometer.  Will be for home with pain medications.  Ice.  Also follow-up with dialysis she agrees with plan                  MDM     Differential diagnosis reflecting the complexity of care include: Fall, facial contusion, orbital fracture, skull fracture, rib fracture, rib contusion    Comorbidities that add complexity to management include: Chronic dialysis    My independent interpretation of studies of: CT head and facial bones was unremarkable no fracture however x-ray of ribs shows 5th or 7th fractures no pneumothorax        Shared decision making was done by myself and patient and her .  She will be discharged home.  She will call dialysis tomorrow.  Return if any worsening problem            Medical Decision Making      Disposition and Plan     Clinical Impression:  1. Contusion of face, initial encounter    2. Closed fracture of multiple ribs of left side, initial encounter    3. Fall, initial encounter         Disposition:  Discharge  5/23/2025 11:42 pm    Follow-up:  Alejandro Pedersen MD  [address] [facility] [address]  [phone]    Follow up            Medications  Prescribed:  Discharge Medication List as of 5/23/2025 11:43 PM                Supplementary Documentation: Patient was screened and evaluated during this visit.  As the treating physician attending to the patient, I determined within reasonable clinical confidence and prior to discharge, that an emergency medical condition was not or was no longer present.  There was no indication for further evaluation, treatment, or admission on an emergency basis.  Comprehensive verbal and written discharge and follow-up instructions were provided to help prevent relapse or worsening.  Patient was instructed to follow-up with primary care provider for further evaluation treatment, return immediately to ER for worsening, concerning, new, or changing/persisting symptoms.  I discussed the case with the patient and they had no questions, complaints, or concerns.  Patient was comfortable going home.      Dictation Disclaimer Note:   To increase efficiency this document may have been prepared using voice recognition technology. Every effort has been made to correct any errors made during preparation of this note. However, if a word or phrase is confusing, or does not make sense, this is likely due to a recognition error within the program which was not discovered during editing. Please do not hesitate to contact to address any significant errors.    Note to Patient:   The 21st Century Cures Act makes medical notes like these available to patients in the interest of transparency. Please be advised this is a medical document. Medical documents are intended to carry relevant information, facts as evident, and the clinical opinion of the practitioner. The medical note is intended as peer to peer communication and may appear blunt or direct. It is written in medical language and may contain abbreviations or verbiage that are unfamiliar.

## 2025-06-06 NOTE — PLAN OF CARE
Assumed care of pt at 0730. A/ox4, 2L NC (on ), SR w/ first degree block on tele. No reports of pain. Breath sounds diminished upon auscultation. Denies cough, but reports dyspnea w/ exertion. +3 edema present in right left and right foot, +2 edema present in left leg and left foot. Bumex drip infusing, daily weight obtained this morning. Also requested miralax this AM with morning pills. Impaired vision - wears glasses. Discussed POC w/ pt and family at bedside.     Problem: Diabetes/Glucose Control  Goal: Glucose maintained within prescribed range  Description: INTERVENTIONS:  - Monitor Blood Glucose as ordered  - Assess for signs and symptoms of hyperglycemia and hypoglycemia  - Administer ordered medications to maintain glucose within target range  - Assess barriers to adequate nutritional intake and initiate nutrition consult as needed  - Instruct patient on self management of diabetes  Outcome: Progressing     Problem: Patient/Family Goals  Goal: Patient/Family Long Term Goal  Description: Patient's Long Term Goal: \"Go home\"    Interventions:  - MD consults as needed  - Diagnostics as needed  - See additional Care Plan goals for specific interventions  Outcome: Progressing  Goal: Patient/Family Short Term Goal  Description: Patient's Short Term Goal: \"Feel better\"    Interventions:   - Diuresis  - Pain meds as needed  - See additional Care Plan goals for specific interventions  Outcome: Progressing     Problem: CARDIOVASCULAR - ADULT  Goal: Maintains optimal cardiac output and hemodynamic stability  Description: INTERVENTIONS:  - Monitor vital signs, rhythm, and trends  - Monitor for bleeding, hypotension and signs of decreased cardiac output  - Evaluate effectiveness of vasoactive medications to optimize hemodynamic stability  - Monitor arterial and/or venous puncture sites for bleeding and/or hematoma  - Assess quality of pulses, skin color and temperature  - Assess for signs of decreased coronary artery perfusion - ex.  Angina  - Evaluate fluid balance, assess for edema, trend weights  Outcome: Progressing  Goal: Absence of cardiac arrhythmias or at baseline  Description: INTERVENTIONS:  - Continuous cardiac monitoring, monitor vital signs, obtain 12 lead EKG if indicated  - Evaluate effectiveness of antiarrhythmic and heart rate control medications as ordered  - Initiate emergency measures for life threatening arrhythmias  - Monitor electrolytes and administer replacement therapy as ordered  Outcome: Progressing Detail Level: Zone Plan: Could consider additional topical treatment at a later time if showing no improvement from initiated regimen.\\n\\nDiscussed the option of ILK injection for treatment, broadband light lasers, Tretinoin/retinol/adapalene, or could refer to general surgery. Initiate Treatment: Tretinoin 0.025% TP Cream: Apply pea size amount 2-3 times per week at night and increase to nightly as tolerated, follow up with moisturizer. Use SPF daily.\\n\\nPatient may use Tretinoin 0.025% TP Cream as a spot treatment for cysts. Render In Strict Bullet Format?: No Continue Regimen: At home use of cleaned and clear BP cleanser.

## (undated) DEVICE — GEL US 20 GM PKT ST AQSNC 100

## (undated) DEVICE — LIGASURE EXACT DISSECTOR: Brand: LIGASURE

## (undated) DEVICE — SUT PROL 6-0 30IN C-1 NABSRB BLU 13MM 3/8 CIR

## (undated) DEVICE — SOLUTION IRRIG 500ML 0.9% NACL

## (undated) DEVICE — SUT MCRYL 4-0 18IN PS-2 ABSRB UD 19MM 3/8 CIR

## (undated) DEVICE — DRAPE,EXTREMITY,89X128,STERILE: Brand: MEDLINE

## (undated) DEVICE — SOLUTION IRRIG 1000ML 0.9% NACL USP BTL

## (undated) DEVICE — HEMOSTAT KIT SURGIFLO THROM 8ML

## (undated) DEVICE — SLEEVE COMPR MD KNEE LEN SGL USE KENDALL SCD

## (undated) DEVICE — SUT PERMA- 3-0 30IN NABSRB BLK TIE SILK

## (undated) DEVICE — AV FISTULA PACK: Brand: MEDLINE INDUSTRIES, INC.

## (undated) DEVICE — ANTIBACTERIAL UNDYED BRAIDED (POLYGLACTIN 910), SYNTHETIC ABSORBABLE SUTURE: Brand: COATED VICRYL

## (undated) DEVICE — STERILE POLYISOPRENE POWDER-FREE SURGICAL GLOVES WITH EMOLLIENT COATING: Brand: PROTEXIS

## (undated) DEVICE — ADHESIVE SKIN TOP FOR WND CLSR DERMBND ADV

## (undated) DEVICE — 3M™ BAIR HUGGER® UNDERBODY BLANKET, FULL ACCESS, 10 PER CASE 63500: Brand: BAIR HUGGER™

## (undated) DEVICE — Device: Brand: INTELLICART™

## (undated) DEVICE — SUT PROL 5-0 36IN C-1 NABSRB BLU 13MM 3/8 CIR

## (undated) DEVICE — SLING ARM L L20IN D7IN DK BLU COT POLY WIDE

## (undated) DEVICE — PROXIMATE RH ROTATING HEAD SKIN STAPLERS (35 WIDE) CONTAINS 35 STAINLESS STEEL STAPLES: Brand: PROXIMATE

## (undated) DEVICE — SUT PERMA- 3-0 30IN SH NABSRB BLK 26MM 1/2

## (undated) NOTE — LETTER
[facility] [address], [address] [place] [address]  Consent for Procedure/Sedation  Date: 7/24/23         Time: 0800    I hereby authorize Dr Danita Varghese, my physician and his/her assistants (if applicable), which may include medical students, residents, and/or fellows, to perform the following surgical operation/ procedure and administer such anesthesia as may be determined necessary by my physician: Permanent Dialysis Catheter Insertion on [address]  2. I recognize that during the surgical operation/procedure, unforeseen conditions may necessitate additional or different procedures than those listed above. I, therefore, further authorize and request that the above-named surgeon, assistants, or designees perform such procedures as are, in their judgment, necessary and desirable. 3.   My surgeon/physician has discussed prior to my surgery the potential benefits, risks and side effects of this procedure; the likelihood of achieving goals; and potential problems that might occur during recuperation. They also discussed reasonable alternatives to the procedure, including risks, benefits, and side effects related to the alternatives and risks related to not receiving this procedure. I have had all my questions answered and I acknowledge that no guarantee has been made as to the result that may be obtained. 4.   Should the need arise during my operation/procedure, which includes change of level of care prior to discharge, I also consent to the administration of blood and/or blood products. Further, I understand that despite careful testing and screening of blood or blood products by collecting agencies, I may still be subject to ill effects as a result of receiving a blood transfusion and/or blood products.   The following are some, but not all, of the potential risks that can occur: fever and allergic reactions, hemolytic reactions, transmission of diseases such as Hepatitis, AIDS and Cytomegalovirus (CMV) and fluid overload. In the event that I wish to have an autologous transfusion of my own blood, or a directed donor transfusion, I will discuss this with my physician. Check only if Refusing Blood or Blood Products  I understand refusal of blood or blood products as deemed necessary by my physician may have serious consequences to my condition to include possible death. I hereby assume responsibility for my refusal and release the hospital, its personnel, and my physicians from any responsibility for the consequences of my refusal.         o  Refuse         5. I authorize the use of any specimen, organs, tissues, body parts or foreign objects that may be removed from my body during the operation/procedure for diagnosis, research or teaching purposes and their subsequent disposal by hospital authorities. I also authorize the release of specimen test results and/or written reports to my treating physician on the hospital medical staff or other referring or consulting physicians involved in my care, at the discretion of the Pathologist or my treating physician. 6.   I consent to the photographing or videotaping of the operations or procedures to be performed, including appropriate portions of my body for medical, scientific, or educational purposes, provided my identity is not revealed by the pictures or by descriptive texts accompanying them. If the procedure has been photographed/videotaped, the surgeon will obtain the original picture, image, videotape or CD. The hospital will not be responsible for storage, release or maintenance of the picture, image, tape or CD.    7.   I consent to the presence of a  or observers in the operating room as deemed necessary by my physician or their designees. 8.   I recognize that in the event my procedure results in extended X-Ray/fluoroscopy time, I may develop a skin reaction. 9.  If I have a Do Not Attempt Resuscitation (DNAR) order in place, that status will be suspended while in the operating room, procedural suite, and during the recovery period unless otherwise explicitly stated by me (or a person authorized to consent on my behalf). The surgeon or my attending physician will determine when the applicable recovery period ends for purposes of reinstating the DNAR order. 10. Patients having a sterilization procedure: I understand that if the procedure is successful the results will be permanent and it will therefore be impossible for me to inseminate, conceive, or bear children. I also understand that the procedure is intended to result in sterility, although the result has not been guaranteed. 11. I acknowledge that my physician has explained sedation/analgesia administration to me including the risk and benefits I consent to the administration of sedation/analgesia as may be necessary or desirable in the judgment of my physician.     I CERTIFY THAT I HAVE READ AND FULLY UNDERSTAND THE ABOVE CONSENT TO OPERATION and/or OTHER PROCEDURE.        ____________________________________       _________________________________      ______________________________  Signature of Patient         Signature of Responsible Person        Printed Name of Responsible Person        ____________________________________      _________________________________      ______________________________       Signature of Witness          Relationship to Patient                       Date                                       Time  Patient Name: Gary Schmitz     : 1970                 Printed: 2023      Medical Record #: [id number]                      Page 1 of 1

## (undated) NOTE — LETTER
[facility] [address], [address]  Consent for Procedure/Sedation    Date: 8/10/22    Time: 0850      1. I hereby authorize Dr. Lorena Conway, my physician and his/her assistants (if applicable), which may include medical students, residents, and/or fellows, to perform the following surgical operation/ procedure and administer such anesthesia as may be determined necessary by my physician:  Operation/Procedure name (s) Permanent hemodialysis catheter insertion on [facility]. 2.   I recognize that during the surgical operation/procedure, unforeseen conditions may necessitate additional or different procedures than those listed above. I, therefore, further authorize and request that the above-named surgeon, assistants, or designees perform such procedures as are, in their judgment, necessary and desirable. 3.   My surgeon/physician has discussed prior to my surgery the potential benefits, risks and side effects of this procedure; the likelihood of achieving goals; and potential problems that might occur during recuperation. They also discussed reasonable alternatives to the procedure, including risks, benefits, and side effects related to the alternatives and risks related to not receiving this procedure. I have had all my questions answered and I acknowledge that no guarantee has been made as to the result that may be obtained. 4.   Should the need arise during my operation or immediate post-operative period, I also consent to the administration of blood and/or blood products. Further, I understand that despite careful testing and screening of blood or blood products by collecting agencies, I may still be subject to ill effects as a result of receiving a blood transfusion and/or blood products.   The following are some, but not all, of the potential risks that can occur: fever and allergic reactions, hemolytic reactions, transmission of diseases such as Hepatitis, AIDS and Cytomegalovirus (CMV) and fluid overload. In the event that I wish to have an autologous transfusion of my own blood, or a directed donor transfusion. I will discuss this with my physician. 5.   I authorize the use of any specimen, organs, tissues, body parts or foreign objects that may be removed from my body during the operation/procedure for diagnosis, research or teaching purposes and their subsequent disposal by hospital authorities. I also authorize the release of specimen test results and/or written reports to my treating physician on the hospital medical staff or other referring or consulting physicians involved in my care, at the discretion of the Pathologist or my treating physician. 6.   I consent to the photographing or videotaping of the operations or procedures to be performed, including appropriate portions of my body for medical, scientific, or educational purposes, provided my identity is not revealed by the pictures or by descriptive texts accompanying them. If the procedure has been photographed/videotaped, the surgeon will obtain the original picture, image, videotape or CD. The hospital will not be responsible for storage, release or maintenance of the picture, image, tape or CD.    7.   I consent to the presence of a  or observers in the operating room as deemed necessary by my physician or their designees. 8.   I recognize that in the event my procedure results in extended X-Ray/fluoroscopy time, I may develop a skin reaction. 9. If I have a Do Not Attempt Resuscitation (DNAR) order in place, that status will be suspended while in the operating room, procedural suite, and during the recovery period unless otherwise explicitly stated by me (or a person authorized to consent on my behalf). The surgeon or my attending physician will determine when the applicable recovery period ends for purposes of reinstating the DNAR order. 10. Patients having a sterilization procedure:  I understand that if the procedure is successful the results will be permanent and it will therefore be impossible for me to inseminate, conceive, or bear children. I also understand that the procedure is intended to result in sterility, although the result has not been guaranteed. 11. I acknowledge that my physician has explained sedation/analgesia administration to me including the risk and benefits I consent to the administration of sedation/analgesia as may be necessary or desirable in the judgment of my physician.     I CERTIFY THAT I HAVE READ AND FULLY UNDERSTAND THE ABOVE CONSENT TO OPERATION and/or OTHER PROCEDURE.      _________________________________________  __________________________________  Signature of Patient     Signature of Responsible Person         ___________________________________         Printed Name of Responsible Person           _________________________________                 Relationship to Patient  _________________________________________  ______________________________  Signature of Witness          Date  Time      Patient Name: Francisco Polk     : 1970                 Printed: August 10, 2022     Medical Record #: [id number]                     Page 1

## (undated) NOTE — LETTER
[facility] [address], [address] [place] [address]  Consent for Procedure/Sedation    Date: 8/10/2022    Time: 1038      1. I hereby authorize Dr Shayy Menjivar, my physician and his/her assistants (if applicable), which may include medical students, residents, and/or fellows, to perform the following surgical operation/ procedure and administer such anesthesia as may be determined necessary by my physician:  Operation/Procedure name (s) Temporary Dialysis Catheter Insertion on [address]  2. I recognize that during the surgical operation/procedure, unforeseen conditions may necessitate additional or different procedures than those listed above. I, therefore, further authorize and request that the above-named surgeon, assistants, or designees perform such procedures as are, in their judgment, necessary and desirable. 3.   My surgeon/physician has discussed prior to my surgery the potential benefits, risks and side effects of this procedure; the likelihood of achieving goals; and potential problems that might occur during recuperation. They also discussed reasonable alternatives to the procedure, including risks, benefits, and side effects related to the alternatives and risks related to not receiving this procedure. I have had all my questions answered and I acknowledge that no guarantee has been made as to the result that may be obtained. 4.   Should the need arise during my operation or immediate post-operative period, I also consent to the administration of blood and/or blood products. Further, I understand that despite careful testing and screening of blood or blood products by collecting agencies, I may still be subject to ill effects as a result of receiving a blood transfusion and/or blood products.   The following are some, but not all, of the potential risks that can occur: fever and allergic reactions, hemolytic reactions, transmission of diseases such as Hepatitis, AIDS and Cytomegalovirus (CMV) and fluid overload. In the event that I wish to have an autologous transfusion of my own blood, or a directed donor transfusion. I will discuss this with my physician. 5.   I authorize the use of any specimen, organs, tissues, body parts or foreign objects that may be removed from my body during the operation/procedure for diagnosis, research or teaching purposes and their subsequent disposal by hospital authorities. I also authorize the release of specimen test results and/or written reports to my treating physician on the hospital medical staff or other referring or consulting physicians involved in my care, at the discretion of the Pathologist or my treating physician. 6.   I consent to the photographing or videotaping of the operations or procedures to be performed, including appropriate portions of my body for medical, scientific, or educational purposes, provided my identity is not revealed by the pictures or by descriptive texts accompanying them. If the procedure has been photographed/videotaped, the surgeon will obtain the original picture, image, videotape or CD. The hospital will not be responsible for storage, release or maintenance of the picture, image, tape or CD.    7.   I consent to the presence of a  or observers in the operating room as deemed necessary by my physician or their designees. 8.   I recognize that in the event my procedure results in extended X-Ray/fluoroscopy time, I may develop a skin reaction. 9. If I have a Do Not Attempt Resuscitation (DNAR) order in place, that status will be suspended while in the operating room, procedural suite, and during the recovery period unless otherwise explicitly stated by me (or a person authorized to consent on my behalf). The surgeon or my attending physician will determine when the applicable recovery period ends for purposes of reinstating the DNAR order. 10. Patients having a sterilization procedure:  I understand that if the procedure is successful the results will be permanent and it will therefore be impossible for me to inseminate, conceive, or bear children. I also understand that the procedure is intended to result in sterility, although the result has not been guaranteed. 11. I acknowledge that my physician has explained sedation/analgesia administration to me including the risk and benefits I consent to the administration of sedation/analgesia as may be necessary or desirable in the judgment of my physician.     I CERTIFY THAT I HAVE READ AND FULLY UNDERSTAND THE ABOVE CONSENT TO OPERATION and/or OTHER PROCEDURE.      _________________________________________  __________________________________  Signature of Patient     Signature of Responsible Person         ___________________________________         Printed Name of Responsible Person           _________________________________                 Relationship to Patient  _________________________________________  ______________________________  Signature of Witness          Date  Time      Patient Name: Vibha Majano     : 1970                 Printed: August 10, 2022     Medical Record #: [id number]                     Page 1

## (undated) NOTE — LETTER
[facility]  [id number]  Consent for Procedure/Sedation  Date: 7/17/23         Time: 12    I hereby authorize Dr. Ray Atwood, my physician and his/her assistants (if applicable), which may include medical students, residents, and/or fellows, to perform the following surgical operation/ procedure and administer such anesthesia as may be determined necessary by my physician: Temporary dialysis catheter insertion on Perla Lopez  2. I recognize that during the surgical operation/procedure, unforeseen conditions may necessitate additional or different procedures than those listed above. I, therefore, further authorize and request that the above-named surgeon, assistants, or designees perform such procedures as are, in their judgment, necessary and desirable. 3.   My surgeon/physician has discussed prior to my surgery the potential benefits, risks and side effects of this procedure; the likelihood of achieving goals; and potential problems that might occur during recuperation. They also discussed reasonable alternatives to the procedure, including risks, benefits, and side effects related to the alternatives and risks related to not receiving this procedure. I have had all my questions answered and I acknowledge that no guarantee has been made as to the result that may be obtained. 4.   Should the need arise during my operation/procedure, which includes change of level of care prior to discharge, I also consent to the administration of blood and/or blood products. Further, I understand that despite careful testing and screening of blood or blood products by collecting agencies, I may still be subject to ill effects as a result of receiving a blood transfusion and/or blood products.   The following are some, but not all, of the potential risks that can occur: fever and allergic reactions, hemolytic reactions, transmission of diseases such as Hepatitis, AIDS and Cytomegalovirus (CMV) and fluid overload. In the event that I wish to have an autologous transfusion of my own blood, or a directed donor transfusion, I will discuss this with my physician. Check only if Refusing Blood or Blood Products  I understand refusal of blood or blood products as deemed necessary by my physician may have serious consequences to my condition to include possible death. I hereby assume responsibility for my refusal and release the hospital, its personnel, and my physicians from any responsibility for the consequences of my refusal.         o  Refuse         5. I authorize the use of any specimen, organs, tissues, body parts or foreign objects that may be removed from my body during the operation/procedure for diagnosis, research or teaching purposes and their subsequent disposal by hospital authorities. I also authorize the release of specimen test results and/or written reports to my treating physician on the hospital medical staff or other referring or consulting physicians involved in my care, at the discretion of the Pathologist or my treating physician. 6.   I consent to the photographing or videotaping of the operations or procedures to be performed, including appropriate portions of my body for medical, scientific, or educational purposes, provided my identity is not revealed by the pictures or by descriptive texts accompanying them. If the procedure has been photographed/videotaped, the surgeon will obtain the original picture, image, videotape or CD. The hospital will not be responsible for storage, release or maintenance of the picture, image, tape or CD.    7.   I consent to the presence of a  or observers in the operating room as deemed necessary by my physician or their designees. 8.   I recognize that in the event my procedure results in extended X-Ray/fluoroscopy time, I may develop a skin reaction. 9.  If I have a Do Not Attempt Resuscitation (DNAR) order in place, that status will be suspended while in the operating room, procedural suite, and during the recovery period unless otherwise explicitly stated by me (or a person authorized to consent on my behalf). The surgeon or my attending physician will determine when the applicable recovery period ends for purposes of reinstating the DNAR order. 10. Patients having a sterilization procedure: I understand that if the procedure is successful the results will be permanent and it will therefore be impossible for me to inseminate, conceive, or bear children. I also understand that the procedure is intended to result in sterility, although the result has not been guaranteed. 11. I acknowledge that my physician has explained sedation/analgesia administration to me including the risk and benefits I consent to the administration of sedation/analgesia as may be necessary or desirable in the judgment of my physician.     I CERTIFY THAT I HAVE READ AND FULLY UNDERSTAND THE ABOVE CONSENT TO OPERATION and/or OTHER PROCEDURE.        ____________________________________       _________________________________      ______________________________  Signature of Patient         Signature of Responsible Person        Printed Name of Responsible Person        ____________________________________      _________________________________      ______________________________       Signature of Witness          Relationship to Patient                       Date                                       Time  Patient Name: Grady Canchola     : 1970                 Printed: 2023      Medical Record #: [id number]                      Page 1 of 1